# Patient Record
Sex: FEMALE | Race: WHITE | Employment: UNEMPLOYED | URBAN - METROPOLITAN AREA
[De-identification: names, ages, dates, MRNs, and addresses within clinical notes are randomized per-mention and may not be internally consistent; named-entity substitution may affect disease eponyms.]

---

## 2017-12-15 ENCOUNTER — HOSPITAL ENCOUNTER (INPATIENT)
Age: 22
LOS: 4 days | Discharge: HOME | DRG: 885 | End: 2017-12-19
Attending: PSYCHIATRY & NEUROLOGY | Admitting: PSYCHIATRY & NEUROLOGY
Payer: COMMERCIAL

## 2017-12-15 DIAGNOSIS — F31.9 BIPOLAR 1 DISORDER (HCC): Primary | ICD-10-CM

## 2017-12-15 LAB
ALBUMIN SERPL-MCNC: 4.7 G/DL (ref 3.9–4.9)
ALP BLD-CCNC: 45 U/L (ref 40–130)
ALT SERPL-CCNC: 14 U/L (ref 0–33)
AMPHETAMINE SCREEN, URINE: ABNORMAL
ANION GAP SERPL CALCULATED.3IONS-SCNC: 12 MEQ/L (ref 7–13)
AST SERPL-CCNC: 19 U/L (ref 0–35)
BACTERIA: NORMAL /HPF
BARBITURATE SCREEN URINE: ABNORMAL
BASOPHILS ABSOLUTE: 0.1 K/UL (ref 0–0.2)
BASOPHILS RELATIVE PERCENT: 1.2 %
BENZODIAZEPINE SCREEN, URINE: ABNORMAL
BILIRUB SERPL-MCNC: 0.2 MG/DL (ref 0–1.2)
BILIRUBIN URINE: NEGATIVE
BLOOD, URINE: NEGATIVE
BUN BLDV-MCNC: 7 MG/DL (ref 6–20)
CALCIUM SERPL-MCNC: 9.4 MG/DL (ref 8.6–10.2)
CANNABINOID SCREEN URINE: POSITIVE
CHLORIDE BLD-SCNC: 101 MEQ/L (ref 98–107)
CLARITY: CLEAR
CO2: 26 MEQ/L (ref 22–29)
COCAINE METABOLITE SCREEN URINE: ABNORMAL
COLOR: YELLOW
CREAT SERPL-MCNC: 0.75 MG/DL (ref 0.5–0.9)
EOSINOPHILS ABSOLUTE: 0.1 K/UL (ref 0–0.7)
EOSINOPHILS RELATIVE PERCENT: 0.5 %
EPITHELIAL CELLS, UA: NORMAL /HPF
ETHANOL PERCENT: NORMAL G/DL
ETHANOL: <10 MG/DL (ref 0–0.08)
GFR AFRICAN AMERICAN: >60
GFR NON-AFRICAN AMERICAN: >60
GLOBULIN: 2.4 G/DL (ref 2.3–3.5)
GLUCOSE BLD-MCNC: 101 MG/DL (ref 74–109)
GLUCOSE URINE: NEGATIVE MG/DL
HCG(URINE) PREGNANCY TEST: NEGATIVE
HCT VFR BLD CALC: 39.9 % (ref 37–47)
HEMOGLOBIN: 12.9 G/DL (ref 12–16)
KETONES, URINE: NEGATIVE MG/DL
LEUKOCYTE ESTERASE, URINE: ABNORMAL
LYMPHOCYTES ABSOLUTE: 2.2 K/UL (ref 1–4.8)
LYMPHOCYTES RELATIVE PERCENT: 20.2 %
Lab: ABNORMAL
MCH RBC QN AUTO: 28.6 PG (ref 27–31.3)
MCHC RBC AUTO-ENTMCNC: 32.4 % (ref 33–37)
MCV RBC AUTO: 88.1 FL (ref 82–100)
MONOCYTES ABSOLUTE: 0.5 K/UL (ref 0.2–0.8)
MONOCYTES RELATIVE PERCENT: 4.3 %
NEUTROPHILS ABSOLUTE: 8 K/UL (ref 1.4–6.5)
NEUTROPHILS RELATIVE PERCENT: 73.8 %
NITRITE, URINE: NEGATIVE
OPIATE SCREEN URINE: ABNORMAL
PDW BLD-RTO: 14.2 % (ref 11.5–14.5)
PH UA: 7 (ref 5–9)
PHENCYCLIDINE SCREEN URINE: ABNORMAL
PLATELET # BLD: 391 K/UL (ref 130–400)
POTASSIUM SERPL-SCNC: 3.8 MEQ/L (ref 3.5–5.1)
PROTEIN UA: NEGATIVE MG/DL
RBC # BLD: 4.53 M/UL (ref 4.2–5.4)
RBC UA: NORMAL /HPF (ref 0–2)
SODIUM BLD-SCNC: 139 MEQ/L (ref 132–144)
SPECIFIC GRAVITY UA: 1 (ref 1–1.03)
TOTAL CK: 143 U/L (ref 0–170)
TOTAL PROTEIN: 7.1 G/DL (ref 6.4–8.1)
TSH SERPL DL<=0.05 MIU/L-ACNC: 0.72 UIU/ML (ref 0.27–4.2)
URINE REFLEX TO CULTURE: YES
UROBILINOGEN, URINE: 0.2 E.U./DL
WBC # BLD: 10.8 K/UL (ref 4.8–10.8)
WBC UA: NORMAL /HPF (ref 0–5)

## 2017-12-15 PROCEDURE — 99285 EMERGENCY DEPT VISIT HI MDM: CPT

## 2017-12-15 PROCEDURE — 81001 URINALYSIS AUTO W/SCOPE: CPT

## 2017-12-15 PROCEDURE — 84703 CHORIONIC GONADOTROPIN ASSAY: CPT

## 2017-12-15 PROCEDURE — 82550 ASSAY OF CK (CPK): CPT

## 2017-12-15 PROCEDURE — 85025 COMPLETE CBC W/AUTO DIFF WBC: CPT

## 2017-12-15 PROCEDURE — 80307 DRUG TEST PRSMV CHEM ANLYZR: CPT

## 2017-12-15 PROCEDURE — 87077 CULTURE AEROBIC IDENTIFY: CPT

## 2017-12-15 PROCEDURE — 84443 ASSAY THYROID STIM HORMONE: CPT

## 2017-12-15 PROCEDURE — G0480 DRUG TEST DEF 1-7 CLASSES: HCPCS

## 2017-12-15 PROCEDURE — 87186 SC STD MICRODIL/AGAR DIL: CPT

## 2017-12-15 PROCEDURE — 80053 COMPREHEN METABOLIC PANEL: CPT

## 2017-12-15 PROCEDURE — 87086 URINE CULTURE/COLONY COUNT: CPT

## 2017-12-15 PROCEDURE — 1240000000 HC EMOTIONAL WELLNESS R&B

## 2017-12-15 PROCEDURE — 36415 COLL VENOUS BLD VENIPUNCTURE: CPT

## 2017-12-15 RX ORDER — CITALOPRAM 10 MG/1
10 TABLET ORAL DAILY
Status: DISCONTINUED | OUTPATIENT
Start: 2017-12-16 | End: 2017-12-16

## 2017-12-15 RX ORDER — CITALOPRAM 10 MG/1
10 TABLET ORAL DAILY
Status: ON HOLD | COMMUNITY
End: 2017-12-19 | Stop reason: HOSPADM

## 2017-12-15 RX ORDER — HALOPERIDOL 5 MG/ML
5 INJECTION INTRAMUSCULAR EVERY 6 HOURS PRN
Status: DISCONTINUED | OUTPATIENT
Start: 2017-12-15 | End: 2017-12-19 | Stop reason: HOSPADM

## 2017-12-15 RX ORDER — LAMOTRIGINE 150 MG/1
150 TABLET ORAL DAILY
Status: DISCONTINUED | OUTPATIENT
Start: 2017-12-16 | End: 2017-12-19

## 2017-12-15 RX ORDER — HYDROXYZINE PAMOATE 50 MG/1
50 CAPSULE ORAL EVERY 6 HOURS PRN
Status: DISCONTINUED | OUTPATIENT
Start: 2017-12-15 | End: 2017-12-19 | Stop reason: HOSPADM

## 2017-12-15 RX ORDER — ACETAMINOPHEN 325 MG/1
650 TABLET ORAL EVERY 4 HOURS PRN
Status: DISCONTINUED | OUTPATIENT
Start: 2017-12-15 | End: 2017-12-19 | Stop reason: HOSPADM

## 2017-12-15 RX ORDER — LAMOTRIGINE 150 MG/1
150 TABLET ORAL DAILY
Status: ON HOLD | COMMUNITY
End: 2021-01-03 | Stop reason: ALTCHOICE

## 2017-12-15 RX ORDER — HALOPERIDOL 5 MG
5 TABLET ORAL EVERY 6 HOURS PRN
Status: DISCONTINUED | OUTPATIENT
Start: 2017-12-15 | End: 2017-12-19 | Stop reason: HOSPADM

## 2017-12-15 RX ORDER — HYDROXYZINE HYDROCHLORIDE 50 MG/ML
50 INJECTION, SOLUTION INTRAMUSCULAR EVERY 6 HOURS PRN
Status: DISCONTINUED | OUTPATIENT
Start: 2017-12-15 | End: 2017-12-19 | Stop reason: HOSPADM

## 2017-12-15 RX ORDER — TRAZODONE HYDROCHLORIDE 50 MG/1
50 TABLET ORAL NIGHTLY PRN
Status: DISCONTINUED | OUTPATIENT
Start: 2017-12-16 | End: 2017-12-19 | Stop reason: HOSPADM

## 2017-12-15 ASSESSMENT — ENCOUNTER SYMPTOMS
VOMITING: 0
COLOR CHANGE: 0
RHINORRHEA: 0
BLOOD IN STOOL: 0
NAUSEA: 0
DIARRHEA: 0
ABDOMINAL PAIN: 0
SHORTNESS OF BREATH: 0
COUGH: 0

## 2017-12-16 PROBLEM — F31.9 BIPOLAR 1 DISORDER (HCC): Status: RESOLVED | Noted: 2017-12-15 | Resolved: 2017-12-16

## 2017-12-16 PROBLEM — F31.9 BIPOLAR DEPRESSION (HCC): Status: ACTIVE | Noted: 2017-12-16

## 2017-12-16 LAB
EKG ATRIAL RATE: 60 BPM
EKG P AXIS: 66 DEGREES
EKG P-R INTERVAL: 124 MS
EKG Q-T INTERVAL: 396 MS
EKG QRS DURATION: 102 MS
EKG QTC CALCULATION (BAZETT): 396 MS
EKG R AXIS: 72 DEGREES
EKG T AXIS: 54 DEGREES
EKG VENTRICULAR RATE: 60 BPM

## 2017-12-16 PROCEDURE — 1240000000 HC EMOTIONAL WELLNESS R&B

## 2017-12-16 PROCEDURE — 6370000000 HC RX 637 (ALT 250 FOR IP): Performed by: PSYCHIATRY & NEUROLOGY

## 2017-12-16 PROCEDURE — 93005 ELECTROCARDIOGRAM TRACING: CPT

## 2017-12-16 RX ORDER — NICOTINE 21 MG/24HR
1 PATCH, TRANSDERMAL 24 HOURS TRANSDERMAL DAILY
Status: DISCONTINUED | OUTPATIENT
Start: 2017-12-16 | End: 2017-12-16

## 2017-12-16 RX ADMIN — NICOTINE POLACRILEX 2 MG: 2 GUM, CHEWING BUCCAL at 08:48

## 2017-12-16 RX ADMIN — BREXPIPRAZOLE 1 MG: 1 TABLET ORAL at 14:09

## 2017-12-16 RX ADMIN — NICOTINE POLACRILEX 2 MG: 2 GUM, CHEWING BUCCAL at 14:16

## 2017-12-16 RX ADMIN — CITALOPRAM HYDROBROMIDE 10 MG: 10 TABLET, FILM COATED ORAL at 08:48

## 2017-12-16 RX ADMIN — HYDROXYZINE PAMOATE 50 MG: 50 CAPSULE ORAL at 19:11

## 2017-12-16 RX ADMIN — LAMOTRIGINE 150 MG: 150 TABLET ORAL at 08:48

## 2017-12-16 ASSESSMENT — SLEEP AND FATIGUE QUESTIONNAIRES
AVERAGE NUMBER OF SLEEP HOURS: 7
DO YOU USE A SLEEP AID: NO
DO YOU HAVE DIFFICULTY SLEEPING: NO
SLEEP PATTERN: NORMAL

## 2017-12-16 ASSESSMENT — PATIENT HEALTH QUESTIONNAIRE - PHQ9: SUM OF ALL RESPONSES TO PHQ QUESTIONS 1-9: 4

## 2017-12-16 NOTE — ED NOTES
Urine collected and sent to lab.  Lab notified of blood work orders     [facility]  12/15/17 1940

## 2017-12-16 NOTE — CONSULTS
Medications   Medication Dose Route Frequency Provider Last Rate Last Dose    nicotine polacrilex (NICORETTE) gum 2 mg  2 mg Oral PRN Renald Collet, MD   2 mg at 12/16/17 0848    citalopram (CELEXA) tablet 10 mg  10 mg Oral Daily Chase Ruelas MD   10 mg at 12/16/17 0848    lamoTRIgine (LAMICTAL) tablet 150 mg  150 mg Oral Daily Chase Ruelas MD   150 mg at 12/16/17 0848    acetaminophen (TYLENOL) tablet 650 mg  650 mg Oral Q4H PRN Chase Ruelas MD        magnesium hydroxide (MILK OF MAGNESIA) 400 MG/5ML suspension 30 mL  30 mL Oral Daily PRN Chase Ruelas MD        hydrOXYzine (VISTARIL) capsule 50 mg  50 mg Oral Q6H PRN Chase Ruelas MD        Or    hydrOXYzine (VISTARIL) injection 50 mg  50 mg Intramuscular Q6H PRN Chase Ruelas MD        haloperidol lactate (HALDOL) injection 5 mg  5 mg Intramuscular Q6H PRN Chase Ruelas MD        Or    haloperidol (HALDOL) tablet 5 mg  5 mg Oral Q6H PRN Chase Ruelas MD        traZODone (DESYREL) tablet 50 mg  50 mg Oral Nightly PRN Chase Ruelas MD           ALLERGIES: Penicillins    REVIEW OF SYSTEM:   ROS as noted in HPI, 12 point ROS reviewed and otherwise negative. OBJECTIVE  PHYSICAL EXAM: /69   Pulse 101   Temp 99 °F (37.2 °C) (Oral)   Resp 16   Ht 5' 3\" (1.6 m)   Wt 125 lb (56.7 kg)   SpO2 100%   BMI 22.14 kg/m²     General appearance:  No apparent distress, appears stated age and cooperative. HEENT:  Normal cephalic, atraumatic without obvious deformity. Pupils equal, round, and reactive to light. Extra ocular muscles intact. Conjunctivae/corneas clear. Neck: Supple, with full range of motion. No jugular venous distention. Trachea midline. Respiratory:  Normal respiratory effort. Clear to auscultation, bilaterally without Rales/Wheezes/Rhonchi. Cardiovascular:  Regular rate and rhythm with normal S1/S2 without murmurs, rubs or gallops.   Abdomen: Soft, non-tender, non-distended with normal bowel sounds. Musculoskeletal:  No clubbing, cyanosis or edema bilaterally. Full range of motion without deformity. Skin: Skin color, texture, turgor normal.  No rashes or lesions. Neurologic:  Neurovascularly intact without any focal sensory/motor deficits. Cranial nerves: II-XII intact, grossly non-focal.  Psychiatric:  Alert and oriented, thought content appropriate, normal insight  Capillary Refill: Brisk,< 3 seconds   Peripheral Pulses: +2 palpable, equal bilaterally     DATA:     Diagnostic tests reviewed for today's visit:    Most recent labs and imaging results reviewed. ASSESSMENT AND PLAN  Patient Active Hospital Problem List:   Bipolar 1 disorder ([facility] [address] (12/15/2017)    Assessment:     Plan:     General adult medical exam without abnormal findings        This is only a history and physical examination and not medical management. The patient is to contact and follow up with their primary care physician and go over any abnormal labs, imaging, findings, medical concerns, or conditions that we have and have not addressed during this encounter.     Plan of care discussed with: patient    SIGNATURE: Jovanni Markham CNP  DATE: December 16, 2017  TIME: 10:46 AM

## 2017-12-16 NOTE — PROGRESS NOTES
Pt arrived to unit with [facility]FFILIATE OF [facility] staff. Pt taken to shower ada room searched in presence of green team RN and SELIN RN. A cell phone and no other contraband was found. Cell phone secured and placed with belongings.

## 2017-12-16 NOTE — PROGRESS NOTES
Pt arrived to unit. Skin assessment and contraband check complete with this nurse and Jens Hilliard. Pt agreeable. Skin intact. Pt's cell phone found in her sock. Explained to pt that she can not have her cell phone on the unit. Pt agreeable, gave staff cell phone. Cell phone placed with pt's belongings. Pt oriented to unit and assigned room.  Electronically signed by Digna Murray RN on 12/15/17 at 10:53 PM

## 2017-12-16 NOTE — PROGRESS NOTES
Report called by Jacquie Ray RN - SELIN. Pt is 21y/o white female. Involuntary admit of Dr. Deloris Amin with diagnosis of Bipolar. Pt reports having rapid and intense mood swings, sees [facility] weekly. Pt reports life full of stress. Pt reportedly grabbed meds to OD today, BF took meds and pt to see counselor who referred to ER and followed pt and BF here. Pt denies any current suicidal ideation, homicidal ideation or hallucinations. BF reports he does not feel that the pt can be safe  .      Med history = as charted  Surgical History = as charted  Psych History =bipolar open at [facility] =  none

## 2017-12-16 NOTE — PROGRESS NOTES
Pt  requested to sign voluntary and did so. Pt at first was very calm and mood seemed stable. After assessment, pedro was seen on phone crying. Pt reports she was talking with her therapist and just feels overwhelmed right now. Pt disappointed that she wont have visitors. Refused lunch. Isolated to room sitting on floor. Encouraged to come out.  Pt does contract for safety while on unit

## 2017-12-16 NOTE — ED PROVIDER NOTES
probability of clinically significant/life threatening deterioration in the patient's condition which required my urgent intervention. Procedures    FINAL IMPRESSION      1. Bipolar 1 disorder ([facility] [address]          DISPOSITION/PLAN   DISPOSITION Admitted    PATIENT REFERRED TO:  No follow-up provider specified. DISCHARGE MEDICATIONS:  New Prescriptions    No medications on file          (Please note that portions of this note were completed with a voice recognition program.  Efforts were made to edit the dictations but occasionally words are mis-transcribed. )    THOMPSON Marques (electronically signed)  Emergency Physician [address]  12/15/17 4019

## 2017-12-16 NOTE — PROGRESS NOTES
Pt. refused to attend the 1000 skills group, despite staff encouragement. Electronically signed by Braden Watt, [address] on 12/16/2017 at 12:09 PM

## 2017-12-16 NOTE — ED TRIAGE NOTES
Pt dropped off by boyfriend to ED and her therapist followed them to ED. Pt states things were crazy at home so her boyfriend called her therapist. Pt saw her therapist today and was concerned and advised that she go to ED for an eval. Pt sees a therapist, Brina Crawley at [facility]. Pt has been seeing her weekly since May 2017.

## 2017-12-16 NOTE — PROGRESS NOTES
Pt. refused to attend the 0900 community meeting due to resting in room, despite staff encouragement. Electronically signed by Irina Do, [address] on 12/16/2017 at 10:55 AM

## 2017-12-16 NOTE — H&P
Department of Psychiatry  History and Physical - Adult     CHIEF COMPLAINT:  Depression, SI    History obtained from:  patient    Patient was seen after discussing with the treatment team and reviewing the chart    HISTORY OF PRESENT ILLNESS:    The patient is a 25 y.o. female with significant past history of  Bipolar disorder    Pt was transported to ED by her boyfriend and her [address]  therapist, Alessia MORALES followed behind them. Pt states she got into an argument with her boyfriend,she complains he doesn't listen. Pt complains she was feeling very frustrated today from work and there is a lot of family drama. Pt states she was feeling very intense. Pt admits she stated \"I'm just done\" and she went to grab her pills in attempt to OD. The boyfriend snatched up all her pills and called her therapist who was able to see her. Pt states that she is just \"tangled up in life stress\". Pt lives with boyfriend, Jeovany Rivera and his 3 kids  (1, 8, 7 yo). Pt states in general there are no relationship problems. Pt reports she works full-time at [employer]. Pt sees her [address]  therapist on a weekly basis since May 2017. Her psychiatrist is at [facility].   Pt states she has been on Lamictal only 3 months and Celexa for 1 month. Collateral Information obtained from boyfriend and therapist. Boyfriend believes she needs a medication adjustment. BF thinks something is not right for the past 3 months with her mood and it has gotten intense this past week. BF complains pt is having mood swings and easily agitated. BF does not feel comfortable taking her back home because there are children at home. Therapist agrees that her mood swings have become more intense and she is usually able to cope better. Therapist states that they have a safety plan written that she has not adhered to.  Therapist is concerned that her behavior today was an attempt of self-harm which her behavior/thoughts have never gotten this intense in a long time. Therapist agrees that pt needs a medication adjustment. Stressors:relationship issue    The patient is currently receiving care for the above psychiatric illness. Medications Prior to Admission:   Prescriptions Prior to Admission: lamoTRIgine (LAMICTAL) 150 MG tablet, Take 150 mg by mouth daily  citalopram (CELEXA) 10 MG tablet, Take 10 mg by mouth daily     Compliance:yes    Psychiatric Review of Systems       Depression: yes     Saranya or Hypomania:  yes -     Panic Attacks:  no     Phobias:  no     Obsessions and Compulsions:  no     PTSD : no     Hallucinations:  no     Delusions:  no    Substance Abuse History:  ETOH: no  Marijuana: yes  Opiates: no  Other Drugs: no    Past Psychiatric History:  Prior Diagnosis:  Bipolar II disorder; depressed episode  Psychiatrist: kelly  Therapist:yes  Hospitalization: yes 2013  Hx of Suicidal Attempts: yes  Hx of violence:  no  ECT: no  Previous discontinued Psychiatric Med Trials: not recall    Past Medical History:        Diagnosis Date    Migraine     Varicella        Past Surgical History:    History reviewed. No pertinent surgical history.     Allergies:   Penicillins    Family History  Family History   Problem Relation Age of Onset    Cancer Other      brain, breast, lung    Coronary Art Dis Other     High Blood Pressure Brother     Obesity Brother     Cancer Maternal Grandmother     Allergies Maternal Grandmother     Colon Cancer Maternal Grandmother     Breast Cancer Maternal Grandmother     Ovarian Cancer Maternal Grandmother     Other Maternal Grandmother      eye cancer, loss of left eye    Cancer Maternal Grandfather      lung    Heart Disease Maternal Grandfather     Uterine Cancer Mother     Ovarian Cancer Mother     Breast Cancer Mother     Cancer Mother      behind right eye    Heart Attack Mother     Migraines Mother     Cancer Maternal Aunt      lung Recent Labs      12/15/17   1948   BILITOT  0.2   ALKPHOS  45   AST  19   ALT  14     Lab Results   Component Value Date    LABAMPH Neg 12/15/2017    BARBSCNU Neg 12/15/2017    LABBENZ Neg 12/15/2017    LABBENZ NT DTCD 03/01/2013    OPIATESCREENURINE Neg 12/15/2017    PHENCYCLIDINESCREENURINE Neg 12/15/2017    ETOH <10 12/15/2017     Lab Results   Component Value Date    TSH 0.721 12/15/2017     No results found for: LITHIUM  No results found for: VALPROATE, CBMZ  No results found for: LITHIUM, VALPROATE    Radiology  No results found. TREATMENT PLAN:    Risk Management:  close watch and suicide risk    Collateral Information:  Will obtain collateral information from the family or friends. Will obtain medical records as appropriate from out patient providers  Will consult the hospitalist for a physical exam to rule out any co-morbid physical condition. Medications:    See orders    Prn Haldol 5mg and Vistaril 50mg q6hr for extreme agitation. Discussed with the patient risk, benefit, alternative and common side effects for the  proposed medication treatment. Patient is consenting to the treatment. Psychotherapy:   Encourage participation in milieu and group therapy  Individual therapy as needed      GENERAL PATIENT/FAMILY EDUCATION    Goals:    Patient will understand basic signs and symptoms  Patient will understand their role in recovery          Behavioral Services  Medicare Certification      Admission Day 1  I certify that this patient's inpatient psychiatric hospital admission is medically necessary for:     (1) treatment which could reasonably be expected to improve this patient's condition, or     (2) diagnostic study or its equivalent.        Electronically signed by Duke Schofield MD on 12/16/2017 at 10:55 AM

## 2017-12-16 NOTE — ED NOTES
Provisional Diagnosis:   Bipolar Disorder    Psychosocial and Contextual Factors:   Pt was transported to ED by her boyfriend and her [place] therapist, Charanjit MORALES followed behind them. Pt states she got into an argument with her boyfriend today,she complains he doesn't listen. Pt complains she was feeling very frustrated today from work and there is a lot of family drama. Pt states she was feeling very intense. Pt admits she stated \"I'm just done\" and she went to grab her pills in attempt to OD. The boyfriend snatched up all her pills and called her therapist who was able to see her today. Pt states that she is just \"tangled up in life stress\". Pt lives with boyfriend, Trever Stanley and his 3 kids  (1, 8, 7 yo). Pt states in general there are no relationship problems. Pt reports she works full-time at [employer]. Pt sees her [place] therapist on a weekly basis since May 2017. Her psychiatrist is at [facility]. Pt states she has been on Lamictal only 3 months and Celexa for 1 month. Collateral Information obtained from boyfriend and therapist. Boyfriend believes she needs a medication adjustment. BF thinks something is not right for the past 3 months with her mood and it has gotten intense this past week. BF complains pt is having mood swings and easily agitated. BF does not feel comfortable taking her back home because there are children at home. Therapist agrees that her mood swings have become more intense and she is usually able to cope better. Therapist states that they have a safety plan written that she has not adhered to. Therapist is concerned that her behavior today was an attempt of self-harm which her behavior/thoughts have never gotten this intense in a long time. Therapist agrees that pt needs a medication adjustment.     C-SSRS Summary:  yes    Patient: pt currently denies  Family: boyfriend admits yes  Agency: therapist admits yes      Present Suicidal Behavior:  yes    Verbal: none    Attempt:pt attempted to grab her pills but bf snatched them out of her hand    Past Suicidal Behavior: pt admits past history    Verbal:none    Attempt:2012 cutting and took OD of Nyquil      Self-Injurious/Self-Mutilation:pt admits to cutting 6 months ago with razor upper thigh before then was 2 yrs ago    Trauma Identified:  None reported      Protective Factors:    Pt lives with boyfriend been together for 3 yrs. Pt has support and established rapport at [facility] with Prescribing Provider and therapist. Pt is employed full-time and enjoys work. Reports med complaince    Risk Factors:    Pt lacks insight into her illness and not motivated for treatment wanting to follow-up outpatient. Pt uses marijuana as a coping skill to calm her down    Clinical Summary:    See above Pt cooperative and pleasant. No psychosis noted. Thought process and content intact.        Level of Care Disposition:    TO be determined by physician    Insurance Precertification Authorization:  N/A       Grace Shea RN  12/15/17 9721

## 2017-12-17 PROCEDURE — 6370000000 HC RX 637 (ALT 250 FOR IP): Performed by: PSYCHIATRY & NEUROLOGY

## 2017-12-17 PROCEDURE — 1240000000 HC EMOTIONAL WELLNESS R&B

## 2017-12-17 RX ADMIN — TRAZODONE HYDROCHLORIDE 50 MG: 50 TABLET ORAL at 21:49

## 2017-12-17 RX ADMIN — NICOTINE POLACRILEX 2 MG: 2 GUM, CHEWING BUCCAL at 08:32

## 2017-12-17 RX ADMIN — BREXPIPRAZOLE 1 MG: 1 TABLET ORAL at 08:28

## 2017-12-17 RX ADMIN — NICOTINE POLACRILEX 2 MG: 2 GUM, CHEWING BUCCAL at 13:26

## 2017-12-17 RX ADMIN — LAMOTRIGINE 150 MG: 150 TABLET ORAL at 08:28

## 2017-12-17 RX ADMIN — NICOTINE POLACRILEX 2 MG: 2 GUM, CHEWING BUCCAL at 18:56

## 2017-12-17 ASSESSMENT — LIFESTYLE VARIABLES: HISTORY_ALCOHOL_USE: NO

## 2017-12-17 NOTE — CARE COORDINATION
BHI Biopsychosocial Assessment    Current Level of Psychosocial Functioning     Independent x  Dependent    Minimal Assist     Comments:  Patient works and lives with her boyfriend. They take care of his three children together. Patient does not receive any government assistance. She works and is provided employer sponsored benefits. Psychosocial High Risk Factors (check all that apply)    Unable to obtain meds   Chronic illness/pain    Substance abuse   Lack of Family Support   Financial stress   Isolation   Inadequate Community Resources  Suicide attempt(s)  Not taking medications   Victim of crime   Developmental Delay  Unable to manage personal needs    Age 72 or older   Homeless  No transportation   Readmission within 30 days  Unemployment  Traumatic Event    Comments: Patient did not indicate that she is experiencing any high risk psychosocial stressors. Psychiatric Advanced Directives: None Reported. Family to Involve in Treatment: Patient provided her boyfriends information to complete collateral contact. Sexual Orientation:  Patient is in a heterosexual relationship. Patient Strengths: Patient is cooperative and engaging. Patient Barriers: None Identified. Opiate Education Provided:  N/A    CMHC/mental health history: Patient currently receives psychiatric and counseling services at [facility]. Plan of Care   medication management, group/individual therapies, family meetings, psycho -education, treatment team meetings to assist with stabilization    Initial Discharge Plan:  Patient will return to her boyfriend's home, continue with [facility] and follow the recommendations of the treatment team.      Clinical Summary:    Patient is a 25year old female who was admitted to the [facility] due to suicidal ideation. Patient was cooperative and engaging due the assessment. She voiced her concern about her medication being ineffective prior to this hospitalization.  Patient is convinced

## 2017-12-17 NOTE — PROGRESS NOTES
Pt reports feeling clearer today. Had a good night sleep. Pt will try to eat today. Reports it is normal for her to only eat once a day. Out and social. Denies all. Enjoyed her visits yesterday. More resigned to being here.

## 2017-12-17 NOTE — PROGRESS NOTES
Patient denies SI, HI, and AVH. Denies depression and anxiety. Patient brighter today, feels medications are working well for her already. Visible on unit, social with peers. Polite and cooperative with staff.  Electronically signed by Guillermina Juarez LPN on 71/83/6830 at 5:30 PM

## 2017-12-18 PROBLEM — B96.20 E. COLI UTI: Status: ACTIVE | Noted: 2017-12-18

## 2017-12-18 PROBLEM — N39.0 E. COLI UTI: Status: ACTIVE | Noted: 2017-12-18

## 2017-12-18 LAB
ORGANISM: ABNORMAL
URINE CULTURE, ROUTINE: ABNORMAL
URINE CULTURE, ROUTINE: ABNORMAL

## 2017-12-18 PROCEDURE — 6370000000 HC RX 637 (ALT 250 FOR IP): Performed by: INTERNAL MEDICINE

## 2017-12-18 PROCEDURE — 6370000000 HC RX 637 (ALT 250 FOR IP): Performed by: PSYCHIATRY & NEUROLOGY

## 2017-12-18 PROCEDURE — 99232 SBSQ HOSP IP/OBS MODERATE 35: CPT | Performed by: PSYCHIATRY & NEUROLOGY

## 2017-12-18 PROCEDURE — 1240000000 HC EMOTIONAL WELLNESS R&B

## 2017-12-18 RX ORDER — CIPROFLOXACIN 500 MG/1
250 TABLET, FILM COATED ORAL EVERY 12 HOURS SCHEDULED
Status: DISCONTINUED | OUTPATIENT
Start: 2017-12-18 | End: 2017-12-19 | Stop reason: HOSPADM

## 2017-12-18 RX ORDER — ACETAMINOPHEN 80 MG
TABLET,CHEWABLE ORAL ONCE
Status: DISCONTINUED | OUTPATIENT
Start: 2017-12-18 | End: 2017-12-19 | Stop reason: HOSPADM

## 2017-12-18 RX ORDER — CIPROFLOXACIN 500 MG/1
500 TABLET, FILM COATED ORAL EVERY 12 HOURS SCHEDULED
Status: DISCONTINUED | OUTPATIENT
Start: 2017-12-18 | End: 2017-12-18

## 2017-12-18 RX ADMIN — TRAZODONE HYDROCHLORIDE 50 MG: 50 TABLET ORAL at 20:58

## 2017-12-18 RX ADMIN — NICOTINE POLACRILEX 2 MG: 2 GUM, CHEWING BUCCAL at 18:00

## 2017-12-18 RX ADMIN — LAMOTRIGINE 150 MG: 150 TABLET ORAL at 08:22

## 2017-12-18 RX ADMIN — BREXPIPRAZOLE 1 MG: 1 TABLET ORAL at 08:22

## 2017-12-18 RX ADMIN — CIPROFLOXACIN HYDROCHLORIDE 250 MG: 500 TABLET, FILM COATED ORAL at 20:57

## 2017-12-18 RX ADMIN — HYDROXYZINE PAMOATE 50 MG: 50 CAPSULE ORAL at 14:45

## 2017-12-18 RX ADMIN — NICOTINE POLACRILEX 2 MG: 2 GUM, CHEWING BUCCAL at 12:41

## 2017-12-18 RX ADMIN — NICOTINE POLACRILEX 2 MG: 2 GUM, CHEWING BUCCAL at 09:17

## 2017-12-18 NOTE — PLAN OF CARE
Problem: Altered Mood, Depressive Behavior  Goal: LTG-Able to verbalize acceptance of life and situations over which he or she has no control  Outcome: Met This Shift    Goal: LTG-Able to verbalize and/or display a decrease in depressive symptoms  Outcome: Met This Shift    Goal: STG-Able to verbalize suicidal ideations  Outcome: Not Met This Shift  Pt denies SI    Goal: STG-Able to verbalize support system  Outcome: Met This Shift    Goal: LTG-Absence of self-harm  Outcome: Met This Shift    Goal: STG-Knowledge of positive coping patterns  Outcome: Met This Shift    Goal: Patient Specific Goal  Outcome: Met This Shift    Goal: Participation in care planning  Outcome: Met This Shift      Problem: Suicide risk  Goal: Provide patient with safe environment  Provide patient with safe environment   Outcome: Met This Shift

## 2017-12-18 NOTE — PROGRESS NOTES
Group Therapy Note    Date: 12/18/2017  Start Time: 1100  End Time:  0687  Number of Participants: 5    Type of Group: Psychoeducation    Wellness Binder Information  Module Name:    Session Number:      Patient's Goal:  \"to go home\"    Notes:  Pt. attended the skill group. Slightly elevated. [place] engaged in the group discussion. Very helpful to other pts. Status After Intervention:  Unchanged    Participation Level:  Active Listener, Interactive and Monopolizing    Participation Quality: Appropriate, Attentive, Sharing, Supportive and Intrusive      Speech:  normal      Thought Process/Content: Logical      Affective Functioning: Congruent      Mood: elevated      Level of consciousness:  Alert, Oriented x4 and Attentive      Response to Learning: Progressing to goal      Endings: None Reported    Modes of Intervention: Education, Support, Socialization and Activity      Discipline Responsible: Psychoeducational Specialist      Signature:  Hetal Ring

## 2017-12-18 NOTE — CARE COORDINATION
Group Therapy Note    Date: 12/18/2017  Start Time: 1330  End Time:  9476    Number of Participants:6    Type of Group: Psychotherapy    Patient's Goal:  To spend time on interpersonal concerns. Notes:  Patient was verbal about her own concerns and supportive of others. Status After Intervention:  Improved    Participation Level: Active Listener    Participation Quality: Appropriate      Speech:  normal      Thought Process/Content: Logical      Affective Functioning: Congruent      Mood: elevated      Level of consciousness:  Alert      Response to Learning: Able to verbalize current knowledge/experience      Endings: None Reported    Modes of Intervention: Education      Discipline Responsible: /Counselor      Signature:   ASHANTI Faulkner

## 2017-12-18 NOTE — PLAN OF CARE
Problem: Altered Mood, Depressive Behavior  Goal: LTG-Able to verbalize acceptance of life and situations over which he or she has no control  Outcome: Ongoing    Goal: LTG-Able to verbalize and/or display a decrease in depressive symptoms  Outcome: Ongoing    Goal: STG-Able to verbalize suicidal ideations  Outcome: Ongoing    Goal: STG-Able to verbalize support system  Outcome: Ongoing    Goal: LTG-Absence of self-harm  Outcome: Ongoing    Goal: STG-Knowledge of positive coping patterns  Outcome: Ongoing    Goal: Patient Specific Goal  Outcome: Ongoing    Goal: Participation in care planning  Outcome: Ongoing

## 2017-12-18 NOTE — PROGRESS NOTES
[facility] FOLLOW-UP NOTE     12/18/2017     Patient was seen and examined in person, Chart reviewed   Patient's case discussed with staff/team    Chief Complaint: depression    Interim History:   Pt doing better  Mood less depressed or mood swings  Tolerating medication. Less hopeless and worthless    Appetite:   [x] Normal/Unchanged  [] Increased  [] Decreased      Sleep:       [] Normal/Unchanged  [x] Fair       [] Poor              Energy:    [] Normal/Unchanged  [] Increased  [x] Decreased        SI [] Present  [x] Absent    HI  []Present  [x] Absent     Aggression:  [] yes  [x] no    Patient is [x] able  [] unable to CONTRACT FOR SAFETY     PAST MEDICAL/PSYCHIATRIC HISTORY:   Past Medical History:   Diagnosis Date    Migraine     Varicella        FAMILY/SOCIAL HISTORY:  Family History   Problem Relation Age of Onset    Cancer Other      brain, breast, lung    Coronary Art Dis Other     High Blood Pressure Brother     Obesity Brother     Cancer Maternal Grandmother     Allergies Maternal Grandmother     Colon Cancer Maternal Grandmother     Breast Cancer Maternal Grandmother     Ovarian Cancer Maternal Grandmother     Other Maternal Grandmother      eye cancer, loss of left eye    Cancer Maternal Grandfather      lung    Heart Disease Maternal Grandfather     Uterine Cancer Mother     Ovarian Cancer Mother     Breast Cancer Mother     Cancer Mother      behind right eye    Heart Attack Mother     Migraines Mother     Cancer Maternal Aunt      lung     Social History     Social History    Marital status: Single     Spouse name: N/A    Number of children: N/A    Years of education: N/A     Occupational History    Not on file.      Social History Main Topics    Smoking status: Current Every Day Smoker     Packs/day: 0.50     Types: Cigarettes    Smokeless tobacco: Never Used    Alcohol use No    Drug use: Yes     Types: Marijuana    Sexual activity: Not on file     Other Topics Concern    Not on file     Social History Narrative    No narrative on file           ROS:  [x] All negative/unchanged except if checked.  Explain positive(checked items) below:  [] Constitutional  [] Eyes  [] Ear/Nose/Mouth/Throat  [] Respiratory  [] CV  [] GI  []   [] Musculoskeletal  [] Skin/Breast  [] Neurological  [] Endocrine  [] Heme/Lymph  [] Allergic/Immunologic    Explanation:     MEDICATIONS:    Current Facility-Administered Medications:     nicotine polacrilex (NICORETTE) gum 2 mg, 2 mg, Oral, PRN, Shania Oliva MD, 2 mg at 12/18/17 0917    brexpiprazole (REXULTI) tablet 1 mg, 1 mg, Oral, Daily, Shania Oliva MD, 1 mg at 12/18/17 9597    lamoTRIgine (LAMICTAL) tablet 150 mg, 150 mg, Oral, Daily, Shay Murray MD, 150 mg at 12/18/17 4682    acetaminophen (TYLENOL) tablet 650 mg, 650 mg, Oral, Q4H PRN, Shay Murray MD    magnesium hydroxide (MILK OF MAGNESIA) 400 MG/5ML suspension 30 mL, 30 mL, Oral, Daily PRN, Shay Murray MD    hydrOXYzine (VISTARIL) capsule 50 mg, 50 mg, Oral, Q6H PRN, 50 mg at 12/16/17 1911 **OR** hydrOXYzine (VISTARIL) injection 50 mg, 50 mg, Intramuscular, Q6H PRN, Shay Murray MD    haloperidol lactate (HALDOL) injection 5 mg, 5 mg, Intramuscular, Q6H PRN **OR** haloperidol (HALDOL) tablet 5 mg, 5 mg, Oral, Q6H PRN, Shay Murray MD    traZODone (DESYREL) tablet 50 mg, 50 mg, Oral, Nightly PRN, Shay Murray MD, 50 mg at 12/17/17 0307      Examination:  /70   Pulse 80   Temp 98 °F (36.7 °C)   Resp 16   Ht 5' 3\" (1.6 m)   Wt 125 lb (56.7 kg)   SpO2 98%   BMI 22.14 kg/m²   Gait - steady  Medication side effects(SE): no    Mental Status Examination:    Level of consciousness:  within normal limits   Appearance:  fair grooming and fair hygiene  Behavior/Motor:  no abnormalities noted  Attitude toward examiner:  cooperative  Speech:  spontaneous and normal rate   Mood: dysthymic  Affect:  mood congruent  Thought processes:  linear and goal directed   Thought content:  Suicidal Ideation:  denies suicidal ideation  Delusions:  no evidence of delusions  Perceptual Disturbance:  denies any perceptual disturbance  Cognition:  oriented to person, place, and time   Concentration intact  Insight fair   Judgement fair     ASSESSMENT:   Patient symptoms are:  [] Well controlled  [] Improving  [] Worsening  [x] No change      Diagnosis:   Principal Problem:    Bipolar depression ([facility] [address]  Active Problems:    Anxiety disorder  UTI    LABS:    Recent Labs      12/15/17   1948   WBC  10.8   HGB  12.9   PLT  391     Recent Labs      12/15/17   1948   NA  139   K  3.8   CL  101   CO2  26   BUN  7   CREATININE  0.75   GLUCOSE  101     Recent Labs      12/15/17   1948   BILITOT  0.2   ALKPHOS  45   AST  19   ALT  14     Lab Results   Component Value Date    LABAMPH Neg 12/15/2017    BARBSCNU Neg 12/15/2017    LABBENZ Neg 12/15/2017    LABBENZ NT DTCD 03/01/2013    OPIATESCREENURINE Neg 12/15/2017    PHENCYCLIDINESCREENURINE Neg 12/15/2017    ETOH <10 12/15/2017     Lab Results   Component Value Date    TSH 0.721 12/15/2017     No results found for: LITHIUM  No results found for: VALPROATE, CBMZ      Treatment Plan:  Reviewed current Medications with the patient. Increase rexulti 2 mg po q daily  Pt Urine culture - positive for E Coli. Hospitalist to be consulted  Risks, benefits, side effects, drug-to-drug interactions and alternatives to treatment were discussed. Collateral information  CD evaluation  Encourage patient to attend group and other milieu activities.   Discharge planning discussed with the patient and treatment team.    PSYCHOTHERAPY/COUNSELING:  [x] Therapeutic interview  [x] Supportive  [] CBT  [] Ongoing  [] Other  Patient was seen 1:1 for 20 minutes, other than E&M time spent, focusing on      - coping skills techniques     - Anxiety management techniques discussed including deep breathing exercise and PMR     - discussing patients strength and weakness        [x] Patient continues to need, on a daily basis, active treatment furnished directly by or requiring the supervision of inpatient psychiatric personnel      Anticipated Length of stay:            Electronically signed by Rhonda Quarles MD on 12/18/2017 at 11:20 AM

## 2017-12-19 VITALS
WEIGHT: 125 LBS | HEIGHT: 63 IN | BODY MASS INDEX: 22.15 KG/M2 | DIASTOLIC BLOOD PRESSURE: 82 MMHG | HEART RATE: 111 BPM | RESPIRATION RATE: 18 BRPM | OXYGEN SATURATION: 97 % | TEMPERATURE: 99.3 F | SYSTOLIC BLOOD PRESSURE: 118 MMHG

## 2017-12-19 PROCEDURE — 6370000000 HC RX 637 (ALT 250 FOR IP): Performed by: PSYCHIATRY & NEUROLOGY

## 2017-12-19 PROCEDURE — 6370000000 HC RX 637 (ALT 250 FOR IP): Performed by: INTERNAL MEDICINE

## 2017-12-19 PROCEDURE — 99239 HOSP IP/OBS DSCHRG MGMT >30: CPT | Performed by: PSYCHIATRY & NEUROLOGY

## 2017-12-19 RX ORDER — LAMOTRIGINE 25 MG/1
25 TABLET ORAL DAILY
Qty: 30 TABLET | Refills: 0 | Status: SHIPPED | OUTPATIENT
Start: 2017-12-19 | End: 2019-03-13

## 2017-12-19 RX ORDER — CIPROFLOXACIN 250 MG/1
250 TABLET, FILM COATED ORAL EVERY 12 HOURS SCHEDULED
Qty: 18 TABLET | Refills: 0 | Status: SHIPPED | OUTPATIENT
Start: 2017-12-19 | End: 2017-12-29

## 2017-12-19 RX ADMIN — CIPROFLOXACIN HYDROCHLORIDE 250 MG: 500 TABLET, FILM COATED ORAL at 07:53

## 2017-12-19 RX ADMIN — NICOTINE POLACRILEX 2 MG: 2 GUM, CHEWING BUCCAL at 12:21

## 2017-12-19 RX ADMIN — BREXPIPRAZOLE 1 MG: 1 TABLET ORAL at 07:53

## 2017-12-19 RX ADMIN — NICOTINE POLACRILEX 2 MG: 2 GUM, CHEWING BUCCAL at 08:44

## 2017-12-19 RX ADMIN — LAMOTRIGINE 150 MG: 150 TABLET ORAL at 07:54

## 2017-12-19 RX ADMIN — BREXPIPRAZOLE 1 MG: 1 TABLET ORAL at 12:08

## 2017-12-19 NOTE — DISCHARGE SUMMARY
behind right eye    Heart Attack Mother     Migraines Mother     Cancer Maternal Aunt      lung     Social History     Social History    Marital status: Single     Spouse name: N/A    Number of children: N/A    Years of education: N/A     Occupational History    Not on file.      Social History Main Topics    Smoking status: Current Every Day Smoker     Packs/day: 0.50     Types: Cigarettes    Smokeless tobacco: Never Used    Alcohol use No    Drug use: Yes     Types: Marijuana    Sexual activity: Not on file     Other Topics Concern    Not on file     Social History Narrative    No narrative on file       MEDICATIONS:    Current Facility-Administered Medications:     [START ON 12/20/2017] brexpiprazole (REXULTI) tablet 2 mg, 2 mg, Oral, Daily, Antonio Don MD    [START ON 12/20/2017] lamoTRIgine (LAMICTAL) tablet 175 mg, 175 mg, Oral, Daily, Antonio Don MD    ciprofloxacin (CIPRO) tablet 250 mg, 250 mg, Oral, 2 times per day, Tremaine Marte MD, 250 mg at 12/19/17 0753    pill splitter, , Does not apply, Once, Eli Ivey MD    nicotine polacrilex (NICORETTE) gum 2 mg, 2 mg, Oral, PRN, Antonio Don MD, 2 mg at 12/19/17 0844    acetaminophen (TYLENOL) tablet 650 mg, 650 mg, Oral, Q4H PRN, Donavan De Leon MD    magnesium hydroxide (MILK OF MAGNESIA) 400 MG/5ML suspension 30 mL, 30 mL, Oral, Daily PRN, Donavan De Leon MD    hydrOXYzine (VISTARIL) capsule 50 mg, 50 mg, Oral, Q6H PRN, 50 mg at 12/18/17 1445 **OR** hydrOXYzine (VISTARIL) injection 50 mg, 50 mg, Intramuscular, Q6H PRN, Donavan De Leon MD    haloperidol lactate (HALDOL) injection 5 mg, 5 mg, Intramuscular, Q6H PRN **OR** haloperidol (HALDOL) tablet 5 mg, 5 mg, Oral, Q6H PRN, Donavan De Leon MD    traZODone (DESYREL) tablet 50 mg, 50 mg, Oral, Nightly PRN, Donavan De Leon MD, 50 mg at 12/18/17 2058    Examination:  /82   Pulse 111   Temp 99.3 °F (37.4 °C)   Resp 18   Ht 5' 3\" (1.6 m)   Wt 125 lb (56.7 kg) SpO2 97%   BMI 22.14 kg/m²   Gait - steady    HOSPITAL COURSE[de-identified]  Following admission to the hospital, patient had a complete physical exam and blood work up  Patient was monitored closely with suicide precaution  Patient was started on rexulti and lamictal   rexulti dose was titrated to 2 mg  Lamictal dose was increased to 175mg  D/C celexa, since it was making her mood to cycle fast  Was encouraged to participate in group and other milieu activity  Patient started to feel better with this combination of treatment. Significant progress in the symptoms since admission. Mood better, with the score of 2/10 - bad  No AVH or paranoid thoughts  No Hopeless or worthless feeling  No active SI/HI  Appetite:  [x] Normal  [] Increased  [] Decreased    Sleep:       [x] Normal  [] Fair       [] Poor            Energy:    [x] Normal  [] Increased  [] Decreased     SI [] Present  [x] Absent  HI  []Present  [x] Absent   Aggression:  [] yes  [] no  Patient is [x] able  [] unable to CONTRACT FOR SAFETY   Medication side effects(SE):  [x] None(Psych.  Meds.) [] Other      Mental Status Examination on discharge:    Level of consciousness:  within normal limits   Appearance:  well-appearing  Behavior/Motor:  no abnormalities noted  Attitude toward examiner:  attentive and good eye contact  Speech:  spontaneous, normal rate and normal volume   Mood: euthymic  Affect:  mood congruent  Thought processes:  linear and goal directed   Thought content:  Suicidal Ideation:  denies suicidal ideation  Delusions:  no evidence of delusions  Perceptual Disturbance:  denies any perceptual disturbance  Cognition:  oriented to person, place, and time   Concentration intact  Memory intact  Insight good   Judgement fair   Fund of Knowledge adequate      ASSESSMENT:  Patient symptoms are:  [x] Well controlled  [x] Improving  [] Worsening  [] No change      Diagnosis:  Principal Problem:    Bipolar depression ([facility] [address]  Active Problems:    Anxiety disorder    E. coli UTI      LABS:    No results for input(s): WBC, HGB, PLT in the last 72 hours. No results for input(s): NA, K, CL, CO2, BUN, CREATININE, GLUCOSE in the last 72 hours. No results for input(s): BILITOT, ALKPHOS, AST, ALT in the last 72 hours. Lab Results   Component Value Date    LABAMPH Neg 12/15/2017    BARBSCNU Neg 12/15/2017    LABBENZ Neg 12/15/2017    LABBENZ NT DTCD 03/01/2013    OPIATESCREENURINE Neg 12/15/2017    PHENCYCLIDINESCREENURINE Neg 12/15/2017    ETOH <10 12/15/2017     Lab Results   Component Value Date    TSH 0.721 12/15/2017     No results found for: LITHIUM  No results found for: VALPROATE, CBMZ    RISK ASSESSMENT AT DISCHARGE: Low risk for suicide and homicide. Treatment Plan:  Reviewed current Medications with the patient. Education provided on the complaince with treatment. Risks, benefits, side effects, drug-to-drug interactions and alternatives to treatment were discussed. Encourage patient to attend outpatient follow up appointment and therapy. Discharge planning discussed with the patient including follow up plan as documented in the follow up plan section. Patient expressed understanding of the condition and treatment plan. Medication List      START taking these medications    brexpiprazole 2 MG Tabs tablet  Commonly known as:  REXULTI  Take 1 tablet by mouth daily  Start taking on:  12/20/2017     ciprofloxacin 250 MG tablet  Commonly known as:  CIPRO  Take 1 tablet by mouth every 12 hours for 10 days     nicotine polacrilex 2 MG gum  Commonly known as:  NICORETTE  Take 1 each by mouth as needed for Smoking cessation        CHANGE how you take these medications    * lamoTRIgine 150 MG tablet  Commonly known as:  LAMICTAL  What changed:  Another medication with the same name was added. Make sure you understand how and when to take each.      * lamoTRIgine 25 MG tablet  Commonly known as:  LAMICTAL  Take 1 tablet by mouth daily Take along with 150 mg q daily  What changed: You were already taking a medication with the same name, and this prescription was added. Make sure you understand how and when to take each. * This list has 2 medication(s) that are the same as other medications prescribed for you. Read the directions carefully, and ask your doctor or other care provider to review them with you.             STOP taking these medications    citalopram 10 MG tablet  Commonly known as:  CELEXA           Where to Get Your Medications      These medications were sent to [address] [place] [address] [address] [facility] [address] [facility] [address]    Phone:  [phone]   · brexpiprazole 2 MG Tabs tablet  · ciprofloxacin 250 MG tablet  · lamoTRIgine 25 MG tablet  · nicotine polacrilex 2 MG gum         TIME SPEND - 35 MINUTES TO COMPLETE THE EVALUATION, DISCHARGE SUMMARY, MEDICATION RECONCILIATION AND FOLLOW UP CARE     Marina Nava  12/19/2017  9:23 AM

## 2017-12-19 NOTE — PROGRESS NOTES
Feels ready for discharge- is hopeful for future and glad that she came to the hospital for treatment.   Feels she has a better perspective on her life and medications are helpful

## 2017-12-19 NOTE — PROGRESS NOTES
Pt. attended the 0900 community meeting. Electronically signed by Stephania Srivastava [address] on 12/19/2017 at 10:51 AM

## 2017-12-19 NOTE — PROGRESS NOTES
Affect and mood stable  Denies suicidal ideation/homicidal ideation  Reviewed and verbalized understanding of all instructions  Belongings returned to patient  Discharged with friend for transport home

## 2017-12-19 NOTE — PROGRESS NOTES
Patient denies SI, HI, and AVH. Patient denies depression and anxiety. Patient reports that medications are working well for her. Patient is bright in affect. Social with peers. Polite and cooperative with staff.  Electronically signed by Lauren Dash LPN on 04/71/3372 at 7:27 PM

## 2018-09-20 ENCOUNTER — HOSPITAL ENCOUNTER (EMERGENCY)
Age: 23
Discharge: HOME | End: 2018-09-21
Attending: EMERGENCY MEDICINE

## 2018-09-20 VITALS
HEIGHT: 63 IN | BODY MASS INDEX: 22.15 KG/M2 | HEART RATE: 100 BPM | TEMPERATURE: 98.2 F | WEIGHT: 125 LBS | DIASTOLIC BLOOD PRESSURE: 82 MMHG | SYSTOLIC BLOOD PRESSURE: 122 MMHG | RESPIRATION RATE: 18 BRPM | OXYGEN SATURATION: 99 %

## 2018-09-20 DIAGNOSIS — F43.22 ACUTE ADJUSTMENT DISORDER WITH ANXIETY: Primary | ICD-10-CM

## 2018-09-20 LAB
ACETAMINOPHEN LEVEL: <5 UG/ML (ref 10–30)
ALBUMIN SERPL-MCNC: 4.8 G/DL (ref 3.9–4.9)
ALP BLD-CCNC: 53 U/L (ref 40–130)
ALT SERPL-CCNC: 16 U/L (ref 0–33)
AMPHETAMINE SCREEN, URINE: ABNORMAL
ANION GAP SERPL CALCULATED.3IONS-SCNC: 16 MEQ/L (ref 7–13)
AST SERPL-CCNC: 19 U/L (ref 0–35)
BACTERIA: ABNORMAL /HPF
BARBITURATE SCREEN URINE: ABNORMAL
BASOPHILS ABSOLUTE: 0.2 K/UL (ref 0–0.2)
BASOPHILS RELATIVE PERCENT: 1.3 %
BENZODIAZEPINE SCREEN, URINE: ABNORMAL
BILIRUB SERPL-MCNC: <0.2 MG/DL (ref 0–1.2)
BILIRUBIN URINE: NEGATIVE
BLOOD, URINE: NEGATIVE
BUN BLDV-MCNC: 11 MG/DL (ref 6–20)
CALCIUM SERPL-MCNC: 9.3 MG/DL (ref 8.6–10.2)
CANNABINOID SCREEN URINE: POSITIVE
CHLORIDE BLD-SCNC: 104 MEQ/L (ref 98–107)
CHP ED QC CHECK: YES
CLARITY: ABNORMAL
CO2: 20 MEQ/L (ref 22–29)
COCAINE METABOLITE SCREEN URINE: ABNORMAL
COLOR: YELLOW
CREAT SERPL-MCNC: 0.7 MG/DL (ref 0.5–0.9)
EOSINOPHILS ABSOLUTE: 0.1 K/UL (ref 0–0.7)
EOSINOPHILS RELATIVE PERCENT: 1.1 %
EPITHELIAL CELLS, UA: ABNORMAL /HPF
ETHANOL PERCENT: 0.02 G/DL
ETHANOL: 26 MG/DL (ref 0–0.08)
GFR AFRICAN AMERICAN: >60
GFR NON-AFRICAN AMERICAN: >60
GLOBULIN: 2.7 G/DL (ref 2.3–3.5)
GLUCOSE BLD-MCNC: 77 MG/DL (ref 74–109)
GLUCOSE URINE: NEGATIVE MG/DL
GONADOTROPIN, CHORIONIC (HCG) QUANT: 657.8 MIU/ML
HCG(URINE) PREGNANCY TEST: POSITIVE
HCT VFR BLD CALC: 41.8 % (ref 37–47)
HEMOGLOBIN: 13.9 G/DL (ref 12–16)
KETONES, URINE: NEGATIVE MG/DL
LEUKOCYTE ESTERASE, URINE: ABNORMAL
LYMPHOCYTES ABSOLUTE: 1.7 K/UL (ref 1–4.8)
LYMPHOCYTES RELATIVE PERCENT: 13.8 %
Lab: ABNORMAL
MCH RBC QN AUTO: 28.5 PG (ref 27–31.3)
MCHC RBC AUTO-ENTMCNC: 33.4 % (ref 33–37)
MCV RBC AUTO: 85.6 FL (ref 82–100)
MONOCYTES ABSOLUTE: 0.7 K/UL (ref 0.2–0.8)
MONOCYTES RELATIVE PERCENT: 5.7 %
MUCUS: PRESENT
NEUTROPHILS ABSOLUTE: 9.4 K/UL (ref 1.4–6.5)
NEUTROPHILS RELATIVE PERCENT: 78.1 %
NITRITE, URINE: NEGATIVE
OPIATE SCREEN URINE: ABNORMAL
PDW BLD-RTO: 15.8 % (ref 11.5–14.5)
PH UA: 5.5 (ref 5–9)
PHENCYCLIDINE SCREEN URINE: ABNORMAL
PLATELET # BLD: 400 K/UL (ref 130–400)
POTASSIUM SERPL-SCNC: 3.2 MEQ/L (ref 3.5–5.1)
PREGNANCY TEST URINE, POC: POSITIVE
PROTEIN UA: 100 MG/DL
RBC # BLD: 4.88 M/UL (ref 4.2–5.4)
RBC UA: ABNORMAL /HPF (ref 0–2)
SALICYLATE, SERUM: <0.3 MG/DL (ref 15–30)
SODIUM BLD-SCNC: 140 MEQ/L (ref 132–144)
SPECIFIC GRAVITY UA: 1.02 (ref 1–1.03)
TOTAL PROTEIN: 7.5 G/DL (ref 6.4–8.1)
TSH SERPL DL<=0.05 MIU/L-ACNC: 1.66 UIU/ML (ref 0.27–4.2)
UROBILINOGEN, URINE: 0.2 E.U./DL
WBC # BLD: 12.1 K/UL (ref 4.8–10.8)
WBC UA: ABNORMAL /HPF (ref 0–5)

## 2018-09-20 PROCEDURE — G0480 DRUG TEST DEF 1-7 CLASSES: HCPCS

## 2018-09-20 PROCEDURE — 85025 COMPLETE CBC W/AUTO DIFF WBC: CPT

## 2018-09-20 PROCEDURE — 81001 URINALYSIS AUTO W/SCOPE: CPT

## 2018-09-20 PROCEDURE — 84703 CHORIONIC GONADOTROPIN ASSAY: CPT

## 2018-09-20 PROCEDURE — 84702 CHORIONIC GONADOTROPIN TEST: CPT

## 2018-09-20 PROCEDURE — 80053 COMPREHEN METABOLIC PANEL: CPT

## 2018-09-20 PROCEDURE — 80307 DRUG TEST PRSMV CHEM ANLYZR: CPT

## 2018-09-20 PROCEDURE — 99283 EMERGENCY DEPT VISIT LOW MDM: CPT

## 2018-09-20 PROCEDURE — 84443 ASSAY THYROID STIM HORMONE: CPT

## 2018-09-20 RX ORDER — HYDROXYZINE HYDROCHLORIDE 25 MG/1
25 TABLET, FILM COATED ORAL 3 TIMES DAILY PRN
COMMUNITY
End: 2021-06-07

## 2018-09-21 NOTE — ED TRIAGE NOTES
Patient [facility] with c/o possible suicide attempt. Patient stated she was in a verbal altercation with boyfriend and he called police because he \"thought\" she had taken some pills. [place] police pink slipped patient. lpd  found three bottles of pills near patient and a small cut to left forearm. Patient states that she has a h/o cutting herself and says she cut her arm because she was drinking and upset. She states \"I never tried to kill myself\". Patient states her boyfriend is the one who told [employer] that she tried to kill herself. Patient is calm and cooperative in room. Patient changed into [facility] clothes. Informed patient she is pink slipped by [employer]. Security at bedside to gather valuables and clothing from patient.

## 2018-09-21 NOTE — ED NOTES
Bed: 12  Expected date: 9/20/18  Expected time: 9:09 PM  Means of arrival: [facility]  Comments:  21 F suicide attempt, laceration     Adrien Lacy RN  09/20/18 9247

## 2018-09-21 NOTE — ED NOTES
Labwork drawn and sent via tube system with yellow stickers     Elizabeth Rodriguez RN  09/20/18 7475

## 2018-09-21 NOTE — ED PROVIDER NOTES
read by the radiologist. Plain radiographic images are visualized and preliminarily interpreted by the emergency physician with the below findings:        Interpretation per the Radiologist below, if available at the time of this note:    No orders to display     There was a notation by police of a small cut on her left wrist.  She states that she used to cut and use this for dramatic effect. The cut on the left wrist is very small can barely discernible. She states there was no way that this was a suicide attempt.   Again refused further eval    ED BEDSIDE ULTRASOUND:   Performed by ED Physician - none    LABS:  Labs Reviewed   CBC WITH AUTO DIFFERENTIAL - Abnormal; Notable for the following:        Result Value    WBC 12.1 (*)     RDW 15.8 (*)     Neutrophils # 9.4 (*)     All other components within normal limits   URINALYSIS - Abnormal; Notable for the following:     Clarity, UA CLOUDY (*)     Protein,  (*)     Leukocyte Esterase, Urine LARGE (*)     All other components within normal limits   URINE DRUG SCREEN - Abnormal; Notable for the following:     Cannabinoid Scrn, Ur POSITIVE (*)     All other components within normal limits   ACETAMINOPHEN LEVEL - Abnormal; Notable for the following:     Acetaminophen Level <5 (*)     All other components within normal limits   SALICYLATE LEVEL - Abnormal; Notable for the following:     Salicylate, Serum <3.1 (*)     All other components within normal limits   COMPREHENSIVE METABOLIC PANEL - Abnormal; Notable for the following:     Potassium 3.2 (*)     CO2 20 (*)     Anion Gap 16 (*)     All other components within normal limits   MICROSCOPIC URINALYSIS - Abnormal; Notable for the following:     WBC, UA 20-50 (*)     All other components within normal limits   POC PREGNANCY UR-QUAL - Normal   PREGNANCY, URINE   TSH WITHOUT REFLEX   ETHANOL   HCG, QUANTITATIVE, PREGNANCY       All other labs were within normal range or not returned as of this dictation. EMERGENCY DEPARTMENT COURSE and DIFFERENTIAL DIAGNOSIS/MDM:   Vitals:    Vitals:    09/20/18 2124 09/20/18 2311   BP: 124/83 122/82   Pulse: 112 100   Resp:  18   Temp: 98.2 °F (36.8 °C) 98.2 °F (36.8 °C)   TempSrc: Oral Oral   SpO2: 98% 99%   Weight: 125 lb (56.7 kg)    Height: 5' 3\" (1.6 m)            MDM     Patient is alert and oriented ×3 in front of [place] nurse, declined further medical and further psychiatric evaluation while alert and appropriate. She is smiling, intelligent and appropriate young lady and I do not feel that I have legal right to hold her here. Urine pregnancy test was positive. Referral to ObGyn. CRITICAL CARE TIME   Total Critical Care time was  minutes, excluding separately reportable procedures. There was a high probability of clinically significant/life threatening deterioration in the patient's condition which required my urgent intervention. CONSULTS:  None    PROCEDURES:  Unless otherwise noted below, none     Procedures    FINAL IMPRESSION      1.  Acute adjustment disorder with anxiety          DISPOSITION/PLAN   DISPOSITION Decision To Discharge 09/20/2018 11:12:31 PM      PATIENT REFERRED TO:  Suzanna Elaine MD  [address]  [phone]    In 1 day        DISCHARGE MEDICATIONS:  New Prescriptions    No medications on file          (Please note that portions of this note were completed with a voice recognition program.  Efforts were made to edit the dictations but occasionally words are mis-transcribed.)    Adam Garcia MD (electronically signed)  Attending Emergency Physician          Adam Garcia MD  09/20/18 6367       Adam Garcia MD  09/20/18 4735

## 2019-03-13 ENCOUNTER — HOSPITAL ENCOUNTER (EMERGENCY)
Age: 24
Discharge: HOME | End: 2019-03-14
Payer: MEDICAID

## 2019-03-13 VITALS
HEIGHT: 63 IN | TEMPERATURE: 98.3 F | DIASTOLIC BLOOD PRESSURE: 85 MMHG | BODY MASS INDEX: 26.22 KG/M2 | RESPIRATION RATE: 18 BRPM | WEIGHT: 148 LBS | OXYGEN SATURATION: 97 % | HEART RATE: 112 BPM | SYSTOLIC BLOOD PRESSURE: 109 MMHG

## 2019-03-13 DIAGNOSIS — F43.21 ADJUSTMENT DISORDER WITH DEPRESSED MOOD: Primary | ICD-10-CM

## 2019-03-13 LAB
BILIRUBIN URINE: NEGATIVE
BLOOD, URINE: NEGATIVE
CLARITY: ABNORMAL
COLOR: YELLOW
GLUCOSE URINE: NEGATIVE MG/DL
HCG(URINE) PREGNANCY TEST: POSITIVE
KETONES, URINE: NEGATIVE MG/DL
LEUKOCYTE ESTERASE, URINE: ABNORMAL
NITRITE, URINE: NEGATIVE
PH UA: 6.5 (ref 5–9)
PROTEIN UA: 100 MG/DL
SPECIFIC GRAVITY UA: 1.01 (ref 1–1.03)
UROBILINOGEN, URINE: 0.2 E.U./DL

## 2019-03-13 PROCEDURE — 84703 CHORIONIC GONADOTROPIN ASSAY: CPT

## 2019-03-13 PROCEDURE — 99283 EMERGENCY DEPT VISIT LOW MDM: CPT

## 2019-03-13 PROCEDURE — 80307 DRUG TEST PRSMV CHEM ANLYZR: CPT

## 2019-03-13 PROCEDURE — 81001 URINALYSIS AUTO W/SCOPE: CPT

## 2019-03-13 RX ORDER — ARIPIPRAZOLE 5 MG/1
20 TABLET ORAL DAILY
COMMUNITY
End: 2021-04-21 | Stop reason: SDUPTHER

## 2019-03-13 RX ORDER — FOLIC ACID 1 MG/1
4 TABLET ORAL DAILY
Status: ON HOLD | COMMUNITY
End: 2021-01-03 | Stop reason: ALTCHOICE

## 2019-03-13 RX ORDER — ACETAMINOPHEN 325 MG/1
650 TABLET ORAL EVERY 6 HOURS PRN
COMMUNITY
End: 2021-06-07

## 2019-03-13 ASSESSMENT — ENCOUNTER SYMPTOMS
VOMITING: 0
SHORTNESS OF BREATH: 0
COLOR CHANGE: 0
ABDOMINAL PAIN: 0
DIARRHEA: 0
BLOOD IN STOOL: 0
COUGH: 0
SORE THROAT: 0
NAUSEA: 0
RHINORRHEA: 0

## 2019-03-14 LAB
ACETAMINOPHEN LEVEL: <5 UG/ML (ref 10–30)
ALBUMIN SERPL-MCNC: 3.7 G/DL (ref 3.5–4.6)
ALP BLD-CCNC: 73 U/L (ref 40–130)
ALT SERPL-CCNC: 12 U/L (ref 0–33)
AMPHETAMINE SCREEN, URINE: ABNORMAL
ANION GAP SERPL CALCULATED.3IONS-SCNC: 11 MEQ/L (ref 9–15)
AST SERPL-CCNC: 16 U/L (ref 0–35)
BACTERIA: ABNORMAL /HPF
BARBITURATE SCREEN URINE: ABNORMAL
BASOPHILS ABSOLUTE: 0.1 K/UL (ref 0–0.2)
BASOPHILS RELATIVE PERCENT: 0.6 %
BENZODIAZEPINE SCREEN, URINE: ABNORMAL
BILIRUB SERPL-MCNC: <0.2 MG/DL (ref 0.2–0.7)
BUN BLDV-MCNC: 7 MG/DL (ref 6–20)
CALCIUM SERPL-MCNC: 9 MG/DL (ref 8.5–9.9)
CANNABINOID SCREEN URINE: POSITIVE
CHLORIDE BLD-SCNC: 105 MEQ/L (ref 95–107)
CO2: 20 MEQ/L (ref 20–31)
COCAINE METABOLITE SCREEN URINE: POSITIVE
CREAT SERPL-MCNC: 0.42 MG/DL (ref 0.5–0.9)
EOSINOPHILS ABSOLUTE: 0.2 K/UL (ref 0–0.7)
EOSINOPHILS RELATIVE PERCENT: 2 %
EPITHELIAL CELLS, UA: ABNORMAL /HPF (ref 0–5)
ETHANOL PERCENT: NORMAL G/DL
ETHANOL: <10 MG/DL (ref 0–0.08)
GFR AFRICAN AMERICAN: >60
GFR NON-AFRICAN AMERICAN: >60
GLOBULIN: 2.8 G/DL (ref 2.3–3.5)
GLUCOSE BLD-MCNC: 102 MG/DL (ref 70–99)
HCT VFR BLD CALC: 31.3 % (ref 37–47)
HEMOGLOBIN: 10.4 G/DL (ref 12–16)
HYALINE CASTS: ABNORMAL /HPF (ref 0–5)
LYMPHOCYTES ABSOLUTE: 1.8 K/UL (ref 1–4.8)
LYMPHOCYTES RELATIVE PERCENT: 19.5 %
Lab: ABNORMAL
MCH RBC QN AUTO: 30.3 PG (ref 27–31.3)
MCHC RBC AUTO-ENTMCNC: 33.4 % (ref 33–37)
MCV RBC AUTO: 90.9 FL (ref 82–100)
MONOCYTES ABSOLUTE: 0.5 K/UL (ref 0.2–0.8)
MONOCYTES RELATIVE PERCENT: 6 %
NEUTROPHILS ABSOLUTE: 6.5 K/UL (ref 1.4–6.5)
NEUTROPHILS RELATIVE PERCENT: 71.9 %
OPIATE SCREEN URINE: ABNORMAL
PDW BLD-RTO: 13.4 % (ref 11.5–14.5)
PHENCYCLIDINE SCREEN URINE: ABNORMAL
PLATELET # BLD: 308 K/UL (ref 130–400)
POTASSIUM SERPL-SCNC: 3.4 MEQ/L (ref 3.4–4.9)
RBC # BLD: 3.44 M/UL (ref 4.2–5.4)
SALICYLATE, SERUM: <0.3 MG/DL (ref 15–30)
SODIUM BLD-SCNC: 136 MEQ/L (ref 135–144)
TOTAL CK: 140 U/L (ref 0–170)
TOTAL PROTEIN: 6.5 G/DL (ref 6.3–8)
TSH SERPL DL<=0.05 MIU/L-ACNC: 1.48 UIU/ML (ref 0.44–3.86)
WBC # BLD: 9 K/UL (ref 4.8–10.8)
WBC UA: ABNORMAL /HPF (ref 0–5)

## 2019-03-14 PROCEDURE — G0480 DRUG TEST DEF 1-7 CLASSES: HCPCS

## 2019-03-14 PROCEDURE — 80053 COMPREHEN METABOLIC PANEL: CPT

## 2019-03-14 PROCEDURE — 85025 COMPLETE CBC W/AUTO DIFF WBC: CPT

## 2019-03-14 PROCEDURE — 36415 COLL VENOUS BLD VENIPUNCTURE: CPT

## 2019-03-14 PROCEDURE — 82550 ASSAY OF CK (CPK): CPT

## 2019-03-14 PROCEDURE — 84443 ASSAY THYROID STIM HORMONE: CPT

## 2019-03-14 NOTE — ED TRIAGE NOTES
Patient had argument with her boyfriend and reached for a razor to cut self. Patient states this was not suicide attempt, but rather cuts self for stress relief. Boyfriend called ambulance.  Patient denies SI.

## 2019-03-14 NOTE — ED NOTES
Provisional Diagnosis:    Bipolar disorder    Psychosocial and Contextual Factors:    Patient lives at home with boyfriend, his sister, and three children. She is 7 months pregnant and has had routine prenatal care. C-SSRS Summary:     Patient: C-SSRS Suicide Screening  1) Within the past month, have you wished you were dead or wished you could go to sleep and not wake up? : No  2) Have you actually had any thoughts of killing yourself? : No  6) Have you ever done anything, started to do anything, or prepared to do anything to end your life?: Yes(12/2017. was going to overdose on pills but boyfriend stopped her)  Did this occur within the past 3 months? : No    Agency: Patient has monthly counseling with Dr. Campos More of [facility]. She has missed her last two month appointments due to her OBGYN managing her psychiatric medications. She has an appointment with Dr Ade Quinonez next week to go over her recent medication changes and to discuss her increased mood swings. Abuse Assessment  Physical Abuse: Denies  Verbal Abuse: Denies  Emotional Abuse: Denies  Financial Abuse: Denies  Sexual Abuse: Denies  Elder abuse: No    Clinical Summary:    Patient arrived to ER from home after verbal altercation with her boyfriend. She states that he frequently works late and that has been upsetting her. He came home late this evening and confronted him verbally. Patient states \"I lost my mind when he got in my face. It feels like I blacked out for a moment I was so angry. I do not even remember grabbing the utility knife. Since I have a history of cutting he was worried that's what I was going to do. \" She states she never stated she was going to harm herself. She denies any SI/HI. She is future oriented, talking about plans of her baby shower planning this weekend. She states she last cut herself \"over one year ago\" and has no current thoughts of harming herself.  She is alert and oriented x4, calm and cooperative, normal mood and affect. Level of Care Disposition:      Per Dr Ruby Rodriguez.  Sheela Haile, RN  03/14/19 0028

## 2019-03-14 NOTE — ED NOTES
Spoke with mony, with whom patient lives, and he stated that he does not believe she is suicidal, and she would never do anything to harm the baby. Boyfriend states he feels very comfortable taking her home. She did not say she wanted to kill herself.  Boyfriend states she has a history of self cutting for stress relief, but she is not suicidal.     Destiny Bach RN  03/14/19 8969

## 2019-03-14 NOTE — ED PROVIDER NOTES
[facility] ED  eMERGENCY dEPARTMENT eNCOUnter      Pt Name: Hang Peters  MRN: [id number]  [place] 1995  Date of evaluation: 3/13/2019  Provider: Ricardo Mckay St is a 25 y.o. female with PMHx of bipolar presents to the emergency department with self harm threats. Pt is 7 months pregnant. She was arguing with her boyfriend when she grabbed a razor to perform self cutting. Boyfriend then called the police. Patient states she has not performs self cutting for the past year. She has no suicidal ideation, homicidal ideation, hallucinations. She is taking her Abilify as prescribed. There were no abdominal trauma this evening and baby has good fetal movement. HPI    Nursing Notes were reviewed. REVIEW OF SYSTEMS       Review of Systems   Constitutional: Negative for appetite change, chills and fever. HENT: Negative for congestion, rhinorrhea and sore throat. Respiratory: Negative for cough and shortness of breath. Cardiovascular: Negative for chest pain. Gastrointestinal: Negative for abdominal pain, blood in stool, diarrhea, nausea and vomiting. Genitourinary: Negative for difficulty urinating. Musculoskeletal: Negative for neck stiffness. Skin: Negative for color change and rash. Neurological: Negative for dizziness, syncope, weakness, light-headedness, numbness and headaches. Psychiatric/Behavioral: Positive for self-injury. All other systems reviewed and are negative. PAST MEDICAL HISTORY     Past Medical History:   Diagnosis Date    Migraine     Varicella          SURGICAL HISTORY     History reviewed. No pertinent surgical history.       CURRENT MEDICATIONS       Previous Medications    ACETAMINOPHEN (TYLENOL) 325 MG TABLET    Take 650 mg by mouth every 6 hours as needed for Pain    ARIPIPRAZOLE (ABILIFY) 5 MG TABLET    Take 5 mg by mouth daily    FOLIC ACID (FOLVITE) 1 MG TABLET    Take 4 mg by mouth daily    HYDROXYZINE (ATARAX) 25 MG TABLET    Take 25 mg by mouth 3 times daily as needed for Anxiety    LAMOTRIGINE (LAMICTAL) 150 MG TABLET    Take 400 mg by mouth daily     PRENATAL VIT-FE FUMARATE-FA (PRENATAL VITAMIN PO)    Take 1 tablet by mouth daily       ALLERGIES     Penicillins    FAMILY HISTORY       Family History   Problem Relation Age of Onset    Cancer Other         brain, breast, lung    Coronary Art Dis Other     High Blood Pressure Brother     Obesity Brother     Cancer Maternal Grandmother     Allergies Maternal Grandmother     Colon Cancer Maternal Grandmother     Breast Cancer Maternal Grandmother     Ovarian Cancer Maternal Grandmother     Other Maternal Grandmother         eye cancer, loss of left eye    Cancer Maternal Grandfather         lung    Heart Disease Maternal Grandfather     Uterine Cancer Mother     Ovarian Cancer Mother     Breast Cancer Mother     Cancer Mother         behind right eye    Heart Attack Mother     Migraines Mother     Cancer Maternal Aunt         lung          SOCIAL HISTORY       Social History     Socioeconomic History    Marital status: Single     Spouse name: None    Number of children: None    Years of education: None    Highest education level: None   Occupational History    None   Social Needs    Financial resource strain: None    Food insecurity:     Worry: None     Inability: None    Transportation needs:     Medical: None     Non-medical: None   Tobacco Use    Smoking status: Current Every Day Smoker     Packs/day: 0.50     Types: Cigarettes    Smokeless tobacco: Never Used   Substance and Sexual Activity    Alcohol use: No    Drug use: Not Currently    Sexual activity: Yes     Partners: Male   Lifestyle    Physical activity:     Days per week: None     Minutes per session: None    Stress: None   Relationships    Social connections:     Talks on phone: None     Gets together: None     Attends Muslim service: None     Active member of club or organization: None     Attends meetings of clubs or organizations: None     Relationship status: None    Intimate partner violence:     Fear of current or ex partner: None     Emotionally abused: None     Physically abused: None     Forced sexual activity: None   Other Topics Concern    None   Social History Narrative    None         PHYSICAL EXAM         ED Triage Vitals   BP Temp Temp src Pulse Resp SpO2 Height Weight   -- -- -- -- -- -- -- --       Physical Exam   Constitutional: She is oriented to person, place, and time. She appears well-developed and well-nourished. HENT:   Head: Normocephalic and atraumatic. Mouth/Throat: Oropharynx is clear and moist.   Eyes: Pupils are equal, round, and reactive to light. Conjunctivae and EOM are normal.   Neck: Normal range of motion. Neck supple. No tracheal deviation present. Cardiovascular: Normal heart sounds and intact distal pulses. Pulmonary/Chest: Effort normal and breath sounds normal. No stridor. No respiratory distress. Abdominal: Soft. Bowel sounds are normal. She exhibits no distension and no mass. There is no tenderness. There is no rebound and no guarding. Musculoskeletal: Normal range of motion. Neurological: She is alert and oriented to person, place, and time. She has normal reflexes. Skin: Skin is warm and dry. Capillary refill takes less than 2 seconds. No rash noted. Psychiatric: She has a normal mood and affect.  Her behavior is normal. Judgment and thought content normal.       DIAGNOSTIC RESULTS     EKG:All EKG's are interpreted by the Emergency Department Physician who either signs or Co-signs this chart in the absence of a cardiologist.        RADIOLOGY:   Non-plain film images such as CT, Ultrasound and MRI are read by theradiologist. Plain radiographic images are visualized and preliminarily interpreted by the emergency physician with the below findings:    Interpretation per theRadiologist below, if available at the time of this note:    No orders to display           LABS:  Labs Reviewed   CBC WITH AUTO DIFFERENTIAL - Abnormal; Notable for the following components:       Result Value    RBC 3.44 (*)     Hemoglobin 10.4 (*)     Hematocrit 31.3 (*)     All other components within normal limits   COMPREHENSIVE METABOLIC PANEL - Abnormal; Notable for the following components:    Glucose 102 (*)     CREATININE 0.42 (*)     All other components within normal limits   URINALYSIS - Abnormal; Notable for the following components:    Clarity, UA CLOUDY (*)     Protein,  (*)     Leukocyte Esterase, Urine MODERATE (*)     All other components within normal limits   MICROSCOPIC URINALYSIS - Abnormal; Notable for the following components:    Bacteria, UA MODERATE (*)     WBC, UA  (*)     All other components within normal limits   CK   PREGNANCY, URINE   TSH WITHOUT REFLEX   ETHANOL   URINE DRUG SCREEN   ACETAMINOPHEN LEVEL   SALICYLATE LEVEL       All other labs were within normal range or not returned as of this dictation. EMERGENCY DEPARTMENT COURSE and DIFFERENTIAL DIAGNOSIS/MDM:   Vitals:    Vitals:    03/13/19 2323   BP: 109/85   Pulse: 112   Resp: 18   Temp: 98.3 °F (36.8 °C)   TempSrc: Oral   SpO2: 97%   Weight: 148 lb (67.1 kg)   Height: 5' 3\" (1.6 m)         MDM    Blood work is unremarkable. Urine is contaminated. Psychiatry states patient is appropriate for discharge. Patient is very excited about her pregnancy and I do not feel would try to harm herself or commit suicide. Standard anticipatory guidance given to patient upon discharge. Have given them a specific time frame in which to follow-up and who to follow-up with. I have also advised them that they should return to the emergency department if they get worse, or not getting better or develop any new or concerning symptoms. Patient demonstrates understanding.         CRITICAL CARE TIME   Total Critical Caretime was 0 minutes, excluding separately reportable procedures. There was a high probability of clinically significant/life threatening deterioration in the patient's condition which required my urgent intervention. Procedures    FINAL IMPRESSION      1. Adjustment disorder with depressed mood          DISPOSITION/PLAN   DISPOSITION Decision To Discharge 03/14/2019 01:07:57 AM      PATIENT REFERRED TO:  Lowell [address] [id number]  [phone]    Call today        DISCHARGE MEDICATIONS:  New Prescriptions    No medications on file          (Please notethat portions of this note were completed with a voice recognition program.  Efforts were made to edit the dictations but occasionally words are mis-transcribed. )    THOMPSON Garibay (electronically signed)  Emergency Physician Assistant          Suellen [place], [address]  03/14/19 0110

## 2021-01-02 ENCOUNTER — APPOINTMENT (OUTPATIENT)
Dept: CT IMAGING | Age: 26
DRG: 053 | End: 2021-01-02
Payer: MEDICAID

## 2021-01-02 ENCOUNTER — HOSPITAL ENCOUNTER (INPATIENT)
Age: 26
LOS: 3 days | Discharge: HOME | DRG: 053 | End: 2021-01-05
Attending: EMERGENCY MEDICINE
Payer: MEDICAID

## 2021-01-02 ENCOUNTER — APPOINTMENT (OUTPATIENT)
Dept: GENERAL RADIOLOGY | Age: 26
DRG: 053 | End: 2021-01-02
Payer: MEDICAID

## 2021-01-02 DIAGNOSIS — E87.5 HYPERKALEMIA: ICD-10-CM

## 2021-01-02 DIAGNOSIS — R56.9 SEIZURES (HCC): ICD-10-CM

## 2021-01-02 DIAGNOSIS — G40.901 STATUS EPILEPTICUS (HCC): Primary | ICD-10-CM

## 2021-01-02 LAB
ALBUMIN SERPL-MCNC: 4.5 G/DL (ref 3.5–4.6)
ALP BLD-CCNC: 92 U/L (ref 40–130)
ALT SERPL-CCNC: 21 U/L (ref 0–33)
AMPHETAMINE SCREEN, URINE: ABNORMAL
ANION GAP SERPL CALCULATED.3IONS-SCNC: 21 MEQ/L (ref 9–15)
AST SERPL-CCNC: 28 U/L (ref 0–35)
ATYPICAL LYMPHOCYTE RELATIVE PERCENT: 1 %
BANDED NEUTROPHILS RELATIVE PERCENT: 1 % (ref 5–11)
BARBITURATE SCREEN URINE: ABNORMAL
BASOPHILS ABSOLUTE: 0 K/UL (ref 0–0.2)
BASOPHILS RELATIVE PERCENT: 0.4 %
BENZODIAZEPINE SCREEN, URINE: ABNORMAL
BILIRUB SERPL-MCNC: <0.2 MG/DL (ref 0.2–0.7)
BUN BLDV-MCNC: 10 MG/DL (ref 6–20)
CALCIUM SERPL-MCNC: 9.3 MG/DL (ref 8.5–9.9)
CANNABINOID SCREEN URINE: POSITIVE
CHLORIDE BLD-SCNC: 111 MEQ/L (ref 95–107)
CO2: 13 MEQ/L (ref 20–31)
COCAINE METABOLITE SCREEN URINE: ABNORMAL
CREAT SERPL-MCNC: 1.09 MG/DL (ref 0.5–0.9)
EKG ATRIAL RATE: 102 BPM
EKG P AXIS: 54 DEGREES
EKG P-R INTERVAL: 142 MS
EKG Q-T INTERVAL: 354 MS
EKG QRS DURATION: 102 MS
EKG QTC CALCULATION (BAZETT): 461 MS
EKG R AXIS: 44 DEGREES
EKG T AXIS: 41 DEGREES
EKG VENTRICULAR RATE: 102 BPM
EOSINOPHILS ABSOLUTE: 0 K/UL (ref 0–0.7)
EOSINOPHILS RELATIVE PERCENT: 0.1 %
ETHANOL PERCENT: NORMAL G/DL
ETHANOL: <10 MG/DL (ref 0–0.08)
GFR AFRICAN AMERICAN: >60
GFR NON-AFRICAN AMERICAN: >60
GLOBULIN: 3.2 G/DL (ref 2.3–3.5)
GLUCOSE BLD-MCNC: 147 MG/DL (ref 70–99)
GONADOTROPIN, CHORIONIC (HCG) QUANT: <0.1 MIU/ML
HCT VFR BLD CALC: 39.3 % (ref 37–47)
HEMOGLOBIN: 11.6 G/DL (ref 12–16)
LYMPHOCYTES ABSOLUTE: 1.8 K/UL (ref 1–4.8)
LYMPHOCYTES RELATIVE PERCENT: 6 %
Lab: ABNORMAL
MAGNESIUM: 3 MG/DL (ref 1.7–2.4)
MCH RBC QN AUTO: 23.6 PG (ref 27–31.3)
MCHC RBC AUTO-ENTMCNC: 29.6 % (ref 33–37)
MCV RBC AUTO: 79.8 FL (ref 82–100)
METHADONE SCREEN, URINE: ABNORMAL
MONOCYTES ABSOLUTE: 0.5 K/UL (ref 0.2–0.8)
MONOCYTES RELATIVE PERCENT: 1.9 %
NEUTROPHILS ABSOLUTE: 23.5 K/UL (ref 1.4–6.5)
NEUTROPHILS RELATIVE PERCENT: 91 %
OPIATE SCREEN URINE: ABNORMAL
OXYCODONE URINE: ABNORMAL
PDW BLD-RTO: 17.1 % (ref 11.5–14.5)
PHENCYCLIDINE SCREEN URINE: ABNORMAL
PLATELET # BLD: 595 K/UL (ref 130–400)
PLATELET SLIDE REVIEW: ABNORMAL
POTASSIUM SERPL-SCNC: 5.7 MEQ/L (ref 3.4–4.9)
PROPOXYPHENE SCREEN: ABNORMAL
RBC # BLD: 4.93 M/UL (ref 4.2–5.4)
RBC # BLD: NORMAL 10*6/UL
SARS-COV-2, NAAT: NOT DETECTED
SODIUM BLD-SCNC: 145 MEQ/L (ref 135–144)
TOTAL PROTEIN: 7.7 G/DL (ref 6.3–8)
TROPONIN: <0.01 NG/ML (ref 0–0.01)
WBC # BLD: 25.5 K/UL (ref 4.8–10.8)

## 2021-01-02 PROCEDURE — 87040 BLOOD CULTURE FOR BACTERIA: CPT

## 2021-01-02 PROCEDURE — G0480 DRUG TEST DEF 1-7 CLASSES: HCPCS

## 2021-01-02 PROCEDURE — U0002 COVID-19 LAB TEST NON-CDC: HCPCS

## 2021-01-02 PROCEDURE — 6360000002 HC RX W HCPCS: Performed by: INTERNAL MEDICINE

## 2021-01-02 PROCEDURE — 85025 COMPLETE CBC W/AUTO DIFF WBC: CPT

## 2021-01-02 PROCEDURE — 36415 COLL VENOUS BLD VENIPUNCTURE: CPT

## 2021-01-02 PROCEDURE — 71045 X-RAY EXAM CHEST 1 VIEW: CPT

## 2021-01-02 PROCEDURE — 83735 ASSAY OF MAGNESIUM: CPT

## 2021-01-02 PROCEDURE — 96375 TX/PRO/DX INJ NEW DRUG ADDON: CPT

## 2021-01-02 PROCEDURE — 80307 DRUG TEST PRSMV CHEM ANLYZR: CPT

## 2021-01-02 PROCEDURE — 93005 ELECTROCARDIOGRAM TRACING: CPT | Performed by: EMERGENCY MEDICINE

## 2021-01-02 PROCEDURE — 99285 EMERGENCY DEPT VISIT HI MDM: CPT

## 2021-01-02 PROCEDURE — 6370000000 HC RX 637 (ALT 250 FOR IP): Performed by: EMERGENCY MEDICINE

## 2021-01-02 PROCEDURE — 70450 CT HEAD/BRAIN W/O DYE: CPT

## 2021-01-02 PROCEDURE — 2580000003 HC RX 258: Performed by: INTERNAL MEDICINE

## 2021-01-02 PROCEDURE — 84484 ASSAY OF TROPONIN QUANT: CPT

## 2021-01-02 PROCEDURE — 84702 CHORIONIC GONADOTROPIN TEST: CPT

## 2021-01-02 PROCEDURE — 2580000003 HC RX 258: Performed by: EMERGENCY MEDICINE

## 2021-01-02 PROCEDURE — 1210000000 HC MED SURG R&B

## 2021-01-02 PROCEDURE — 6360000002 HC RX W HCPCS: Performed by: EMERGENCY MEDICINE

## 2021-01-02 PROCEDURE — 80053 COMPREHEN METABOLIC PANEL: CPT

## 2021-01-02 PROCEDURE — 96365 THER/PROPH/DIAG IV INF INIT: CPT

## 2021-01-02 RX ORDER — LORAZEPAM 2 MG/ML
1 INJECTION INTRAMUSCULAR ONCE
Status: COMPLETED | OUTPATIENT
Start: 2021-01-02 | End: 2021-01-02

## 2021-01-02 RX ORDER — ACETAMINOPHEN 500 MG
1000 TABLET ORAL ONCE
Status: DISCONTINUED | OUTPATIENT
Start: 2021-01-02 | End: 2021-01-05 | Stop reason: HOSPADM

## 2021-01-02 RX ORDER — ONDANSETRON 2 MG/ML
4 INJECTION INTRAMUSCULAR; INTRAVENOUS EVERY 6 HOURS PRN
Status: DISCONTINUED | OUTPATIENT
Start: 2021-01-02 | End: 2021-01-05 | Stop reason: HOSPADM

## 2021-01-02 RX ORDER — LORAZEPAM 2 MG/ML
2 INJECTION INTRAMUSCULAR EVERY 6 HOURS PRN
Status: DISCONTINUED | OUTPATIENT
Start: 2021-01-02 | End: 2021-01-05 | Stop reason: HOSPADM

## 2021-01-02 RX ORDER — SODIUM CHLORIDE 450 MG/100ML
INJECTION, SOLUTION INTRAVENOUS CONTINUOUS
Status: DISCONTINUED | OUTPATIENT
Start: 2021-01-02 | End: 2021-01-03

## 2021-01-02 RX ORDER — 0.9 % SODIUM CHLORIDE 0.9 %
1000 INTRAVENOUS SOLUTION INTRAVENOUS ONCE
Status: COMPLETED | OUTPATIENT
Start: 2021-01-02 | End: 2021-01-02

## 2021-01-02 RX ORDER — KETOROLAC TROMETHAMINE 30 MG/ML
30 INJECTION, SOLUTION INTRAMUSCULAR; INTRAVENOUS ONCE
Status: DISCONTINUED | OUTPATIENT
Start: 2021-01-02 | End: 2021-01-05 | Stop reason: HOSPADM

## 2021-01-02 RX ORDER — SODIUM POLYSTYRENE SULFONATE 15 G/60ML
30 SUSPENSION ORAL; RECTAL ONCE
Status: COMPLETED | OUTPATIENT
Start: 2021-01-02 | End: 2021-01-02

## 2021-01-02 RX ADMIN — SODIUM POLYSTYRENE SULFONATE 30 G: 15 SUSPENSION ORAL; RECTAL at 16:25

## 2021-01-02 RX ADMIN — ONDANSETRON 4 MG: 2 INJECTION INTRAMUSCULAR; INTRAVENOUS at 17:51

## 2021-01-02 RX ADMIN — LORAZEPAM 2 MG: 2 INJECTION INTRAMUSCULAR; INTRAVENOUS at 13:01

## 2021-01-02 RX ADMIN — SODIUM CHLORIDE: 4.5 INJECTION, SOLUTION INTRAVENOUS at 21:13

## 2021-01-02 RX ADMIN — PHENYTOIN SODIUM 1000 MG: 50 INJECTION INTRAMUSCULAR; INTRAVENOUS at 16:42

## 2021-01-02 RX ADMIN — SODIUM CHLORIDE 1000 ML: 9 INJECTION, SOLUTION INTRAVENOUS at 13:30

## 2021-01-02 RX ADMIN — LEVETIRACETAM 500 MG: 100 INJECTION, SOLUTION INTRAVENOUS at 21:14

## 2021-01-02 RX ADMIN — LEVETIRACETAM 1000 MG: 100 INJECTION, SOLUTION INTRAVENOUS at 13:30

## 2021-01-02 NOTE — ED PROVIDER NOTES
[address] [facility] ED  eMERGENCYdEPARTMENT eNCOUnter      Pt Name: Oleg Valentine  MRN: [id number]  [place] 1995  Date of evaluation: 1/2/2021  Gavin Jimenez MD    CHIEF COMPLAINT           HPI  Oleg Valentine is a 22 y.o. female per chart review has a h/o migraines, seizures on lamictal presents to the ED with seizures. Per family, pt takes lamictal for seizures. Per family, pt has had 5 seizures today. Last seizure just prior to arrival.  Pt arrived in the ED confused. Pt is axox0 however moves all extremities spontaneously. ROS  Review of Systems   Neurological: Positive for seizures. Except as noted above the remainder of the review of systems was reviewed and negative. PAST MEDICAL HISTORY     Past Medical History:   Diagnosis Date    Migraine     Seizures ([facility] [address]     Varicella          SURGICAL HISTORY     History reviewed. No pertinent surgical history.       CURRENTMEDICATIONS       Previous Medications    ACETAMINOPHEN (TYLENOL) 325 MG TABLET    Take 650 mg by mouth every 6 hours as needed for Pain    ARIPIPRAZOLE (ABILIFY) 5 MG TABLET    Take 5 mg by mouth daily    FOLIC ACID (FOLVITE) 1 MG TABLET    Take 4 mg by mouth daily    HYDROXYZINE (ATARAX) 25 MG TABLET    Take 25 mg by mouth 3 times daily as needed for Anxiety    LAMOTRIGINE (LAMICTAL) 150 MG TABLET    Take 400 mg by mouth daily     PRENATAL VIT-FE FUMARATE-FA (PRENATAL VITAMIN PO)    Take 1 tablet by mouth daily       ALLERGIES     Penicillins    FAMILY HISTORY       Family History   Problem Relation Age of Onset    Cancer Other         brain, breast, lung    Coronary Art Dis Other     High Blood Pressure Brother     Obesity Brother     Cancer Maternal Grandmother     Allergies Maternal Grandmother     Colon Cancer Maternal Grandmother     Breast Cancer Maternal Grandmother     Ovarian Cancer Maternal Grandmother     Other Maternal Grandmother         eye cancer, loss of left eye    Cancer Maternal Grandfather         lung    Heart Disease Maternal Grandfather     Uterine Cancer Mother     Ovarian Cancer Mother     Breast Cancer Mother     Cancer Mother         behind right eye    Heart Attack Mother     Migraines Mother     Cancer Maternal Aunt         lung          SOCIAL HISTORY       Social History     Socioeconomic History    Marital status: Single     Spouse name: None    Number of children: None    Years of education: None    Highest education level: None   Occupational History    None   Social Needs    Financial resource strain: None    Food insecurity     Worry: None     Inability: None    Transportation needs     Medical: None     Non-medical: None   Tobacco Use    Smoking status: Current Every Day Smoker     Packs/day: 0.50     Types: Cigarettes    Smokeless tobacco: Never Used   Substance and Sexual Activity    Alcohol use: No    Drug use: Not Currently    Sexual activity: Yes     Partners: Male   Lifestyle    Physical activity     Days per week: None     Minutes per session: None    Stress: None   Relationships    Social connections     Talks on phone: None     Gets together: None     Attends Worship service: None     Active member of club or organization: None     Attends meetings of clubs or organizations: None     Relationship status: None    Intimate partner violence     Fear of current or ex partner: None     Emotionally abused: None     Physically abused: None     Forced sexual activity: None   Other Topics Concern    None   Social History Narrative    None         PHYSICAL EXAM       ED Triage Vitals   BP Temp Temp src Pulse Resp SpO2 Height Weight   -- -- -- -- -- -- -- --       Physical Exam  Vitals signs and nursing note reviewed. Constitutional:       Appearance: She is well-developed. HENT:      Head: Normocephalic.       Right Ear: External ear normal.      Left Ear: External ear normal.   Eyes:      Conjunctiva/sclera: Conjunctivae normal.

## 2021-01-02 NOTE — ED TRIAGE NOTES
Pt arrived via ems from home after having 5 seizures since waking up today. O/a pt wake confused but does follow some commands. per ems family told them that pt has been unresponsive for last hr prior to ems arrival. During triage pt rolled to lt side and eyes began twitching then pt had full tonic clonic seizure for approx 1 min. Dr Neil Dorantes aware.

## 2021-01-02 NOTE — ED NOTES
Pt straight cath for urine with assist by [facility] PCA. Urine sent     Derlesly Casas  01/02/21 3437

## 2021-01-02 NOTE — ED NOTES
Pt still sleeping and skin pink w/d resp non labored. No seizures noted      Lin Sheriff.  Kiko Boswell  01/02/21 8271

## 2021-01-02 NOTE — ED NOTES
Bed: 12  Expected date: 1/2/21  Expected time: 12:49 PM  Means of arrival: [facility]  Comments:  32 F - Seizure x5. Postictal. 130/62,123,91% RA now 98% 3L O2. .

## 2021-01-02 NOTE — PROGRESS NOTES
1710- patient arrived to room; pulled IV out; not following direction and impulsive; seizure protocol in place; railing pads to bed  1720-bed and gown changed; IV replaced to [address]  1725-patient jumps out of bed and impulsive; assist to [facility] x2; unsteady; Supervisor grisel notified that avasys or 1:1 needed for safetyf  1745- patient drinks cup of water w/ large amount of liquid dark brown emesis immediately after  1750-Bed changed; gown changed; vitals stable  1800-patient sleeping in bed at this time  1820-patient drowsy and unable to answer admission questions  Electronically signed by Romario Curiel RN on 1/2/2021 at 6:00 PM

## 2021-01-02 NOTE — ED NOTES
Pt sleepy but awakens to voice pt aware she is at hospital and updated on why she is here. Pt knows month name and bd.skin nimesh w/d     Courtney Hagen.  Brit Jean  01/02/21 1520

## 2021-01-02 NOTE — H&P
Patient with past medical history of partial seizures on Lamictal only comes in with multiple seizures. One episode of tonic-clonic seizure in the ER for 1 minute patient is postictal most of the information was obtained from the ER physician. Patient is only uses marijuana occasionally, patient is compliant with her seizure medications. CT head was negative in the ER. ER physician spoke with Dr. Sloan Delaney and recommended to load her with Keppra and admit her for evaluation. Patient also received Ativan in the ER and currently lethargic and unable to answer questions. Past Medical History:   Diagnosis Date    Migraine     Seizures ([facility] [address]     Varicella      History reviewed. No pertinent surgical history.   Relationships   Social connections    Talks on phone: Not on file    Gets together: Not on file    Attends Zoroastrian service: Not on file    Active member of club or organization: Not on file    Attends meetings of clubs or organizations: Not on file    Relationship status: Not on file     Social History     Tobacco History     Smoking Status  Current Every Day Smoker Smoking Frequency  0.5 packs/day Smoking Tobacco Type  Cigarettes    Smokeless Tobacco Use  Never Used          Alcohol History     Alcohol Use Status  No          Drug Use     Drug Use Status  Not Currently          Sexual Activity     Sexually Active  Yes Partners  Male              Family History   Problem Relation Age of Onset    Cancer Other         brain, breast, lung    Coronary Art Dis Other     High Blood Pressure Brother     Obesity Brother     Cancer Maternal Grandmother     Allergies Maternal Grandmother     Colon Cancer Maternal Grandmother     Breast Cancer Maternal Grandmother     Ovarian Cancer Maternal Grandmother     Other Maternal Grandmother         eye cancer, loss of left eye    Cancer Maternal Grandfather         lung    Heart Disease Maternal Grandfather     Uterine Cancer Mother     Ovarian Cancer Mother     Breast Cancer Mother     Cancer Mother         behind right eye    Heart Attack Mother     Migraines Mother     Cancer Maternal Aunt         lung     No current facility-administered medications on file prior to encounter. Current Outpatient Medications on File Prior to Encounter   Medication Sig Dispense Refill    ARIPiprazole (ABILIFY) 5 MG tablet Take 5 mg by mouth daily      folic acid (FOLVITE) 1 MG tablet Take 4 mg by mouth daily      acetaminophen (TYLENOL) 325 MG tablet Take 650 mg by mouth every 6 hours as needed for Pain      Prenatal Vit-Fe Fumarate-FA (PRENATAL VITAMIN PO) Take 1 tablet by mouth daily      hydrOXYzine (ATARAX) 25 MG tablet Take 25 mg by mouth 3 times daily as needed for Anxiety      lamoTRIgine (LAMICTAL) 150 MG tablet Take 400 mg by mouth daily        Review of Systems   Unable to perform ROS: Acuity of condition   Physical Exam  HENT:      Head: Normocephalic and atraumatic. Nose: Nose normal.      Mouth/Throat:      Mouth: Mucous membranes are moist.   Eyes:      Pupils: Pupils are equal, round, and reactive to light. Neck:      Musculoskeletal: No neck rigidity. Cardiovascular:      Rate and Rhythm: Normal rate. Heart sounds: No murmur. Pulmonary:      Breath sounds: No wheezing. Abdominal:      General: There is no distension. Tenderness: There is no abdominal tenderness. Skin:     Coloration: Skin is not jaundiced. Neurological:      General: No focal deficit present. Mental Status: She is alert. 1) seziures  2) leukocytosis  3) hyperkalemia  4) hypernatremia  Maintaining patient, CTA was negative. Very patient was loaded with Keppra. Neurology evaluation. IV Keppra for now. Ativan as needed for seizures, fall and aspiration precautions. Leukocytosis could be secondary to seizures. We will follow up blood cultures and urine UA. Hypotonic solution for hyponatremia.   Patient received Kayexalate for hyperkalemia will monitor.

## 2021-01-03 LAB
ALBUMIN SERPL-MCNC: 4.1 G/DL (ref 3.5–4.6)
ALP BLD-CCNC: 64 U/L (ref 40–130)
ALT SERPL-CCNC: 19 U/L (ref 0–33)
ANION GAP SERPL CALCULATED.3IONS-SCNC: 11 MEQ/L (ref 9–15)
AST SERPL-CCNC: 34 U/L (ref 0–35)
BASOPHILS ABSOLUTE: 0.1 K/UL (ref 0–0.2)
BASOPHILS RELATIVE PERCENT: 0.6 %
BILIRUB SERPL-MCNC: 0.3 MG/DL (ref 0.2–0.7)
BUN BLDV-MCNC: 7 MG/DL (ref 6–20)
CALCIUM SERPL-MCNC: 7.7 MG/DL (ref 8.5–9.9)
CHLORIDE BLD-SCNC: 108 MEQ/L (ref 95–107)
CO2: 19 MEQ/L (ref 20–31)
CREAT SERPL-MCNC: 0.58 MG/DL (ref 0.5–0.9)
EOSINOPHILS ABSOLUTE: 0.1 K/UL (ref 0–0.7)
EOSINOPHILS RELATIVE PERCENT: 0.8 %
GFR AFRICAN AMERICAN: >60
GFR NON-AFRICAN AMERICAN: >60
GLOBULIN: 2 G/DL (ref 2.3–3.5)
GLUCOSE BLD-MCNC: 88 MG/DL (ref 70–99)
HCT VFR BLD CALC: 33.2 % (ref 37–47)
HEMOGLOBIN: 10.3 G/DL (ref 12–16)
LYMPHOCYTES ABSOLUTE: 2.3 K/UL (ref 1–4.8)
LYMPHOCYTES RELATIVE PERCENT: 17.4 %
MCH RBC QN AUTO: 23.8 PG (ref 27–31.3)
MCHC RBC AUTO-ENTMCNC: 30.9 % (ref 33–37)
MCV RBC AUTO: 76.8 FL (ref 82–100)
MONOCYTES ABSOLUTE: 1 K/UL (ref 0.2–0.8)
MONOCYTES RELATIVE PERCENT: 7.2 %
NEUTROPHILS ABSOLUTE: 9.9 K/UL (ref 1.4–6.5)
NEUTROPHILS RELATIVE PERCENT: 74 %
PDW BLD-RTO: 16.9 % (ref 11.5–14.5)
PLATELET # BLD: 510 K/UL (ref 130–400)
POTASSIUM SERPL-SCNC: 3.1 MEQ/L (ref 3.4–4.9)
RBC # BLD: 4.32 M/UL (ref 4.2–5.4)
SODIUM BLD-SCNC: 138 MEQ/L (ref 135–144)
TOTAL PROTEIN: 6.1 G/DL (ref 6.3–8)
WBC # BLD: 13.4 K/UL (ref 4.8–10.8)

## 2021-01-03 PROCEDURE — 6370000000 HC RX 637 (ALT 250 FOR IP): Performed by: PSYCHIATRY & NEUROLOGY

## 2021-01-03 PROCEDURE — 36415 COLL VENOUS BLD VENIPUNCTURE: CPT

## 2021-01-03 PROCEDURE — 1210000000 HC MED SURG R&B

## 2021-01-03 PROCEDURE — 80053 COMPREHEN METABOLIC PANEL: CPT

## 2021-01-03 PROCEDURE — 6360000002 HC RX W HCPCS: Performed by: INTERNAL MEDICINE

## 2021-01-03 PROCEDURE — 6370000000 HC RX 637 (ALT 250 FOR IP): Performed by: NURSE PRACTITIONER

## 2021-01-03 PROCEDURE — 2580000003 HC RX 258: Performed by: INTERNAL MEDICINE

## 2021-01-03 PROCEDURE — 6370000000 HC RX 637 (ALT 250 FOR IP): Performed by: INTERNAL MEDICINE

## 2021-01-03 PROCEDURE — 85025 COMPLETE CBC W/AUTO DIFF WBC: CPT

## 2021-01-03 PROCEDURE — 87086 URINE CULTURE/COLONY COUNT: CPT

## 2021-01-03 PROCEDURE — 99254 IP/OBS CNSLTJ NEW/EST MOD 60: CPT | Performed by: PSYCHIATRY & NEUROLOGY

## 2021-01-03 PROCEDURE — 80175 DRUG SCREEN QUAN LAMOTRIGINE: CPT

## 2021-01-03 RX ORDER — LAMOTRIGINE 100 MG/1
200 TABLET ORAL 2 TIMES DAILY
Status: DISCONTINUED | OUTPATIENT
Start: 2021-01-03 | End: 2021-01-04

## 2021-01-03 RX ORDER — LEVETIRACETAM 500 MG/1
500 TABLET ORAL 2 TIMES DAILY
Status: ON HOLD | COMMUNITY
End: 2021-01-04 | Stop reason: HOSPADM

## 2021-01-03 RX ORDER — HYDROXYZINE HYDROCHLORIDE 50 MG/ML
25 INJECTION, SOLUTION INTRAMUSCULAR ONCE
Status: COMPLETED | OUTPATIENT
Start: 2021-01-03 | End: 2021-01-03

## 2021-01-03 RX ORDER — ACETAMINOPHEN 325 MG/1
650 TABLET ORAL EVERY 4 HOURS PRN
Status: DISCONTINUED | OUTPATIENT
Start: 2021-01-03 | End: 2021-01-05 | Stop reason: HOSPADM

## 2021-01-03 RX ORDER — LEVETIRACETAM 500 MG/1
1000 TABLET ORAL 2 TIMES DAILY
Status: DISCONTINUED | OUTPATIENT
Start: 2021-01-03 | End: 2021-01-05

## 2021-01-03 RX ORDER — POTASSIUM CHLORIDE 20 MEQ/1
40 TABLET, EXTENDED RELEASE ORAL ONCE
Status: COMPLETED | OUTPATIENT
Start: 2021-01-03 | End: 2021-01-03

## 2021-01-03 RX ORDER — LAMOTRIGINE 200 MG/1
200 TABLET ORAL 2 TIMES DAILY
Status: ON HOLD | COMMUNITY
End: 2021-02-20

## 2021-01-03 RX ORDER — ARIPIPRAZOLE 5 MG/1
5 TABLET ORAL DAILY
Status: DISCONTINUED | OUTPATIENT
Start: 2021-01-03 | End: 2021-01-05 | Stop reason: HOSPADM

## 2021-01-03 RX ORDER — ACETAMINOPHEN 500 MG
1000 TABLET ORAL ONCE
Status: COMPLETED | OUTPATIENT
Start: 2021-01-03 | End: 2021-01-03

## 2021-01-03 RX ADMIN — HYDROXYZINE HYDROCHLORIDE 25 MG: 50 INJECTION, SOLUTION INTRAMUSCULAR at 21:42

## 2021-01-03 RX ADMIN — LAMOTRIGINE 200 MG: 100 TABLET ORAL at 20:57

## 2021-01-03 RX ADMIN — ACETAMINOPHEN 650 MG: 325 TABLET, FILM COATED ORAL at 20:57

## 2021-01-03 RX ADMIN — ARIPIPRAZOLE 5 MG: 5 TABLET ORAL at 09:32

## 2021-01-03 RX ADMIN — LAMOTRIGINE 200 MG: 100 TABLET ORAL at 12:45

## 2021-01-03 RX ADMIN — ACETAMINOPHEN 1000 MG: 500 TABLET ORAL at 04:55

## 2021-01-03 RX ADMIN — ACETAMINOPHEN 650 MG: 325 TABLET, FILM COATED ORAL at 16:56

## 2021-01-03 RX ADMIN — LEVETIRACETAM 500 MG: 100 INJECTION, SOLUTION INTRAVENOUS at 10:12

## 2021-01-03 RX ADMIN — LEVETIRACETAM 1000 MG: 500 TABLET ORAL at 18:07

## 2021-01-03 RX ADMIN — POTASSIUM CHLORIDE 40 MEQ: 20 TABLET, EXTENDED RELEASE ORAL at 09:31

## 2021-01-03 ASSESSMENT — ENCOUNTER SYMPTOMS
COLOR CHANGE: 0
SHORTNESS OF BREATH: 0
VOMITING: 0
DIARRHEA: 0
COUGH: 0
CHOKING: 0
BACK PAIN: 0
NAUSEA: 0
PHOTOPHOBIA: 0
TROUBLE SWALLOWING: 0

## 2021-01-03 ASSESSMENT — PAIN SCALES - GENERAL: PAINLEVEL_OUTOF10: 10

## 2021-01-03 NOTE — ACP (ADVANCE CARE PLANNING)
Advance Care Planning     Advance Care Planning Activator (Inpatient)  Conversation Note      Date of ACP Conversation: 1/2/2021    Conversation Conducted with: Patient with Decision Making Capacity    ACP Activator: [address] makes decisions on behalf of the incapacitated patient: Decision Maker is asked to consider and make decisions based on patient values, known preferences, or best interests. Health Care Decision Maker:     Current Designated Health Care Decision Maker:   Primary Decision Maker: Chirag Gonsalves - Other - [phone]  (If there is a [address] named in the [address]\" box in the ACP activity, but it is not visible above, be sure to open that field and then select the health care decision maker relationship (ie \"primary\") in the blank space to the right of the name.) Validate  this information as still accurate & up-to-date; edit [employer] [address] field as needed.)    Note: Assess and validate information in current ACP documents, as indicated. If no Decision Maker listed above or available through scanned documents, then:    If no Authorized Decision Maker has previously been identified, then patient chooses [address]:  \"Who would you like to name as your primary health care decision-maker? \"               Name: Chirag Gonsalves        Relationship: other          Phone number: [phone]  Aiden Rodriguez this person be reached easily? \" Yes     Note: If the relationship of these Decision-Makers to the patient does NOT follow your state's Next of Kin hierarchy, recommend that patient complete ACP document that meets state-specific requirements to allow them to act on the patient's behalf when appropriate. Care Preferences    Ventilation:   \"If you were in your present state of health and suddenly became very ill and were unable to breathe on your own, what would your preference be about the use of a ventilator (breathing machine) if it were available to you? \"      Would the patient desire the use of ventilator (breathing machine)?: yes    \"If your health worsens and it becomes clear that your chance of recovery is unlikely, what would your preference be about the use of a ventilator (breathing machine) if it were available to you? \"     Would the patient desire the use of ventilator (breathing machine)?: Yes      Resuscitation  \"CPR works best to restart the heart when there is a sudden event, like a heart attack, in someone who is otherwise healthy. Unfortunately, CPR does not typically restart the heart for people who have serious health conditions or who are very sick. \"    \"In the event your heart stopped as a result of an underlying serious health condition, would you want attempts to be made to restart your heart (answer \"yes\" for attempt to resuscitate) or would you prefer a natural death (answer \"no\" for do not attempt to resuscitate)? \" yes      NOTE: If the patient has a valid advance directive AND now provides care preference(s) that are inconsistent with that prior directive, advise the patient to consider either: creating a new advance directive that complies with state-specific requirements; or, if that is not possible, orally revoking that prior directive in accordance with state-specific requirements, which must be documented in the EHR. [x] Yes   [] No   Educated Patient / Nadine Gerber regarding differences between Advance Directives and portable DNR orders.     Length of ACP Conversation in minutes:  10  Conversation Outcomes:  [x] ACP discussion completed  [] Existing advance directive reviewed with patient; no changes to patient's previously recorded wishes  [] New Advance Directive completed  [] Portable Do Not Rescitate prepared for Provider review and signature  [] POLST/POST/MOLST/MOST prepared for Provider review and signature      Follow-up plan:    [] Schedule follow-up conversation to continue planning  [] Referred individual to Provider for additional questions/concerns   [] Advised patient/agent/surrogate to review completed ACP document and update if needed with changes in condition, patient preferences or care setting    [x] This note routed to one or more involved healthcare providers

## 2021-01-03 NOTE — PROGRESS NOTES
Progress Note  Date:1/3/2021       [facility]:[id number]  Patient Bebeto Godwin     YOB: 1995     Age:25 y.o. Subjective    Subjective:  Symptoms:  No shortness of breath, malaise, cough, chest pain, weakness, headache, chest pressure, anorexia, diarrhea or anxiety. Diet:  No nausea or vomiting. Review of Systems   Respiratory: Negative for cough and shortness of breath. Cardiovascular: Negative for chest pain. Gastrointestinal: Negative for anorexia, diarrhea, nausea and vomiting. Neurological: Negative for weakness. Objective         Vitals Last 24 Hours:  TEMPERATURE:  Temp  Av.7 °F (37.1 °C)  Min: 97.9 °F (36.6 °C)  Max: 99.5 °F (37.5 °C)  RESPIRATIONS RANGE: Resp  Av.2  Min: 14  Max: 26  PULSE OXIMETRY RANGE: SpO2  Av.2 %  Min: 98 %  Max: 100 %  PULSE RANGE: Pulse  Av.4  Min: 73  Max: 103  BLOOD PRESSURE RANGE: Systolic (21DOP), SKO:211 , Min:98 , VDX:389   ; Diastolic (41CNR), NZM:24, Min:49, Max:85    I/O (24Hr): Intake/Output Summary (Last 24 hours) at 1/3/2021 1047  Last data filed at 1/3/2021 0940  Gross per 24 hour   Intake 300 ml   Output --   Net 300 ml     Objective:  General Appearance:  Comfortable, well-appearing and in no acute distress. Vital signs: (most recent): Blood pressure (!) 121/49, pulse 73, temperature 97.9 °F (36.6 °C), temperature source Oral, resp. rate 16, weight 160 lb (72.6 kg), SpO2 99 %, unknown if currently breastfeeding. HEENT: Normal HEENT exam.    Lungs:  Normal effort. Heart: Regular rhythm. S1 normal and S2 normal.    Abdomen: Abdomen is soft. There is no epigastric area or suprapubic area tenderness. Extremities: Normal range of motion. Pulses: Distal pulses are intact. Neurological: Patient is alert and oriented to person, place and time. Pupils:  Pupils are equal, round, and reactive to light. Skin:  Warm and dry.       Labs/Imaging/Diagnostics    Labs:  CBC:  Recent Labs 01/02/21  1300 01/03/21  0453   WBC 25.5* 13.4*   RBC 4.93 4.32   HGB 11.6* 10.3*   HCT 39.3 33.2*   MCV 79.8* 76.8*   RDW 17.1* 16.9*   * 510*     CHEMISTRIES:  Recent Labs     01/02/21  1300 01/03/21 0453   * 138   K 5.7* 3.1*   * 108*   CO2 13* 19*   BUN 10 7   CREATININE 1.09* 0.58   GLUCOSE 147* 88   MG 3.0*  --      PT/INR:No results for input(s): PROTIME, INR in the last 72 hours. APTT:No results for input(s): APTT in the last 72 hours. LIVER PROFILE:  Recent Labs     01/02/21  1300 01/03/21 0453   AST 28 34   ALT 21 19   BILITOT <0.2 0.3   ALKPHOS 92 64       Imaging Last 24 Hours:  Ct Head Wo Contrast    Result Date: 1/2/2021  CT HEAD WO CONTRAST : 1/2/2021 CLINICAL HISTORY:  Seizure . COMPARISON: None available. TECHNIQUE: Spiral unenhanced images were obtained of the head, with routine multiplanar reconstructions performed. All CT scans at this facility use dose modulation, iterative reconstruction, and/or weight based dosing when appropriate to reduce radiation dose to as low as reasonably achievable. FINDINGS: There is no intracranial hemorrhage, mass effect, midline shift, extra-axial collection, evidence of hydrocephalus, recent ischemic infarct, or skull fracture identified. There is no significant atrophy or white matter changes, for age. Small broad-based probable mucus retention cysts or mucoceles are noted within the maxillary sinuses. The mastoid air cells and other visualized paranasal sinuses are essentially clear. NO ACUTE INTRACRANIAL PROCESS IDENTIFIED. Xr Chest Portable    Result Date: 1/3/2021  XR CHEST PORTABLE : 1/2/2021 CLINICAL HISTORY:  SOB . COMPARISON: 9/12/2012. TECHNIQUE: An upright portable AP radiograph of the chest was obtained. FINDINGS: Shallow inspiratory volumes are present without significant infiltrate identified. There is no cardiomegaly, significant pleural effusion, vascular congestion, pneumothorax, or displaced fractures identified. NO EVIDENCE OF ACTIVE CARDIOPULMONARY DISEASE.      Assessment//Plan           Hospital Problems           Last Modified POA    Seizures ([facility] [address] 1/2/2021 Yes        Assessment & Plan  1) seizures  Resolved  C/w keppra  Pt states she on keppra at home   Neuro eval  2) leukocytosis resolving  3) hypernatremia better  4) hypokalemia replace  Once cleared by neurology pt can be discharged  Electronically signed by Suzan Cardenas MD on 1/3/21 at 10:47 AM EST

## 2021-01-03 NOTE — CONSULTS
Subjective:      Patient ID: Serenity Reich is a 22 y.o. female who presents today for seizures. HPI 24-year-old right-handed female with a known history of seizures and migraine headaches with bipolar state maintained with seizures patient had 5 seizures prior to her arrival.  She was in status epilepticus. She was confused upon arrival to ER. Patient has no recall of her history of seizure details. Patient was seen at [facility] clinic in 2018 she had a seizure while she was pregnant. EEG and bedside monitoring was reviewed and there appeared to be some suggestion of a left to right frontotemporal sharp waves. Patient had a seizure of few weeks ago and was seen at [facility] where [address] was added. She is followed by Dr. Chuck Ramsey at [facility] patient reports that she is does take her medications. We are not aware as while she was at [facility] she reported she had not been taking her medicine. Patient was given Keppra in the emergency room. recommended a dose of Dilantin as she had 5 seizures already patient already has high dose of lamotrigine at 40 mg daily. Patient only had partial epilepsy but reports that she had no generalized seizure though in documentation during pregnancy at 16 weeks in 2018 she had a seizure this was discussed with Dr. Stewart Mascorro, urine tox screen positive for marijuana only. March 2019 urine drug screen is due for cocaine, patient has a mild headache but otherwise does not report any new symptoms  Review of Systems   Constitutional: Negative for fever. HENT: Negative for ear pain, tinnitus and trouble swallowing. Eyes: Negative for photophobia and visual disturbance. Respiratory: Negative for choking and shortness of breath. Cardiovascular: Negative for chest pain and palpitations. Gastrointestinal: Negative for nausea and vomiting. Musculoskeletal: Negative for back pain, gait problem, joint swelling, myalgias, neck pain and neck stiffness.    Skin: Negative for color change. Allergic/Immunologic: Negative for food allergies. Neurological: Negative for dizziness, tremors, seizures, syncope, facial asymmetry, speech difficulty, weakness, light-headedness, numbness and headaches. Psychiatric/Behavioral: Negative for behavioral problems, confusion, hallucinations and sleep disturbance. Past Medical History:   Diagnosis Date    Migraine     Seizures ([facility] [address]     Varicella      History reviewed. No pertinent surgical history.   Social History     Socioeconomic History    Marital status: Single     Spouse name: Not on file    Number of children: Not on file    Years of education: Not on file    Highest education level: Not on file   Occupational History    Not on file   Social Needs    Financial resource strain: Not on file    Food insecurity     Worry: Not on file     Inability: Not on file    Transportation needs     Medical: Not on file     Non-medical: Not on file   Tobacco Use    Smoking status: Current Every Day Smoker     Packs/day: 0.50     Types: Cigarettes    Smokeless tobacco: Never Used   Substance and Sexual Activity    Alcohol use: No    Drug use: Not Currently    Sexual activity: Yes     Partners: Male   Lifestyle    Physical activity     Days per week: Not on file     Minutes per session: Not on file    Stress: Not on file   Relationships    Social connections     Talks on phone: Not on file     Gets together: Not on file     Attends Anabaptist service: Not on file     Active member of club or organization: Not on file     Attends meetings of clubs or organizations: Not on file     Relationship status: Not on file    Intimate partner violence     Fear of current or ex partner: Not on file     Emotionally abused: Not on file     Physically abused: Not on file     Forced sexual activity: Not on file   Other Topics Concern    Not on file   Social History Narrative    Not on file     Family History   Problem Relation Age of Onset    Cancer Other         brain, breast, lung    Coronary Art Dis Other     High Blood Pressure Brother     Obesity Brother     Cancer Maternal Grandmother     Allergies Maternal Grandmother     Colon Cancer Maternal Grandmother     Breast Cancer Maternal Grandmother     Ovarian Cancer Maternal Grandmother     Other Maternal Grandmother         eye cancer, loss of left eye    Cancer Maternal Grandfather         lung    Heart Disease Maternal Grandfather     Uterine Cancer Mother     Ovarian Cancer Mother     Breast Cancer Mother     Cancer Mother         behind right eye    Heart Attack Mother     Migraines Mother     Cancer Maternal Aunt         lung     Allergies   Allergen Reactions    Penicillins      GI Problems     Current Facility-Administered Medications   Medication Dose Route Frequency Provider Last Rate Last Admin    acetaminophen (TYLENOL) tablet 650 mg  650 mg Oral Q4H PRN Prisca Bobo, RN, NP        ARIPiprazole (ABILIFY) tablet 5 mg  5 mg Oral Daily Ayala Solomon MD   5 mg at 01/03/21 0932    acetaminophen (TYLENOL) tablet 1,000 mg  1,000 mg Oral Once Rex Vasquez MD   Stopped at 01/02/21 1634    ketorolac (TORADOL) injection 30 mg  30 mg Intravenous Once Rex Vasquez MD        levETIRAcetam (KEPPRA) 500 mg in sodium chloride 0.9 % 100 mL IVPB  500 mg Intravenous Q12H Ayala Solomon MD   Stopped at 01/03/21 1030    LORazepam (ATIVAN) injection 2 mg  2 mg Intravenous Q6H PRN Ayala Solomon MD        ondansetron [facility]) injection 4 mg  4 mg Intravenous Q6H PRN Ayala Solomon MD   4 mg at 01/02/21 1751        Objective:   BP (!) 121/49   Pulse 73   Temp 97.9 °F (36.6 °C) (Oral)   Resp 16   Wt 160 lb (72.6 kg)   SpO2 99%   Breastfeeding Unknown   BMI 28.34 kg/m²     Physical Exam  Vitals signs reviewed. Eyes:      Pupils: Pupils are equal, round, and reactive to light. Neck:      Musculoskeletal: Normal range of motion.    Cardiovascular:      Rate and Rhythm: Normal rate and regular rhythm. Heart sounds: No murmur. Pulmonary:      Effort: Pulmonary effort is normal.      Breath sounds: Normal breath sounds. Abdominal:      General: Bowel sounds are normal.   Musculoskeletal: Normal range of motion. Skin:     General: Skin is warm. Neurological:      Mental Status: She is alert and oriented to person, place, and time. Cranial Nerves: No cranial nerve deficit. Sensory: No sensory deficit. Motor: No abnormal muscle tone. Coordination: Coordination normal.      Deep Tendon Reflexes: Reflexes are normal and symmetric. Babinski sign absent on the right side. Babinski sign absent on the left side. Psychiatric:         Mood and Affect: Mood normal.       Patient seen in the presence of my nurse, Gay    Ct Head Wo Contrast    Result Date: 1/2/2021  CT HEAD WO CONTRAST : 1/2/2021 CLINICAL HISTORY:  Seizure . COMPARISON: None available. TECHNIQUE: Spiral unenhanced images were obtained of the head, with routine multiplanar reconstructions performed. All CT scans at this facility use dose modulation, iterative reconstruction, and/or weight based dosing when appropriate to reduce radiation dose to as low as reasonably achievable. FINDINGS: There is no intracranial hemorrhage, mass effect, midline shift, extra-axial collection, evidence of hydrocephalus, recent ischemic infarct, or skull fracture identified. There is no significant atrophy or white matter changes, for age. Small broad-based probable mucus retention cysts or mucoceles are noted within the maxillary sinuses. The mastoid air cells and other visualized paranasal sinuses are essentially clear. NO ACUTE INTRACRANIAL PROCESS IDENTIFIED. Xr Chest Portable    Result Date: 1/3/2021  XR CHEST PORTABLE : 1/2/2021 CLINICAL HISTORY:  SOB . COMPARISON: 9/12/2012. TECHNIQUE: An upright portable AP radiograph of the chest was obtained.  FINDINGS: Shallow inspiratory volumes are present without significant infiltrate identified. There is no cardiomegaly, significant pleural effusion, vascular congestion, pneumothorax, or displaced fractures identified. NO EVIDENCE OF ACTIVE CARDIOPULMONARY DISEASE. Lab Results   Component Value Date    WBC 13.4 01/03/2021    RBC 4.32 01/03/2021    RBC 4.70 06/01/2012    HGB 10.3 01/03/2021    HCT 33.2 01/03/2021    MCV 76.8 01/03/2021    MCH 23.8 01/03/2021    MCHC 30.9 01/03/2021    RDW 16.9 01/03/2021     01/03/2021    MPV 8.0 06/19/2014     Lab Results   Component Value Date     01/03/2021    K 3.1 01/03/2021     01/03/2021    CO2 19 01/03/2021    BUN 7 01/03/2021    CREATININE 0.58 01/03/2021    GFRAA >60.0 01/03/2021    LABGLOM >60.0 01/03/2021    GLUCOSE 88 01/03/2021    GLUCOSE 77 06/01/2012    PROT 6.1 01/03/2021    LABALBU 4.1 01/03/2021    LABALBU 4.7 06/01/2012    CALCIUM 7.7 01/03/2021    BILITOT 0.3 01/03/2021    ALKPHOS 64 01/03/2021    AST 34 01/03/2021    ALT 19 01/03/2021     No results found for: PROTIME, INR  Lab Results   Component Value Date    TSH 1.480 03/14/2019    IRON 58 01/08/2014    TIBC 395 01/08/2014     No results found for: TRIG, HDL, LDLCALC, LDLDIRECT, LABVLDL  Lab Results   Component Value Date    LABAMPH Neg 01/02/2021    BARBSCNU Neg 01/02/2021    LABBENZ Neg 01/02/2021    LABBENZ NT DTCD 03/01/2013    LABMETH Neg 01/02/2021    OPIATESCREENURINE Neg 01/02/2021    PHENCYCLIDINESCREENURINE Neg 01/02/2021    ETOH <10 01/02/2021     No results found for: LITHIUM, DILFRTOT, VALPROATE    Assessment:   Status epilepticus with 5 seizures. Patient has a history of complex partial seizures but reported no generalized tonic-clonic seizures in the past.  Though there is a documentation thousand 18 while she was pregnant she had a seizure at week Zora Sas at that time she was at [facility] and diagnosed with epilepsy. She had 1 abnormal EEG with some left-to-right frontal sharp waves.   Patient had another seizure and was at [facility] in November. At that time she had reported that she was not taking her medication or may have missed the dose. Right now she reports that she has been taking her medication. Keppra was added in November. Patient is on Lamictal 400 mg and I recommended that we add Keppra 1000 mg twice a day. 1 must keep in mind that she has bipolar disorder and Keppra may not be the right choice but for now we will continue this and if this does not help she may need to increase the Lamictal to 600 mg at some point. She already has an established neurologist Dr. Washington Has with whom she can follow-up with. We will follow her if she wants. We will repeat her EEG. Consultation includes my consultation with emergency room for initial treatment of status epilepticus. Patient also has migraine headaches which are not bothersome the headache she has now is a postictal headache. Juan Daniel Hutchins MD, Nayeli Morfin, American Board of Psychiatry & Neurology  Board Certified in Vascular Neurology  Board Certified in Neuromuscular Medicine  Certified in [facility]:

## 2021-01-04 ENCOUNTER — APPOINTMENT (OUTPATIENT)
Dept: MRI IMAGING | Age: 26
DRG: 053 | End: 2021-01-04
Payer: MEDICAID

## 2021-01-04 LAB
ALBUMIN SERPL-MCNC: 3.6 G/DL (ref 3.5–4.6)
ALP BLD-CCNC: 62 U/L (ref 40–130)
ALT SERPL-CCNC: 26 U/L (ref 0–33)
ANION GAP SERPL CALCULATED.3IONS-SCNC: 14 MEQ/L (ref 9–15)
AST SERPL-CCNC: 51 U/L (ref 0–35)
BASOPHILS ABSOLUTE: 0.2 K/UL (ref 0–0.2)
BASOPHILS RELATIVE PERCENT: 2 %
BILIRUB SERPL-MCNC: <0.2 MG/DL (ref 0.2–0.7)
BUN BLDV-MCNC: 7 MG/DL (ref 6–20)
CALCIUM SERPL-MCNC: 8.7 MG/DL (ref 8.5–9.9)
CHLORIDE BLD-SCNC: 110 MEQ/L (ref 95–107)
CO2: 19 MEQ/L (ref 20–31)
CREAT SERPL-MCNC: 0.6 MG/DL (ref 0.5–0.9)
EOSINOPHILS ABSOLUTE: 0.2 K/UL (ref 0–0.7)
EOSINOPHILS RELATIVE PERCENT: 2 %
GFR AFRICAN AMERICAN: >60
GFR NON-AFRICAN AMERICAN: >60
GLOBULIN: 3 G/DL (ref 2.3–3.5)
GLUCOSE BLD-MCNC: 87 MG/DL (ref 70–99)
HCT VFR BLD CALC: 33.2 % (ref 37–47)
HEMOGLOBIN: 10.6 G/DL (ref 12–16)
LYMPHOCYTES ABSOLUTE: 3.1 K/UL (ref 1–4.8)
LYMPHOCYTES RELATIVE PERCENT: 31 %
MCH RBC QN AUTO: 24.3 PG (ref 27–31.3)
MCHC RBC AUTO-ENTMCNC: 32 % (ref 33–37)
MCV RBC AUTO: 76.1 FL (ref 82–100)
MONOCYTES ABSOLUTE: 0.2 K/UL (ref 0.2–0.8)
MONOCYTES RELATIVE PERCENT: 1.9 %
NEUTROPHILS ABSOLUTE: 6.2 K/UL (ref 1.4–6.5)
NEUTROPHILS RELATIVE PERCENT: 63 %
PDW BLD-RTO: 16.4 % (ref 11.5–14.5)
PLATELET # BLD: 517 K/UL (ref 130–400)
POTASSIUM SERPL-SCNC: 4 MEQ/L (ref 3.4–4.9)
RBC # BLD: 4.37 M/UL (ref 4.2–5.4)
SODIUM BLD-SCNC: 143 MEQ/L (ref 135–144)
TOTAL PROTEIN: 6.6 G/DL (ref 6.3–8)
URINE CULTURE, ROUTINE: NORMAL
WBC # BLD: 9.9 K/UL (ref 4.8–10.8)

## 2021-01-04 PROCEDURE — 6370000000 HC RX 637 (ALT 250 FOR IP): Performed by: INTERNAL MEDICINE

## 2021-01-04 PROCEDURE — APPSS30 APP SPLIT SHARED TIME 16-30 MINUTES: Performed by: NURSE PRACTITIONER

## 2021-01-04 PROCEDURE — 70553 MRI BRAIN STEM W/O & W/DYE: CPT

## 2021-01-04 PROCEDURE — 6360000004 HC RX CONTRAST MEDICATION: Performed by: PSYCHIATRY & NEUROLOGY

## 2021-01-04 PROCEDURE — 6370000000 HC RX 637 (ALT 250 FOR IP): Performed by: PSYCHIATRY & NEUROLOGY

## 2021-01-04 PROCEDURE — 95816 EEG AWAKE AND DROWSY: CPT

## 2021-01-04 PROCEDURE — 1210000000 HC MED SURG R&B

## 2021-01-04 PROCEDURE — 80053 COMPREHEN METABOLIC PANEL: CPT

## 2021-01-04 PROCEDURE — 85025 COMPLETE CBC W/AUTO DIFF WBC: CPT

## 2021-01-04 PROCEDURE — 99233 SBSQ HOSP IP/OBS HIGH 50: CPT | Performed by: PSYCHIATRY & NEUROLOGY

## 2021-01-04 PROCEDURE — 6370000000 HC RX 637 (ALT 250 FOR IP): Performed by: NURSE PRACTITIONER

## 2021-01-04 PROCEDURE — 36415 COLL VENOUS BLD VENIPUNCTURE: CPT

## 2021-01-04 PROCEDURE — A9579 GAD-BASE MR CONTRAST NOS,1ML: HCPCS | Performed by: PSYCHIATRY & NEUROLOGY

## 2021-01-04 PROCEDURE — 93010 ELECTROCARDIOGRAM REPORT: CPT | Performed by: INTERNAL MEDICINE

## 2021-01-04 RX ORDER — LAMOTRIGINE 100 MG/1
300 TABLET ORAL DAILY
Status: DISCONTINUED | OUTPATIENT
Start: 2021-01-04 | End: 2021-01-05 | Stop reason: HOSPADM

## 2021-01-04 RX ORDER — LEVETIRACETAM 1000 MG/1
1000 TABLET ORAL 2 TIMES DAILY
Qty: 60 TABLET | Refills: 3 | Status: SHIPPED | OUTPATIENT
Start: 2021-01-04 | End: 2021-01-05 | Stop reason: HOSPADM

## 2021-01-04 RX ORDER — NICOTINE 21 MG/24HR
1 PATCH, TRANSDERMAL 24 HOURS TRANSDERMAL DAILY
Status: DISCONTINUED | OUTPATIENT
Start: 2021-01-04 | End: 2021-01-05 | Stop reason: HOSPADM

## 2021-01-04 RX ORDER — LAMOTRIGINE 100 MG/1
100 TABLET ORAL ONCE
Status: DISCONTINUED | OUTPATIENT
Start: 2021-01-04 | End: 2021-01-05 | Stop reason: HOSPADM

## 2021-01-04 RX ORDER — SODIUM CHLORIDE 0.9 % (FLUSH) 0.9 %
10 SYRINGE (ML) INJECTION 2 TIMES DAILY
Status: DISCONTINUED | OUTPATIENT
Start: 2021-01-04 | End: 2021-01-05 | Stop reason: HOSPADM

## 2021-01-04 RX ORDER — HYDROXYZINE PAMOATE 25 MG/1
25 CAPSULE ORAL 3 TIMES DAILY PRN
Status: DISCONTINUED | OUTPATIENT
Start: 2021-01-04 | End: 2021-01-05 | Stop reason: HOSPADM

## 2021-01-04 RX ORDER — LORAZEPAM 2 MG/ML
1 INJECTION INTRAMUSCULAR
Status: ACTIVE | OUTPATIENT
Start: 2021-01-04 | End: 2021-01-04

## 2021-01-04 RX ADMIN — LEVETIRACETAM 1000 MG: 500 TABLET ORAL at 10:20

## 2021-01-04 RX ADMIN — LEVETIRACETAM 1000 MG: 500 TABLET ORAL at 21:47

## 2021-01-04 RX ADMIN — LAMOTRIGINE 300 MG: 100 TABLET ORAL at 22:10

## 2021-01-04 RX ADMIN — HYDROXYZINE PAMOATE 25 MG: 25 CAPSULE ORAL at 21:47

## 2021-01-04 RX ADMIN — ACETAMINOPHEN 650 MG: 325 TABLET, FILM COATED ORAL at 14:13

## 2021-01-04 RX ADMIN — LAMOTRIGINE 200 MG: 100 TABLET ORAL at 11:16

## 2021-01-04 RX ADMIN — HYDROXYZINE PAMOATE 25 MG: 25 CAPSULE ORAL at 10:20

## 2021-01-04 RX ADMIN — GADOTERIDOL 15 ML: 279.3 INJECTION, SOLUTION INTRAVENOUS at 20:59

## 2021-01-04 RX ADMIN — HYDROXYZINE PAMOATE 25 MG: 25 CAPSULE ORAL at 15:55

## 2021-01-04 RX ADMIN — ARIPIPRAZOLE 5 MG: 5 TABLET ORAL at 10:20

## 2021-01-04 ASSESSMENT — ENCOUNTER SYMPTOMS
CHEST TIGHTNESS: 0
NAUSEA: 0
COUGH: 0
SHORTNESS OF BREATH: 0
VOMITING: 0
TROUBLE SWALLOWING: 0
COLOR CHANGE: 0
WHEEZING: 0

## 2021-01-04 NOTE — PROGRESS NOTES
[facility] Neurology Daily Progress Note  Name: Ruby Ramsey  Age: 22 y.o. Gender: female  CodeStatus: Full Code  Allergies: Penicillins    Chief Complaint:Seizures (x 5 today)    Primary Care Provider: No primary care provider on file. InpatientTreatment Team: Treatment Team: Attending Provider: Radha Castro DO; Consulting Physician: Vicky Fang MD; : Junior Doran, RN; Utilization Reviewer: Diana Payton RN; Registered Nurse: Jessika Contreras RN; Patient Care Tech: Olivia Carpio  Admission Date: 1/2/2021      HPI   Pt seen and examined for neuro follow up for breakthrough seizure. Patient currently alert and oriented x3, no acute distress, cooperative. No recurrent seizure activity noted. She continues on Lamictal 200 mg twice daily. Keppra was increased to 1000 mg twice daily and patient is tolerating this without adverse effect or mood or behavioral changes. Headache resolved today. Pt more awake and has poor recall of events from yesterday  Vitals:    01/03/21 1908   BP: 139/81   Pulse: 78   Resp:    Temp: 98.6 °F (37 °C)   SpO2: 100%      Review of Systems   Constitutional: Negative for appetite change, chills, fatigue and fever. HENT: Negative for hearing loss and trouble swallowing. Eyes: Negative for visual disturbance. Respiratory: Negative for cough, chest tightness, shortness of breath and wheezing. Cardiovascular: Negative for chest pain, palpitations and leg swelling. Gastrointestinal: Negative for nausea and vomiting. Musculoskeletal: Negative for gait problem. Skin: Negative for color change and rash. Neurological: Negative for dizziness, tremors, seizures, syncope, facial asymmetry, speech difficulty, weakness, light-headedness, numbness and headaches. Psychiatric/Behavioral: Negative for agitation, confusion and hallucinations. The patient is not nervous/anxious. more awake  Physical Exam  Vitals signs and nursing note reviewed. Constitutional:       General: She is not in acute distress. Appearance: She is not ill-appearing or diaphoretic. HENT:      Head: Normocephalic and atraumatic. Eyes:      Extraocular Movements: Extraocular movements intact. Pupils: Pupils are equal, round, and reactive to light. Cardiovascular:      Rate and Rhythm: Normal rate and regular rhythm. Pulmonary:      Effort: Pulmonary effort is normal. No respiratory distress. Breath sounds: Normal breath sounds. Abdominal:      General: Bowel sounds are normal.      Palpations: Abdomen is soft. Skin:     General: Skin is warm and dry. Neurological:      General: No focal deficit present. Mental Status: She is alert and oriented to person, place, and time. Mental status is at baseline. Cranial Nerves: No cranial nerve deficit. non focal exam        Medications:  Reviewed    Infusion Medications:   Scheduled Medications:    ARIPiprazole  5 mg Oral Daily    levETIRAcetam  1,000 mg Oral BID    lamoTRIgine  200 mg Oral BID    acetaminophen  1,000 mg Oral Once    ketorolac  30 mg Intravenous Once     PRN Meds: hydrOXYzine, acetaminophen, LORazepam, ondansetron    Labs:   Recent Labs     01/02/21  1300 01/03/21  0453 01/04/21  0509   WBC 25.5* 13.4* 9.9   HGB 11.6* 10.3* 10.6*   HCT 39.3 33.2* 33.2*   * 510* 517*     Recent Labs     01/02/21  1300 01/03/21  0453 01/04/21  0509   * 138 143   K 5.7* 3.1* 4.0   * 108* 110*   CO2 13* 19* 19*   BUN 10 7 7   CREATININE 1.09* 0.58 0.60   CALCIUM 9.3 7.7* 8.7     Recent Labs     01/02/21  1300 01/03/21  0453 01/04/21  0509   AST 28 34 51*   ALT 21 19 26   BILITOT <0.2 0.3 <0.2   ALKPHOS 92 64 62     No results for input(s): INR in the last 72 hours.   Recent Labs     01/02/21  1300   TROPONINI <0.010       Urinalysis:   Lab Results   Component Value Date    NITRU Negative 03/13/2019    WBCUA  03/13/2019    BACTERIA MODERATE 03/13/2019    RBCUA 0-2 09/20/2018 BLOODU Negative 03/13/2019    SPECGRAV 1.012 03/13/2019    GLUCOSEU Negative 03/13/2019    GLUCOSEU NEG 11/25/2011       Radiology:   Most recent    EEG No procedure found. MRI of Brain No results found for this or any previous visit. No results found for this or any previous visit. MRA of the Head and Neck: No results found for this or any previous visit. No results found for this or any previous visit. No results found for this or any previous visit. CT of the Head:   Results for orders placed during the hospital encounter of 01/02/21   CT Head WO Contrast    Narrative CT HEAD WO CONTRAST : 1/2/2021    CLINICAL HISTORY:  Seizure . COMPARISON: None available. TECHNIQUE: Spiral unenhanced images were obtained of the head, with routine multiplanar reconstructions performed. All CT scans at this facility use dose modulation, iterative reconstruction, and/or weight based dosing when appropriate to reduce radiation dose to as low as reasonably achievable. FINDINGS:    There is no intracranial hemorrhage, mass effect, midline shift, extra-axial collection, evidence of hydrocephalus, recent ischemic infarct, or skull fracture identified. There is no significant atrophy or white matter changes, for age. Small broad-based probable mucus retention cysts or mucoceles are noted within the maxillary sinuses. The mastoid air cells and other visualized paranasal sinuses are essentially clear. Impression NO ACUTE INTRACRANIAL PROCESS IDENTIFIED. No results found for this or any previous visit. No results found for this or any previous visit. Carotid duplex: No results found for this or any previous visit. No results found for this or any previous visit. No results found for this or any previous visit. Echo No results found for this or any previous visit. Assessment/Plan:    Status epilepticus with 5 seizures.   Patient has a history of complex partial seizures but reported no generalized tonic-clonic seizures in the past.  Though there is a documentation thousand 18 while she was pregnant she had a seizure at week Yany Pee at that time she was at [facility] clinic and diagnosed with epilepsy. She had 1 abnormal EEG with some left-to-right frontal sharp waves. Patient had another seizure and was at [facility] in November. At that time she had reported that she was not taking her medication or may have missed the dose. Right now she reports that she has been taking her medication. Keppra was added in November. Patient is on Lamictal 400 mg and I recommended that we add Keppra 1000 mg twice a day. 1 must keep in mind that she has bipolar disorder and Keppra may not be the right choice but for now we will continue this and if this does not help she may need to increase the Lamictal to 600 mg at some point. She already has an established neurologist Dr. Bhupendra Green with whom she can follow-up with. We will follow her if she wants. We will repeat her EEG. Consultation includes my consultation with emergency room for initial treatment of status epilepticus.     Patient also has migraine headaches which are not bothersome the headache she has now is a postictal headache. Breakthrough seizure  Continue Lamictal 200 mg twice daily  Keppra has been increased to 1000 mg twice daily patient is tolerating without adverse effect or mood or behavioral changes thus far  CT head negative   Tox screen positive for cannabinoid  EEG pending  Patient can be discharged after EEG completed and can follow-up with existing neurologist  I have personally performed a face to face diagnostic evaluation on this patient, reviewed all data and investigations, and am the sole provider of all clinical decisions on the neurological status of this patient. pt more wake and comprehends better,  eeg abnormal as periods of rhythmic changes c/w prtialseizures,  Will increase lamictal as I do not think Keppra helping, keep one more night  Needs MRI brain for temporal sclerosis  Discussed with Dr Francheska iBllings and pt, agreeable to stay        [facility] GREG Callaway MD, [id number], American Board of Psychiatry & Neurology  Board Certified in Vascular Neurology  Board Certified in Neuromuscular Medicine  Certified in Neurorehabilitation     Collaborating physicians: Dr Adrien Callaway    Electronically signed by BRUCE Vera CNP on 1/4/2021 at 10:48 AM

## 2021-01-04 NOTE — DISCHARGE SUMMARY
Hospital Medicine Discharge Summary    Shakira Coats  :  1995  MRN:  [id number]    Admit date:  2021  Discharge date:  2021    Admitting Physician:  Claire Ordoñez MD  Primary Care Physician:  No primary care provider on file. Discharge Diagnoses:    Status epilepticus   Bipolar d/o       Patient Active Problem List   Diagnosis    Migraine    Anxiety disorder    Depression    Bipolar depression ([facility]    E. coli UTI    Seizures ([facility]       Chief Complaint   Patient presents with    Seizures     x 5 today     Hospital Course:         Admitted with status epilepticus. Neuro was consulted. AED's were adjusted by neurology. Educated on seizure precautions. Added to dc instructions by neurology. She verbalized understanding. Rx for AED send to pharmacy by neuro. She will follow up with [facility] neurologist, Dr Charleen Shah as outpatient in 1-2 weeks. Exam on discharge:   /81   Pulse 78   Temp 98.6 °F (37 °C)   Resp 16   Wt 160 lb (72.6 kg)   SpO2 100%   Breastfeeding Unknown   BMI 28.34 kg/m²   General appearance: No apparent distress, appears stated age and cooperative. HEENT: Pupils equal, round, and reactive to light. Conjunctivae/corneas clear. Neck: Supple, with full range of motion. No jugular venous distention. Trachea midline. Respiratory:  Normal respiratory effort. Clear to auscultation, bilaterally without Rales/Wheezes/Rhonchi. Cardiovascular: Regular rate and rhythm with normal S1/S2 without murmurs, rubs or gallops. Abdomen: Soft, non-tender, non-distended with normal bowel sounds. Musculoskeletal: No clubbing, cyanosis or edema bilaterally. Full range of motion without deformity. Skin: Skin color, texture, turgor normal.  No rashes or lesions. Neuro: Non Focal. Symetrical motor and tone. Nl Comprehension, Alert,awake and oriented. NL CN. Symetrical tone and reflexes.   Psychiatric: Alert and oriented, thought content appropriate, normal insight  Capillary Refill: Brisk,< 3 seconds   Peripheral Pulses: +2 palpable, equal bilaterally     Patient was seen by the following consultants while admitted to [facility]:   Consults:  [address]    Significant Diagnostic Studies:    Refer to chart     Please refer to chart if no studies are shown here    Ct Head Wo Contrast    Result Date: 1/2/2021  CT HEAD WO CONTRAST : 1/2/2021 CLINICAL HISTORY:  Seizure . COMPARISON: None available. TECHNIQUE: Spiral unenhanced images were obtained of the head, with routine multiplanar reconstructions performed. All CT scans at this facility use dose modulation, iterative reconstruction, and/or weight based dosing when appropriate to reduce radiation dose to as low as reasonably achievable. FINDINGS: There is no intracranial hemorrhage, mass effect, midline shift, extra-axial collection, evidence of hydrocephalus, recent ischemic infarct, or skull fracture identified. There is no significant atrophy or white matter changes, for age. Small broad-based probable mucus retention cysts or mucoceles are noted within the maxillary sinuses. The mastoid air cells and other visualized paranasal sinuses are essentially clear. NO ACUTE INTRACRANIAL PROCESS IDENTIFIED. Xr Chest Portable    Result Date: 1/3/2021  XR CHEST PORTABLE : 1/2/2021 CLINICAL HISTORY:  SOB . COMPARISON: 9/12/2012. TECHNIQUE: An upright portable AP radiograph of the chest was obtained. FINDINGS: Shallow inspiratory volumes are present without significant infiltrate identified. There is no cardiomegaly, significant pleural effusion, vascular congestion, pneumothorax, or displaced fractures identified. NO EVIDENCE OF ACTIVE CARDIOPULMONARY DISEASE.        Discharge Medications:       So [place]   Home Medication Instructions YMD:[id number]    Printed on:01/04/21 6409   Medication Information                      acetaminophen (TYLENOL) 325 MG tablet  Take 650 mg by mouth every 6 hours as needed for Pain             ARIPiprazole (ABILIFY) 5 MG tablet  Take 5 mg by mouth daily             hydrOXYzine (ATARAX) 25 MG tablet  Take 25 mg by mouth 3 times daily as needed for Anxiety             lamoTRIgine (LAMICTAL) 200 MG tablet  Take 200 mg by mouth 2 times daily             levETIRAcetam (KEPPRA) 1000 MG tablet  Take 1 tablet by mouth 2 times daily                 Disposition:   If discharged to Home, Any [place] [address] needs that were indicated and/or required as been addressed and set up by Social Work. Condition at discharge: good     Activity: activity as tolerated    Total time taken for discharging this patient: 40 minutes. Greater than 70% of time was spent focused exclusively on this patient. Time was taken to review chart, discuss plans with consultants, reconciling medications, discussing plan answering questions with patient.      Gee Zuniga  1/4/2021, 5:55 PM  ----------------------------------------------------------------------------------------------------------------------    Sukhwinder Prasad

## 2021-01-05 VITALS
RESPIRATION RATE: 18 BRPM | OXYGEN SATURATION: 94 % | WEIGHT: 160 LBS | BODY MASS INDEX: 28.34 KG/M2 | SYSTOLIC BLOOD PRESSURE: 122 MMHG | TEMPERATURE: 97.7 F | DIASTOLIC BLOOD PRESSURE: 65 MMHG | HEART RATE: 63 BPM

## 2021-01-05 LAB
ALBUMIN SERPL-MCNC: 3.7 G/DL (ref 3.5–4.6)
ALP BLD-CCNC: 60 U/L (ref 40–130)
ALT SERPL-CCNC: 35 U/L (ref 0–33)
ANION GAP SERPL CALCULATED.3IONS-SCNC: 13 MEQ/L (ref 9–15)
AST SERPL-CCNC: 70 U/L (ref 0–35)
BASOPHILS ABSOLUTE: 0.1 K/UL (ref 0–0.2)
BASOPHILS RELATIVE PERCENT: 1.2 %
BILIRUB SERPL-MCNC: <0.2 MG/DL (ref 0.2–0.7)
BUN BLDV-MCNC: 9 MG/DL (ref 6–20)
CALCIUM SERPL-MCNC: 8.8 MG/DL (ref 8.5–9.9)
CHLORIDE BLD-SCNC: 105 MEQ/L (ref 95–107)
CO2: 22 MEQ/L (ref 20–31)
CREAT SERPL-MCNC: 0.67 MG/DL (ref 0.5–0.9)
EOSINOPHILS ABSOLUTE: 0.4 K/UL (ref 0–0.7)
EOSINOPHILS RELATIVE PERCENT: 4.5 %
GFR AFRICAN AMERICAN: >60
GFR NON-AFRICAN AMERICAN: >60
GLOBULIN: 2.8 G/DL (ref 2.3–3.5)
GLUCOSE BLD-MCNC: 85 MG/DL (ref 70–99)
HCT VFR BLD CALC: 34 % (ref 37–47)
HEMOGLOBIN: 10.7 G/DL (ref 12–16)
LAMOTRIGINE LEVEL: 3.3 UG/ML (ref 2.5–15)
LYMPHOCYTES ABSOLUTE: 2.5 K/UL (ref 1–4.8)
LYMPHOCYTES RELATIVE PERCENT: 30.7 %
MCH RBC QN AUTO: 24.2 PG (ref 27–31.3)
MCHC RBC AUTO-ENTMCNC: 31.6 % (ref 33–37)
MCV RBC AUTO: 76.5 FL (ref 82–100)
MONOCYTES ABSOLUTE: 0.6 K/UL (ref 0.2–0.8)
MONOCYTES RELATIVE PERCENT: 7.1 %
NEUTROPHILS ABSOLUTE: 4.7 K/UL (ref 1.4–6.5)
NEUTROPHILS RELATIVE PERCENT: 56.5 %
PDW BLD-RTO: 16.8 % (ref 11.5–14.5)
PLATELET # BLD: 512 K/UL (ref 130–400)
POTASSIUM SERPL-SCNC: 3.9 MEQ/L (ref 3.4–4.9)
RBC # BLD: 4.45 M/UL (ref 4.2–5.4)
SODIUM BLD-SCNC: 140 MEQ/L (ref 135–144)
TOTAL PROTEIN: 6.5 G/DL (ref 6.3–8)
WBC # BLD: 8.3 K/UL (ref 4.8–10.8)

## 2021-01-05 PROCEDURE — 6370000000 HC RX 637 (ALT 250 FOR IP): Performed by: PSYCHIATRY & NEUROLOGY

## 2021-01-05 PROCEDURE — 6370000000 HC RX 637 (ALT 250 FOR IP): Performed by: INTERNAL MEDICINE

## 2021-01-05 PROCEDURE — 99233 SBSQ HOSP IP/OBS HIGH 50: CPT | Performed by: PSYCHIATRY & NEUROLOGY

## 2021-01-05 PROCEDURE — 80053 COMPREHEN METABOLIC PANEL: CPT

## 2021-01-05 PROCEDURE — 85025 COMPLETE CBC W/AUTO DIFF WBC: CPT

## 2021-01-05 PROCEDURE — 36415 COLL VENOUS BLD VENIPUNCTURE: CPT

## 2021-01-05 RX ORDER — LAMOTRIGINE 100 MG/1
100 TABLET ORAL ONCE
Qty: 30 TABLET | Refills: 3 | Status: ON HOLD | OUTPATIENT
Start: 2021-01-05 | End: 2021-02-20

## 2021-01-05 RX ORDER — LAMOTRIGINE 150 MG/1
300 TABLET ORAL 2 TIMES DAILY
Qty: 120 TABLET | Refills: 3 | Status: SHIPPED | OUTPATIENT
Start: 2021-01-05 | End: 2021-06-03 | Stop reason: SDUPTHER

## 2021-01-05 RX ADMIN — LAMOTRIGINE 300 MG: 100 TABLET ORAL at 10:24

## 2021-01-05 RX ADMIN — ARIPIPRAZOLE 5 MG: 5 TABLET ORAL at 10:24

## 2021-01-05 ASSESSMENT — ENCOUNTER SYMPTOMS
COLOR CHANGE: 0
PHOTOPHOBIA: 0
DIARRHEA: 0
COUGH: 0
BACK PAIN: 0
CHOKING: 0
SHORTNESS OF BREATH: 0
VOMITING: 0
NAUSEA: 0
TROUBLE SWALLOWING: 0

## 2021-01-05 NOTE — PROGRESS NOTES
Neurology Follow up    SUBJECTIVE: Patient slept well but is somewhat tired but more awake and less foggy    Current Facility-Administered Medications   Medication Dose Route Frequency Provider Last Rate Last Admin    hydrOXYzine (VISTARIL) capsule 25 mg  25 mg Oral TID PRN Galileo Najera DO   25 mg at 01/04/21 2147    lamoTRIgine (LAMICTAL) tablet 300 mg  300 mg Oral Daily Governor Jeannine MD   300 mg at 01/04/21 2210    lamoTRIgine (LAMICTAL) tablet 100 mg  100 mg Oral Once Governor Jeannine MD   Stopped at 01/04/21 1943    nicotine (NICODERM CQ) 21 MG/24HR 1 patch  1 patch Transdermal Daily Galileo Najera DO   1 patch at 01/04/21 2147    sodium chloride flush 0.9 % injection 10 mL  10 mL Intravenous BID Governor Jeannine MD        acetaminophen (TYLENOL) tablet 650 mg  650 mg Oral Q4H PRN Prisca Bobo RN, NP   650 mg at 01/04/21 1413    ARIPiprazole (ABILIFY) tablet 5 mg  5 mg Oral Daily Tad Hickman MD   5 mg at 01/04/21 1020    acetaminophen (TYLENOL) tablet 1,000 mg  1,000 mg Oral Once Santy Andre MD   Stopped at 01/02/21 1634    ketorolac (TORADOL) injection 30 mg  30 mg Intravenous Once Santy Andre MD        LORazepam (ATIVAN) injection 2 mg  2 mg Intravenous Q6H PRN Tad Hickman MD        ondansetron [facility]) injection 4 mg  4 mg Intravenous Q6H PRN Tad Hickman MD   4 mg at 01/02/21 1751       PHYSICAL EXAM:    /65   Pulse 63   Temp 97.7 °F (36.5 °C) (Oral)   Resp 18   Wt 160 lb (72.6 kg)   SpO2 94%   Breastfeeding Unknown   BMI 28.34 kg/m²    General Appearance:      Skin:  normal  CVS - Normal sounds, No murmurs , No carotid Bruits  RS -CTA  Abdomen Soft, BS present  Review of Systems   Constitutional: Negative for fever. HENT: Negative for ear pain, tinnitus and trouble swallowing. Eyes: Negative for photophobia and visual disturbance. Respiratory: Negative for choking and shortness of breath. Cardiovascular: Negative for chest pain and palpitations. Gastrointestinal: Negative for nausea and vomiting. Musculoskeletal: Negative for back pain, gait problem, joint swelling, myalgias, neck pain and neck stiffness. Skin: Negative for color change. Allergic/Immunologic: Negative for food allergies. Neurological: Negative for dizziness, tremors, seizures, syncope, facial asymmetry, speech difficulty, weakness, light-headedness, numbness and headaches. Psychiatric/Behavioral: Negative for behavioral problems, confusion, hallucinations and sleep disturbance.       Mental Status Exam:             Level of Alertness:   awake            Orientation:   person, place, time                      Attention/Concentration:  normal            Language:  normal      Funduscopic Exam:     Cranial Nerves            Cranial nerve III           Pupils:  equal, round, reactive to light      Cranial nerves III, IV, VI           Extraocular Movements: intact      Cranial nerve V           Facial sensation:  intact      Cranial nerve VII           Facial strength: intact      Cranial nerve VIII           Hearing:  intact      Cranial nerve IX           Palate:  intact      Cranial nerve XI         Shoulder shrug:  intact      Cranial nerve XII          Tongue movement:  normal    Motor:    Drift:  absent  Motor exam is symmetrical 5 out of 5 all extremities bilaterally  Tone:  normal  Abnormal Movements:  absent            Sensory:        Pinprick             Right Upper Extremity:  normal             Left Upper Extremity:  normal             Right Lower Extremity:  normal             Left Lower Extremity:  normal           Vibration                         Touch            Proprioception                 Coordination:           Finger/Nose   Right:  normal              Left:  normal          Heel-Knee-Shin                Right:  normal              Left:  normal          Rapid Alternating Movements              Right:  normal              Left:  normal          Gait: Casual:  normal                         Romberg:  normal            Reflexes:             Deep Tendon Reflexes:             Reflexes are 2 +             Plantar response:                Right:  downgoing               Left:  downgoing    Vascular:  Cardiac Exam:  normal         Ct Head Wo Contrast    Result Date: 1/2/2021  CT HEAD WO CONTRAST : 1/2/2021 CLINICAL HISTORY:  Seizure . COMPARISON: None available. TECHNIQUE: Spiral unenhanced images were obtained of the head, with routine multiplanar reconstructions performed. All CT scans at this facility use dose modulation, iterative reconstruction, and/or weight based dosing when appropriate to reduce radiation dose to as low as reasonably achievable. FINDINGS: There is no intracranial hemorrhage, mass effect, midline shift, extra-axial collection, evidence of hydrocephalus, recent ischemic infarct, or skull fracture identified. There is no significant atrophy or white matter changes, for age. Small broad-based probable mucus retention cysts or mucoceles are noted within the maxillary sinuses. The mastoid air cells and other visualized paranasal sinuses are essentially clear. NO ACUTE INTRACRANIAL PROCESS IDENTIFIED. Xr Chest Portable    Result Date: 1/3/2021  XR CHEST PORTABLE : 1/2/2021 CLINICAL HISTORY:  SOB . COMPARISON: 9/12/2012. TECHNIQUE: An upright portable AP radiograph of the chest was obtained. FINDINGS: Shallow inspiratory volumes are present without significant infiltrate identified. There is no cardiomegaly, significant pleural effusion, vascular congestion, pneumothorax, or displaced fractures identified. NO EVIDENCE OF ACTIVE CARDIOPULMONARY DISEASE.        Recent Labs     01/03/21 0453 01/04/21  0509 01/05/21  0512   WBC 13.4* 9.9 8.3   HGB 10.3* 10.6* 10.7*   * 517* 512*     Recent Labs     01/03/21 0453 01/04/21  0509 01/05/21  0512    143 140   K 3.1* 4.0 3.9   * 110* 105   CO2 19* 19* 22   BUN 7 7 9 CREATININE 0.58 0.60 0.67   GLUCOSE 88 87 85     Recent Labs     01/03/21  0453 01/04/21  0509 01/05/21  0512   BILITOT 0.3 <0.2 <0.2   ALKPHOS 64 62 60   AST 34 51* 70*   ALT 19 26 35*     No results found for: PROTIME, INR  No results found for: LITHIUM, DILFRTOT, VALPROATE    ASSESSMENT AND PLAN  Complex partial seizures with secondary generalization. Patient had a significantly abnormal EEG with rhythmic changes occurring during awake phase. These findings are consistent with underlying subclinical partial seizures. Patient was on Lamictal 400 mg and Keppra 5 mg twice a day prior to admission. Patient has been seen by other neurologists as well. Further recommended that we maximize the Lamictal to 300 mg twice a day and discontinue the Keppra and try and maintain her on the monotherapy with maximizing the dose with levels. Ilir Martines may not be the right choice for her as she is bipolar as well. He is due to see her psychiatrist in the next week and she is on Abilify. MRI of the brain was done last night which are reviewed and does not show temporal sclerosis or any other scarring. Patient would like to follow-up with me and we will see her in a few weeks with a follow-up level of Lamictal in a week. Endings were discussed with Dr. Fanny Dumont last night and the patient. Juan Daniel Hou MD, [place], American Board of Psychiatry & Neurology  Board Certified in Vascular Neurology  Board Certified in Neuromuscular Medicine  Certified in . [address]

## 2021-01-05 NOTE — PROGRESS NOTES
Progress Note  Date:2021       ZYZZ:[id number]  Patient Ana Aldridge     YOB: 1995     Age:25 y.o. Pt was seen and evaluated, no overnight events    Subjective    Subjective:  Symptoms:  No shortness of breath, malaise, cough, chest pain, weakness, headache, chest pressure, anorexia, diarrhea or anxiety. Diet:  No nausea or vomiting. Review of Systems   Respiratory: Negative for cough and shortness of breath. Cardiovascular: Negative for chest pain. Gastrointestinal: Negative for anorexia, diarrhea, nausea and vomiting. Neurological: Negative for weakness. Objective         Vitals Last 24 Hours:  TEMPERATURE:  Temp  Av.2 °F (36.8 °C)  Min: 97.7 °F (36.5 °C)  Max: 98.6 °F (37 °C)  RESPIRATIONS RANGE: Resp  Av  Min: 18  Max: 18  PULSE OXIMETRY RANGE: SpO2  Av %  Min: 94 %  Max: 100 %  PULSE RANGE: Pulse  Av  Min: 63  Max: 73  BLOOD PRESSURE RANGE: Systolic (95CEU), GOO:995 , Min:122 , THY:904   ; Diastolic (81WON), VMC:19, Min:65, Max:65    I/O (24Hr): No intake or output data in the 24 hours ending 21 1104  Objective:  General Appearance:  Comfortable and well-appearing. Vital signs: (most recent): Blood pressure 122/65, pulse 63, temperature 97.7 °F (36.5 °C), temperature source Oral, resp. rate 18, weight 160 lb (72.6 kg), SpO2 94 %, unknown if currently breastfeeding. HEENT: Normal HEENT exam.    Lungs:  Normal effort. Heart: Normal rate. Regular rhythm. S1 normal and S2 normal.    Abdomen: Abdomen is soft. Bowel sounds are normal.   There is no epigastric area or suprapubic area tenderness. Pulses: Distal pulses are intact. Neurological: Patient is alert. Pupils:  Pupils are equal, round, and reactive to light. Skin:  Warm and dry.       Labs/Imaging/Diagnostics    Labs:  CBC:  Recent Labs     21  0453 21  0509 21  0512   WBC 13.4* 9.9 8.3   RBC 4.32 4.37 4.45   HGB 10.3* 10.6* 10.7*   HCT 33.2* 33.2* 34.0*   MCV 76.8* 76.1* 76.5*   RDW 16.9* 16.4* 16.8*   * 517* 512*     CHEMISTRIES:  Recent Labs     01/02/21  1300 01/03/21  0453 01/04/21  0509 01/05/21  0512   * 138 143 140   K 5.7* 3.1* 4.0 3.9   * 108* 110* 105   CO2 13* 19* 19* 22   BUN 10 7 7 9   CREATININE 1.09* 0.58 0.60 0.67   GLUCOSE 147* 88 87 85   MG 3.0*  --   --   --      PT/INR:No results for input(s): PROTIME, INR in the last 72 hours. APTT:No results for input(s): APTT in the last 72 hours. LIVER PROFILE:  Recent Labs     01/03/21  0453 01/04/21  0509 01/05/21  0512   AST 34 51* 70*   ALT 19 26 35*   BILITOT 0.3 <0.2 <0.2   ALKPHOS 64 62 60       Imaging Last 24 Hours:  Mri Brain W Wo Contrast    Result Date: 1/5/2021  MRI BRAIN W WO CONTRAST : 1/4/2021 CLINICAL HISTORY: Seizure. COMPARISON: Head CT 1/2/2021. TECHNIQUE: Multiplanar MR imaging of the head was performed without contrast. FINDINGS: There is no evidence of mesial temporal sclerosis, abnormal enhancement, infarct, intracranial hemorrhage, mass effect, midline shift, extra-axial collection, or hydrocephalus. There is no significant atrophy or white matter changes, for age. NO ACUTE INTRACRANIAL PROCESS OR EVIDENCE OF MESIAL TEMPORAL SCLEROSIS IDENTIFIED.      Assessment//Plan           Hospital Problems           Last Modified POA    * (Principal) Seizures ([address] 1/4/2021 Yes    Anxiety disorder 1/4/2021 Yes    Depression 1/4/2021 Yes    Bipolar depression ([address] 1/4/2021 Yes    Status epilepticus ([address] 1/4/2021 Yes    Partial symptomatic epilepsy with complex partial seizures, intractable, with status epilepticus ([address] 1/4/2021 Yes        Assessment & Plan  1) Seizures  C/w AED  2) hypokalemia resolved  3) leukocytosis  C/w WBC  4) hypokalemia  Resolved    Electronically signed by Radha Cisneros MD on 1/5/21 at 11:04 AM EST

## 2021-01-07 LAB
BLOOD CULTURE, ROUTINE: NORMAL
CULTURE, BLOOD 2: NORMAL

## 2021-01-10 PROCEDURE — 95816 EEG AWAKE AND DROWSY: CPT | Performed by: PSYCHIATRY & NEUROLOGY

## 2021-01-10 NOTE — PROCEDURES
[address]                      [address], [address] [facility]                          ELECTROENCEPHALOGRAM REPORT    PATIENT NAME: Bry Kumar                  :        1995  MED REC NO:   [id number]                            ROOM:       K190  ACCOUNT NO:   [de-identified]                           ADMIT DATE: 2021  PROVIDER:     Samia Holder MD    DATE OF EE2021    MEDICATIONS:  Keppra, Abilify, and Lamictal.    EEG FINDINGS:  The background rhythm of this EEG shows 9 Hz posterior  dominant rhythm of alpha. In between this, there are multiple episodes  of sudden rhythmic changes occurring suddenly and stopping suddenly with  alpha rhythms continuing. These findings are consistent with underlying  seizures. The patient remains awake during this time. Hyperventilation  was performed with good effort without any change in frequencies. Photic stimulation does not reveal any photic response to seizures. IMPRESSION:  This is an abnormal EEG recording by the virtue of  underlying subclinical seizures as discussed above. The patient has  multiple episodes of sudden rhythmic changes occurring for several  seconds with a normal alpha rhythm after this. The patient should be  maximized on anticonvulsant agents. Clinical correlation is  recommended.         Ange Leahy MD    D: 01/10/2021 11:10:13       T: 01/10/2021 11:11:54     [email or web address]  Job#: [id number]     Doc#: [id number]    CC:

## 2021-02-10 PROBLEM — Z87.19 H/O: GASTROINTESTINAL DISEASE: Status: ACTIVE | Noted: 2021-02-10

## 2021-02-10 PROBLEM — D64.9 ANEMIA: Status: ACTIVE | Noted: 2021-02-10

## 2021-02-10 PROBLEM — O99.323 DRUG USE AFFECTING PREGNANCY IN THIRD TRIMESTER: Status: ACTIVE | Noted: 2018-10-13

## 2021-02-10 PROBLEM — S42.209A FRACTURE OF UPPER END OF HUMERUS: Status: ACTIVE | Noted: 2021-02-10

## 2021-02-10 PROBLEM — B00.9: Status: ACTIVE | Noted: 2019-05-02

## 2021-02-10 PROBLEM — F31.10 BIPOLAR AFFECTIVE DISORDER, CURRENT EPISODE MANIC (HCC): Status: ACTIVE | Noted: 2021-02-10

## 2021-02-10 PROBLEM — O42.00 PROM WITH ONSET OF LABOR WITHIN 24 HOURS OF RUPTURE: Status: ACTIVE | Noted: 2019-05-25

## 2021-02-10 PROBLEM — O98.519: Status: ACTIVE | Noted: 2019-05-02

## 2021-02-10 PROBLEM — G40.109 TEMPORAL LOBE EPILEPSY (HCC): Status: ACTIVE | Noted: 2018-10-14

## 2021-02-10 PROBLEM — G40.909 SEIZURE DISORDER (HCC): Status: ACTIVE | Noted: 2020-11-09

## 2021-02-10 PROBLEM — A08.4 VIRAL GASTROENTERITIS: Status: ACTIVE | Noted: 2021-02-10

## 2021-02-10 PROBLEM — O99.820 GROUP B STREPTOCOCCUS CARRIER, ANTEPARTUM: Status: ACTIVE | Noted: 2019-05-06

## 2021-02-10 PROBLEM — E55.9 VITAMIN D DEFICIENCY: Status: ACTIVE | Noted: 2021-02-10

## 2021-02-10 PROBLEM — R82.90 ABNORMAL URINALYSIS: Status: ACTIVE | Noted: 2020-11-09

## 2021-02-10 PROBLEM — Z65.3 PROBLEMS RELATED TO OTHER LEGAL CIRCUMSTANCES: Status: ACTIVE | Noted: 2019-06-11

## 2021-02-10 PROBLEM — F17.200 NICOTINE USE DISORDER: Status: ACTIVE | Noted: 2018-10-14

## 2021-02-10 PROBLEM — O99.333 TOBACCO SMOKING AFFECTING PREGNANCY IN THIRD TRIMESTER: Status: ACTIVE | Noted: 2018-10-13

## 2021-02-10 PROBLEM — G60.0 HEREDITARY MOTOR AND SENSORY NEUROPATHY: Status: ACTIVE | Noted: 2021-02-10

## 2021-02-20 ENCOUNTER — APPOINTMENT (OUTPATIENT)
Dept: CT IMAGING | Age: 26
DRG: 053 | End: 2021-02-20
Payer: MEDICAID

## 2021-02-20 ENCOUNTER — APPOINTMENT (OUTPATIENT)
Dept: GENERAL RADIOLOGY | Age: 26
DRG: 053 | End: 2021-02-20
Payer: MEDICAID

## 2021-02-20 ENCOUNTER — HOSPITAL ENCOUNTER (INPATIENT)
Age: 26
LOS: 2 days | Discharge: HOME | DRG: 053 | End: 2021-02-22
Attending: INTERNAL MEDICINE | Admitting: INTERNAL MEDICINE
Payer: MEDICAID

## 2021-02-20 DIAGNOSIS — G40.909 SEIZURE DISORDER (HCC): Primary | ICD-10-CM

## 2021-02-20 DIAGNOSIS — F14.10 COCAINE ABUSE (HCC): ICD-10-CM

## 2021-02-20 PROBLEM — F19.90 DRUG USE: Status: ACTIVE | Noted: 2021-02-20

## 2021-02-20 PROBLEM — M25.512 LEFT SHOULDER PAIN: Status: ACTIVE | Noted: 2021-02-20

## 2021-02-20 PROBLEM — R79.89 ELEVATED LACTIC ACID LEVEL: Status: ACTIVE | Noted: 2021-02-20

## 2021-02-20 PROBLEM — R56.9 SEIZURES (HCC): Status: ACTIVE | Noted: 2021-02-20

## 2021-02-20 PROBLEM — D72.829 LEUKOCYTOSIS: Status: ACTIVE | Noted: 2021-02-20

## 2021-02-20 LAB
ALBUMIN SERPL-MCNC: 4.5 G/DL (ref 3.5–4.6)
ALP BLD-CCNC: 104 U/L (ref 40–130)
ALT SERPL-CCNC: 48 U/L (ref 0–33)
AMPHETAMINE SCREEN, URINE: ABNORMAL
ANION GAP SERPL CALCULATED.3IONS-SCNC: 23 MEQ/L (ref 9–15)
ANISOCYTOSIS: ABNORMAL
AST SERPL-CCNC: 38 U/L (ref 0–35)
BARBITURATE SCREEN URINE: ABNORMAL
BASOPHILS ABSOLUTE: 0 K/UL (ref 0–0.2)
BASOPHILS ABSOLUTE: 0.2 K/UL (ref 0–0.2)
BASOPHILS RELATIVE PERCENT: 0.4 %
BASOPHILS RELATIVE PERCENT: 1 %
BENZODIAZEPINE SCREEN, URINE: ABNORMAL
BILIRUB SERPL-MCNC: <0.2 MG/DL (ref 0.2–0.7)
BUN BLDV-MCNC: 16 MG/DL (ref 6–20)
CALCIUM SERPL-MCNC: 8.3 MG/DL (ref 8.5–9.9)
CANNABINOID SCREEN URINE: POSITIVE
CHLORIDE BLD-SCNC: 106 MEQ/L (ref 95–107)
CK MB: 4.1 NG/ML (ref 0–3.8)
CO2: 11 MEQ/L (ref 20–31)
COCAINE METABOLITE SCREEN URINE: POSITIVE
CREAT SERPL-MCNC: 0.99 MG/DL (ref 0.5–0.9)
CREATINE KINASE-MB INDEX: 1.1 % (ref 0–3.5)
EOSINOPHILS ABSOLUTE: 0 K/UL (ref 0–0.7)
EOSINOPHILS ABSOLUTE: 0.2 K/UL (ref 0–0.7)
EOSINOPHILS RELATIVE PERCENT: 0.1 %
EOSINOPHILS RELATIVE PERCENT: 1 %
ETHANOL PERCENT: NORMAL G/DL
ETHANOL: <10 MG/DL (ref 0–0.08)
GFR AFRICAN AMERICAN: >60
GFR NON-AFRICAN AMERICAN: >60
GLOBULIN: 3.5 G/DL (ref 2.3–3.5)
GLUCOSE BLD-MCNC: 124 MG/DL (ref 70–99)
HCG, URINE, POC: NEGATIVE
HCT VFR BLD CALC: 34.4 % (ref 37–47)
HCT VFR BLD CALC: 37.1 % (ref 37–47)
HEMOGLOBIN: 10.5 G/DL (ref 12–16)
HEMOGLOBIN: 11.1 G/DL (ref 12–16)
HYPOCHROMIA: ABNORMAL
HYPOCHROMIA: ABNORMAL
LACTIC ACID: 1.7 MMOL/L (ref 0.5–2.2)
LACTIC ACID: 11 MMOL/L (ref 0.5–2.2)
LYMPHOCYTES ABSOLUTE: 1.4 K/UL (ref 1–4.8)
LYMPHOCYTES ABSOLUTE: 1.9 K/UL (ref 1–4.8)
LYMPHOCYTES RELATIVE PERCENT: 6 %
LYMPHOCYTES RELATIVE PERCENT: 6 %
Lab: ABNORMAL
Lab: NORMAL
MAGNESIUM: 2.8 MG/DL (ref 1.7–2.4)
MCH RBC QN AUTO: 22.5 PG (ref 27–31.3)
MCH RBC QN AUTO: 22.5 PG (ref 27–31.3)
MCHC RBC AUTO-ENTMCNC: 30 % (ref 33–37)
MCHC RBC AUTO-ENTMCNC: 30.6 % (ref 33–37)
MCV RBC AUTO: 73.4 FL (ref 82–100)
MCV RBC AUTO: 75.2 FL (ref 82–100)
METHADONE SCREEN, URINE: ABNORMAL
MICROCYTES: ABNORMAL
MICROCYTES: ABNORMAL
MONOCYTES ABSOLUTE: 0.6 K/UL (ref 0.2–0.8)
MONOCYTES ABSOLUTE: 0.7 K/UL (ref 0.2–0.8)
MONOCYTES RELATIVE PERCENT: 2 %
MONOCYTES RELATIVE PERCENT: 3 %
NEGATIVE QC PASS/FAIL: NORMAL
NEUTROPHILS ABSOLUTE: 21.1 K/UL (ref 1.4–6.5)
NEUTROPHILS ABSOLUTE: 28.5 K/UL (ref 1.4–6.5)
NEUTROPHILS RELATIVE PERCENT: 91 %
NEUTROPHILS RELATIVE PERCENT: 92 %
OPIATE SCREEN URINE: ABNORMAL
OXYCODONE URINE: ABNORMAL
PDW BLD-RTO: 17.7 % (ref 11.5–14.5)
PDW BLD-RTO: 18.3 % (ref 11.5–14.5)
PHENCYCLIDINE SCREEN URINE: ABNORMAL
PLATELET # BLD: 505 K/UL (ref 130–400)
PLATELET # BLD: 657 K/UL (ref 130–400)
PLATELET SLIDE REVIEW: ABNORMAL
PLATELET SLIDE REVIEW: ABNORMAL
POIKILOCYTES: ABNORMAL
POIKILOCYTES: ABNORMAL
POSITIVE QC PASS/FAIL: NORMAL
POTASSIUM SERPL-SCNC: 4.1 MEQ/L (ref 3.4–4.9)
PROLACTIN: 26.6 NG/ML
PROPOXYPHENE SCREEN: ABNORMAL
RBC # BLD: 4.7 M/UL (ref 4.2–5.4)
RBC # BLD: 4.94 M/UL (ref 4.2–5.4)
SARS-COV-2, NAAT: NOT DETECTED
SLIDE REVIEW: ABNORMAL
SODIUM BLD-SCNC: 140 MEQ/L (ref 135–144)
TOTAL CK: 366 U/L (ref 0–170)
TOTAL PROTEIN: 8 G/DL (ref 6.3–8)
WBC # BLD: 23.2 K/UL (ref 4.8–10.8)
WBC # BLD: 31 K/UL (ref 4.8–10.8)

## 2021-02-20 PROCEDURE — 82553 CREATINE MB FRACTION: CPT

## 2021-02-20 PROCEDURE — 80175 DRUG SCREEN QUAN LAMOTRIGINE: CPT

## 2021-02-20 PROCEDURE — 96375 TX/PRO/DX INJ NEW DRUG ADDON: CPT

## 2021-02-20 PROCEDURE — 87635 SARS-COV-2 COVID-19 AMP PRB: CPT

## 2021-02-20 PROCEDURE — 85025 COMPLETE CBC W/AUTO DIFF WBC: CPT

## 2021-02-20 PROCEDURE — 80053 COMPREHEN METABOLIC PANEL: CPT

## 2021-02-20 PROCEDURE — 81003 URINALYSIS AUTO W/O SCOPE: CPT

## 2021-02-20 PROCEDURE — 6360000002 HC RX W HCPCS: Performed by: INTERNAL MEDICINE

## 2021-02-20 PROCEDURE — 96366 THER/PROPH/DIAG IV INF ADDON: CPT

## 2021-02-20 PROCEDURE — 83605 ASSAY OF LACTIC ACID: CPT

## 2021-02-20 PROCEDURE — 83735 ASSAY OF MAGNESIUM: CPT

## 2021-02-20 PROCEDURE — 99284 EMERGENCY DEPT VISIT MOD MDM: CPT

## 2021-02-20 PROCEDURE — 70450 CT HEAD/BRAIN W/O DYE: CPT

## 2021-02-20 PROCEDURE — 6360000002 HC RX W HCPCS: Performed by: PHYSICIAN ASSISTANT

## 2021-02-20 PROCEDURE — 36415 COLL VENOUS BLD VENIPUNCTURE: CPT

## 2021-02-20 PROCEDURE — 87040 BLOOD CULTURE FOR BACTERIA: CPT

## 2021-02-20 PROCEDURE — 84146 ASSAY OF PROLACTIN: CPT

## 2021-02-20 PROCEDURE — 2580000003 HC RX 258: Performed by: PHYSICIAN ASSISTANT

## 2021-02-20 PROCEDURE — 82550 ASSAY OF CK (CPK): CPT

## 2021-02-20 PROCEDURE — 96365 THER/PROPH/DIAG IV INF INIT: CPT

## 2021-02-20 PROCEDURE — 6360000002 HC RX W HCPCS: Performed by: FAMILY MEDICINE

## 2021-02-20 PROCEDURE — 82077 ASSAY SPEC XCP UR&BREATH IA: CPT

## 2021-02-20 PROCEDURE — 73030 X-RAY EXAM OF SHOULDER: CPT

## 2021-02-20 PROCEDURE — 80307 DRUG TEST PRSMV CHEM ANLYZR: CPT

## 2021-02-20 PROCEDURE — 1210000000 HC MED SURG R&B

## 2021-02-20 RX ORDER — LORAZEPAM 2 MG/ML
2 INJECTION INTRAMUSCULAR ONCE
Status: COMPLETED | OUTPATIENT
Start: 2021-02-20 | End: 2021-02-20

## 2021-02-20 RX ORDER — MORPHINE SULFATE 2 MG/ML
0.5 INJECTION, SOLUTION INTRAMUSCULAR; INTRAVENOUS ONCE
Status: DISCONTINUED | OUTPATIENT
Start: 2021-02-20 | End: 2021-02-20

## 2021-02-20 RX ORDER — LAMOTRIGINE 200 MG/1
300 TABLET ORAL 2 TIMES DAILY
Status: DISCONTINUED | OUTPATIENT
Start: 2021-02-21 | End: 2021-02-22 | Stop reason: HOSPADM

## 2021-02-20 RX ORDER — ONDANSETRON 2 MG/ML
4 INJECTION INTRAMUSCULAR; INTRAVENOUS EVERY 6 HOURS PRN
Status: DISCONTINUED | OUTPATIENT
Start: 2021-02-20 | End: 2021-02-22 | Stop reason: HOSPADM

## 2021-02-20 RX ORDER — HYDROXYZINE HYDROCHLORIDE 25 MG/1
25 TABLET, FILM COATED ORAL 3 TIMES DAILY PRN
Status: DISCONTINUED | OUTPATIENT
Start: 2021-02-20 | End: 2021-02-22 | Stop reason: HOSPADM

## 2021-02-20 RX ORDER — 0.9 % SODIUM CHLORIDE 0.9 %
1000 INTRAVENOUS SOLUTION INTRAVENOUS ONCE
Status: COMPLETED | OUTPATIENT
Start: 2021-02-20 | End: 2021-02-20

## 2021-02-20 RX ORDER — SODIUM CHLORIDE 0.9 % (FLUSH) 0.9 %
10 SYRINGE (ML) INJECTION EVERY 12 HOURS SCHEDULED
Status: DISCONTINUED | OUTPATIENT
Start: 2021-02-20 | End: 2021-02-22 | Stop reason: HOSPADM

## 2021-02-20 RX ORDER — ACETAMINOPHEN 325 MG/1
650 TABLET ORAL EVERY 6 HOURS PRN
Status: DISCONTINUED | OUTPATIENT
Start: 2021-02-20 | End: 2021-02-22 | Stop reason: HOSPADM

## 2021-02-20 RX ORDER — ACETAMINOPHEN 325 MG/1
650 TABLET ORAL EVERY 6 HOURS PRN
Status: DISCONTINUED | OUTPATIENT
Start: 2021-02-20 | End: 2021-02-20 | Stop reason: SDUPTHER

## 2021-02-20 RX ORDER — POLYETHYLENE GLYCOL 3350 17 G/17G
17 POWDER, FOR SOLUTION ORAL DAILY PRN
Status: DISCONTINUED | OUTPATIENT
Start: 2021-02-20 | End: 2021-02-22 | Stop reason: HOSPADM

## 2021-02-20 RX ORDER — MORPHINE SULFATE 2 MG/ML
0.5 INJECTION, SOLUTION INTRAMUSCULAR; INTRAVENOUS ONCE
Status: COMPLETED | OUTPATIENT
Start: 2021-02-20 | End: 2021-02-20

## 2021-02-20 RX ORDER — HYDROCODONE BITARTRATE AND ACETAMINOPHEN 5; 325 MG/1; MG/1
1 TABLET ORAL EVERY 6 HOURS PRN
Status: DISCONTINUED | OUTPATIENT
Start: 2021-02-20 | End: 2021-02-22 | Stop reason: HOSPADM

## 2021-02-20 RX ORDER — PROMETHAZINE HYDROCHLORIDE 12.5 MG/1
12.5 TABLET ORAL EVERY 6 HOURS PRN
Status: DISCONTINUED | OUTPATIENT
Start: 2021-02-20 | End: 2021-02-22 | Stop reason: HOSPADM

## 2021-02-20 RX ORDER — ACETAMINOPHEN 650 MG/1
650 SUPPOSITORY RECTAL EVERY 6 HOURS PRN
Status: DISCONTINUED | OUTPATIENT
Start: 2021-02-20 | End: 2021-02-22 | Stop reason: HOSPADM

## 2021-02-20 RX ORDER — SODIUM CHLORIDE 0.9 % (FLUSH) 0.9 %
10 SYRINGE (ML) INJECTION PRN
Status: DISCONTINUED | OUTPATIENT
Start: 2021-02-20 | End: 2021-02-22 | Stop reason: HOSPADM

## 2021-02-20 RX ORDER — ARIPIPRAZOLE 20 MG/1
20 TABLET ORAL DAILY
Status: DISCONTINUED | OUTPATIENT
Start: 2021-02-21 | End: 2021-02-22 | Stop reason: HOSPADM

## 2021-02-20 RX ORDER — ACETAMINOPHEN 80 MG
TABLET,CHEWABLE ORAL ONCE
Status: COMPLETED | OUTPATIENT
Start: 2021-02-21 | End: 2021-02-21

## 2021-02-20 RX ADMIN — SODIUM CHLORIDE 1000 ML: 9 INJECTION, SOLUTION INTRAVENOUS at 17:05

## 2021-02-20 RX ADMIN — MORPHINE SULFATE 0.5 MG: 2 INJECTION, SOLUTION INTRAMUSCULAR; INTRAVENOUS at 21:17

## 2021-02-20 RX ADMIN — PHENYTOIN SODIUM 1000 MG: 50 INJECTION INTRAMUSCULAR; INTRAVENOUS at 17:55

## 2021-02-20 RX ADMIN — LORAZEPAM 2 MG: 2 INJECTION, SOLUTION INTRAMUSCULAR; INTRAVENOUS at 17:06

## 2021-02-20 RX ADMIN — SODIUM CHLORIDE 1000 ML: 9 INJECTION, SOLUTION INTRAVENOUS at 18:30

## 2021-02-20 ASSESSMENT — PAIN SCALES - GENERAL
PAINLEVEL_OUTOF10: 8
PAINLEVEL_OUTOF10: 9

## 2021-02-20 ASSESSMENT — ENCOUNTER SYMPTOMS
WHEEZING: 0
EYE PAIN: 0
SHORTNESS OF BREATH: 0
VOMITING: 0
NAUSEA: 0
SORE THROAT: 0
VISUAL CHANGE: 0
ALLERGIC/IMMUNOLOGIC NEGATIVE: 1
APNEA: 0
COLOR CHANGE: 0
ABDOMINAL PAIN: 0
TROUBLE SWALLOWING: 0

## 2021-02-20 NOTE — ED PROVIDER NOTES
[address] ENCOUNTER      Pt Name: Elvis Robles  MRN: [id number]  [place] 1995  Date of evaluation: 2/20/2021  Provider: Ramy Weber PA-C    CHIEF COMPLAINT       Chief Complaint   Patient presents with    Seizures     has a h/o  had recent change to medications   pt had 2 today          HISTORY OF PRESENT ILLNESS   (Location/Symptom, Timing/Onset, Context/Setting, Quality, Duration, Modifying Factors, Severity)  Note limiting factors. Elvis Robles is a 32 y.o. female who presents to the emergency department with 2 seizures today. Patient had a seizure this morning and EMS had gone and patient signed out as a no treat, however patient had a second seizure today just prior to arrival.  Family described the seizure as \"all over shaking\". Patient has a known seizure disorder and was admitted 1-1/2 months ago with status epilepticus. Patient is alert and oriented on emergency department arrival though drowsy. Patient does not remember the events of the seizure but states that she was laying down when the seizures occurred. Patient denies any recent fevers, cough or congestion. Patient is complaining of left shoulder pain which she states was not there prior to the seizures. Patient is on Lamictal and states that she has not missed any doses but recently had a dosing change approximately 3 weeks ago    HPI    Nursing Notes were reviewed. REVIEW OF SYSTEMS    (2-9 systems for level 4, 10 or more for level 5)     Review of Systems   Constitutional: Negative for diaphoresis and fever. HENT: Negative for hearing loss and trouble swallowing. Eyes: Negative for pain. Respiratory: Negative for apnea and shortness of breath. Cardiovascular: Negative for chest pain. Gastrointestinal: Negative for abdominal pain. Endocrine: Negative. Genitourinary: Negative for hematuria. Musculoskeletal: Positive for arthralgias.  Negative for neck pain and neck stiffness. Skin: Negative for color change. Allergic/Immunologic: Negative. Neurological: Positive for seizures. Negative for dizziness and numbness. Hematological: Negative. Psychiatric/Behavioral: Negative. All other systems reviewed and are negative. Except as noted above the remainder of the review of systems was reviewed and negative. PAST MEDICAL HISTORY     Past Medical History:   Diagnosis Date    Migraine     Seizures ([facility] [address]     Varicella          SURGICAL HISTORY     History reviewed. No pertinent surgical history.       CURRENT MEDICATIONS       Current Discharge Medication List      CONTINUE these medications which have NOT CHANGED    Details   lamoTRIgine (LAMICTAL) 150 MG tablet Take 2 tablets by mouth 2 times daily  Qty: 120 tablet, Refills: 3      ARIPiprazole (ABILIFY) 5 MG tablet Take 20 mg by mouth daily       acetaminophen (TYLENOL) 325 MG tablet Take 650 mg by mouth every 6 hours as needed for Pain      hydrOXYzine (ATARAX) 25 MG tablet Take 25 mg by mouth 3 times daily as needed for Anxiety             ALLERGIES     Penicillins    FAMILY HISTORY       Family History   Problem Relation Age of Onset    Cancer Other         brain, breast, lung    Coronary Art Dis Other     High Blood Pressure Brother     Obesity Brother     Cancer Maternal Grandmother     Allergies Maternal Grandmother     Colon Cancer Maternal Grandmother     Breast Cancer Maternal Grandmother     Ovarian Cancer Maternal Grandmother     Other Maternal Grandmother         eye cancer, loss of left eye    Cancer Maternal Grandfather         lung    Heart Disease Maternal Grandfather     Uterine Cancer Mother     Ovarian Cancer Mother     Breast Cancer Mother     Cancer Mother         behind right eye    Heart Attack Mother     Migraines Mother     Cancer Maternal Aunt         lung          SOCIAL HISTORY       Social History     Socioeconomic History    Marital status: Single     Spouse name: None    Number of children: None    Years of education: None    Highest education level: None   Occupational History    None   Social Needs    Financial resource strain: None    Food insecurity     Worry: None     Inability: None    Transportation needs     Medical: None     Non-medical: None   Tobacco Use    Smoking status: Current Every Day Smoker     Packs/day: 0.50     Years: 9.00     Pack years: 4.50     Types: Cigarettes    Smokeless tobacco: Never Used   Substance and Sexual Activity    Alcohol use: No    Drug use: Not Currently    Sexual activity: Yes     Partners: Male   Lifestyle    Physical activity     Days per week: None     Minutes per session: None    Stress: None   Relationships    Social connections     Talks on phone: None     Gets together: None     Attends [employer] service: None     Active member of club or organization: None     Attends meetings of clubs or organizations: None     Relationship status: None    Intimate partner violence     Fear of current or ex partner: None     Emotionally abused: None     Physically abused: None     Forced sexual activity: None   Other Topics Concern    None   Social History Narrative    None       SCREENINGS        Hi Coma Scale  Eye Opening: Spontaneous  Best Verbal Response: Oriented  Best Motor Response: Obeys commands  [place] Coma Scale Score: 15               PHYSICAL EXAM    (up to 7 for level 4, 8 or more for level 5)     ED Triage Vitals [02/20/21 1647]   BP Temp Temp src Pulse Resp SpO2 Height Weight   128/80 98.6 °F (37 °C) -- 85 14 97 % 5' 7\" (1.702 m) 175 lb (79.4 kg)       Physical Exam  Vitals signs and nursing note reviewed. Constitutional:       General: She is not in acute distress. Appearance: She is well-developed. She is not diaphoretic. HENT:      Head: Normocephalic and atraumatic. Mouth/Throat:      Pharynx: No oropharyngeal exudate. Eyes:      General: No scleral icterus. Calcium 8.3 (*)     ALT 48 (*)     AST 38 (*)     All other components within normal limits   URINE DRUG SCREEN - Abnormal; Notable for the following components:    Cannabinoid Scrn, Ur POSITIVE (*)     Cocaine Metabolite Screen, Urine POSITIVE (*)     All other components within normal limits   CBC WITH AUTO DIFFERENTIAL - Abnormal; Notable for the following components:    WBC 31.0 (*)     Hemoglobin 11.1 (*)     MCV 75.2 (*)     MCH 22.5 (*)     MCHC 30.0 (*)     RDW 18.3 (*)     Platelets 299 (*)     Neutrophils Absolute 28.5 (*)     All other components within normal limits   URINE RT REFLEX TO CULTURE - Abnormal; Notable for the following components:    Blood, Urine SMALL (*)     Protein, UA 30 (*)     Leukocyte Esterase, Urine TRACE (*)     All other components within normal limits   MAGNESIUM - Abnormal; Notable for the following components:    Magnesium 2.8 (*)     All other components within normal limits   LACTIC ACID, PLASMA - Abnormal; Notable for the following components:    Lactic Acid 11.0 (*)     All other components within normal limits    Narrative:     Gurwinder Wood tel. [phone],  LACID results called to and read back by Marquita North, 02/20/2021 18:27, by  HANY   CK - Abnormal; Notable for the following components:     Total  (*)     All other components within normal limits   CKMB & RELATIVE PERCENT - Abnormal; Notable for the following components:    CK-MB 4.1 (*)     All other components within normal limits   CBC WITH AUTO DIFFERENTIAL - Abnormal; Notable for the following components:    WBC 23.2 (*)     Hemoglobin 10.5 (*)     Hematocrit 34.4 (*)     MCV 73.4 (*)     MCH 22.5 (*)     MCHC 30.6 (*)     RDW 17.7 (*)     Platelets 365 (*)     Neutrophils Absolute 21.0 (*)     All other components within normal limits   COVID-19, RAPID   CULTURE, BLOOD 1   CULTURE, BLOOD 2   ETHANOL    Narrative:     Gurwinder Wood tel. [phone],  LACID results called to and read back by Tisha Mancini Crow, 02/20/2021 18:27, by  Edin Tinajero   PROLACTIN   LACTIC ACID, PLASMA   LAMOTRIGINE LEVEL   MICROSCOPIC URINALYSIS   BASIC METABOLIC PANEL W/ REFLEX TO MG FOR LOW K   CBC WITH AUTO DIFFERENTIAL   LACTIC ACID, PLASMA   POC PREGNANCY UR-QUAL       All other labs were within normal range or not returned as of this dictation. EMERGENCY DEPARTMENT COURSE and DIFFERENTIAL DIAGNOSIS/MDM:   Vitals:    Vitals:    02/20/21 1800 02/20/21 2000 02/20/21 2045 02/20/21 2113   BP: 127/84 138/64  (!) 126/58   Pulse:  90  94   Resp:  16  20   Temp:    98.2 °F (36.8 °C)   TempSrc:    Oral   SpO2:  95%  100%   Weight:   171 lb 14.4 oz (78 kg)    Height:   5' 3\" (1.6 m)            MDM      REASSESSMENT      Patient presented to the emergency department following 2 seizures at home. Patient did state that she has been compliant with her Lamictal.  While in the emergency department and while having laboratory testing done the patient had another seizure which was witnessed by nurse. This was described as a full body tonic-clonic seizure that lasted approximately 1 minute. Patient was given 1 mg of Ativan at that time. Call was placed to patient's neurologist who recommends 1 g of Dilantin and to continue Lamictal and admit to the hospital for further evaluation and care. Patient had no further seizures while in the emergency department      CONSULTS:  Neville Das 19:  Unless otherwise noted below, none     Procedures        FINAL IMPRESSION      1. Seizure disorder ([facility] [address]    2. Cocaine abuse ([facility] [address]          DISPOSITION/PLAN   DISPOSITION Admitted 02/20/2021 07:38:31 PM      PATIENT REFERRED TO:  No follow-up provider specified. DISCHARGE MEDICATIONS:  Current Discharge Medication List        Controlled Substances Monitoring:     No flowsheet data found.     (Please note that portions of this note were completed with a voice recognition program.  Efforts were made to edit the dictations but occasionally words

## 2021-02-20 NOTE — ED NOTES
Bed: 26  Expected date: 2/20/21  Expected time:   Means of arrival:   Comments:  32year old  female, seizure h/o seizures recent change in meds 152/69, 100 nsr, 99% ra.  One touch 128     April L Gill JohnsonL[facility]aster [facility] Hospital  02/20/21 5936

## 2021-02-20 NOTE — ED NOTES
Went into room to give pt  1 liter pof normal saline per order  Witnessed pt having fulll body sezuire     Dr Carson Whatley  notifed aND [address], Susan. \Hospital Has a New Name and Outlook.\"" Dafne Collins RN  02/20/21 7701

## 2021-02-21 ENCOUNTER — APPOINTMENT (OUTPATIENT)
Dept: MRI IMAGING | Age: 26
DRG: 053 | End: 2021-02-21
Payer: MEDICAID

## 2021-02-21 LAB
ANION GAP SERPL CALCULATED.3IONS-SCNC: 8 MEQ/L (ref 9–15)
BACTERIA: ABNORMAL /HPF
BASOPHILS ABSOLUTE: 0.1 K/UL (ref 0–0.2)
BASOPHILS RELATIVE PERCENT: 0.6 %
BILIRUBIN URINE: NEGATIVE
BLOOD, URINE: ABNORMAL
BUN BLDV-MCNC: 10 MG/DL (ref 6–20)
CALCIUM SERPL-MCNC: 8.1 MG/DL (ref 8.5–9.9)
CHLORIDE BLD-SCNC: 108 MEQ/L (ref 95–107)
CLARITY: CLEAR
CO2: 19 MEQ/L (ref 20–31)
COLOR: YELLOW
CREAT SERPL-MCNC: 0.76 MG/DL (ref 0.5–0.9)
CRYSTALS, UA: ABNORMAL /HPF
EOSINOPHILS ABSOLUTE: 0.1 K/UL (ref 0–0.7)
EOSINOPHILS RELATIVE PERCENT: 0.7 %
EPITHELIAL CELLS, UA: ABNORMAL /HPF
GFR AFRICAN AMERICAN: >60
GFR NON-AFRICAN AMERICAN: >60
GLUCOSE BLD-MCNC: 91 MG/DL (ref 70–99)
GLUCOSE URINE: NEGATIVE MG/DL
HCT VFR BLD CALC: 32 % (ref 37–47)
HEMOGLOBIN: 10 G/DL (ref 12–16)
KETONES, URINE: NEGATIVE MG/DL
LACTIC ACID: 0.7 MMOL/L (ref 0.5–2.2)
LEUKOCYTE ESTERASE, URINE: ABNORMAL
LYMPHOCYTES ABSOLUTE: 2.2 K/UL (ref 1–4.8)
LYMPHOCYTES RELATIVE PERCENT: 16.6 %
MAGNESIUM: 2.5 MG/DL (ref 1.7–2.4)
MCH RBC QN AUTO: 22.4 PG (ref 27–31.3)
MCHC RBC AUTO-ENTMCNC: 31.3 % (ref 33–37)
MCV RBC AUTO: 71.5 FL (ref 82–100)
MONOCYTES ABSOLUTE: 1 K/UL (ref 0.2–0.8)
MONOCYTES RELATIVE PERCENT: 7.1 %
NEUTROPHILS ABSOLUTE: 10 K/UL (ref 1.4–6.5)
NEUTROPHILS RELATIVE PERCENT: 75 %
NITRITE, URINE: NEGATIVE
PDW BLD-RTO: 17.4 % (ref 11.5–14.5)
PH UA: 5 (ref 5–9)
PLATELET # BLD: 467 K/UL (ref 130–400)
POTASSIUM REFLEX MAGNESIUM: 3.5 MEQ/L (ref 3.4–4.9)
PROTEIN UA: 30 MG/DL
RBC # BLD: 4.47 M/UL (ref 4.2–5.4)
RBC UA: ABNORMAL /HPF (ref 0–2)
SODIUM BLD-SCNC: 135 MEQ/L (ref 135–144)
SPECIFIC GRAVITY UA: 1.01 (ref 1–1.03)
URINE REFLEX TO CULTURE: ABNORMAL
UROBILINOGEN, URINE: 0.2 E.U./DL
WBC # BLD: 13.4 K/UL (ref 4.8–10.8)
WBC UA: ABNORMAL /HPF (ref 0–5)

## 2021-02-21 PROCEDURE — 6360000002 HC RX W HCPCS: Performed by: INTERNAL MEDICINE

## 2021-02-21 PROCEDURE — 1210000000 HC MED SURG R&B

## 2021-02-21 PROCEDURE — 80048 BASIC METABOLIC PNL TOTAL CA: CPT

## 2021-02-21 PROCEDURE — 85025 COMPLETE CBC W/AUTO DIFF WBC: CPT

## 2021-02-21 PROCEDURE — 6370000000 HC RX 637 (ALT 250 FOR IP): Performed by: INTERNAL MEDICINE

## 2021-02-21 PROCEDURE — 73221 MRI JOINT UPR EXTREM W/O DYE: CPT

## 2021-02-21 PROCEDURE — 2580000003 HC RX 258: Performed by: INTERNAL MEDICINE

## 2021-02-21 PROCEDURE — 83605 ASSAY OF LACTIC ACID: CPT

## 2021-02-21 PROCEDURE — 83735 ASSAY OF MAGNESIUM: CPT

## 2021-02-21 PROCEDURE — 36415 COLL VENOUS BLD VENIPUNCTURE: CPT

## 2021-02-21 PROCEDURE — 99254 IP/OBS CNSLTJ NEW/EST MOD 60: CPT | Performed by: ORTHOPAEDIC SURGERY

## 2021-02-21 PROCEDURE — 99254 IP/OBS CNSLTJ NEW/EST MOD 60: CPT | Performed by: PSYCHIATRY & NEUROLOGY

## 2021-02-21 PROCEDURE — 6370000000 HC RX 637 (ALT 250 FOR IP): Performed by: PSYCHIATRY & NEUROLOGY

## 2021-02-21 RX ORDER — NICOTINE 21 MG/24HR
1 PATCH, TRANSDERMAL 24 HOURS TRANSDERMAL DAILY
Status: DISCONTINUED | OUTPATIENT
Start: 2021-02-21 | End: 2021-02-22 | Stop reason: HOSPADM

## 2021-02-21 RX ORDER — PHENYTOIN SODIUM 100 MG/1
100 CAPSULE, EXTENDED RELEASE ORAL 3 TIMES DAILY
Status: DISCONTINUED | OUTPATIENT
Start: 2021-02-21 | End: 2021-02-22 | Stop reason: HOSPADM

## 2021-02-21 RX ORDER — LORAZEPAM 2 MG/ML
1 INJECTION INTRAMUSCULAR
Status: ACTIVE | OUTPATIENT
Start: 2021-02-21 | End: 2021-02-21

## 2021-02-21 RX ADMIN — HYDROCODONE BITARTRATE AND ACETAMINOPHEN 1 TABLET: 5; 325 TABLET ORAL at 18:12

## 2021-02-21 RX ADMIN — PHENYTOIN SODIUM 100 MG: 100 CAPSULE ORAL at 21:57

## 2021-02-21 RX ADMIN — HYDROCODONE BITARTRATE AND ACETAMINOPHEN 1 TABLET: 5; 325 TABLET ORAL at 00:49

## 2021-02-21 RX ADMIN — LAMOTRIGINE 300 MG: 200 TABLET ORAL at 21:57

## 2021-02-21 RX ADMIN — HYDROCODONE BITARTRATE AND ACETAMINOPHEN 1 TABLET: 5; 325 TABLET ORAL at 06:20

## 2021-02-21 RX ADMIN — ACETAMINOPHEN 650 MG: 325 TABLET ORAL at 09:59

## 2021-02-21 RX ADMIN — Medication: at 00:42

## 2021-02-21 RX ADMIN — HYDROCODONE BITARTRATE AND ACETAMINOPHEN 1 TABLET: 5; 325 TABLET ORAL at 12:31

## 2021-02-21 RX ADMIN — HYDROXYZINE HYDROCHLORIDE 25 MG: 25 TABLET, FILM COATED ORAL at 18:12

## 2021-02-21 RX ADMIN — LAMOTRIGINE 300 MG: 200 TABLET ORAL at 09:58

## 2021-02-21 RX ADMIN — ARIPIPRAZOLE 20 MG: 20 TABLET ORAL at 09:59

## 2021-02-21 RX ADMIN — SODIUM CHLORIDE, PRESERVATIVE FREE 10 ML: 5 INJECTION INTRAVENOUS at 10:00

## 2021-02-21 RX ADMIN — ENOXAPARIN SODIUM 40 MG: 40 INJECTION SUBCUTANEOUS at 09:59

## 2021-02-21 RX ADMIN — PHENYTOIN SODIUM 100 MG: 100 CAPSULE ORAL at 15:43

## 2021-02-21 RX ADMIN — SODIUM CHLORIDE, PRESERVATIVE FREE 10 ML: 5 INJECTION INTRAVENOUS at 00:42

## 2021-02-21 RX ADMIN — LAMOTRIGINE 300 MG: 200 TABLET ORAL at 00:42

## 2021-02-21 ASSESSMENT — ENCOUNTER SYMPTOMS
CHOKING: 0
BACK PAIN: 0
COLOR CHANGE: 0
PHOTOPHOBIA: 0
NAUSEA: 0
TROUBLE SWALLOWING: 0
VOMITING: 0
SHORTNESS OF BREATH: 0

## 2021-02-21 ASSESSMENT — PAIN SCALES - GENERAL
PAINLEVEL_OUTOF10: 7
PAINLEVEL_OUTOF10: 9
PAINLEVEL_OUTOF10: 8

## 2021-02-21 NOTE — PROGRESS NOTES
Patient is alert and complains of pain in her left shoulder and arm. She states she had generalized pain. She is unable to move her left arm or turn well to her left side. Patient used the bedpan until seen by physician. Patient states she has been having seizures since she was 16. Skin intact and seizure pads are up for protection. She is a smoker and admits to using cocaine and smoking weed.

## 2021-02-21 NOTE — H&P
Mother    Galvin Oppenheim Mother         behind right eye    Heart Attack Mother     Migraines Mother     Cancer Maternal Aunt         lung     SOCIAL HISTORY:    Social History     Socioeconomic History    Marital status: Single     Spouse name: Not on file    Number of children: Not on file    Years of education: Not on file    Highest education level: Not on file   Occupational History    Not on file   Social Needs    Financial resource strain: Not on file    Food insecurity     Worry: Not on file     Inability: Not on file    Transportation needs     Medical: Not on file     Non-medical: Not on file   Tobacco Use    Smoking status: Current Every Day Smoker     Packs/day: 0.50     Types: Cigarettes    Smokeless tobacco: Never Used   Substance and Sexual Activity    Alcohol use: No    Drug use: Not Currently    Sexual activity: Yes     Partners: Male   Lifestyle    Physical activity     Days per week: Not on file     Minutes per session: Not on file    Stress: Not on file   Relationships    Social connections     Talks on phone: Not on file     Gets together: Not on file     Attends Orthodoxy service: Not on file     Active member of club or organization: Not on file     Attends meetings of clubs or organizations: Not on file     Relationship status: Not on file    Intimate partner violence     Fear of current or ex partner: Not on file     Emotionally abused: Not on file     Physically abused: Not on file     Forced sexual activity: Not on file   Other Topics Concern    Not on file   Social History Narrative    Not on file     MEDICATIONS:   Prior to Admission medications    Medication Sig Start Date End Date Taking?  Authorizing Provider   lamoTRIgine (LAMICTAL) 150 MG tablet Take 2 tablets by mouth 2 times daily 1/5/21  Yes Imani Alvarado MD   lamoTRIgine (LAMICTAL) 100 MG tablet Take 1 tablet by mouth once for 1 dose 1/5/21 2/20/21 Yes Juan Daniel Thomas MD   lamoTRIgine (LAMICTAL) 200 MG tablet Take 200 mg by mouth 2 times daily   Yes Historical Provider, MD   ARIPiprazole (ABILIFY) 5 MG tablet Take 5 mg by mouth daily   Yes Historical Provider, MD   acetaminophen (TYLENOL) 325 MG tablet Take 650 mg by mouth every 6 hours as needed for Pain   Yes Historical Provider, MD   hydrOXYzine (ATARAX) 25 MG tablet Take 25 mg by mouth 3 times daily as needed for Anxiety   Yes Historical Provider, MD       ALLERGIES: Penicillins    REVIEW OF SYSTEM:   Review of Systems   Constitutional: Positive for fatigue. Negative for chills, diaphoresis and fever. HENT: Negative for congestion, sore throat and trouble swallowing. Eyes: Negative for visual disturbance. Respiratory: Negative for shortness of breath and wheezing. Cardiovascular: Negative for chest pain, palpitations and leg swelling. Gastrointestinal: Negative for abdominal pain, nausea and vomiting. Genitourinary: Negative for flank pain and hematuria. Musculoskeletal: Positive for arthralgias. Negative for gait problem. Skin: Negative for rash. Neurological: Positive for seizures. Negative for dizziness and tremors. Psychiatric/Behavioral: Negative for agitation. The patient is nervous/anxious. OBJECTIVE  PHYSICAL EXAM:   Physical Exam  Constitutional:       General: She is not in acute distress. Appearance: She is not ill-appearing. HENT:      Head: Normocephalic. Nose: Nose normal.      Mouth/Throat:      Mouth: Mucous membranes are moist.   Eyes:      Conjunctiva/sclera: Conjunctivae normal.      Pupils: Pupils are equal, round, and reactive to light. Neck:      Musculoskeletal: No muscular tenderness. Cardiovascular:      Rate and Rhythm: Normal rate and regular rhythm. Pulses: Normal pulses. Pulmonary:      Effort: Pulmonary effort is normal.      Breath sounds: No wheezing or rhonchi. Abdominal:      Palpations: Abdomen is soft. Tenderness: There is no abdominal tenderness.    Musculoskeletal:      Right lower leg: No edema. Left lower leg: No edema. Lymphadenopathy:      Cervical: No cervical adenopathy. Skin:     General: Skin is warm and dry. Capillary Refill: Capillary refill takes less than 2 seconds. Findings: No rash. Neurological:      Mental Status: She is alert and oriented to person, place, and time. Psychiatric:      Comments: Anxious. /64   Pulse 90   Temp 98.6 °F (37 °C)   Resp 16   Ht 5' 7\" (1.702 m)   Wt 175 lb (79.4 kg)   SpO2 95%   BMI 27.41 kg/m²     DATA:     Diagnostic tests reviewed for today's visit:    Most recent labs and imaging results reviewed.      LABS:    Recent Results (from the past 24 hour(s))   POC Pregnancy Urine Qual    Collection Time: 02/20/21 12:00 AM   Result Value Ref Range    HCG, Urine, POC Negative Negative    Lot Number [id number]     Positive QC Pass/Fail Acceptable     Negative QC Pass/Fail Acceptable    Comprehensive Metabolic Panel    Collection Time: 02/20/21  5:00 PM   Result Value Ref Range    Sodium 140 135 - 144 mEq/L    Potassium 4.1 3.4 - 4.9 mEq/L    Chloride 106 95 - 107 mEq/L    CO2 11 (L) 20 - 31 mEq/L    Anion Gap 23 (H) 9 - 15 mEq/L    Glucose 124 (H) 70 - 99 mg/dL    BUN 16 6 - 20 mg/dL    CREATININE 0.99 (H) 0.50 - 0.90 mg/dL    GFR Non-African American >60.0 >60    GFR  >60.0 >60    Calcium 8.3 (L) 8.5 - 9.9 mg/dL    Total Protein 8.0 6.3 - 8.0 g/dL    Albumin 4.5 3.5 - 4.6 g/dL    Total Bilirubin <0.2 0.2 - 0.7 mg/dL    Alkaline Phosphatase 104 40 - 130 U/L    ALT 48 (H) 0 - 33 U/L    AST 38 (H) 0 - 35 U/L    Globulin 3.5 2.3 - 3.5 g/dL   Urine Drug Screen    Collection Time: 02/20/21  5:00 PM   Result Value Ref Range    Amphetamine Screen, Urine Neg Negative <1000 ng/mL    Barbiturate Screen, Ur Neg Negative < 200 ng/mL    Benzodiazepine Screen, Urine Neg Negative < 200 ng/mL    Cannabinoid Scrn, Ur POSITIVE (A) Negative < 50 ng/mL    Cocaine Metabolite Screen, Urine POSITIVE (A) Negative < 300 Basophils % 0.4 %    Neutrophils Absolute 28.5 (H) 1.4 - 6.5 K/uL    Lymphocytes Absolute 1.9 1.0 - 4.8 K/uL    Monocytes Absolute 0.6 0.2 - 0.8 K/uL    Eosinophils Absolute 0.0 0.0 - 0.7 K/uL    Basophils Absolute 0.0 0.0 - 0.2 K/uL    Anisocytosis 2+     Microcytes 2+     Hypochromia 1+     Poikilocytes 1+    Lactic Acid, Plasma    Collection Time: 02/20/21  5:11 PM   Result Value Ref Range    Lactic Acid 11.0 (HH) 0.5 - 2.2 mmol/L   COVID-19, Rapid    Collection Time: 02/20/21  6:31 PM    Specimen: Nasopharyngeal Swab   Result Value Ref Range    SARS-CoV-2, NAAT Not Detected Not Detected       IMAGING:  Xr Shoulder Left (min 2 Views)    Result Date: 2/20/2021  EXAM: XR SHOULDER LEFT (MIN 2 VIEWS) HISTORY: Pain COMPARISON: None available TECHNIQUE: AP, Grashey, and a scapular Y view of the shoulder obtained. FINDINGS: No acute fracture or dislocation. Acromioclavicular joint is intact. Soft tissues are within normal limits. No acute osseous abnormality. VTE Prophylaxis: low molecular weight heparin -  start    ASSESSMENT AND PLAN  Principal Problem:    Seizures x 3 today - witnessed in ER  Received ativan and 1000 mg dilantin in ER. Neuro contacted. Plan: admit  Neuro consult. Leukocytosis  31K   Seizure x 3. Urine w trace leukocytes, small blood. Awaiting micro. No cough or fever. Plan: likely post-seizure. No obvious infection. Recheck in AM.       Elevated lactic acid level  Plan: Recheck in AM.  Likely post-seizure elevation. Drug use  Cocaine, cannabis. Hx seizure disorder. Plan:  re: cessation once receptive. Left shoulder pain since seizure. States she fell on it. Melodye Perfect w no acute abnormality. No dislocation. Plan: pain control   Consider ortho if no improvement by AM.       Bipolar disorder   Depression/anxiety. Treated w. Abilify, atarax. Plan: continue meds. Awaiting verification.          Plan of care discussed with: patient    SIGNATURE: Dorothy Lainez BRUCE Whitney - CNP  DATE: February 20, 2021  TIME: 8:32 PM   Angelica Jorgensen DO  - supervising physician

## 2021-02-21 NOTE — FLOWSHEET NOTE
Notified Dr. Cisco Schultz of Orthopedic Surgery consult via [address] [place] [address]

## 2021-02-21 NOTE — CONSULTS
Patient: Matthew Payer  Unit/Bed:W291/W291-01  YOB: 1995  MRN: [id number]  Acct: [de-identified]   Admitting Diagnosis: Seizures ([facility] [address] [R56.9]  Seizures ([facility] [address] [R56.9]  Admit Date:  2/20/2021  Hospital Day: 1      Chief Complaint:   Left shoulder pain    History of Present Illness:  Patient is a 51-year-old female who has epilepsy. The patient's had multiple seizures. The patient had a seizure yesterday and was brought in by EMS to the emergency room here at [facility].  The patient fell off her air mattress when she had that seizure. The patient also had a seizure actually while she was in the emergency room. She patient's been here before of with status epilepticus. The patient is evaluated by Dr. Vee Santos here on admission has had previous work-up with other neurologist on her last admission. Procedure apparently occurred when she was 16 weeks pregnant. Been asked to evaluate her in regards to her left shoulder. The initial time he came to evaluated the patient was not at bedside and therefore I have returned to see her as she is returned from MRI. She states she has had popping of her shoulder and probably had previous dislocations. Patient says she has had previous dislocations or popping in her shoulder prior to this event.     Allergies   Allergen Reactions    Penicillins      GI Problems       Current Facility-Administered Medications   Medication Dose Route Frequency Provider Last Rate Last Admin    phenytoin (DILANTIN) ER capsule 100 mg  100 mg Oral TID Eloisa Nowak MD        LORazepam (ATIVAN) injection 1 mg  1 mg Intravenous Once PRN Eloisa Nowak MD        sodium chloride flush 0.9 % injection 10 mL  10 mL Intravenous 2 times per day Nya Landonar, DO   10 mL at 02/21/21 1000    sodium chloride flush 0.9 % injection 10 mL  10 mL Intravenous PRN Marilia Lange, DO        enoxaparin (LOVENOX) injection 40 mg  40 mg Subcutaneous Daily Basim Lange, DO   40 mg at 02/21/21 0574  promethazine (PHENERGAN) tablet 12.5 mg  12.5 mg Oral Q6H PRN Ruby Browner Sedar, DO        Or    ondansetron (ZOFRAN) injection 4 mg  4 mg Intravenous Q6H PRN Ruby Browner Sedar, DO        polyethylene glycol (GLYCOLAX) packet 17 g  17 g Oral Daily PRN Ruby Browner Sedar, DO        acetaminophen (TYLENOL) tablet 650 mg  650 mg Oral Q6H PRN Ruby Browner Sedar, DO   650 mg at 02/21/21 0579    Or    acetaminophen (TYLENOL) suppository 650 mg  650 mg Rectal Q6H PRN Ruby Browner Sedar, DO        lamoTRIgine (LAMICTAL) tablet 300 mg  300 mg Oral BID Ruby Browner Sedar, DO   300 mg at 02/21/21 6369    hydrOXYzine (ATARAX) tablet 25 mg  25 mg Oral TID PRN Ruby Browner Sedar, DO        ARIPiprazole (ABILIFY) tablet 20 mg  20 mg Oral Daily Ruby Browner Sedar, DO   20 mg at 02/21/21 0959    HYDROcodone-acetaminophen (NORCO) 5-325 MG per tablet 1 tablet  1 tablet Oral Q6H PRN Chano Dally, DO   1 tablet at 02/21/21 1231       PMHx:  Past Medical History:   Diagnosis Date    Migraine     Seizures (HCC)     Varicella        PSHx:  History reviewed. No pertinent surgical history.     Social Hx:  Social History     Socioeconomic History    Marital status: Single     Spouse name: None    Number of children: None    Years of education: None    Highest education level: None   Occupational History    None   Social Needs    Financial resource strain: None    Food insecurity     Worry: None     Inability: None    Transportation needs     Medical: None     Non-medical: None   Tobacco Use    Smoking status: Current Every Day Smoker     Packs/day: 0.50     Years: 9.00     Pack years: 4.50     Types: Cigarettes    Smokeless tobacco: Never Used   Substance and Sexual Activity    Alcohol use: No    Drug use: Not Currently    Sexual activity: Yes     Partners: Male   Lifestyle    Physical activity     Days per week: None     Minutes per session: None    Stress: None   Relationships    Social connections     Talks on phone: None     Gets together: None Attends [employer] service: None     Active member of club or organization: None     Attends meetings of clubs or organizations: None     Relationship status: None    Intimate partner violence     Fear of current or ex partner: None     Emotionally abused: None     Physically abused: None     Forced sexual activity: None   Other Topics Concern    None   Social History Narrative    None       Family Hx:  Family History   Problem Relation Age of Onset    Cancer Other         brain, breast, lung    Coronary Art Dis Other     High Blood Pressure Brother     Obesity Brother     Cancer Maternal Grandmother     Allergies Maternal Grandmother     Colon Cancer Maternal Grandmother     Breast Cancer Maternal Grandmother     Ovarian Cancer Maternal Grandmother     Other Maternal Grandmother         eye cancer, loss of left eye    Cancer Maternal Grandfather         lung    Heart Disease Maternal Grandfather     Uterine Cancer Mother     Ovarian Cancer Mother     Breast Cancer Mother     Cancer Mother         behind right eye    Heart Attack Mother     Migraines Mother     Cancer Maternal Aunt         lung       Review ofSystems:   Review of Systems   Constitutional: Negative for fever. HENT: Negative for ear pain, tinnitus and trouble swallowing. Eyes: Negative for photophobia and visual disturbance. Respiratory: Negative for choking and shortness of breath. Cardiovascular: Negative for chest pain and palpitations. Gastrointestinal: Negative for nausea and vomiting. Musculoskeletal: Negative for back pain, gait problem, joint swelling, myalgias, neck pain and neck stiffness. Skin: Negative for color change. Allergic/Immunologic: Negative for food allergies. Neurological: Negative for dizziness, tremors, seizures, syncope, facial asymmetry, speech difficulty, weakness, light-headedness, numbness and headaches.    Psychiatric/Behavioral: Negative for behavioral problems, confusion, hallucinations and sleep disturbance.        Physical Examination:    /65   Pulse 89   Temp 98.1 °F (36.7 °C) (Oral)   Resp 20   Ht 5' 3\" (1.6 m)   Wt 171 lb 14.4 oz (78 kg)   SpO2 97%   BMI 30.45 kg/m²    Physical Exam     Patient has pain with any type of motion of her left shoulder. The shoulder appears to be reduced however she has pain and discomfort with any type of passive motion. Active motion is of been more painful. The patient can flex and extend her elbow wrist and fingers. Therefore I believe she is neurologically intact. Her is grossly intact as well. She has good cap refill in the fingers on the left.     LABS:  CBC:   Lab Results   Component Value Date    WBC 13.4 02/21/2021    RBC 4.47 02/21/2021    RBC 4.70 06/01/2012    HGB 10.0 02/21/2021    HCT 32.0 02/21/2021    MCV 71.5 02/21/2021    MCH 22.4 02/21/2021    MCHC 31.3 02/21/2021    RDW 17.4 02/21/2021     02/21/2021    MPV 8.0 06/19/2014     CBC with Differential:   Lab Results   Component Value Date    WBC 13.4 02/21/2021    RBC 4.47 02/21/2021    RBC 4.70 06/01/2012    HGB 10.0 02/21/2021    HCT 32.0 02/21/2021     02/21/2021    MCV 71.5 02/21/2021    MCH 22.4 02/21/2021    MCHC 31.3 02/21/2021    RDW 17.4 02/21/2021    BANDSPCT 1 01/02/2021    LYMPHOPCT 16.6 02/21/2021    MONOPCT 7.1 02/21/2021    EOSPCT 1.6 04/13/2012    BASOPCT 0.6 02/21/2021    MONOSABS 1.0 02/21/2021    LYMPHSABS 2.2 02/21/2021    EOSABS 0.1 02/21/2021    BASOSABS 0.1 02/21/2021     CMP:    Lab Results   Component Value Date     02/21/2021    K 3.5 02/21/2021     02/21/2021    CO2 19 02/21/2021    BUN 10 02/21/2021    CREATININE 0.76 02/21/2021    GFRAA >60.0 02/21/2021    LABGLOM >60.0 02/21/2021    GLUCOSE 91 02/21/2021    GLUCOSE 77 06/01/2012    PROT 8.0 02/20/2021    LABALBU 4.5 02/20/2021    LABALBU 4.7 06/01/2012    CALCIUM 8.1 02/21/2021    BILITOT <0.2 02/20/2021    ALKPHOS 104 02/20/2021    AST 38 02/20/2021    ALT 48 02/20/2021     BMP:    Lab Results   Component Value Date     02/21/2021    K 3.5 02/21/2021     02/21/2021    CO2 19 02/21/2021    BUN 10 02/21/2021    LABALBU 4.5 02/20/2021    LABALBU 4.7 06/01/2012    CREATININE 0.76 02/21/2021    CALCIUM 8.1 02/21/2021    GFRAA >60.0 02/21/2021    LABGLOM >60.0 02/21/2021    GLUCOSE 91 02/21/2021    GLUCOSE 77 06/01/2012     Magnesium:    Lab Results   Component Value Date    MG 2.5 02/21/2021     Troponin:    Lab Results   Component Value Date    TROPONINI <0.010 01/02/2021     No results for input(s): PROBNP in the last 72 hours. No results for input(s): INR in the last 72 hours. RADIOLOGY:  Ct Head Wo Contrast    Result Date: 2/21/2021  EXAMINATION: CT of the brain without contrast HISTORY: Seizure COMPARISON: CT brain from January 2, 2021 TECHNIQUE: Multiple contiguous axial images were obtained of the brain from the skull base through the vertex. Multiplanar reformats were obtained. FINDINGS: Brain volume is age appropriate. Ventricular morphology is within normal limits. Gray-white matter differentiation is preserved. No acute hemorrhage or abnormal extra-axial fluid collection. No mass effect or midline shift. Mild mucosal thickening of the right maxillary sinus. The mastoid air cells are clear. Calvarium is intact. No acute intracranial process. All CT scans at this facility use dose modulation, iterative reconstruction, and/or weight based dosing when appropriate to reduce radiation dose to as low as reasonably achievable. Xr Shoulder Left (min 2 Views)    Result Date: 2/20/2021  EXAM: XR SHOULDER LEFT (MIN 2 VIEWS) HISTORY: Pain COMPARISON: None available TECHNIQUE: AP, Grashey, and a scapular Y view of the shoulder obtained. FINDINGS: No acute fracture or dislocation. Acromioclavicular joint is intact. Soft tissues are within normal limits. No acute osseous abnormality.      Mri Shoulder Left Wo Contrast    Result Date: 2/21/2021  EXAM: MRI of the left shoulder without contrast HISTORY:  Shoulder pain after seizure TECHNIQUE: Multiplanar multisequence MRI of the left shoulder was performed without contrast. COMPARISON:  Shoulder radiographs from February 20, 2021. FINDINGS: Examination is degraded by motion artifact. The acromioclavicular joint is intact. Acromion is flat. Coracoclavicular ligament intact. No subacromial/subdeltoid bursal fluid or edema. Low-grade intrasubstance tear the myotendinous junction of mid and inferior subscapularis tendon centered approximately 2.5 cm from its attachment to the humerus with moderate associated edema within subscapularis muscle. Low-grade intrasubstance tearing  along the myotendinous junction of teres minor. Supraspinatus and infraspinatus tendons are intact. No atrophy or fatty infiltration of the rotator cuff musculature. Low-grade partial-thickness tearing of proximal pectoralis minor muscle is question. Mild edema is present within the latissimus dorsi muscle. The intra-articular and extra-articular long head biceps tendon is intact. The biceps tendon resides within the bicipital groove. Mildly displaced tear of the anterior inferior labrum. There is periosteal stripping in periosteal tearing. The glenoid is intact. A Hill-Sachs lesion measures approximately 1.5 cm in transverse dimension with a maximum depth of approximately 8 mm. There  is mild bone marrow edema of the humeral head No well-defined or measurable cartilage defect identified. Moderate glenohumeral joint effusion containing several tiny loose bodies. Findings compatible with prior anterior inferior shoulder dislocation with labral tear and Hill-Sachs lesion as detailed. Moderate glenohumeral joint effusion with areas of synovitis and numerous tiny loose bodies. Low-grade partial-thickness intrasubstance tear along the mid and inferior fibers of subscapularis tendon.  Low-grade intrasubstance tearing along the myotendinous junction of teres minor. Possible low-grade intrasubstance tearing of proximal pectoralis minor muscle. Mildly displaced dorsally strain. 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 My findings consistent with labral pathology and a Hill-Sachs lesion which occurs with that anterior dislocation. Shoulder as of now is reduced. Plan:    Unfortunately with seizures a dislocation shoulder is not uncommon. As a result of either this dislocation or previous 1 the patient has labral pathology and she also has a Hill-Sachs lesion. On the recent MRI she also some rotator cuff injury as well but no grisel large rotator cuff tears. I would not be commented age 32. With shoulder dislocations at her age she is a high level of potential recurrence. If she becomes recurrent dislocator should be a candidate for reconstruction. However any of her seizure activity would have to be stable for several years in order to pursue such that she would rip any type of repair immediately with any type of seizure. Therefore conservative care is appropriate. At this time I would recommend ice anti-inflammatories and pain medicine as needed. She can theoretically if she does not get significant improvement over several weeks go to a subscap strengthening program via therapy. She continues no signs of instability without seizures follow-up in the office would be appropriate. Shared this plan with the patient.   Will be signing off orthopedically during this admission.         Electronically signed by Elsie Avila MD on 2/21/2021 at 3:36 PM

## 2021-02-21 NOTE — CARE COORDINATION
[facility] Case Management Initial Discharge Assessment    Met with Patient to discuss discharge plan. PCP: No primary care provider on file. Date of Last Visit:     If no PCP, list provided? Yes    Discharge Planning    Living Arrangements: independently at home    Who do you live with? FAMILY    Who helps you with your care:  self    If lives at home:     Do you have any barriers navigating in your home? no    Patient can perform ADL? Yes    Current Services (outpatient and in home) :  None    Dialysis: No    Is transportation available to get to your appointments? Yes    DME Equipment:  no    Respiratory equipment: None    Respiratory provider:  no     Pharmacy:  yes - [facility]    Consult with Medication Assistance Program?  No      Patient agreeable to [address]? N/A    Patient agreeable to SNF/Rehab? N/A    Other discharge needs identified? N/A    Freedom of choice list provided with basic dialogue that supports the patient's individualized plan of care/goals and shares the quality data associated with the providers. Yes    Does Patient Have a High-Risk for Readmission Diagnosis (CHF, PN, MI, COPD)? No        The plan for Transition of Care is related to the following treatment goals:HD STABLE    Initial Discharge Plan? (Note: please see concurrent daily documentation for any updates after initial note).     HOME WITH FAMILY, DENIES NEES    The Patient  was provided with choice of any post-acute providers for care and equipment and agrees with discharge plan  Yes  20%    Electronically signed by Bennett Almonte RN on 2/21/2021 at 11:48 AM

## 2021-02-21 NOTE — CONSULTS
headaches. Psychiatric/Behavioral: Negative for behavioral problems, confusion, hallucinations and sleep disturbance. Past Medical History:   Diagnosis Date    Migraine     Seizures ([facility] [address]     Varicella      History reviewed. No pertinent surgical history.   Social History     Socioeconomic History    Marital status: Single     Spouse name: Not on file    Number of children: Not on file    Years of education: Not on file    Highest education level: Not on file   Occupational History    Not on file   Social Needs    Financial resource strain: Not on file    Food insecurity     Worry: Not on file     Inability: Not on file    Transportation needs     Medical: Not on file     Non-medical: Not on file   Tobacco Use    Smoking status: Current Every Day Smoker     Packs/day: 0.50     Years: 9.00     Pack years: 4.50     Types: Cigarettes    Smokeless tobacco: Never Used   Substance and Sexual Activity    Alcohol use: No    Drug use: Not Currently    Sexual activity: Yes     Partners: Male   Lifestyle    Physical activity     Days per week: Not on file     Minutes per session: Not on file    Stress: Not on file   Relationships    Social connections     Talks on phone: Not on file     Gets together: Not on file     Attends Faith service: Not on file     Active member of club or organization: Not on file     Attends meetings of clubs or organizations: Not on file     Relationship status: Not on file    Intimate partner violence     Fear of current or ex partner: Not on file     Emotionally abused: Not on file     Physically abused: Not on file     Forced sexual activity: Not on file   Other Topics Concern    Not on file   Social History Narrative    Not on file     Family History   Problem Relation Age of Onset    Cancer Other         brain, breast, lung    Coronary Art Dis Other     High Blood Pressure Brother     Obesity Brother     Cancer Maternal Grandmother     Allergies Maternal Grandmother     Colon Cancer Maternal Grandmother     Breast Cancer Maternal Grandmother     Ovarian Cancer Maternal Grandmother     Other Maternal Grandmother         eye cancer, loss of left eye    Cancer Maternal Grandfather         lung    Heart Disease Maternal Grandfather     Uterine Cancer Mother     Ovarian Cancer Mother     Breast Cancer Mother     Cancer Mother         behind right eye    Heart Attack Mother     Migraines Mother     Cancer Maternal Aunt         lung     Allergies   Allergen Reactions    Penicillins      GI Problems     Current Facility-Administered Medications   Medication Dose Route Frequency Provider Last Rate Last Admin    phenytoin (DILANTIN) ER capsule 100 mg  100 mg Oral TID Samia Holder MD        LORazepam (ATIVAN) injection 1 mg  1 mg Intravenous Once PRN Samia Holder MD        sodium chloride flush 0.9 % injection 10 mL  10 mL Intravenous 2 times per day Pan CONROY Sedar, DO   10 mL at 02/21/21 1000    sodium chloride flush 0.9 % injection 10 mL  10 mL Intravenous PRN Pan CONROY Sedar, DO        enoxaparin (LOVENOX) injection 40 mg  40 mg Subcutaneous Daily Basim CONROY Sedar, DO   40 mg at 02/21/21 0959    promethazine (PHENERGAN) tablet 12.5 mg  12.5 mg Oral Q6H PRN Pan CONROY Sedar, DO        Or    ondansetron (ZOFRAN) injection 4 mg  4 mg Intravenous Q6H PRN Pan CONROY Sedar, DO        polyethylene glycol (GLYCOLAX) packet 17 g  17 g Oral Daily PRN Lauren Stern Sedar, DO        acetaminophen (TYLENOL) tablet 650 mg  650 mg Oral Q6H PRN Lauren Setrn Sedar, DO   650 mg at 02/21/21 4180    Or    acetaminophen (TYLENOL) suppository 650 mg  650 mg Rectal Q6H PRN Lauren Stern Sedar, DO        lamoTRIgine (LAMICTAL) tablet 300 mg  300 mg Oral BID Lauren Stern Sedar, DO   300 mg at 02/21/21 9362    hydrOXYzine (ATARAX) tablet 25 mg  25 mg Oral TID PRN Lauren Stern Sedar, DO        ARIPiprazole (ABILIFY) tablet 20 mg  20 mg Oral Daily Basim CONROY Sedar, DO   20 mg at 02/21/21 0959    HYDROcodone-acetaminophen (NORCO) 5-325 MG per tablet 1 tablet  1 tablet Oral Q6H PRN Axel Lange, DO   1 tablet at 02/21/21 0620        Objective:   /65   Pulse 89   Temp 98.1 °F (36.7 °C) (Oral)   Resp 20   Ht 5' 3\" (1.6 m)   Wt 171 lb 14.4 oz (78 kg)   SpO2 97%   BMI 30.45 kg/m²     Physical Exam  Vitals signs reviewed. Eyes:      Pupils: Pupils are equal, round, and reactive to light. Neck:      Musculoskeletal: Normal range of motion. Cardiovascular:      Rate and Rhythm: Normal rate and regular rhythm. Heart sounds: No murmur. Pulmonary:      Effort: Pulmonary effort is normal.      Breath sounds: Normal breath sounds. Abdominal:      General: Bowel sounds are normal.   Musculoskeletal: Normal range of motion. Skin:     General: Skin is warm. Neurological:      Mental Status: She is alert and oriented to person, place, and time. Cranial Nerves: No cranial nerve deficit. Sensory: No sensory deficit. Motor: No abnormal muscle tone. Coordination: Coordination normal.      Deep Tendon Reflexes: Reflexes are normal and symmetric. Babinski sign absent on the right side. Babinski sign absent on the left side. Psychiatric:         Mood and Affect: Mood normal.       Patient is unable to raise her arm up with the shoulder in considerable pain on the left     Ct Head Wo Contrast    Result Date: 2/21/2021  EXAMINATION: CT of the brain without contrast HISTORY: Seizure COMPARISON: CT brain from January 2, 2021 TECHNIQUE: Multiple contiguous axial images were obtained of the brain from the skull base through the vertex. Multiplanar reformats were obtained. FINDINGS: Brain volume is age appropriate. Ventricular morphology is within normal limits. Gray-white matter differentiation is preserved. No acute hemorrhage or abnormal extra-axial fluid collection. No mass effect or midline shift. Mild mucosal thickening of the right maxillary sinus. The mastoid air cells are clear. Calvarium is intact. No acute intracranial process. All CT scans at this facility use dose modulation, iterative reconstruction, and/or weight based dosing when appropriate to reduce radiation dose to as low as reasonably achievable. Xr Shoulder Left (min 2 Views)    Result Date: 2/20/2021  EXAM: XR SHOULDER LEFT (MIN 2 VIEWS) HISTORY: Pain COMPARISON: None available TECHNIQUE: AP, Grashey, and a scapular Y view of the shoulder obtained. FINDINGS: No acute fracture or dislocation. Acromioclavicular joint is intact. Soft tissues are within normal limits. No acute osseous abnormality. Lab Results   Component Value Date    WBC 13.4 02/21/2021    RBC 4.47 02/21/2021    RBC 4.70 06/01/2012    HGB 10.0 02/21/2021    HCT 32.0 02/21/2021    MCV 71.5 02/21/2021    MCH 22.4 02/21/2021    MCHC 31.3 02/21/2021    RDW 17.4 02/21/2021     02/21/2021    MPV 8.0 06/19/2014     Lab Results   Component Value Date     02/21/2021    K 3.5 02/21/2021     02/21/2021    CO2 19 02/21/2021    BUN 10 02/21/2021    CREATININE 0.76 02/21/2021    GFRAA >60.0 02/21/2021    LABGLOM >60.0 02/21/2021    GLUCOSE 91 02/21/2021    GLUCOSE 77 06/01/2012    PROT 8.0 02/20/2021    LABALBU 4.5 02/20/2021    LABALBU 4.7 06/01/2012    CALCIUM 8.1 02/21/2021    BILITOT <0.2 02/20/2021    ALKPHOS 104 02/20/2021    AST 38 02/20/2021    ALT 48 02/20/2021     No results found for: PROTIME, INR  Lab Results   Component Value Date    TSH 1.480 03/14/2019    IRON 58 01/08/2014    TIBC 395 01/08/2014     No results found for: TRIG, HDL, LDLCALC, LDLDIRECT, LABVLDL  Lab Results   Component Value Date    LABAMPH Neg 02/20/2021    BARBSCNU Neg 02/20/2021    LABBENZ Neg 02/20/2021    LABBENZ NT DTCD 03/01/2013    LABMETH Neg 02/20/2021    OPIATESCREENURINE Neg 02/20/2021    PHENCYCLIDINESCREENURINE Neg 02/20/2021    ETOH <10 02/20/2021     No results found for: LITHIUM, DILFRTOT, VALPROATE    Assessment:   Epilepsy with breakthrough seizures.   Patient had 3 seizures yesterday she is on a high dose of Lamictal 300 mg twice a day. She was admitted here with status epilepticus with partial seizures and we had added Keppra for short time. We did not think to continue the medication as we had increased her Lamictal though she has not missed a dose. She has bipolar disorder and we work concern about use of Keppra. We now would need a second anticonvulsant for now while she is in the hospital we will treat her with Dilantin we will keep her on this for a while and as an outpatient may change her over to something different such as Xcopri. She had an abnormal EEG in the past she had 1 EEG at the clinic which showed left focus and this was her first seizure when she was pregnant. Her other evaluations are normal.  Patient has sustained a shoulder injury on the left from the seizure and an MRI is therefore recommended likely has a tear. This consultation includes my consultation the emergency room. Urine tox screen shows marijuana and cocaine metabolites likely to be laced. Discussed this with her      Juan Daniel DEAN.  Brittney Melvin MD, Filiberto Massey American Board of Psychiatry & Neurology  Board Certified in Vascular Neurology  Board Certified in Neuromuscular Medicine  Certified in [facility]:

## 2021-02-22 VITALS
DIASTOLIC BLOOD PRESSURE: 66 MMHG | HEIGHT: 63 IN | SYSTOLIC BLOOD PRESSURE: 140 MMHG | OXYGEN SATURATION: 99 % | RESPIRATION RATE: 17 BRPM | BODY MASS INDEX: 30.46 KG/M2 | HEART RATE: 86 BPM | WEIGHT: 171.9 LBS | TEMPERATURE: 99 F

## 2021-02-22 LAB
AMPHETAMINE SCREEN, URINE: ABNORMAL
BARBITURATE SCREEN URINE: ABNORMAL
BENZODIAZEPINE SCREEN, URINE: ABNORMAL
CANNABINOID SCREEN URINE: POSITIVE
CK MB: 1.7 NG/ML (ref 0–3.8)
COCAINE METABOLITE SCREEN URINE: ABNORMAL
CREATINE KINASE-MB INDEX: 0.3 % (ref 0–3.5)
Lab: ABNORMAL
METHADONE SCREEN, URINE: ABNORMAL
OPIATE SCREEN URINE: ABNORMAL
OXYCODONE URINE: ABNORMAL
PHENCYCLIDINE SCREEN URINE: ABNORMAL
PROPOXYPHENE SCREEN: ABNORMAL
TOTAL CK: 654 U/L (ref 0–170)

## 2021-02-22 PROCEDURE — 82550 ASSAY OF CK (CPK): CPT

## 2021-02-22 PROCEDURE — 6370000000 HC RX 637 (ALT 250 FOR IP): Performed by: INTERNAL MEDICINE

## 2021-02-22 PROCEDURE — APPSS30 APP SPLIT SHARED TIME 16-30 MINUTES: Performed by: NURSE PRACTITIONER

## 2021-02-22 PROCEDURE — 99233 SBSQ HOSP IP/OBS HIGH 50: CPT | Performed by: PSYCHIATRY & NEUROLOGY

## 2021-02-22 PROCEDURE — 95816 EEG AWAKE AND DROWSY: CPT

## 2021-02-22 PROCEDURE — 80307 DRUG TEST PRSMV CHEM ANLYZR: CPT

## 2021-02-22 PROCEDURE — 82553 CREATINE MB FRACTION: CPT

## 2021-02-22 PROCEDURE — 6360000002 HC RX W HCPCS: Performed by: INTERNAL MEDICINE

## 2021-02-22 PROCEDURE — 6370000000 HC RX 637 (ALT 250 FOR IP): Performed by: PSYCHIATRY & NEUROLOGY

## 2021-02-22 PROCEDURE — 36415 COLL VENOUS BLD VENIPUNCTURE: CPT

## 2021-02-22 RX ORDER — NICOTINE 21 MG/24HR
1 PATCH, TRANSDERMAL 24 HOURS TRANSDERMAL DAILY
Qty: 30 PATCH | Refills: 3 | Status: SHIPPED | OUTPATIENT
Start: 2021-02-23 | End: 2021-06-07

## 2021-02-22 RX ORDER — PHENYTOIN SODIUM 100 MG/1
100 CAPSULE, EXTENDED RELEASE ORAL 3 TIMES DAILY
Qty: 90 CAPSULE | Refills: 2 | Status: SHIPPED | OUTPATIENT
Start: 2021-02-22 | End: 2021-03-03 | Stop reason: DRUGHIGH

## 2021-02-22 RX ADMIN — HYDROCODONE BITARTRATE AND ACETAMINOPHEN 1 TABLET: 5; 325 TABLET ORAL at 00:02

## 2021-02-22 RX ADMIN — HYDROCODONE BITARTRATE AND ACETAMINOPHEN 1 TABLET: 5; 325 TABLET ORAL at 07:36

## 2021-02-22 RX ADMIN — ARIPIPRAZOLE 20 MG: 20 TABLET ORAL at 09:39

## 2021-02-22 RX ADMIN — HYDROCODONE BITARTRATE AND ACETAMINOPHEN 1 TABLET: 5; 325 TABLET ORAL at 14:00

## 2021-02-22 RX ADMIN — ENOXAPARIN SODIUM 40 MG: 40 INJECTION SUBCUTANEOUS at 09:39

## 2021-02-22 RX ADMIN — PHENYTOIN SODIUM 100 MG: 100 CAPSULE ORAL at 09:39

## 2021-02-22 RX ADMIN — PHENYTOIN SODIUM 100 MG: 100 CAPSULE ORAL at 14:00

## 2021-02-22 RX ADMIN — LAMOTRIGINE 300 MG: 200 TABLET ORAL at 10:33

## 2021-02-22 ASSESSMENT — ENCOUNTER SYMPTOMS
SHORTNESS OF BREATH: 0
WHEEZING: 0
COUGH: 0
COLOR CHANGE: 0
CHEST TIGHTNESS: 0
VOMITING: 0
TROUBLE SWALLOWING: 0
NAUSEA: 0

## 2021-02-22 ASSESSMENT — PAIN SCALES - GENERAL
PAINLEVEL_OUTOF10: 8
PAINLEVEL_OUTOF10: 8

## 2021-02-22 NOTE — PROGRESS NOTES
Hospitalist Progress Note      Date of Admission: 2/20/2021  Chief Complaint:    Chief Complaint   Patient presents with    Seizures     has a h/o  had recent change to medications   pt had 2 today      Subjective:  No new complaints, no nausea vomiting chest pain or headache. Complains of some shoulder pain. Medications:    Infusion Medications   Scheduled Medications    phenytoin  100 mg Oral TID    nicotine  1 patch Transdermal Daily    sodium chloride flush  10 mL Intravenous 2 times per day    enoxaparin  40 mg Subcutaneous Daily    lamoTRIgine  300 mg Oral BID    ARIPiprazole  20 mg Oral Daily     PRN Meds: LORazepam, sodium chloride flush, promethazine **OR** ondansetron, polyethylene glycol, acetaminophen **OR** acetaminophen, hydrOXYzine, HYDROcodone 5 mg - acetaminophen  No intake or output data in the 24 hours ending 02/21/21 2248  Exam:  /65   Pulse 89   Temp 98.1 °F (36.7 °C) (Oral)   Resp 20   Ht 5' 3\" (1.6 m)   Wt 171 lb 14.4 oz (78 kg)   SpO2 97%   BMI 30.45 kg/m²   Head: Normocephalic, atraumatic  Sclera clear  Neck JVD flat  Lungs: Normal effort of breathing    Labs:   Recent Labs     02/20/21  1711 02/20/21 2015 02/21/21  0605   WBC 31.0* 23.2* 13.4*   HGB 11.1* 10.5* 10.0*   HCT 37.1 34.4* 32.0*   * 505* 467*     Recent Labs     02/20/21  1700 02/21/21  0605    135   K 4.1 3.5    108*   CO2 11* 19*   BUN 16 10   CREATININE 0.99* 0.76   CALCIUM 8.3* 8.1*   AST 38*  --    ALT 48*  --    BILITOT <0.2  --    ALKPHOS 104  --      No results for input(s): INR in the last 72 hours. Recent Labs     02/20/21  1700   CKTOTAL 56*     Radiology:  MRI SHOULDER LEFT WO CONTRAST   Final Result      Findings compatible with prior anterior inferior shoulder dislocation with labral tear and Hill-Sachs lesion as detailed. Moderate glenohumeral joint effusion with areas of synovitis and numerous tiny loose bodies.        Low-grade partial-thickness intrasubstance

## 2021-02-22 NOTE — FLOWSHEET NOTE
1115--Pt assessment complete. Alert and oriented x4. PERRLA. Neuro assessment WNL. No SOB/dyspnea. Lungs clear. Cardiac WNL. Neurovascular WNL. Abdomen soft, active bowels x4. No complaints of painful urination. Pt complains of pain in her left shoulder but is being controlled well with pain medication. Seizure precautions in place, call light within reach. 1400-Pt discharged, discharge instructions and pt education complete, pt verbalized an understanding. 1530--pt prescription left behind, called pt and was asked to fax to [facility] on [place]. Fax complete.

## 2021-02-22 NOTE — FLOWSHEET NOTE
Resume care of pt at [address] [place] [address]. Pt awake in the bed talking to staff. C/O pain in the left shoulder. No seizure activity at this time. Pt requested a sandwich states she is starving. and given. Medicated at 1200 for pain of 8 to 9 in the left shoulder. Electronically signed by Justo Hines LPN on 6/44/9410 at 35:79 AM    Pt took pain pill at 1200 and has been asleep all night. No distress notes. Electronically signed by Justo Hines LPN on 2/23/3660 at 9:00 AM

## 2021-02-22 NOTE — FLOWSHEET NOTE
Spiritual Care Services     Summary of Visit:  Patient was alert and stated that today is a good day, no seizures, patient shared that she has great support form her mother in law, pt has a one year old son, pt has no community of kg, but appreciated the visit,     Spiritual Assessment/Intervention/Outcomes:    Encounter Summary  Services provided to[de-identified] (P) Patient  Referral/Consult From[de-identified] (P) Rounding  Support System: (P) Family members  Continue Visiting: (P) No  Complexity of Encounter: (P) Moderate  Length of Encounter: (P) 15 minutes  Spiritual Assessment Completed: (P) Yes  Routine  Type: (P) Follow up  Assessment: Approachable, Anxious  Intervention: Nurtured hope, Active listening, Sustaining presence/ Ministry of presence  Outcome: Comfort, Receptive     Spiritual/Quaker  Type: (P) Spiritual struggle  Assessment: (P) Concerns with suffering  Intervention: (P) Nurtured hope, Active listening, Sustaining presence/ Ministry of presence, Discussed meaning/purpose  Outcome: (P) Comfort, Expressed gratitude, Engaged in conversation, [facility]:        [id number] [facility]   Electronically signed by Vania Alston on 2/22/21 at 1:58 PM EST     To reach a  for emotional and spiritual support, place an [facility] consult request.   If a  is needed immediately, dial 0 and ask to page the on-call .

## 2021-02-22 NOTE — PROGRESS NOTES
[id number] [facility] Neurology Daily Progress Note  Name: Beverly Cabezas  Age: 32 y.o. Gender: female  CodeStatus: Full Code  Allergies: Penicillins    Chief Complaint:Seizures (has a h/o  had recent change to medications   pt had 2 today )    Primary Care Provider: No primary care provider on file. InpatientTreatment Team: Treatment Team: Attending Provider: Margarita Berrios DO; Consulting Physician: Imani Alvarado MD; Utilization Reviewer: Keith Vasquez RN; : Weston Maldonado RN; Registered Nurse: Hudson Benson RN; Registered Nurse: Gilberto Geller RN; Patient Care Tech: [facility]  Admission Date: 2/20/2021      HPI   Pt seen and examined for neuro follow up for history of epilepsy with breakthrough seizure. Patient currently alert and oriented x3, no acute distress, cooperative. Encephalopathy has resolved. No recurrent seizures. No focal neuro deficits. NO Headache, double vision, blurry vision, difficulty with speech, difficulty with swallowing, weakness, numbness, pain, nausea, vomiting, choking, neck pain, dizziness    No side effects  Vitals:    02/22/21 0722   BP: (!) 140/66   Pulse: 86   Resp: 17   Temp: 99 °F (37.2 °C)   SpO2: 99%      Review of Systems   Constitutional: Negative for appetite change, chills, fatigue and fever. HENT: Negative for hearing loss and trouble swallowing. Eyes: Negative for visual disturbance. Respiratory: Negative for cough, chest tightness, shortness of breath and wheezing. Cardiovascular: Negative for chest pain, palpitations and leg swelling. Gastrointestinal: Negative for nausea and vomiting. Musculoskeletal: Negative for gait problem. Skin: Negative for color change and rash. Neurological: Negative for dizziness, tremors, seizures, syncope, facial asymmetry, speech difficulty, weakness, light-headedness, numbness and headaches. Psychiatric/Behavioral: Negative for agitation, confusion and hallucinations. The patient is not nervous/anxious. 02/22/21  0504   CKTOTAL 366* 654*       Urinalysis:   Lab Results   Component Value Date    NITRU Negative 02/20/2021    WBCUA 6-9 02/20/2021    BACTERIA FEW 02/20/2021    RBCUA 3-5 02/20/2021    BLOODU SMALL 02/20/2021    SPECGRAV 1.012 02/20/2021    GLUCOSEU Negative 02/20/2021    GLUCOSEU NEG 11/25/2011       Radiology:   Most recent    EEG No procedure found. MRI of Brain   Results for orders placed during the hospital encounter of 01/02/21   MRI BRAIN W WO CONTRAST    Narrative MRI BRAIN W WO CONTRAST : 1/4/2021    CLINICAL HISTORY: Seizure. COMPARISON: Head CT 1/2/2021. TECHNIQUE: Multiplanar MR imaging of the head was performed without contrast.      FINDINGS:    There is no evidence of mesial temporal sclerosis, abnormal enhancement, infarct, intracranial hemorrhage, mass effect, midline shift, extra-axial collection, or hydrocephalus. There is no significant atrophy or white matter changes, for age. Impression NO ACUTE INTRACRANIAL PROCESS OR EVIDENCE OF MESIAL TEMPORAL SCLEROSIS IDENTIFIED. No results found for this or any previous visit. MRA of the Head and Neck: No results found for this or any previous visit. No results found for this or any previous visit. No results found for this or any previous visit. CT of the Head:   Results for orders placed during the hospital encounter of 02/20/21   CT HEAD WO CONTRAST    Narrative EXAMINATION: CT of the brain without contrast    HISTORY: Seizure    COMPARISON: CT brain from January 2, 2021    TECHNIQUE: Multiple contiguous axial images were obtained of the brain from the skull base through the vertex. Multiplanar reformats were obtained. FINDINGS:    Brain volume is age appropriate. Ventricular morphology is within normal limits. Gray-white matter differentiation is preserved. No acute hemorrhage or abnormal extra-axial fluid collection. No mass effect or midline shift. Mild mucosal thickening of the right maxillary sinus. The mastoid air cells are clear. Calvarium is intact. Impression No acute intracranial process. All CT scans at this facility use dose modulation, iterative reconstruction, and/or weight based dosing when appropriate to reduce radiation dose to as low as reasonably achievable. No results found for this or any previous visit. No results found for this or any previous visit. Carotid duplex: No results found for this or any previous visit. No results found for this or any previous visit. No results found for this or any previous visit. Echo No results found for this or any previous visit. Assessment/Plan:    Epilepsy with breakthrough seizures. Patient had 3 seizures yesterday she is on a high dose of Lamictal 300 mg twice a day. She was admitted here with status epilepticus with partial seizures and we had added Keppra for short time. We did not think to continue the medication as we had increased her Lamictal though she has not missed a dose. She has bipolar disorder and we work concern about use of Keppra. We now would need a second anticonvulsant for now while she is in the hospital we will treat her with Dilantin we will keep her on this for a while and as an outpatient may change her over to something different such as Xcopri. She had an abnormal EEG in the past she had 1 EEG at the clinic which showed left focus and this was her first seizure when she was pregnant. Her other evaluations are normal.  Patient has sustained a shoulder injury on the left from the seizure and an MRI is therefore recommended likely has a tear.     This consultation includes my consultation the emergency room. Urine tox screen shows marijuana and cocaine metabolites likely to be laced.   Discussed this with her    Epilepsy with breakthrough seizures  MRI of the brain negative for any acute process or mesial temporal sclerosis  Continue on Dilantin and Lamictal  Tox screen positive for cannabinoid and cocaine, educated patient to avoid alcohol and illicit drug use as this can lower the threshold for seizures. EEG pending  Orthopedics has been consulted due to shoulder dislocation and is delaying surgical intervention until patient is seizure-free for 1 year  Okay to discharge from neurology standpoint and patient will follow up with Dr. Melvin Rodas so he can evaluate her appropriateness for Xcopri. Follow-up appointment has been made for 3/17/2021 at 1 PM.   I have personally performed a face to face diagnostic evaluation on this patient, reviewed all data and investigations, and am the sole provider of all clinical decisions on the neurological status of this patient. Pt  Has not had any seizures,   Will continue on dilantin for now      Juan Daniel Rodas MD, [address], American Board of Psychiatry & Neurology  Board Certified in Vascular Neurology  Board Certified in Neuromuscular Medicine  Certified in Neurorehabilitation     Collaborating physicians: Dr Melvin Rodas    Electronically signed by BRUCE Lamb CNP on 2/22/2021 at 12:00 PM

## 2021-02-22 NOTE — DISCHARGE SUMMARY
Physician Discharge Summary     Patient ID:  Beverly Cabezas  [id number]  8 y.o.  1995    Admit date: 2/20/2021    Discharge date : 02/22/21     Admitting Physician: Angelica Jorgensen DO     Discharge Physician: SONIA [facility] B.HDO VAN     Admission Diagnoses: Seizures ([facility] [address] [R56.9]  Seizures ([facility] [address] [R56.9]    Discharge Diagnoses: Seizure, L shoulder dislocation    Admission Condition: fair    Discharged Condition: good    Hospital Course: 33 y/o Pt, history of epilepsy since her last pregnancy several years previously brought in through ed for seizures. Pt has had two seizures today at home, during which she bit her tongue and injured her shoulder. She was brought to ED, and on arrival was noted to have another one. Neurology was contacted and recommended phenytoin x1 gram followed by lamictal. Blood lamictal levels are pending. Pt denies any fevers or chills, CP, SOB, N/V/D/C. Lactic acid elevated during seizure, resolved with IVF. Pt admitted and neuro consulted and added lamictal and dilantin. Pt improved and no further seizures. Ortho consulted and will follow patient as an outpatient for her L shoulder dislocation. Pt discharged home in stable and improved condition and will follow with PCP, neuro and ortho after discharge. Consults: neurology and orthopedic surgery      Discharge Exam:  Constitutional: Awake and alert in no acute distress.  Sitting in bed comfortably  Head: Normocephalic, atraumatic  Eyes: EOMI, PERRLA  ENT: moist mucous membranes  Neck: neck supple, trachea midline  Lungs: Good inspiratory effort, CTABL, no wheeze, no rhonchi, no rales  Heart: RRR, normal S1 and S2, no murmurs  GI: Soft, non-distended, non tender, no guarding, no rebound, +BS  MSK: no edema noted, L shoulder in sling  Skin: warm, dry  Psych: appropriate affect       Labs:   Recent Labs     02/20/21  1711 02/20/21 2015 02/21/21  0605   WBC 31.0* 23.2* 13.4*   HGB 11.1* 10.5* 10.0*   HCT 37.1 34.4* 32.0*   * 505* 467* (DILANTIN) 100 MG ER capsule Take 1 capsule by mouth 3 times daily, Disp-90 capsule, R-2Print      nicotine (NICODERM CQ) 21 MG/24HR Place 1 patch onto the skin daily, Disp-30 patch, R-3Normal         CONTINUE these medications which have NOT CHANGED    Details   lamoTRIgine (LAMICTAL) 150 MG tablet Take 2 tablets by mouth 2 times daily, Disp-120 tablet, R-3Print      ARIPiprazole (ABILIFY) 5 MG tablet Take 20 mg by mouth daily Historical Med      acetaminophen (TYLENOL) 325 MG tablet Take 650 mg by mouth every 6 hours as needed for PainHistorical Med      hydrOXYzine (ATARAX) 25 MG tablet Take 25 mg by mouth 3 times daily as needed for AnxietyHistorical Med           Activity: activity as tolerated  Diet: regular diet  Wound Care: none needed    Follow-up with Jacquelyn Mesa MD  in 2 weeks.     DC time 35 minutes    Signed:  Electronically signed by [facility]SD[facility] Lynn on 2/22/2021 at 5:27 PM

## 2021-02-24 LAB — LAMOTRIGINE LEVEL: 1.9 UG/ML (ref 2.5–15)

## 2021-02-25 ENCOUNTER — TELEPHONE (OUTPATIENT)
Dept: NEUROLOGY | Age: 26
End: 2021-02-25

## 2021-02-25 DIAGNOSIS — R56.9 SEIZURE (HCC): Primary | ICD-10-CM

## 2021-02-25 DIAGNOSIS — F41.9 ANXIETY: ICD-10-CM

## 2021-02-25 NOTE — TELEPHONE ENCOUNTER
Patient called and states that she is on dilantin 100mg tid and lamictal 300mg bid. She states that dilantin was just added . She states that she feels anxious, like she is coming out of her skin. She feels wired but is tired.  She states that its a weird feeling    Please advise    Thanks Umm Huston      [phone]    Phone numbers that she can be reached at

## 2021-02-25 NOTE — TELEPHONE ENCOUNTER
Attempted to call patient back on her home phone number both on our work line and on my cell phone and could not get through due to restrictions placed on her number. Also attempted to call her mobile phone number but this line was busy.

## 2021-02-25 NOTE — TELEPHONE ENCOUNTER
Patient called back and I was able to speak with her as I was in the office. She states that she just has a generalized feeling of anxiety since being discharged from the hospital.  She feels like she is \"crawling out of her skin\". No recurrent seizures. No headaches or focal neuro deficits. Patient does have underlying anxiety disorder for which she takes as needed Atarax. Advised her we will assess levels of Dilantin and Lamictal and assess TSH level and further advise once we get the results.

## 2021-02-26 DIAGNOSIS — F41.9 ANXIETY: ICD-10-CM

## 2021-02-26 DIAGNOSIS — R56.9 SEIZURE (HCC): ICD-10-CM

## 2021-02-26 LAB
BLOOD CULTURE, ROUTINE: NORMAL
CULTURE, BLOOD 2: NORMAL
PHENYTOIN LEVEL: 6.4 UG/ML (ref 10–20)
TSH REFLEX: 0.48 UIU/ML (ref 0.44–3.86)

## 2021-02-28 LAB — LAMOTRIGINE LEVEL: 6 UG/ML (ref 2.5–15)

## 2021-03-01 ENCOUNTER — TELEPHONE (OUTPATIENT)
Dept: NEUROLOGY | Age: 26
End: 2021-03-01

## 2021-03-01 PROCEDURE — 95816 EEG AWAKE AND DROWSY: CPT | Performed by: PSYCHIATRY & NEUROLOGY

## 2021-03-01 NOTE — TELEPHONE ENCOUNTER
Number to call patient to go over laboratory testing results. Once again her home phone number has restrictions on it which will not take my incoming call. Her other phone number is busy. I did advise patient of this problem the last time I attempted to call her and that I was unable to get through to her on either line. PED DISCHARGE SUMMARY      Patient: Gabriel Guzman MRN: [id number]  SSN: [id number]    YOB: 2013  Age: 3 y.o. Sex: female      Admitting Diagnosis: Abdominal pain    Discharge Diagnosis:   Problem List as of 12/10/2017  Date Reviewed: 2017          Codes Class Noted - Resolved    Fever ICD-10-CM: R50.9  ICD-9-CM: 780.60  2017 - Present        Leg pain ICD-10-CM: M79.606  ICD-9-CM: 729.5  2017 - Present        Constipation ICD-10-CM: K59.00  ICD-9-CM: 564.00  2017 - Present        Fecal impaction of colon ([address] ICD-10-CM: K56.41  ICD-9-CM: 560.32  2017 - Present        * (Principal)Abdominal pain ICD-10-CM: R10.9  ICD-9-CM: 789.00  2017 - Present        Chronic idiopathic constipation ICD-10-CM: K59.04  ICD-9-CM: 564.00  2017 - Present        Single liveborn, born in hospital, delivered without mention of  delivery ICD-10-CM: Z38.00  ICD-9-CM: V30.00  2013 - Present               Primary Care Physician: Samantha Casanova MD    HPI: As per admitting MD, \"Pt is 3 y. o. with history of intermittent stool withholding and encopresis for the past 5-6 months is brought due to persistent abdominal pain. Per mom abdominal pain started on monday 5 days ago. It is intermittent but pt becky and moans when it occurs. Also had vomiting (non bilious, non bloody) 2-3 x/day for 3 days but vomiting has stopped for the last 2 days. Pt noted to have Tactile fever since tuesday 4 days ago and was intermittently getting motrin, last dose this morning. Pt was seen at [facility] ED on Monday (where labs, urine, CT, abd x ray, US were done and showed constipation and started on miralax/glycerine supp). Seen here the next day  due to persistent abdominal pain where x ray was done and showed constipation. Referred to GI and seen on  by Dr Perla Nielson who recommended stopping miralax and prescribed bentyl, cyproheptadine and zofran.  Bentyl is not covered by pt's insurance. Per mom the cyproheptadine does not help with the abdominal pain. Pt had about 2 liquid stools/day on Tuesday and wednesday,1x yesterday but none today. Passing a lot of foul smelling gas today. Today started complaining of left leg pain, wincing when her thigh is touched, refusing to bear weight. Denies trauma, rash, cough, URI symptoms or sick contact. Appetite and activity reduced. Normally very active child but she has been mostly lying down and has missed school the whole week. Drinking some fluids but urinating less about 2x today.       Course in the ED: 101 temp, labs, motrin, X ray, GI consult- recommended Golytely by NG tube for clean out\"       Hospital Course: Pt admitted for abdominal pain, vomiting, and constipation. NG placed and Golytely given and pt cleaned out with resolution seen on KUB. Pt did also have fever on admit and c/o dysuria and left leg pain. UCx was negative and CK neg for myositis. Xray of femur was negative. ASO, Lyme and Bcx sent and pending. 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 Pt currently with FROM and walking normally in room. ESR and CRP were elevated on admit but both improved on discharge. ESR from 41 to 2 and CRP 9.35 to 7.36. Pt will be discharge home on Miralax with 1 cap BID (but if loose stools then decrease to 1 cap daily) with f/u with GI at end of this week. F/u with PCP to follow up pending labs in 2-3 days. At time of Discharge patient is Afebrile, feeling well and stooling well. No leg pain or fever.     Disposition: Improved Home    Labs:     Recent Results (from the past 72 hour(s))   CBC WITH AUTOMATED DIFF    Collection Time: 12/08/17 10:16 PM   Result Value Ref Range    WBC 15.1 (H) 4.9 - 13.2 K/uL    RBC 4.24 3.84 - 4.92 M/uL    HGB 12.1 10.2 - 12.7 g/dL    HCT 35.1 31.2 - 37.8 %    MCV 82.8 72.3 - 85.0 FL    MCH 28.5 23.7 - 28.6 PG    MCHC 34.5 31.8 - 34.6 g/dL    RDW 12.2 (L) 12.4 - 14.9 %    PLATELET 660 591 - 508 K/uL    NEUTROPHILS 69 22 - 69 %    LYMPHOCYTES 17 (L) 18 - 69 %    MONOCYTES 13 (H) 4 - 11 %    EOSINOPHILS 1 0 - 3 %    BASOPHILS 0 0 - 1 %    ABS. NEUTROPHILS 10.4 (H) 1.6 - 8.3 K/UL    ABS. LYMPHOCYTES 2.5 1.3 - 5.8 K/UL    ABS. MONOCYTES 2.0 (H) 0.2 - 0.9 K/UL    ABS. EOSINOPHILS 0.1 0.0 - 0.5 K/UL    ABS. BASOPHILS 0.0 0.0 - 0.1 K/UL   METABOLIC PANEL, BASIC    Collection Time: 12/08/17 10:16 PM   Result Value Ref Range    Sodium 136 132 - 141 mmol/L    Potassium 4.0 3.5 - 5.1 mmol/L    Chloride 101 97 - 108 mmol/L    CO2 25 18 - 29 mmol/L    Anion gap 10 5 - 15 mmol/L    Glucose 104 54 - 117 mg/dL    BUN 8 6 - 20 MG/DL    Creatinine 0.22 0.20 - 0.70 MG/DL    BUN/Creatinine ratio 36 (H) 12 - 20      GFR est AA Cannot be calculated >60 ml/min/1.73m2    GFR est non-AA Cannot be calculated >60 ml/min/1.73m2    Calcium 9.4 8.8 - 10.8 MG/DL   LIPASE    Collection Time: 12/08/17 10:16 PM   Result Value Ref Range    Lipase 87 73 - 393 U/L   CK    Collection Time: 12/08/17 10:16 PM   Result Value Ref Range    CK 62 26 - 192 U/L   OCCULT BLOOD, STOOL    Collection Time: 12/08/17 10:16 PM   Result Value Ref Range    Occult blood, stool NEGATIVE  NEG     HEPATIC FUNCTION PANEL    Collection Time: 12/08/17 10:16 PM   Result Value Ref Range    Protein, total 7.2 6.0 - 8.0 g/dL    Albumin 3.6 3.2 - 5.5 g/dL    Globulin 3.6 2.0 - 4.0 g/dL    A-G Ratio 1.0 (L) 1.1 - 2.2      Bilirubin, total 0.6 0.2 - 1.0 MG/DL    Bilirubin, direct <0.1 0.0 - 0.2 MG/DL    Alk.  phosphatase 164 110 - 460 U/L    AST (SGOT) 27 15 - 50 U/L    ALT (SGPT) 13 12 - 78 U/L   C REACTIVE PROTEIN, QT    Collection Time: 12/08/17 10:16 PM   Result Value Ref Range    C-Reactive protein 9.35 (H) 0.00 - 0.60 mg/dL   SED RATE (ESR)    Collection Time: 12/08/17 10:16 PM   Result Value Ref Range    Sed rate, automated 41 (H) 0 - 15 mm/hr   URINALYSIS W/MICROSCOPIC    Collection Time: 12/09/17 12:05 AM   Result Value Ref Range    Color YELLOW/STRAW      Appearance TURBID (A) CLEAR      Specific gravity 1.027 1.003 - 1.030      pH (UA) 7.5 5.0 - 8.0      Protein TRACE (A) NEG mg/dL    Glucose NEGATIVE  NEG mg/dL    Ketone TRACE (A) NEG mg/dL    Bilirubin NEGATIVE  NEG      Blood NEGATIVE  NEG      Urobilinogen 1.0 0.2 - 1.0 EU/dL    Nitrites NEGATIVE  NEG      Leukocyte Esterase TRACE (A) NEG      WBC 0-4 0 - 4 /hpf    RBC 0-5 0 - 5 /hpf    Epithelial cells FEW FEW /lpf    Bacteria NEGATIVE  NEG /hpf    Mucus 1+ (A) NEG /lpf    Amorphous Crystals 3+ (A) NEG   CULTURE, URINE    Collection Time: 12/09/17 12:15 AM   Result Value Ref Range    Special Requests: NO SPECIAL REQUESTS      Lomax Count <1,000 CFU/ML      Culture result: NO GROWTH 1 DAY     CBC WITH AUTOMATED DIFF    Collection Time: 12/10/17 10:16 AM   Result Value Ref Range    WBC 9.3 4.9 - 13.2 K/uL    RBC 4.27 3.84 - 4.92 M/uL    HGB 12.1 10.2 - 12.7 g/dL    HCT 35.6 31.2 - 37.8 %    MCV 83.4 72.3 - 85.0 FL    MCH 28.3 23.7 - 28.6 PG    MCHC 34.0 31.8 - 34.6 g/dL    RDW 12.0 (L) 12.4 - 14.9 %    PLATELET 291 939 - 486 K/uL    NEUTROPHILS 67 22 - 69 %    LYMPHOCYTES 23 18 - 69 %    MONOCYTES 7 4 - 11 %    EOSINOPHILS 3 0 - 3 %    BASOPHILS 0 0 - 1 %    ABS. NEUTROPHILS 6.2 1.6 - 8.3 K/UL    ABS. LYMPHOCYTES 2.1 1.3 - 5.8 K/UL    ABS. MONOCYTES 0.7 0.2 - 0.9 K/UL    ABS. EOSINOPHILS 0.3 0.0 - 0.5 K/UL    ABS. BASOPHILS 0.0 0.0 - 0.1 K/UL   C REACTIVE PROTEIN, QT    Collection Time: 12/10/17 10:16 AM   Result Value Ref Range    C-Reactive protein 7.36 (H) 0.00 - 0.60 mg/dL   SED RATE (ESR)    Collection Time: 12/10/17 10:16 AM   Result Value Ref Range    Sed rate, automated 2 0 - 15 mm/hr       There has been no growth for urine culture in the last 24 hours    Radiology:    KUB 12/8 - There is significant retained stool but no dilated loops of bowel or air-fluid levels. Gas is seen through to the rectum. No free air. No pathologic  calcifications. KUB  - No significant residual colonic stool. No evidence for bowel obstruction. Femur xray - No acute abnormality. Pending Labs:  Bcx, ASO and Lyme    Discharge Exam:   Visit Vitals    BP 96/52 (BP 1 Location: Right arm, BP Patient Position: At rest)    Pulse 110    Temp 98.3 °F (36.8 °C)    Resp 20    Ht (!) 0.965 m    Wt 14.8 kg    SpO2 99%    BMI 15.89 kg/m2     Oxygen Therapy  O2 Sat (%): 99 % (17)  O2 Device: Room air (17)  Temp (24hrs), Av.3 °F (36.8 °C), Min:97 °F (36.1 °C), Max:99.6 °F (37.6 °C)    General  no distress, well developed, well nourished  HEENT  normocephalic/ atraumatic and moist mucous membranes  Respiratory  Clear Breath Sounds Bilaterally, No Increased Effort and Good Air Movement Bilaterally  Cardiovascular   RRR, S1S2 and No murmur  Abdomen  soft, non tender, non distended, bowel sounds present in all 4 quadrants, active bowel sounds and no masses  Lymph   none  Skin  No Rash, No Erythema and Cap Refill less than 3 sec  Musculoskeletal full range of motion in all Joints, no swelling or tenderness and strength normal and equal bilaterally  Neurology  CN II - XII grossly intact and normal gait    Discharge Condition: good    Discharge Medications:  Current Discharge Medication List      CONTINUE these medications which have NOT CHANGED    Details   CHILDREN'S PAIN RELIEF 160 mg/5 mL suspension       polyethylene glycol (MIRALAX) 17 gram/dose powder 17 g two (2) times a day. STOP taking these medications       ondansetron (ZOFRAN ODT) 4 mg disintegrating tablet Comments:   Reason for Stopping:         cyproheptadine (PERIACTIN) 2 mg/5 mL syrup Comments:   Reason for Stopping:         dicyclomine (BENTYL) 10 mg/5 mL soln oral solution Comments:   Reason for Stopping:         glycerin, child, supp Comments:   Reason for Stopping:             Mom holding on Periactin for now and will call GI tomorrow to discuss if she should continue it.  It was not continued while hospitalized and mom states it didn't help at home. Discharge Instructions: Call your doctor with concerns of persistent fever, decreased urine output, persistent vomiting and abdominal pain or constipation    Asthma action plan was given to family: not applicable    Appointment with: Honey Packer MD in  2-3 days. Mom has appointment with GI tomorrow but will call tomorrow and change to Friday 12/15.      Signed By: Gwen Reyna MD  Total Patient Care Time: > 30 minutes

## 2021-03-01 NOTE — PROCEDURES
[address]                      [address], [address] [facility]                          ELECTROENCEPHALOGRAM REPORT    PATIENT NAME: Judith Peres                  :        1995  MED REC NO:   [id number]                            ROOM:       W291  ACCOUNT NO:   [de-identified]                           ADMIT DATE: 2021  PROVIDER:     Alix Olivier MD    DATE OF EE2021    MEDICATIONS:  Dilantin, Lovenox, Lamictal, Abilify and Norco.    EEG FINDINGS:  This is a spontaneous 21-channel EEG recording for this  patient with memory loss. The background rhythm of this EEG shows a  well regulated 9 Hz posterior dominant rhythm of alpha. This is  symmetrical and attenuates with eye opening. EKG is monitored, this is  regular. Photic stimulation is performed without any photo responsive  seizures. There is no suggestion of asymmetry. The record remains  symmetrical throughout. Hyperventilation was performed with good effort  without any change in frequencies. Photic stimulation does not reveal  photic responses. IMPRESSION:  This is a normal and very well regulated EEG recording much  improved from her previous EEG. Clinical correlation is recommended.         Marsha Kendall MD    D: 2021 10:58:24       T: 2021 11:24:47     [email or web address]  Job#: [id number]     Doc#: [id number]    CC:

## 2021-03-03 ENCOUNTER — TELEPHONE (OUTPATIENT)
Dept: NEUROLOGY | Age: 26
End: 2021-03-03

## 2021-03-03 DIAGNOSIS — R56.9 SEIZURE (HCC): Primary | ICD-10-CM

## 2021-03-03 RX ORDER — PHENYTOIN SODIUM 100 MG/1
200 CAPSULE, EXTENDED RELEASE ORAL 2 TIMES DAILY
Qty: 120 CAPSULE | Refills: 3 | Status: SHIPPED | OUTPATIENT
Start: 2021-03-03 | End: 2021-05-04 | Stop reason: SDUPTHER

## 2021-04-19 ENCOUNTER — TELEPHONE (OUTPATIENT)
Dept: NEUROLOGY | Age: 26
End: 2021-04-19

## 2021-04-19 NOTE — TELEPHONE ENCOUNTER
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    Please advise    Thanks Kade

## 2021-04-21 RX ORDER — ARIPIPRAZOLE 5 MG/1
20 TABLET ORAL DAILY
Qty: 120 TABLET | Refills: 0 | Status: SHIPPED | OUTPATIENT
Start: 2021-04-21 | End: 2021-05-28 | Stop reason: SDUPTHER

## 2021-04-21 NOTE — TELEPHONE ENCOUNTER
Pt is currently on ablify 20mg daily I did verify this with her pharmacy which [facility]. I spoke with them and they said that the last fill with them was march 12th. I am going to send the refill request to you in a different encounter due to you saying ok to do.

## 2021-05-03 ENCOUNTER — APPOINTMENT (OUTPATIENT)
Dept: CT IMAGING | Age: 26
End: 2021-05-03
Payer: MEDICAID

## 2021-05-03 ENCOUNTER — HOSPITAL ENCOUNTER (EMERGENCY)
Age: 26
Discharge: HOME | End: 2021-05-04
Attending: EMERGENCY MEDICINE
Payer: MEDICAID

## 2021-05-03 ENCOUNTER — APPOINTMENT (OUTPATIENT)
Dept: GENERAL RADIOLOGY | Age: 26
End: 2021-05-03
Payer: MEDICAID

## 2021-05-03 DIAGNOSIS — G40.919 BREAKTHROUGH SEIZURE (HCC): Primary | ICD-10-CM

## 2021-05-03 DIAGNOSIS — R56.9 SEIZURE (HCC): ICD-10-CM

## 2021-05-03 LAB
ANION GAP SERPL CALCULATED.3IONS-SCNC: 15 MEQ/L (ref 9–15)
ANISOCYTOSIS: ABNORMAL
BASOPHILS ABSOLUTE: 0.1 K/UL (ref 0–0.2)
BASOPHILS RELATIVE PERCENT: 0.6 %
BUN BLDV-MCNC: 7 MG/DL (ref 6–20)
CALCIUM SERPL-MCNC: 8.7 MG/DL (ref 8.5–9.9)
CHLORIDE BLD-SCNC: 102 MEQ/L (ref 95–107)
CO2: 18 MEQ/L (ref 20–31)
CREAT SERPL-MCNC: 0.64 MG/DL (ref 0.5–0.9)
EOSINOPHILS ABSOLUTE: 0 K/UL (ref 0–0.7)
EOSINOPHILS RELATIVE PERCENT: 0.3 %
ETHANOL PERCENT: NORMAL G/DL
ETHANOL: <10 MG/DL (ref 0–0.08)
GFR AFRICAN AMERICAN: >60
GFR NON-AFRICAN AMERICAN: >60
GLUCOSE BLD-MCNC: 105 MG/DL (ref 70–99)
HCT VFR BLD CALC: 36.7 % (ref 37–47)
HEMOGLOBIN: 11.2 G/DL (ref 12–16)
LYMPHOCYTES ABSOLUTE: 2.2 K/UL (ref 1–4.8)
LYMPHOCYTES RELATIVE PERCENT: 14.6 %
MCH RBC QN AUTO: 20.9 PG (ref 27–31.3)
MCHC RBC AUTO-ENTMCNC: 30.5 % (ref 33–37)
MCV RBC AUTO: 68.6 FL (ref 82–100)
MICROCYTES: ABNORMAL
MONOCYTES ABSOLUTE: 0.6 K/UL (ref 0.2–0.8)
MONOCYTES RELATIVE PERCENT: 4.2 %
NEUTROPHILS ABSOLUTE: 11.9 K/UL (ref 1.4–6.5)
NEUTROPHILS RELATIVE PERCENT: 80.3 %
OVALOCYTES: ABNORMAL
PDW BLD-RTO: 19 % (ref 11.5–14.5)
PLATELET # BLD: 578 K/UL (ref 130–400)
POIKILOCYTES: ABNORMAL
POTASSIUM SERPL-SCNC: 4.1 MEQ/L (ref 3.4–4.9)
RBC # BLD: 5.35 M/UL (ref 4.2–5.4)
SODIUM BLD-SCNC: 135 MEQ/L (ref 135–144)
WBC # BLD: 14.8 K/UL (ref 4.8–10.8)

## 2021-05-03 PROCEDURE — 82077 ASSAY SPEC XCP UR&BREATH IA: CPT

## 2021-05-03 PROCEDURE — 80048 BASIC METABOLIC PNL TOTAL CA: CPT

## 2021-05-03 PROCEDURE — 99284 EMERGENCY DEPT VISIT MOD MDM: CPT

## 2021-05-03 PROCEDURE — 36415 COLL VENOUS BLD VENIPUNCTURE: CPT

## 2021-05-03 PROCEDURE — 87086 URINE CULTURE/COLONY COUNT: CPT

## 2021-05-03 PROCEDURE — 80175 DRUG SCREEN QUAN LAMOTRIGINE: CPT

## 2021-05-03 PROCEDURE — 71045 X-RAY EXAM CHEST 1 VIEW: CPT

## 2021-05-03 PROCEDURE — 6360000002 HC RX W HCPCS: Performed by: EMERGENCY MEDICINE

## 2021-05-03 PROCEDURE — 81001 URINALYSIS AUTO W/SCOPE: CPT

## 2021-05-03 PROCEDURE — 96374 THER/PROPH/DIAG INJ IV PUSH: CPT

## 2021-05-03 PROCEDURE — 70450 CT HEAD/BRAIN W/O DYE: CPT

## 2021-05-03 PROCEDURE — 80185 ASSAY OF PHENYTOIN TOTAL: CPT

## 2021-05-03 PROCEDURE — 80177 DRUG SCRN QUAN LEVETIRACETAM: CPT

## 2021-05-03 PROCEDURE — 80307 DRUG TEST PRSMV CHEM ANLYZR: CPT

## 2021-05-03 PROCEDURE — 2580000003 HC RX 258: Performed by: EMERGENCY MEDICINE

## 2021-05-03 PROCEDURE — 85025 COMPLETE CBC W/AUTO DIFF WBC: CPT

## 2021-05-03 RX ADMIN — LEVETIRACETAM 1000 MG: 100 INJECTION, SOLUTION INTRAVENOUS at 22:45

## 2021-05-04 VITALS
OXYGEN SATURATION: 100 % | TEMPERATURE: 98 F | HEART RATE: 88 BPM | SYSTOLIC BLOOD PRESSURE: 120 MMHG | WEIGHT: 170 LBS | HEIGHT: 63 IN | BODY MASS INDEX: 30.12 KG/M2 | DIASTOLIC BLOOD PRESSURE: 76 MMHG | RESPIRATION RATE: 12 BRPM

## 2021-05-04 LAB
AMPHETAMINE SCREEN, URINE: ABNORMAL
BARBITURATE SCREEN URINE: ABNORMAL
BENZODIAZEPINE SCREEN, URINE: ABNORMAL
BILIRUBIN URINE: NEGATIVE
BLOOD, URINE: NEGATIVE
CANNABINOID SCREEN URINE: POSITIVE
CLARITY: ABNORMAL
COCAINE METABOLITE SCREEN URINE: ABNORMAL
COLOR: YELLOW
EPITHELIAL CELLS, UA: ABNORMAL /HPF
GLUCOSE URINE: NEGATIVE MG/DL
HYALINE CASTS: ABNORMAL /LPF (ref 0–5)
KETONES, URINE: 15 MG/DL
LEUKOCYTE ESTERASE, URINE: ABNORMAL
Lab: ABNORMAL
METHADONE SCREEN, URINE: ABNORMAL
NITRITE, URINE: NEGATIVE
OPIATE SCREEN URINE: ABNORMAL
OXYCODONE URINE: ABNORMAL
PH UA: 5 (ref 5–9)
PHENCYCLIDINE SCREEN URINE: ABNORMAL
PHENYTOIN LEVEL: 5 UG/ML (ref 10–20)
PROPOXYPHENE SCREEN: ABNORMAL
PROTEIN UA: ABNORMAL MG/DL
RBC UA: ABNORMAL /HPF (ref 0–2)
SPECIFIC GRAVITY UA: 1.01 (ref 1–1.03)
TRICHOMONAS: PRESENT /HPF
URINE REFLEX TO CULTURE: YES
UROBILINOGEN, URINE: 0.2 E.U./DL
WBC UA: ABNORMAL /HPF (ref 0–5)

## 2021-05-04 PROCEDURE — 6370000000 HC RX 637 (ALT 250 FOR IP): Performed by: EMERGENCY MEDICINE

## 2021-05-04 RX ORDER — DEXTROSE AND SODIUM CHLORIDE 5; .9 G/100ML; G/100ML
INJECTION, SOLUTION INTRAVENOUS
Status: DISCONTINUED
Start: 2021-05-04 | End: 2021-05-04 | Stop reason: HOSPADM

## 2021-05-04 RX ORDER — ACETAMINOPHEN 325 MG/1
650 TABLET ORAL ONCE
Status: COMPLETED | OUTPATIENT
Start: 2021-05-04 | End: 2021-05-04

## 2021-05-04 RX ORDER — PHENYTOIN SODIUM 100 MG/1
CAPSULE, EXTENDED RELEASE ORAL
Qty: 120 CAPSULE | Refills: 3 | Status: SHIPPED | OUTPATIENT
Start: 2021-05-04 | End: 2021-06-07 | Stop reason: SDUPTHER

## 2021-05-04 RX ADMIN — ACETAMINOPHEN 650 MG: 325 TABLET ORAL at 01:30

## 2021-05-04 ASSESSMENT — ENCOUNTER SYMPTOMS
ABDOMINAL DISTENTION: 0
COUGH: 0
SINUS PAIN: 0
DIARRHEA: 0
RHINORRHEA: 0
SHORTNESS OF BREATH: 0
EYE PAIN: 0
CONSTIPATION: 0
ABDOMINAL PAIN: 0
NAUSEA: 0
VOMITING: 0
WHEEZING: 0
EYE DISCHARGE: 0

## 2021-05-04 NOTE — ED NOTES
Bed: 09  Expected date:   Expected time:   Means of arrival:   Comments:  32 F seizure  Aox4  122/73  98  97% ra  20 g Right ac with labs  Bg [address] [facility]  05/03/21 2051

## 2021-05-04 NOTE — ED NOTES
Patient has been triaged. The patient is sitting in bed at this time with seizure pads on bed. The patient is awake and alert and there are no s/s of distress at this time.  Will continue to monitor       Radha Blankenship RN  05/03/21 7509

## 2021-05-04 NOTE — ED TRIAGE NOTES
Pt states that she has a seizure disorder and that she had several seizures today. She said that he normally has a seizure 1 time monthly.

## 2021-05-04 NOTE — ED PROVIDER NOTES
[address] [facility] ED  EMERGENCY MEDICINE     Pt Name: Candi Joseph  MRN: [id number]  Marlingfantionette 1995  Date of evaluation: 5/3/2021  PCP:    Self Referral  Provider: [address]       Chief Complaint   Patient presents with    Seizures       HISTORY OF PRESENT ILLNESS    HPI     Patient is a 26-year-old female with history of seizure disorder, bipolar disorder, past drug use. Presenting to the emergency department with complaint of breakthrough seizure. Patient states she had a couple seizures today. Her last seizure was 2 weeks ago. She sees Dr. Rivka Varma and we recently changed his medications for her seizures. Patient states that she is compliant with her medications. Denies any nausea, vomiting, fevers, or chills. Denies any urinary symptoms or diarrhea. Patient denies possibility of pregnancy as she does finished her period a week ago. Patient complains of headache at this time. Triage notes and Nursing notes were reviewed by myself. Any discrepancies are addressed above. PAST MEDICAL HISTORY     Past Medical History:   Diagnosis Date    Migraine     Seiz[address] ([facility] [address]     Varicella        SURGICAL HISTORY     History reviewed. No pertinent surgical history.     CURRENT MEDICATIONS       Current Discharge Medication List      CONTINUE these medications which have NOT CHANGED    Details   ARIPiprazole (ABILIFY) 5 MG tablet Take 4 tablets by mouth daily  Qty: 120 tablet, Refills: 0      ondansetron (ZOFRAN-ODT) 4 MG disintegrating tablet Take by mouth      lamoTRIgine (LAMICTAL) 150 MG tablet Take 2 tablets by mouth 2 times daily  Qty: 120 tablet, Refills: 3      acetaminophen (TYLENOL) 325 MG tablet Take 650 mg by mouth every 6 hours as needed for Pain      diclofenac sodium (VOLTAREN) 1 % GEL Place 4 g onto the skin      levETIRAcetam (KEPPRA) 500 MG tablet Take 500 mg by mouth 2 times daily      nicotine (NICODERM CQ) 21 MG/24HR Place 1 patch onto the skin daily  Qty: 30 patch, Refills: 3      hydrOXYzine (ATARAX) 25 MG tablet Take 25 mg by mouth 3 times daily as needed for Anxiety             ALLERGIES       Allergies   Allergen Reactions    Penicillins      GI Problems       FAMILY HISTORY       Family History   Problem Relation Age of Onset    Cancer Other         brain, breast, lung    Coronary Art Dis Other     High Blood Pressure Brother     Obesity Brother     Cancer Maternal Grandmother     Allergies Maternal Grandmother     Colon Cancer Maternal Grandmother     Breast Cancer Maternal Grandmother     Ovarian Cancer Maternal Grandmother     Other Maternal Grandmother         eye cancer, loss of left eye    Cancer Maternal Grandfather         lung    Heart Disease Maternal Grandfather     Uterine Cancer Mother     Ovarian Cancer Mother     Breast Cancer Mother     Cancer Mother         behind right eye    Heart Attack Mother     Migraines Mother     Cancer Maternal Aunt         lung        SOCIAL HISTORY       Social History     Socioeconomic History    Marital status: Single     Spouse name: None    Number of children: None    Years of education: None    Highest education level: None   Occupational History    None   Social Needs    Financial resource strain: None    Food insecurity     Worry: None     Inability: None    Transportation needs     Medical: None     Non-medical: None   Tobacco Use    Smoking status: Current Every Day Smoker     Packs/day: 0.50     Years: 9.00     Pack years: 4.50     Types: Cigarettes    Smokeless tobacco: Never Used   Substance and Sexual Activity    Alcohol use: No    Drug use: Not Currently    Sexual activity: Yes     Partners: Male   Lifestyle    Physical activity     Days per week: None     Minutes per session: None    Stress: None   Relationships    Social connections     Talks on phone: None     Gets together: None     Attends Yarsani service: None     Active member of club or organization: None     Attends meetings of clubs or organizations: None     Relationship status: None    Intimate partner violence     Fear of current or ex partner: None     Emotionally abused: None     Physically abused: None     Forced sexual activity: None   Other Topics Concern    None   Social History Narrative    None       REVIEW OF SYSTEMS     Review of Systems   Constitutional: Negative for activity change, chills, fatigue and fever. HENT: Negative for congestion, rhinorrhea and sinus pain. Eyes: Negative for pain and discharge. Respiratory: Negative for cough, shortness of breath and wheezing. Cardiovascular: Negative for chest pain and palpitations. Gastrointestinal: Negative for abdominal distention, abdominal pain, constipation, diarrhea, nausea and vomiting. Genitourinary: Negative for difficulty urinating, dysuria and hematuria. Musculoskeletal: Negative for joint swelling and myalgias. Skin: Negative for rash. Neurological: Positive for seizures and headaches. Negative for dizziness, weakness, light-headedness and numbness. Except as noted above the remainder of the review of systems was reviewed and is negative. SCREENINGS                        PHYSICAL EXAM    (up to 7 for level 4, 8 or more for level 5)     ED Triage Vitals [05/03/21 2046]   BP Temp Temp Source Pulse Resp SpO2 Height Weight   (!) 158/85 98 °F (36.7 °C) Oral 92 20 96 % 5' 3\" (1.6 m) 170 lb (77.1 kg)       Physical Exam  Constitutional:       General: She is not in acute distress. Appearance: Normal appearance. She is normal weight. HENT:      Right Ear: External ear normal.      Left Ear: External ear normal.      Nose: Nose normal. No congestion or rhinorrhea. Mouth/Throat:      Mouth: Mucous membranes are moist.      Pharynx: Oropharynx is clear. Comments: Did bite the right side of her cheek  Eyes:      General:         Right eye: No discharge. Left eye: No discharge. Conjunctiva/sclera: Conjunctivae normal.      Pupils: Pupils are equal, round, and reactive to light. Neck:      Musculoskeletal: Normal range of motion. Cardiovascular:      Rate and Rhythm: Normal rate and regular rhythm. Pulmonary:      Effort: Pulmonary effort is normal. No respiratory distress. Abdominal:      General: Abdomen is flat. There is no distension. Tenderness: There is no abdominal tenderness. Musculoskeletal: Normal range of motion. Skin:     General: Skin is warm and dry. Capillary Refill: Capillary refill takes less than 2 seconds. Neurological:      General: No focal deficit present. Mental Status: She is alert. DIAGNOSTIC RESULTS     EKG:(none if blank)  All EKGs are interpreted by the Emergency Department Physician who either signs or Co-signs this chart in the absence of a cardiologist.        RADIOLOGY: (none if blank)   I directly visualized the following images and reviewed the radiologist interpretations.   Interpretation per the Radiologist below, if available at the time of this note:  XR CHEST PORTABLE    (Results Pending)   CT HEAD WO CONTRAST    (Results Pending)       LABS:  Labs Reviewed   BASIC METABOLIC PANEL - Abnormal; Notable for the following components:       Result Value    CO2 18 (*)     Glucose 105 (*)     All other components within normal limits   CBC WITH AUTO DIFFERENTIAL - Abnormal; Notable for the following components:    WBC 14.8 (*)     Hemoglobin 11.2 (*)     Hematocrit 36.7 (*)     MCV 68.6 (*)     MCH 20.9 (*)     MCHC 30.5 (*)     RDW 19.0 (*)     Platelets 983 (*)     Neutrophils Absolute 11.9 (*)     All other components within normal limits   PHENYTOIN LEVEL, TOTAL - Abnormal; Notable for the following components:    Phenytoin Lvl 5.0 (*)     All other components within normal limits   ETHANOL   URINE RT REFLEX TO CULTURE   URINE DRUG SCREEN   LEVETIRACETAM LEVEL   LAMOTRIGINE LEVEL   POC PREGNANCY UR-QUAL       All other labs were within normal range or not returned as of this dictation. Please note, any cultures that may have been sent were not resulted at the time of this patient visit. EMERGENCY DEPARTMENT COURSE and Medical Decision Making:     Vitals:    Vitals:    05/03/21 2200 05/03/21 2230 05/04/21 0030 05/04/21 0100   BP: (!) 109/59 119/70 118/70 113/77   Pulse: 81 83 76 80   Resp: 18 19 24 19   Temp:       TempSrc:       SpO2: 99%  95% 99%   Weight:       Height:           PROCEDURES: (None if blank)  Procedures       MDM     Patient's lab work is unremarkable. Patient is stable at this time with no seizure activity. She did receive Keppra 1 g load. Patient did get fluids. Did speak to Dr. Alyssa Escobar, her neurologist who requested increasing the Dilantin from 200 mg twice a day to 200 mg in the morning and 300 mg at night. Patient is agreeable to plan of care and did write a new prescription for this. Strict return precautions and follow up instructions were discussed with the patient with which the patient agrees    ED Medications administered this visit:    Medications   levETIRAcetam (KEPPRA) 1,000 mg in sodium chloride 0.9 % 100 mL IVPB (has no administration in time range)   acetaminophen (TYLENOL) tablet 650 mg (has no administration in time range)   dextrose 5 % and 0.9 % NaCl 5-0.9 % infusion (has no administration in time range)         FINAL IMPRESSION      1.  Breakthrough seizure ([address]    2. Seizure [facility])          DISPOSITION/PLAN   DISPOSITION Decision To Discharge 05/04/2021 01:19:09 AM      PATIENT REFERRED TO:  Macho Lan MD  [facility] [address] [place] [address] [address] [facility]  [phone]            DISCHARGE MEDICATIONS:  Current Discharge Medication List                 Dimas Parra DO (electronically signed)  Attending Physician, Emergency Department          Dimas Parra [place] [address]

## 2021-05-05 LAB
KEPPRA: 34 UG/ML (ref 12–46)
LAMOTRIGINE LEVEL: 4.2 UG/ML (ref 2.5–15)
URINE CULTURE, ROUTINE: NORMAL

## 2021-05-28 RX ORDER — ARIPIPRAZOLE 5 MG/1
20 TABLET ORAL DAILY
Qty: 120 TABLET | Refills: 0 | Status: SHIPPED | OUTPATIENT
Start: 2021-05-28 | End: 2021-06-07

## 2021-05-28 NOTE — TELEPHONE ENCOUNTER
Patient is  requesting medication refill. Please approve or deny this request.    Rx requested:  Requested Prescriptions     Pending Prescriptions Disp Refills    ARIPiprazole (ABILIFY) 5 MG tablet 120 tablet 0     Sig: Take 4 tablets by mouth daily         Last Office Visit:   Visit date not found      Next Visit Date:  No future appointments.

## 2021-06-03 DIAGNOSIS — R56.9 SEIZURE (HCC): ICD-10-CM

## 2021-06-03 RX ORDER — LAMOTRIGINE 150 MG/1
300 TABLET ORAL 2 TIMES DAILY
Qty: 20 TABLET | Refills: 0 | Status: SHIPPED | OUTPATIENT
Start: 2021-06-03 | End: 2021-06-07 | Stop reason: SDUPTHER

## 2021-06-03 NOTE — TELEPHONE ENCOUNTER
Requested Prescriptions     Pending Prescriptions Disp Refills    lamoTRIgine (LAMICTAL) 150 MG tablet 20 tablet 0     Sig: Take 2 tablets by mouth 2 times daily for 5 days     5 day supply until seen 6/7/2021

## 2021-06-07 ENCOUNTER — OFFICE VISIT (OUTPATIENT)
Dept: NEUROLOGY | Age: 26
End: 2021-06-07
Payer: MEDICAID

## 2021-06-07 VITALS
HEART RATE: 105 BPM | SYSTOLIC BLOOD PRESSURE: 128 MMHG | BODY MASS INDEX: 29.41 KG/M2 | WEIGHT: 166 LBS | DIASTOLIC BLOOD PRESSURE: 76 MMHG

## 2021-06-07 DIAGNOSIS — F31.10 BIPOLAR AFFECTIVE DISORDER, CURRENT EPISODE MANIC, CURRENT EPISODE SEVERITY UNSPECIFIED (HCC): ICD-10-CM

## 2021-06-07 DIAGNOSIS — F41.9 ANXIETY: ICD-10-CM

## 2021-06-07 DIAGNOSIS — R56.9 SEIZURE (HCC): ICD-10-CM

## 2021-06-07 DIAGNOSIS — G40.109 TEMPORAL LOBE EPILEPSY (HCC): Primary | ICD-10-CM

## 2021-06-07 DIAGNOSIS — Z09 HOSPITAL DISCHARGE FOLLOW-UP: ICD-10-CM

## 2021-06-07 LAB
AMPHETAMINE SCREEN, URINE: ABNORMAL
ANION GAP SERPL CALCULATED.3IONS-SCNC: 9 MEQ/L (ref 9–15)
BARBITURATE SCREEN URINE: ABNORMAL
BENZODIAZEPINE SCREEN, URINE: ABNORMAL
BUN BLDV-MCNC: 8 MG/DL (ref 6–20)
CALCIUM SERPL-MCNC: 9.5 MG/DL (ref 8.5–9.9)
CANNABINOID SCREEN URINE: POSITIVE
CHLORIDE BLD-SCNC: 104 MEQ/L (ref 95–107)
CO2: 25 MEQ/L (ref 20–31)
COCAINE METABOLITE SCREEN URINE: ABNORMAL
CREAT SERPL-MCNC: 0.66 MG/DL (ref 0.5–0.9)
GFR AFRICAN AMERICAN: >60
GFR NON-AFRICAN AMERICAN: >60
GLUCOSE BLD-MCNC: 85 MG/DL (ref 70–99)
HCT VFR BLD CALC: 34.8 % (ref 37–47)
HEMOGLOBIN: 10.8 G/DL (ref 12–16)
Lab: ABNORMAL
MAGNESIUM: 2 MG/DL (ref 1.7–2.4)
MCH RBC QN AUTO: 21.4 PG (ref 27–31.3)
MCHC RBC AUTO-ENTMCNC: 31 % (ref 33–37)
MCV RBC AUTO: 69 FL (ref 82–100)
METHADONE SCREEN, URINE: ABNORMAL
OPIATE SCREEN URINE: ABNORMAL
OXYCODONE URINE: ABNORMAL
PDW BLD-RTO: 20.1 % (ref 11.5–14.5)
PHENCYCLIDINE SCREEN URINE: POSITIVE
PHENYTOIN LEVEL: 7.8 UG/ML (ref 10–20)
PLATELET # BLD: 456 K/UL (ref 130–400)
POTASSIUM SERPL-SCNC: 3.7 MEQ/L (ref 3.4–4.9)
PROPOXYPHENE SCREEN: ABNORMAL
RBC # BLD: 5.05 M/UL (ref 4.2–5.4)
SODIUM BLD-SCNC: 138 MEQ/L (ref 135–144)
TOTAL CK: 100 U/L (ref 0–170)
WBC # BLD: 9.2 K/UL (ref 4.8–10.8)

## 2021-06-07 PROCEDURE — 4004F PT TOBACCO SCREEN RCVD TLK: CPT | Performed by: NURSE PRACTITIONER

## 2021-06-07 PROCEDURE — G8427 DOCREV CUR MEDS BY ELIG CLIN: HCPCS | Performed by: NURSE PRACTITIONER

## 2021-06-07 PROCEDURE — 99214 OFFICE O/P EST MOD 30 MIN: CPT | Performed by: NURSE PRACTITIONER

## 2021-06-07 PROCEDURE — G8419 CALC BMI OUT NRM PARAM NOF/U: HCPCS | Performed by: NURSE PRACTITIONER

## 2021-06-07 RX ORDER — LAMOTRIGINE 150 MG/1
300 TABLET ORAL 2 TIMES DAILY
Qty: 20 TABLET | Refills: 0 | Status: CANCELLED | OUTPATIENT
Start: 2021-06-07 | End: 2021-06-12

## 2021-06-07 RX ORDER — ARIPIPRAZOLE 20 MG/1
TABLET ORAL
COMMUNITY
Start: 2021-03-12 | End: 2021-06-16 | Stop reason: SDUPTHER

## 2021-06-07 RX ORDER — LAMOTRIGINE 150 MG/1
300 TABLET ORAL 2 TIMES DAILY
Qty: 120 TABLET | Refills: 1 | Status: SHIPPED | OUTPATIENT
Start: 2021-06-07 | End: 2021-08-30 | Stop reason: SDUPTHER

## 2021-06-07 RX ORDER — ARIPIPRAZOLE 20 MG/1
TABLET ORAL
Qty: 30 TABLET | Refills: 2 | Status: CANCELLED | OUTPATIENT
Start: 2021-06-07

## 2021-06-07 RX ORDER — PHENYTOIN SODIUM 100 MG/1
CAPSULE, EXTENDED RELEASE ORAL
Qty: 120 CAPSULE | Refills: 3 | Status: SHIPPED | OUTPATIENT
Start: 2021-06-07 | End: 2021-06-16 | Stop reason: SDUPTHER

## 2021-06-07 ASSESSMENT — ENCOUNTER SYMPTOMS
SHORTNESS OF BREATH: 0
WHEEZING: 0
COUGH: 0
COLOR CHANGE: 0
CHEST TIGHTNESS: 0
NAUSEA: 0
VOMITING: 0
TROUBLE SWALLOWING: 0

## 2021-06-07 NOTE — PROGRESS NOTES
Subjective:      Patient ID: Bambi Latham is a 32 y.o. female who presents today for:  Chief Complaint   Patient presents with    Follow-up     PT states that she woke up with a seizure yesterday, she said she had tore her arm out of socket from the seizure. She states that afterwards she slept half the day and woke up feeling ok. She says that her anxiety has been very bad lately, but she has been unable to get into pych still she is trying to get scheduled. She says even with her medication she is still having at least one seizure a month. HPI  Pt seen and examined in the office for hospital follow up. Patient is a 59-year-old  female with past medical history of migraine, varicella, seizures, bipolar disorder. Pt was admitted to [facility] from 2/20/2021 to 2/22/2021 for history of epilepsy with breakthrough seizure. Hospital records reviewed. Patient was initially seen and evaluated by Dr. Asad Gonzales on 2/21/2021. She was admitted with status epilepticus. There is mention in Dr. Radha Spangler dictation that patient had EEG in the past that showed a left temporal focus. She was given Keppra upon admission. She was on Lamictal and Dilantin at home. She also has a history of bipolar disorder. Tox screen was positive for cocaine metabolites and marijuana. MRI of the brain was negative for any acute process or mesial temporal sclerosis. EEG was completed and was normal.  Patient was supposed to follow-up with Dr. Asad Gonzales after hospital discharge for consideration of Xcopri. She was a no-show for this appointment on 3/17/2021. Patient also with history of bipolar disorder and has not followed up with psychiatry in the last 6 months. She is currently on Abilify. Patient currently alert and oriented x3, no acute distress, cooperative. She reports that she continues to have seizures approximately 1 time per month. She states that her last seizure was yesterday.   She woke up around 6 AM and was covered in urine and had significant pain in her left shoulder she thinks from a seizure. She was very tired and slept until approximately 3 PM that day. She bit her tongue slightly. She reports daily compliance with her Lamictal and Dilantin although had not taken her Dilantin dose yet today. Keppra was not given after hospitalization. We will like to avoid this if possible given her underlying anxiety and psychiatric diagnosis. Patient does report ongoing significant anxiety. She reports that her seizures are exacerbated by anxiety as well as her menses. She denies any new focal neuro deficits. No fevers. No unusual or unplanned weight loss. No suicidal ideation. Past Medical History:   Diagnosis Date    Migraine     Seizures ([facility] [address]     Varicella      No past surgical history on file. Social History     Socioeconomic History    Marital status: Single     Spouse name: Not on file    Number of children: Not on file    Years of education: Not on file    Highest education level: Not on file   Occupational History    Not on file   Tobacco Use    Smoking status: Current Every Day Smoker     Packs/day: 0.50     Years: 9.00     Pack years: 4.50     Types: Cigarettes    Smokeless tobacco: Never Used   Vaping Use    Vaping Use: Never used   Substance and Sexual Activity    Alcohol use: No    Drug use: Not Currently    Sexual activity: Yes     Partners: Male   Other Topics Concern    Not on file   Social History Narrative    Not on file     Social Determinants of Health     Financial Resource Strain:     Difficulty of Paying Living Expenses:    Food Insecurity:     Worried About Running Out of Food in the Last Year:     Ran Out of Food in the Last Year:    Transportation Needs:     Lack of Transportation (Medical):      Lack of Transportation (Non-Medical):    Physical Activity:     Days of Exercise per Week:     Minutes of Exercise per Session:    Stress:     Feeling of Stress :    Social Connections:     Frequency of Communication with Friends and Family:     Frequency of Social Gatherings with Friends and Family:     Attends Bahai Services:     Active Member of Clubs or Organizations:     Attends Club or Organization Meetings:     Marital Status:    Intimate Partner Violence:     Fear of Current or Ex-Partner:     Emotionally Abused:     Physically Abused:     Sexually Abused:      Family History   Problem Relation Age of Onset    Cancer Other         brain, breast, lung    Coronary Art Dis Other     High Blood Pressure Brother     Obesity Brother     Cancer Maternal Grandmother     Allergies Maternal Grandmother     Colon Cancer Maternal Grandmother     Breast Cancer Maternal Grandmother     Ovarian Cancer Maternal Grandmother     Other Maternal Grandmother         eye cancer, loss of left eye    Cancer Maternal Grandfather         lung    Heart Disease Maternal Grandfather     Uterine Cancer Mother     Ovarian Cancer Mother     Breast Cancer Mother     Cancer Mother         behind right eye    Heart Attack Mother     Migraines Mother     Cancer Maternal Aunt         lung     Allergies   Allergen Reactions    Penicillins      GI Problems     Current Outpatient Medications on File Prior to Visit   Medication Sig Dispense Refill    ARIPiprazole (ABILIFY) 20 MG tablet TAKE 1 TABLET DAILY       No current facility-administered medications on file prior to visit. Review of Systems   Constitutional: Negative for appetite change, chills, fatigue and fever. HENT: Negative for hearing loss and trouble swallowing. Eyes: Negative for visual disturbance. Respiratory: Negative for cough, chest tightness, shortness of breath and wheezing. Cardiovascular: Negative for chest pain, palpitations and leg swelling. Gastrointestinal: Negative for nausea and vomiting. Genitourinary: Negative for difficulty urinating.    Musculoskeletal: Negative for gait problem. Skin: Negative for color change and rash. Neurological: Positive for seizures. Negative for dizziness, tremors, syncope, facial asymmetry, speech difficulty, weakness, light-headedness, numbness and headaches. Psychiatric/Behavioral: Negative for agitation, confusion and hallucinations. The patient is nervous/anxious. Objective:   /76 (Site: Left Upper Arm, Position: Sitting, Cuff Size: Medium Adult)   Pulse 105   Wt 166 lb (75.3 kg)   BMI 29.41 kg/m²     Physical Exam  Vitals reviewed. Constitutional:       General: She is not in acute distress. Appearance: She is not ill-appearing or diaphoretic. HENT:      Head: Normocephalic and atraumatic. Eyes:      Extraocular Movements: Extraocular movements intact. Pupils: Pupils are equal, round, and reactive to light. Cardiovascular:      Rate and Rhythm: Normal rate and regular rhythm. Pulmonary:      Effort: Pulmonary effort is normal. No respiratory distress. Breath sounds: Normal breath sounds. Abdominal:      General: Bowel sounds are normal.      Palpations: Abdomen is soft. Skin:     General: Skin is warm and dry. Neurological:      General: No focal deficit present. Mental Status: She is alert and oriented to person, place, and time. Mental status is at baseline. Cranial Nerves: No cranial nerve deficit. Motor: Motor function is intact. No tremor. Coordination: Coordination is intact. CT HEAD WO CONTRAST    Result Date: 5/4/2021  CT Brain. Contrast medium:  without contrast.. History:  Seizure. Hit head. . Technical factors: CT imaging of the brain was obtained and formatted as 5 mm contiguous axial images. 2.5 mm contiguous axial images were obtained through the osseous structures. Sagittal and coronal reconstruction obtained during postprocessing. Comparison:  February 20, 2021, January 2, 2021. Findings: Extra-axial spaces:  Normal. Intracranial hemorrhage:  None. Ventricular system:  Normal for age. Basal Cisterns:  Normal. Cerebral Parenchyma:  Normal. Midline Shift:  None. Cerebellum:  Normal. Paranasal sinuses and mastoid air cells:  Rounded soft tissue attenuation anterior right maxillary sinus. Dave Janna Visualized Orbits:  Normal.     Impression: Right maxillary retention cyst. All CT scans at this facility use dose modulation, iterative reconstruction, and/or weight based dosing when appropriate to reduce radiation dose to as low as reasonably achievable. XR CHEST PORTABLE    Result Date: 5/4/2021  EXAMINATION: XR CHEST PORTABLE CLINICAL HISTORY: SEIZURE COMPARISONS: JANUARY 2, 2021 FINDINGS: Patient rotated to left. Osseous structures intact. Cardiopericardial silhouette normal. Pulmonary vasculature normal. Lungs clear. NO ACUTE CARDIOPULMONARY DISEASE.       Lab Results   Component Value Date    WBC 14.8 05/03/2021    RBC 5.35 05/03/2021    RBC 4.70 06/01/2012    HGB 11.2 05/03/2021    HCT 36.7 05/03/2021    MCV 68.6 05/03/2021    MCH 20.9 05/03/2021    MCHC 30.5 05/03/2021    RDW 19.0 05/03/2021     05/03/2021    MPV 8.0 06/19/2014     Lab Results   Component Value Date     05/03/2021    K 4.1 05/03/2021    K 3.5 02/21/2021     05/03/2021    CO2 18 05/03/2021    BUN 7 05/03/2021    CREATININE 0.64 05/03/2021    GFRAA >60.0 05/03/2021    LABGLOM >60.0 05/03/2021    GLUCOSE 105 05/03/2021    GLUCOSE 77 06/01/2012    PROT 8.0 02/20/2021    LABALBU 4.5 02/20/2021    LABALBU 4.7 06/01/2012    CALCIUM 8.7 05/03/2021    BILITOT <0.2 02/20/2021    ALKPHOS 104 02/20/2021    AST 38 02/20/2021    ALT 48 02/20/2021     No results found for: PROTIME, INR  Lab Results   Component Value Date    TSH 1.480 03/14/2019    IRON 58 01/08/2014    TIBC 395 01/08/2014     No results found for: TRIG, HDL, LDLCALC, LDLDIRECT, LABVLDL  Lab Results   Component Value Date    LABAMPH Neg 05/03/2021    BARBSCNU Neg 05/03/2021    LABBENZ Neg 05/03/2021    LABBENZ NT DTCD 03/01/2013    LABMETH Neg 05/03/2021    OPIATESCREENURINE Neg 05/03/2021    PHENCYCLIDINESCREENURINE Neg 05/03/2021    ETOH <10 05/03/2021     No results found for: LITHIUM, DILFRTOT, VALPROATE    Assessment and Plan:      1. Seizure [facility])  -  Pt was admitted to [facility] from 2/20/2021 to 2/22/2021 for history of epilepsy with breakthrough seizure. Hospital records reviewed. Patient was initially seen and evaluated by Dr. Kim Gage on 2/21/2021. There is mention in Dr. Anitha Rodriguez consultation that patient had EEG in the past with a left temporal focus. She was admitted with status epilepticus. She was given Keppra upon admission. She was on Lamictal and Dilantin at home. She also has a history of bipolar disorder. Tox screen was positive for cocaine metabolites and marijuana. MRI of the brain was negative for any acute process or mesial temporal sclerosis. EEG was completed and was normal.  Patient was supposed to follow-up with Dr. Kim Gage after hospital discharge for consideration of Xcopri. She was a no-show for this appointment on 3/17/2021. Patient also with history of bipolar disorder and has not followed up with psychiatry in the last 6 months. She is currently on Abilify.   -Patient's phenytoin levels have been subtherapeutic with the last 2 lab draws. Also Lamictal level, even though is therapeutic, should be somewhat higher given the dose she is on. There is high suspicion that she is noncompliant at least partially with her antiseizure medications. I did discuss this with Dr. Kim Gage and at this time we will hold off on starting alternative treatment until patient fully compliant with current medications. I discussed with the patient importance of medication compliance. I reinforced with her the need to follow-up with psychiatry for her underlying psychiatric issues and prescription of Abilify.   Patient cannot see psychiatry here at [facility] until she establishes with a [facility] primary care provider given our policy. She will be referred to establish with a primary care provider here and then can be referred further to psychiatry. She does have an established psychiatrist but does not wish to follow-up with them as she was not happy with her care. Also reinforced need to avoid alcohol and illicit drug use as this will lower her threshold for seizures. Patient could also be having some intermittent pseudoseizures given her significant anxiety. - phenytoin (DILANTIN) 100 MG ER capsule; Take 200 mg in the morning and 300 mg at night. Dispense: 120 capsule; Refill: 3  [facility] [place]  - Phenytoin Level, Total; Future  - Lamotrigine Level; Future  - CK; Future  - Urine Drug Screen; Future  - CBC; Future  - Basic Metabolic Panel; Future  - Magnesium; Future  - lamoTRIgine (LAMICTAL) 150 MG tablet; Take 2 tablets by mouth 2 times daily  Dispense: 120 tablet; Refill: 1  - Notify neurologist when starting new medications as anti-seizure meds can interact with prescription and nonprescription medicines which may cause increased side effects or decreased efficacy  - Avoid alcohol or illicit drug use as these can lower seizure threshold  - Take seizure medicine exactly as prescribed  - Inform us of any side effects of medications  - Do not stop seizure medication or change dose unless directed to by health care professional  - Call with any questions or concerns    2. Temporal lobe epilepsy ([facility] [address]      3. Bipolar affective disorder, current episode manic, current episode severity unspecified ([facility] [address]  -Patient will need to follow-up with psychiatry per above    4. Anxiety    5. Hospital discharge follow-up        Return in about 3 months (around 9/7/2021), or if symptoms worsen or fail to improve.     BRUCE Mann - CNP     Collaborating Physician Dr Devonte Potts

## 2021-06-09 LAB — LAMOTRIGINE LEVEL: 4.2 UG/ML (ref 2.5–15)

## 2021-06-16 ENCOUNTER — TELEPHONE (OUTPATIENT)
Dept: NEUROLOGY | Age: 26
End: 2021-06-16

## 2021-06-16 DIAGNOSIS — R56.9 SEIZURE (HCC): ICD-10-CM

## 2021-06-16 RX ORDER — PHENYTOIN SODIUM 300 MG/1
300 CAPSULE, EXTENDED RELEASE ORAL 2 TIMES DAILY
Qty: 120 CAPSULE | Refills: 3 | Status: SHIPPED | OUTPATIENT
Start: 2021-06-16 | End: 2021-06-29 | Stop reason: SDUPTHER

## 2021-06-16 RX ORDER — HYDROXYZINE HYDROCHLORIDE 25 MG/1
25 TABLET, FILM COATED ORAL DAILY PRN
Qty: 14 TABLET | Refills: 0 | Status: SHIPPED | OUTPATIENT
Start: 2021-06-16 | End: 2021-06-29 | Stop reason: ALTCHOICE

## 2021-06-16 RX ORDER — ARIPIPRAZOLE 20 MG/1
20 TABLET ORAL DAILY
Qty: 30 TABLET | Refills: 0 | Status: SHIPPED | OUTPATIENT
Start: 2021-06-16 | End: 2021-07-12 | Stop reason: SDUPTHER

## 2021-06-29 ENCOUNTER — OFFICE VISIT (OUTPATIENT)
Dept: FAMILY MEDICINE CLINIC | Age: 26
End: 2021-06-29
Payer: MEDICAID

## 2021-06-29 VITALS
HEART RATE: 100 BPM | DIASTOLIC BLOOD PRESSURE: 77 MMHG | SYSTOLIC BLOOD PRESSURE: 110 MMHG | BODY MASS INDEX: 30.55 KG/M2 | RESPIRATION RATE: 16 BRPM | HEIGHT: 62 IN | TEMPERATURE: 97.5 F | OXYGEN SATURATION: 99 % | WEIGHT: 166 LBS

## 2021-06-29 DIAGNOSIS — G40.109 TEMPORAL LOBE EPILEPSY (HCC): Primary | ICD-10-CM

## 2021-06-29 DIAGNOSIS — F31.75 BIPOLAR DISORDER, IN PARTIAL REMISSION, MOST RECENT EPISODE DEPRESSED (HCC): ICD-10-CM

## 2021-06-29 DIAGNOSIS — F43.21 GRIEF REACTION: ICD-10-CM

## 2021-06-29 DIAGNOSIS — F41.9 ANXIETY: ICD-10-CM

## 2021-06-29 DIAGNOSIS — F43.0 PANIC STATE AS ACUTE REACTION TO STRESS: ICD-10-CM

## 2021-06-29 PROBLEM — N39.0 E. COLI UTI: Status: RESOLVED | Noted: 2017-12-18 | Resolved: 2021-06-29

## 2021-06-29 PROBLEM — B96.20 E. COLI UTI: Status: RESOLVED | Noted: 2017-12-18 | Resolved: 2021-06-29

## 2021-06-29 PROBLEM — O99.333 TOBACCO SMOKING AFFECTING PREGNANCY IN THIRD TRIMESTER: Status: RESOLVED | Noted: 2018-10-13 | Resolved: 2021-06-29

## 2021-06-29 PROBLEM — O98.519: Status: RESOLVED | Noted: 2019-05-02 | Resolved: 2021-06-29

## 2021-06-29 PROBLEM — B00.9: Status: RESOLVED | Noted: 2019-05-02 | Resolved: 2021-06-29

## 2021-06-29 PROBLEM — A08.4 VIRAL GASTROENTERITIS: Status: RESOLVED | Noted: 2021-02-10 | Resolved: 2021-06-29

## 2021-06-29 PROBLEM — Z87.19 HISTORY OF GASTROINTESTINAL DISEASE: Status: RESOLVED | Noted: 2021-02-10 | Resolved: 2021-06-29

## 2021-06-29 PROBLEM — R79.89 ELEVATED LACTIC ACID LEVEL: Status: RESOLVED | Noted: 2021-02-20 | Resolved: 2021-06-29

## 2021-06-29 PROBLEM — F31.10 BIPOLAR AFFECTIVE DISORDER, CURRENT EPISODE MANIC (HCC): Status: RESOLVED | Noted: 2021-02-10 | Resolved: 2021-06-29

## 2021-06-29 PROBLEM — Z65.3 PROBLEMS RELATED TO OTHER LEGAL CIRCUMSTANCES: Status: RESOLVED | Noted: 2019-06-11 | Resolved: 2021-06-29

## 2021-06-29 PROBLEM — M25.512 LEFT SHOULDER PAIN: Status: RESOLVED | Noted: 2021-02-20 | Resolved: 2021-06-29

## 2021-06-29 PROBLEM — O42.00 PROM WITH ONSET OF LABOR WITHIN 24 HOURS OF RUPTURE: Status: RESOLVED | Noted: 2019-05-25 | Resolved: 2021-06-29

## 2021-06-29 PROBLEM — F43.20 GRIEF REACTION: Status: ACTIVE | Noted: 2021-06-29

## 2021-06-29 PROBLEM — R56.9 SEIZURES (HCC): Status: RESOLVED | Noted: 2021-02-20 | Resolved: 2021-06-29

## 2021-06-29 PROBLEM — R82.90 ABNORMAL URINALYSIS: Status: RESOLVED | Noted: 2020-11-09 | Resolved: 2021-06-29

## 2021-06-29 PROBLEM — D64.9 ANEMIA: Status: RESOLVED | Noted: 2021-02-10 | Resolved: 2021-06-29

## 2021-06-29 PROBLEM — G40.909 EPILEPSY (HCC): Status: RESOLVED | Noted: 2020-11-09 | Resolved: 2021-06-29

## 2021-06-29 PROBLEM — S42.209A FRACTURE OF PROXIMAL END OF HUMERUS: Status: RESOLVED | Noted: 2021-02-10 | Resolved: 2021-06-29

## 2021-06-29 PROBLEM — D72.829 LEUKOCYTOSIS: Status: RESOLVED | Noted: 2021-02-20 | Resolved: 2021-06-29

## 2021-06-29 PROBLEM — O99.820 GROUP B STREPTOCOCCUS CARRIER, ANTEPARTUM: Status: RESOLVED | Noted: 2019-05-06 | Resolved: 2021-06-29

## 2021-06-29 PROBLEM — F19.90 DRUG USE: Status: RESOLVED | Noted: 2021-02-20 | Resolved: 2021-06-29

## 2021-06-29 PROBLEM — O99.323 DRUG USE AFFECTING PREGNANCY IN THIRD TRIMESTER: Status: RESOLVED | Noted: 2018-10-13 | Resolved: 2021-06-29

## 2021-06-29 PROCEDURE — 99214 OFFICE O/P EST MOD 30 MIN: CPT | Performed by: PHYSICIAN ASSISTANT

## 2021-06-29 PROCEDURE — G8427 DOCREV CUR MEDS BY ELIG CLIN: HCPCS | Performed by: PHYSICIAN ASSISTANT

## 2021-06-29 PROCEDURE — 4004F PT TOBACCO SCREEN RCVD TLK: CPT | Performed by: PHYSICIAN ASSISTANT

## 2021-06-29 PROCEDURE — G8417 CALC BMI ABV UP PARAM F/U: HCPCS | Performed by: PHYSICIAN ASSISTANT

## 2021-06-29 RX ORDER — ALPRAZOLAM 0.25 MG/1
TABLET ORAL
Qty: 30 TABLET | Refills: 0 | Status: SHIPPED | OUTPATIENT
Start: 2021-06-29 | End: 2021-07-29 | Stop reason: SDUPTHER

## 2021-06-29 RX ORDER — PHENYTOIN SODIUM 300 MG/1
300 CAPSULE, EXTENDED RELEASE ORAL 2 TIMES DAILY
Qty: 120 CAPSULE | Refills: 3 | Status: SHIPPED | OUTPATIENT
Start: 2021-06-29 | End: 2021-08-24 | Stop reason: SDUPTHER

## 2021-06-29 SDOH — ECONOMIC STABILITY: FOOD INSECURITY: WITHIN THE PAST 12 MONTHS, THE FOOD YOU BOUGHT JUST DIDN'T LAST AND YOU DIDN'T HAVE MONEY TO GET MORE.: NEVER TRUE

## 2021-06-29 SDOH — ECONOMIC STABILITY: FOOD INSECURITY: WITHIN THE PAST 12 MONTHS, YOU WORRIED THAT YOUR FOOD WOULD RUN OUT BEFORE YOU GOT MONEY TO BUY MORE.: NEVER TRUE

## 2021-06-29 ASSESSMENT — ENCOUNTER SYMPTOMS
ABDOMINAL PAIN: 0
TROUBLE SWALLOWING: 0
CHEST TIGHTNESS: 1
SHORTNESS OF BREATH: 0
WHEEZING: 0
ABDOMINAL DISTENTION: 0
COLOR CHANGE: 0

## 2021-06-29 ASSESSMENT — SOCIAL DETERMINANTS OF HEALTH (SDOH): HOW HARD IS IT FOR YOU TO PAY FOR THE VERY BASICS LIKE FOOD, HOUSING, MEDICAL CARE, AND HEATING?: NOT HARD AT ALL

## 2021-06-29 NOTE — PROGRESS NOTES
Subjective  Darrentan Zamora, 32 y.o. female presents today with:  Chief Complaint   Patient presents with   Jayme Rice Establish Care     Establish care, pt does not have a previous PCP. pt would like to discuss anxiety. would like you to take over meds. HPI  Parth Dhillon is in the office today to establish care. Seizure disorder. Followed by neurology. History of grand mal.    Last seizure was about 1 month ago. Prone to have partial-seizure if she skips medication. Currently taking phenytoin and lamictal.  Needs to have labs drawn for monitoring. Anxiety/panic attacks/Grief reaction. Was under the care of Drew Chaparro CNP at [facility]. 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 Taking 20 mg abilify daily for MDD, grief reaction--  by suicide which caused a lot of grief and reactive depression. Was started on 25 mg atarax for prn panic/use. Has not felt like medication works fast enough when she is having an acute panic attack. Has had several episodes of panic at work--in which she has had to leave. Asking for an alternate medication today to try. Benzo naive. Denies worsening depressed mood. No SI/HI. Close with her MIL and REYES. Has a toddler son. Good friend network. Review of Systems   Constitutional: Negative for activity change, appetite change, chills, diaphoresis, fatigue, fever and unexpected weight change. HENT: Negative for congestion and trouble swallowing. Eyes: Negative for visual disturbance. Respiratory: Positive for chest tightness (panic). Negative for shortness of breath and wheezing. Cardiovascular: Positive for palpitations (panic). Negative for chest pain and leg swelling. Gastrointestinal: Negative for abdominal distention and abdominal pain. Genitourinary: Negative for difficulty urinating. Musculoskeletal: Negative for arthralgias. Skin: Negative for color change. Neurological: Positive for seizures.  Negative for dizziness, weakness, light-headedness and numbness. Psychiatric/Behavioral: Positive for dysphoric mood. Negative for agitation, behavioral problems, confusion, decreased concentration, self-injury, sleep disturbance and suicidal ideas. The patient is nervous/anxious. Past Medical History:   Diagnosis Date    Anxiety     Migraine     Seizures ([address]     Varicella      History reviewed. No pertinent surgical history. Social History     Socioeconomic History    Marital status: Single     Spouse name: Not on file    Number of children: Not on file    Years of education: Not on file    Highest education level: Not on file   Occupational History    Not on file   Tobacco Use    Smoking status: Current Every Day Smoker     Packs/day: 0.50     Years: 9.00     Pack years: 4.50     Types: Cigarettes    Smokeless tobacco: Never Used   Vaping Use    Vaping Use: Never used   Substance and Sexual Activity    Alcohol use: No    Drug use: Not Currently    Sexual activity: Yes     Partners: Male   Other Topics Concern    Not on file   Social History Narrative    Not on file     Social Determinants of Health     Financial Resource Strain: Low Risk     Difficulty of Paying Living Expenses: Not hard at all   Food Insecurity: No Food Insecurity    Worried About Running Out of Food in the Last Year: Never true    Antione of Food in the Last Year: Never true   Transportation Needs:     Lack of Transportation (Medical):      Lack of Transportation (Non-Medical):    Physical Activity:     Days of Exercise per Week:     Minutes of Exercise per Session:    Stress:     Feeling of Stress :    Social Connections:     Frequency of Communication with Friends and Family:     Frequency of Social Gatherings with Friends and Family:     Attends Holiness Services:     Active Member of Clubs or Organizations:     Attends Club or Organization Meetings:     Marital Status:    Intimate Partner Violence:     Fear of Current or Ex-Partner:     Emotionally Abused:     Physically Abused:     Sexually Abused:      Family History   Problem Relation Age of Onset    Cancer Other         brain, breast, lung    Coronary Art Dis Other     High Blood Pressure Brother     Obesity Brother     Cancer Maternal Grandmother     Allergies Maternal Grandmother     Colon Cancer Maternal Grandmother     Breast Cancer Maternal Grandmother     Ovarian Cancer Maternal Grandmother     Other Maternal Grandmother         eye cancer, loss of left eye    Cancer Maternal Grandfather         lung    Heart Disease Maternal Grandfather     Uterine Cancer Mother     Ovarian Cancer Mother     Breast Cancer Mother     Cancer Mother         behind right eye    Heart Attack Mother     Migraines Mother     Cancer Maternal Aunt         lung     Allergies   Allergen Reactions    Penicillins      GI Problems     Current Outpatient Medications   Medication Sig Dispense Refill    ALPRAZolam (XANAX) 0.25 MG tablet Take 1 tablet by mouth as needed for acute panic/anxiety. No more than 3 doses in 24 hours. 30 tablet 0    phenytoin (PHENYTEK) 300 MG ER capsule Take 1 capsule by mouth 2 times daily 120 capsule 3    ARIPiprazole (ABILIFY) 20 MG tablet Take 1 tablet by mouth daily 30 tablet 0    lamoTRIgine (LAMICTAL) 150 MG tablet Take 2 tablets by mouth 2 times daily 120 tablet 1     No current facility-administered medications for this visit. PMH, Surgical Hx, Family Hx, and Social Hx reviewed and updated. Health Maintenance reviewed.     Objective  Vitals:    06/29/21 0912 06/29/21 1006   BP: (!) 141/75 110/77   Site: Right Upper Arm Left Upper Arm   Position: Sitting Sitting   Cuff Size: Medium Adult Large Adult   Pulse: 100    Resp: 16    Temp: 97.5 °F (36.4 °C)    TempSrc: Temporal    SpO2: 99%    Weight: 166 lb (75.3 kg)    Height: 5' 2\" (1.575 m)      BP Readings from Last 3 Encounters:   06/29/21 110/77   06/07/21 128/76   05/04/21 Take 1 tablet by mouth as needed for acute panic/anxiety. No more than 3 doses in 24 hours. Grief reaction  -     ALPRAZolam (XANAX) 0.25 MG tablet; Take 1 tablet by mouth as needed for acute panic/anxiety. No more than 3 doses in 24 hours. Anxiety    Trial 0.25 mg xanax PRN. 4 week follow up with me. Noted urine drug screen--positive for marijuana. Advised limited use of that at this time. Orders Placed This Encounter   Medications    ALPRAZolam (XANAX) 0.25 MG tablet     Sig: Take 1 tablet by mouth as needed for acute panic/anxiety. No more than 3 doses in 24 hours. Dispense:  30 tablet     Refill:  0    phenytoin (PHENYTEK) 300 MG ER capsule     Sig: Take 1 capsule by mouth 2 times daily     Dispense:  120 capsule     Refill:  3     Medications Discontinued During This Encounter   Medication Reason    hydrOXYzine (ATARAX) 25 MG tablet Alternate therapy    phenytoin (PHENYTEK) 300 MG ER capsule REORDER     Return in about 4 weeks (around 7/27/2021) for follow up in office. Reviewed with the patient: current clinical status, medications, activities and diet. Side effects, adverse effects of the medication prescribed today, as well as treatment plan/ rationale and result expectations have been discussed with the patient who expresses understanding and desires to proceed. Close follow up to evaluate treatment results and for coordination of care. I have reviewed the patient's medical history in detail and updated the computerized patient record.     Tamar Meza PA-C

## 2021-06-30 ENCOUNTER — TELEPHONE (OUTPATIENT)
Dept: FAMILY MEDICINE CLINIC | Age: 26
End: 2021-06-30

## 2021-06-30 NOTE — TELEPHONE ENCOUNTER
Patient calling to request xanax and phenytoin prescriptions be sent to [facility] on [address]. Called [facility] and transferred scripts for patient. Thao Singh is aware scripts were transferred. No other questions at this time.

## 2021-07-12 DIAGNOSIS — F43.0 PANIC STATE AS ACUTE REACTION TO STRESS: ICD-10-CM

## 2021-07-12 DIAGNOSIS — F43.21 GRIEF REACTION: ICD-10-CM

## 2021-07-12 RX ORDER — ARIPIPRAZOLE 20 MG/1
20 TABLET ORAL DAILY
Qty: 30 TABLET | Refills: 5 | Status: SHIPPED | OUTPATIENT
Start: 2021-07-12 | End: 2021-08-31 | Stop reason: ALTCHOICE

## 2021-07-12 RX ORDER — ALPRAZOLAM 0.25 MG/1
TABLET ORAL
Qty: 30 TABLET | Refills: 0 | OUTPATIENT
Start: 2021-07-12 | End: 2021-08-11

## 2021-07-12 NOTE — TELEPHONE ENCOUNTER
Patient is requesting medication refill. Please approve or deny this request.    Rx requested:  Requested Prescriptions     Pending Prescriptions Disp Refills    ARIPiprazole (ABILIFY) 20 MG tablet 30 tablet 0     Sig: Take 1 tablet by mouth daily    ALPRAZolam (XANAX) 0.25 MG tablet 30 tablet 0     Sig: Take 1 tablet by mouth as needed for acute panic/anxiety. No more than 3 doses in 24 hours. Last Office Visit:   6/29/2021      Next Visit Date:  Future Appointments   Date Time Provider Ermias Johnson   7/29/2021  9:30 AM RULA Rojo [facility] [place]   9/8/2021  2:00 PM Jyothi Gregg MD [facility]    Pt is wondering if they can have a short term of the xanax until her appt 7/29. She had a rough few days so she took it the three times a day. Please advise.

## 2021-07-16 ENCOUNTER — TELEPHONE (OUTPATIENT)
Dept: FAMILY MEDICINE CLINIC | Age: 26
End: 2021-07-16

## 2021-07-16 NOTE — TELEPHONE ENCOUNTER
Patient called the office to ask why the Xanax was not refilled. Informed patient medication was denied because refill was requested too soon. She states she only received a 10 day supply. I asked patient how many she is taking a day and she stated 3. Please advise and thank you.

## 2021-07-16 NOTE — TELEPHONE ENCOUNTER
----- Message from Mary Sawant sent at 7/15/2021  4:47 PM EDT -----  Subject: Medication Problem    QUESTIONS  Name of Medication? ALPRAZolam (XANAX) 0.25 MG tablet  Patient-reported dosage and instructions? .25 3x's daily   What question or problem do you have with the medication? Patient   requested refill for medication on 07/12/2021. The refill was not sent to   the pharmacy and patient did not received a call from the office. Patient   called in to follow up with refill request to see why it had not been   filled. Preferred Pharmacy? [facility] - [facility] phone number (if available)? [phone]  Additional Information for Provider?   ---------------------------------------------------------------------------  --------------  CALL BACK INFO  What is the best way for the office to contact you? OK to leave message on   voicemail  Preferred Call Back Phone Number? [phone]  ---------------------------------------------------------------------------  --------------  SCRIPT ANSWERS  Relationship to Patient?  Self

## 2021-07-16 NOTE — TELEPHONE ENCOUNTER
Outgoing Call    Call To: Bruce Palma    Call Reason:   Follow Up on medication request    Answered:  [] Yes  [x] No, left voicemail? [x] yes [] no    Outcome:  Lmom for patient to call back for more information on refill request. Medication appears to be previously prescribed by [facility]. Routing message to proper department.

## 2021-07-16 NOTE — TELEPHONE ENCOUNTER
Incoming Call    Caller:  Emma 12 (HIPPA):  HIPPA not applicable    Notes:  Patient contacted [facility] requesting a refill on Xanax. After looking into the patient's chart it is noted that she is not due for a refill until the end of the month. Will attempt to contact patient to check for a reason for being out early.     Please Advise

## 2021-07-17 NOTE — TELEPHONE ENCOUNTER
She should not be taking three daily. They are for as needed situations not to exceed three in 24 hour period. We can discuss at follow up.

## 2021-07-29 ENCOUNTER — VIRTUAL VISIT (OUTPATIENT)
Dept: FAMILY MEDICINE CLINIC | Age: 26
End: 2021-07-29
Payer: MEDICAID

## 2021-07-29 DIAGNOSIS — F43.21 GRIEF REACTION: ICD-10-CM

## 2021-07-29 DIAGNOSIS — G40.109 TEMPORAL LOBE EPILEPSY (HCC): ICD-10-CM

## 2021-07-29 DIAGNOSIS — F43.0 PANIC STATE AS ACUTE REACTION TO STRESS: ICD-10-CM

## 2021-07-29 DIAGNOSIS — F31.75 BIPOLAR DISORDER, IN PARTIAL REMISSION, MOST RECENT EPISODE DEPRESSED (HCC): Primary | ICD-10-CM

## 2021-07-29 PROCEDURE — 99214 OFFICE O/P EST MOD 30 MIN: CPT | Performed by: PHYSICIAN ASSISTANT

## 2021-07-29 PROCEDURE — G8427 DOCREV CUR MEDS BY ELIG CLIN: HCPCS | Performed by: PHYSICIAN ASSISTANT

## 2021-07-29 RX ORDER — ALPRAZOLAM 0.25 MG/1
TABLET ORAL
Qty: 30 TABLET | Refills: 0 | Status: SHIPPED | OUTPATIENT
Start: 2021-07-29 | End: 2021-08-27 | Stop reason: SDUPTHER

## 2021-07-29 RX ORDER — BUSPIRONE HYDROCHLORIDE 7.5 MG/1
7.5 TABLET ORAL 2 TIMES DAILY
Qty: 60 TABLET | Refills: 3 | Status: SHIPPED | OUTPATIENT
Start: 2021-07-29 | End: 2021-08-28

## 2021-07-29 ASSESSMENT — ENCOUNTER SYMPTOMS
ABDOMINAL PAIN: 0
ABDOMINAL DISTENTION: 0
SHORTNESS OF BREATH: 0
TROUBLE SWALLOWING: 0
COLOR CHANGE: 0
CHEST TIGHTNESS: 1
WHEEZING: 0

## 2021-07-29 NOTE — PROGRESS NOTES
2021    TELEHEALTH EVALUATION -- Audio/Visual (During GVGMK-93 public health emergency)    Due to Yony 19 outbreak, patient's office visit was converted to a virtual visit. Patient was contacted and agreed to proceed with a virtual visit via Telephone Visit--total length of call 15-20 minutes. The risks and benefits of converting to a virtual visit were discussed in light of the current infectious disease epidemic. Patient also understood that insurance coverage and co-pays are up to their individual insurance plans. HPI:    Artur Devi (:  1995) has requested an audio/video evaluation for the following concern(s):    VV today for anxiety follow up. Last OV with me: 21 as new patient. Seizure disorder. Followed by neurology. History of grand mal.    Last seizure, per patient, was this morning. Prone to have partial-seizure if she skips medication. Currently taking phenytoin and lamictal.  Needs to have labs drawn for monitoring. Last labs: lamictal level: NORMAL. Phenytoin level: below therapeutic threshold at 7.8. Currently taking phenytoin 300 mg er BID. Has f/u 21 with Dr. Chapin Forward.     Anxiety/panic attacks/Grief reaction. Was under the care of Yonatan Diaz CNP at [facility]. 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 Taking 20 mg abilify daily for MDD, grief reaction--  by suicide which caused a lot of grief and reactive depression. At last OV, was having increased panic attacks. Previously was on atarax with no relief. Denies worsening depressed mood. No SI/HI. Close with her MIL and REYES. Has a toddler son. Good friend network. Started on PRN xanax at last OV. Medication worked very well for patient. Admits to taking medication 2-3 times/day. Has been out for 2 weeks. Anxiety has been worse. Open to starting DAILY medication for anxiety in addition to abilify.      Review of Systems   Constitutional: Negative for activity change, appetite change, chills, diaphoresis, fatigue, fever and unexpected weight change. HENT: Negative for congestion and trouble swallowing. Eyes: Negative for visual disturbance. Respiratory: Positive for chest tightness (panic). Negative for shortness of breath and wheezing. Cardiovascular: Positive for palpitations (panic ). Negative for chest pain and leg swelling. Gastrointestinal: Negative for abdominal distention and abdominal pain. Genitourinary: Negative for difficulty urinating. Musculoskeletal: Negative for arthralgias. Skin: Negative for color change. Neurological: Positive for seizures. Negative for dizziness, weakness, light-headedness and numbness. Psychiatric/Behavioral: Negative for agitation, behavioral problems, confusion, decreased concentration, dysphoric mood, self-injury, sleep disturbance and suicidal ideas. The patient is nervous/anxious. Prior to Visit Medications    Medication Sig Taking? Authorizing Provider   Erick Greene) 0.25 MG tablet Take 1 tablet by mouth as needed for acute panic/anxiety. No more than 3 doses in 24 hours.  Yes Tamar Meza PA-C   busPIRone (BUSPAR) 7.5 MG tablet Take 1 tablet by mouth 2 times daily Yes Tamar Meza PA-C   ARIPiprazole (ABILIFY) 20 MG tablet Take 1 tablet by mouth daily Yes Tamar Meza PA-C   phenytoin (PHENYTEK) 300 MG ER capsule Take 1 capsule by mouth 2 times daily Yes Tamar Meza PA-C   lamoTRIgine (LAMICTAL) 150 MG tablet Take 2 tablets by mouth 2 times daily Yes BRUCE Orlando CNP       Social History     Tobacco Use    Smoking status: Current Every Day Smoker     Packs/day: 0.50     Years: 9.00     Pack years: 4.50     Types: Cigarettes    Smokeless tobacco: Never Used   Vaping Use    Vaping Use: Never used   Substance Use Topics    Alcohol use: No    Drug use: Not Currently        Allergies   Allergen Reactions    Penicillins      GI Problems   ,   Past Medical History: Diagnosis Date    Anxiety     Migraine     Seizures ([facility] [address]     Varicella    , History reviewed. No pertinent surgical history. ,   Social History     Tobacco Use    Smoking status: Current Every Day Smoker     Packs/day: 0.50     Years: 9.00     Pack years: 4.50     Types: Cigarettes    Smokeless tobacco: Never Used   Vaping Use    Vaping Use: Never used   Substance Use Topics    Alcohol use: No    Drug use: Not Currently   ,   Family History   Problem Relation Age of Onset    Cancer Other         brain, breast, lung    Coronary Art Dis Other     High Blood Pressure Brother     Obesity Brother     Cancer Maternal Grandmother     Allergies Maternal Grandmother     Colon Cancer Maternal Grandmother     Breast Cancer Maternal Grandmother     Ovarian Cancer Maternal Grandmother     Other Maternal Grandmother         eye cancer, loss of left eye    Cancer Maternal Grandfather         lung    Heart Disease Maternal Grandfather     Uterine Cancer Mother     Ovarian Cancer Mother     Breast Cancer Mother     Cancer Mother         behind right eye    Heart Attack Mother     Migraines Mother     Cancer Maternal Aunt         lung   ,   Immunization History   Administered Date(s) Administered    DTP 1995, 1995, 01/22/1996    DTaP 1995, 1995, 01/22/1996, 03/20/1997, 11/11/1999, 06/01/2000    DTaP vaccine 03/20/1997, 11/11/1999, 06/01/2000    HIB PRP-T (ActHIB, Hiberix) 1995, 1995, 01/22/1996, 03/20/1997    HPV Quadrivalent (Gardasil) 06/12/2008, 08/19/2008, 12/19/2008    Hepatitis A 03/28/2013, 09/27/2013    Hepatitis B 1995, 1995, 02/24/1998    Hepatitis B Ped/Adol (Engerix-B, Recombivax HB) 1995, 1995, 02/24/1998    Hepatitis B vaccine 1995    Hib, unspecified 1995, 1995, 01/22/1996, 03/20/1997    Influenza 11/02/2012, 09/27/2013    Influenza Virus Vaccine 10/16/2009, 01/09/2012, 11/16/2018    MMR 03/20/1997, 11/11/1999, 06/01/2000    Meningococcal MCV4P (Menactra) 05/23/2011    Polio IPV (IPOL) 1995, 1995, 01/22/1996, 11/11/1999, 06/01/2000    Polio OPV 1995, 1995, 01/22/1996, 11/11/1999    Tdap (Boostrix, Adacel) 05/23/2011, 03/11/2019   ,   Health Maintenance   Topic Date Due    Varicella vaccine (1 of 2 - 2-dose childhood series) Never done    COVID-19 Vaccine (1) Never done    Cervical cancer screen  Never done    Pneumococcal 0-64 years Vaccine (1 of 2 - PPSV23) 06/29/2022 (Originally 2/12/2001)    Flu vaccine (1) 09/01/2021    DTaP/Tdap/Td vaccine (8 - Td or Tdap) 03/11/2029    Hepatitis A vaccine  Completed    Hepatitis B vaccine  Completed    Hib vaccine  Completed    HPV vaccine  Completed    Meningococcal (ACWY) vaccine  Completed    Hepatitis C screen  Completed    HIV screen  Completed       PHYSICAL EXAMINATION:  [ INSTRUCTIONS:  \"[x]\" Indicates a positive item  \"[]\" Indicates a negative item  -- DELETE ALL ITEMS NOT EXAMINED]  [x] Alert  [x] Oriented to person/place/time    [] No apparent distress  [] Toxic appearing    [] Face flushed appearing [] Sclera clear  [] Lips are cyanotic      [x] Breathing appears normal  [] Appears tachypneic      [] Rash on visible skin    [] Cranial Nerves II-XII grossly intact    [] Motor grossly intact in visible upper extremities    [] Motor grossly intact in visible lower extremities    [x] Normal Mood  [] Anxious appearing    [] Depressed appearing  [] Confused appearing      [] Poor short term memory  [] Poor long term memory    [] OTHER:      Due to this being a TeleHealth encounter, evaluation of the following organ systems is limited: Vitals/Constitutional/EENT/Resp/CV/GI//MS/Neuro/Skin/Heme-Lymph-Imm. ASSESSMENT/PLAN:  1. Bipolar disorder, in partial remission, most recent episode depressed (HCC)  Continue abilify, stable.      2. Temporal lobe epilepsy ([facility] [address]  Will forward chart to neurology as phenytoin dosage may need to be adjusted. Has upcoming visit. 3. Panic state as acute reaction to stress  Discussed PRN use of benzo. Not to be taken 3 times daily. Start buspar.     - ALPRAZolam (XANAX) 0.25 MG tablet; Take 1 tablet by mouth as needed for acute panic/anxiety. No more than 3 doses in 24 hours. Dispense: 30 tablet; Refill: 0  - busPIRone (BUSPAR) 7.5 MG tablet; Take 1 tablet by mouth 2 times daily  Dispense: 60 tablet; Refill: 3    4. Grief reaction  Start buspar. Continue prn:   - ALPRAZolam (XANAX) 0.25 MG tablet; Take 1 tablet by mouth as needed for acute panic/anxiety. No more than 3 doses in 24 hours. Dispense: 30 tablet; Refill: 0    OARRS report ran and is consistent with current and past history concerning scheduled medications. Return in about 4 weeks (around 8/26/2021) for follow up in office. An  electronic signature was used to authenticate this note. --Laurel De Luna PA-C on 7/29/2021 at 12:08 PM        Pursuant to the emergency declaration under the [facility] waiver authority and the [employer] Act, this Virtual  Visit was conducted, with patient's consent, to reduce the patient's risk of exposure to COVID-19 and provide continuity of care for an established patient. Services were provided through a video synchronous discussion virtually to substitute for in-person clinic visit.

## 2021-07-29 NOTE — Clinical Note
Lilli Mccoy,    I had follow up with Livier Thomson today. She said she had a seizure this morning. Looks like her phenytoin level is a little below therapeutic range. Not sure if this needs to be adjusted? She does have upcoming visit 9/8 with Ankita Graves. THANK YOU!

## 2021-07-30 ENCOUNTER — TELEPHONE (OUTPATIENT)
Dept: NEUROLOGY | Age: 26
End: 2021-07-30

## 2021-07-30 NOTE — TELEPHONE ENCOUNTER
Call placed to patient to discuss breakthrough seizure that she reported at PCP appointment yesterday. No answer. Message left on voicemail advising her to call back so we can discuss further.

## 2021-08-08 ENCOUNTER — HOSPITAL ENCOUNTER (EMERGENCY)
Age: 26
Discharge: LEFT BEFORE DISCHARGE | End: 2021-08-08
Payer: MEDICAID

## 2021-08-08 VITALS
WEIGHT: 166 LBS | DIASTOLIC BLOOD PRESSURE: 71 MMHG | HEIGHT: 63 IN | HEART RATE: 86 BPM | TEMPERATURE: 97.4 F | BODY MASS INDEX: 29.41 KG/M2 | SYSTOLIC BLOOD PRESSURE: 125 MMHG | OXYGEN SATURATION: 99 % | RESPIRATION RATE: 17 BRPM

## 2021-08-08 DIAGNOSIS — G40.919 BREAKTHROUGH SEIZURE (HCC): Primary | ICD-10-CM

## 2021-08-08 DIAGNOSIS — Z53.29 LEFT AGAINST MEDICAL ADVICE: ICD-10-CM

## 2021-08-08 LAB
ALBUMIN SERPL-MCNC: 4.1 G/DL (ref 3.5–4.6)
ALP BLD-CCNC: 119 U/L (ref 40–130)
ALT SERPL-CCNC: 37 U/L (ref 0–33)
AMPHETAMINE SCREEN, URINE: ABNORMAL
ANION GAP SERPL CALCULATED.3IONS-SCNC: 12 MEQ/L (ref 9–15)
AST SERPL-CCNC: 32 U/L (ref 0–35)
BACTERIA: ABNORMAL /HPF
BARBITURATE SCREEN URINE: ABNORMAL
BASOPHILS ABSOLUTE: 0.1 K/UL (ref 0–0.2)
BASOPHILS RELATIVE PERCENT: 0.9 %
BENZODIAZEPINE SCREEN, URINE: ABNORMAL
BILIRUB SERPL-MCNC: <0.2 MG/DL (ref 0.2–0.7)
BILIRUBIN URINE: NEGATIVE
BLOOD, URINE: NEGATIVE
BUN BLDV-MCNC: 7 MG/DL (ref 6–20)
CALCIUM SERPL-MCNC: 8.7 MG/DL (ref 8.5–9.9)
CANNABINOID SCREEN URINE: POSITIVE
CARBAMAZEPINE LEVEL: 2.1 UG/ML (ref 4–10)
CHLORIDE BLD-SCNC: 102 MEQ/L (ref 95–107)
CLARITY: ABNORMAL
CO2: 22 MEQ/L (ref 20–31)
COCAINE METABOLITE SCREEN URINE: ABNORMAL
COLOR: YELLOW
CREAT SERPL-MCNC: 0.53 MG/DL (ref 0.5–0.9)
EOSINOPHILS ABSOLUTE: 0.1 K/UL (ref 0–0.7)
EOSINOPHILS RELATIVE PERCENT: 0.9 %
EPITHELIAL CELLS, UA: ABNORMAL /HPF (ref 0–5)
ETHANOL PERCENT: NORMAL G/DL
ETHANOL: <10 MG/DL (ref 0–0.08)
GFR AFRICAN AMERICAN: >60
GFR NON-AFRICAN AMERICAN: >60
GLOBULIN: 3.2 G/DL (ref 2.3–3.5)
GLUCOSE BLD-MCNC: 82 MG/DL (ref 70–99)
GLUCOSE URINE: NEGATIVE MG/DL
HCT VFR BLD CALC: 34.5 % (ref 37–47)
HEMOGLOBIN: 10.8 G/DL (ref 12–16)
HYALINE CASTS: ABNORMAL /HPF (ref 0–5)
KETONES, URINE: NEGATIVE MG/DL
LEUKOCYTE ESTERASE, URINE: ABNORMAL
LYMPHOCYTES ABSOLUTE: 2.3 K/UL (ref 1–4.8)
LYMPHOCYTES RELATIVE PERCENT: 22.9 %
Lab: ABNORMAL
MCH RBC QN AUTO: 21.5 PG (ref 27–31.3)
MCHC RBC AUTO-ENTMCNC: 31.4 % (ref 33–37)
MCV RBC AUTO: 68.4 FL (ref 82–100)
METHADONE SCREEN, URINE: ABNORMAL
MONOCYTES ABSOLUTE: 0.5 K/UL (ref 0.2–0.8)
MONOCYTES RELATIVE PERCENT: 5.4 %
NEUTROPHILS ABSOLUTE: 7 K/UL (ref 1.4–6.5)
NEUTROPHILS RELATIVE PERCENT: 69.9 %
NITRITE, URINE: NEGATIVE
OPIATE SCREEN URINE: ABNORMAL
OXYCODONE URINE: ABNORMAL
PDW BLD-RTO: 19.7 % (ref 11.5–14.5)
PH UA: 6.5 (ref 5–9)
PHENCYCLIDINE SCREEN URINE: POSITIVE
PHENOBARBITAL LEVEL: <2.4 UG/ML (ref 10–30)
PHENYTOIN LEVEL: 20.7 UG/ML (ref 10–20)
PLATELET # BLD: 438 K/UL (ref 130–400)
POTASSIUM SERPL-SCNC: 4 MEQ/L (ref 3.4–4.9)
PROPOXYPHENE SCREEN: ABNORMAL
PROTEIN UA: NEGATIVE MG/DL
RBC # BLD: 5.04 M/UL (ref 4.2–5.4)
RBC UA: ABNORMAL /HPF (ref 0–2)
SODIUM BLD-SCNC: 136 MEQ/L (ref 135–144)
SPECIFIC GRAVITY UA: 1.01 (ref 1–1.03)
TOTAL PROTEIN: 7.3 G/DL (ref 6.3–8)
TRICHOMONAS: PRESENT /HPF
URINE REFLEX TO CULTURE: YES
UROBILINOGEN, URINE: 0.2 E.U./DL
WBC # BLD: 10 K/UL (ref 4.8–10.8)
WBC UA: >100 /HPF (ref 0–5)

## 2021-08-08 PROCEDURE — 87086 URINE CULTURE/COLONY COUNT: CPT

## 2021-08-08 PROCEDURE — 80156 ASSAY CARBAMAZEPINE TOTAL: CPT

## 2021-08-08 PROCEDURE — 80053 COMPREHEN METABOLIC PANEL: CPT

## 2021-08-08 PROCEDURE — 80307 DRUG TEST PRSMV CHEM ANLYZR: CPT

## 2021-08-08 PROCEDURE — 36415 COLL VENOUS BLD VENIPUNCTURE: CPT

## 2021-08-08 PROCEDURE — 81003 URINALYSIS AUTO W/O SCOPE: CPT

## 2021-08-08 PROCEDURE — 6360000002 HC RX W HCPCS: Performed by: PHYSICIAN ASSISTANT

## 2021-08-08 PROCEDURE — 80184 ASSAY OF PHENOBARBITAL: CPT

## 2021-08-08 PROCEDURE — 82077 ASSAY SPEC XCP UR&BREATH IA: CPT

## 2021-08-08 PROCEDURE — 96374 THER/PROPH/DIAG INJ IV PUSH: CPT

## 2021-08-08 PROCEDURE — 99283 EMERGENCY DEPT VISIT LOW MDM: CPT

## 2021-08-08 PROCEDURE — 85025 COMPLETE CBC W/AUTO DIFF WBC: CPT

## 2021-08-08 PROCEDURE — 80185 ASSAY OF PHENYTOIN TOTAL: CPT

## 2021-08-08 PROCEDURE — 2580000003 HC RX 258: Performed by: PHYSICIAN ASSISTANT

## 2021-08-08 RX ADMIN — PHENYTOIN SODIUM 500 MG: 50 INJECTION INTRAMUSCULAR; INTRAVENOUS at 14:38

## 2021-08-08 ASSESSMENT — PAIN SCALES - GENERAL: PAINLEVEL_OUTOF10: 7

## 2021-08-08 ASSESSMENT — ENCOUNTER SYMPTOMS
SORE THROAT: 0
NAUSEA: 0
BACK PAIN: 0
COUGH: 0
VOMITING: 0
RHINORRHEA: 0
EYE PAIN: 0
ABDOMINAL PAIN: 0
SHORTNESS OF BREATH: 0
DIARRHEA: 0
PHOTOPHOBIA: 0

## 2021-08-08 ASSESSMENT — PAIN DESCRIPTION - LOCATION: LOCATION: GENERALIZED

## 2021-08-08 ASSESSMENT — PAIN DESCRIPTION - DESCRIPTORS: DESCRIPTORS: ACHING

## 2021-08-08 NOTE — ED NOTES
Pt stated  That the dilantin infusion burned and hurt really bad   I checked iv sight no sign of infiltraion  howvever pt insisted I removed iv   Which I did  And notified  Werner Patel RN  08/08/21 5780

## 2021-08-08 NOTE — ED PROVIDER NOTES
[facility] ED  eMERGENCY dEPARTMENTeNCOUnter      Pt Name: Abimael Nassar  MRN: [id number]  Armsradhagfantionette 1995  Date ofevaluation: 8/8/2021  Provider: Laverne Deal PA-C    CHIEF COMPLAINT       Chief Complaint   Patient presents with    Seizures     3 partial seizures today. on medications          HISTORY OF PRESENT ILLNESS   (Location/Symptom, Timing/Onset,Context/Setting, Quality, Duration, Modifying Factors, Severity)  Note limiting factors. Abimael Nassar is a 32 y.o. female who presents to the emergency department breakthrough seizure. Patient states that she had 3 partial seizures where she stares off today. Patient states she has been compliant on her medication she is on Lamictal and Dilantin. She does have a neurologist.  She also reports that she has been having breakthrough seizures about every other day at this point and last week it was a grand mal seizure. She has not been incontinent after any of the seizures. She denies hitting her head. Denies any drug or alcohol use. HPI    NursingNotes were reviewed. REVIEW OF SYSTEMS    (2-9 systems for level 4, 10 or more for level 5)     Review of Systems   Constitutional: Negative for chills, diaphoresis, fatigue and fever. HENT: Negative for congestion, rhinorrhea and sore throat. Eyes: Negative for photophobia and pain. Respiratory: Negative for cough and shortness of breath. Cardiovascular: Negative for chest pain and palpitations. Gastrointestinal: Negative for abdominal pain, diarrhea, nausea and vomiting. Genitourinary: Negative for dysuria and flank pain. Musculoskeletal: Negative for back pain. Skin: Negative for rash. Neurological: Positive for seizures. Negative for dizziness, light-headedness and headaches. Psychiatric/Behavioral: Negative. All other systems reviewed and are negative. Except as noted above the remainder of the review of systems was reviewed and negative.        PAST MEDICAL HISTORY     Past Medical History:   Diagnosis Date    Anxiety     Migraine     Seizures ([facility] [address]     Varicella          SURGICALHISTORY     History reviewed. No pertinent surgical history. CURRENT MEDICATIONS       Previous Medications    ALPRAZOLAM (XANAX) 0.25 MG TABLET    Take 1 tablet by mouth as needed for acute panic/anxiety. No more than 3 doses in 24 hours.     ARIPIPRAZOLE (ABILIFY) 20 MG TABLET    Take 1 tablet by mouth daily    BUSPIRONE (BUSPAR) 7.5 MG TABLET    Take 1 tablet by mouth 2 times daily    LAMOTRIGINE (LAMICTAL) 150 MG TABLET    Take 2 tablets by mouth 2 times daily    PHENYTOIN (PHENYTEK) 300 MG ER CAPSULE    Take 1 capsule by mouth 2 times daily       ALLERGIES     Penicillins    FAMILY HISTORY       Family History   Problem Relation Age of Onset    Cancer Other         brain, breast, lung    Coronary Art Dis Other     High Blood Pressure Brother     Obesity Brother     Cancer Maternal Grandmother     Allergies Maternal Grandmother     Colon Cancer Maternal Grandmother     Breast Cancer Maternal Grandmother     Ovarian Cancer Maternal Grandmother     Other Maternal Grandmother         eye cancer, loss of left eye    Cancer Maternal Grandfather         lung    Heart Disease Maternal Grandfather     Uterine Cancer Mother     Ovarian Cancer Mother     Breast Cancer Mother     Cancer Mother         behind right eye    Heart Attack Mother     Migraines Mother     Cancer Maternal Aunt         lung          SOCIAL HISTORY       Social History     Socioeconomic History    Marital status: Single     Spouse name: None    Number of children: None    Years of education: None    Highest education level: None   Occupational History    None   Tobacco Use    Smoking status: Current Every Day Smoker     Packs/day: 0.50     Years: 9.00     Pack years: 4.50     Types: Cigarettes    Smokeless tobacco: Never Used   Vaping Use    Vaping Use: Never used   Substance and Sexual Activity    Alcohol use: No    Drug use: Not Currently    Sexual activity: Yes     Partners: Male   Other Topics Concern    None   Social History Narrative    None     Social Determinants of Health     Financial Resource Strain: Low Risk     Difficulty of Paying Living Expenses: Not hard at all   Food Insecurity: No Food Insecurity    Worried About Running Out of Food in the Last Year: Never true    Antione of Food in the Last Year: Never true   Transportation Needs:     Lack of Transportation (Medical):  Lack of Transportation (Non-Medical):    Physical Activity:     Days of Exercise per Week:     Minutes of Exercise per Session:    Stress:     Feeling of Stress :    Social Connections:     Frequency of Communication with Friends and Family:     Frequency of Social Gatherings with Friends and Family:     Attends Synagogue Services:     Active Member of Clubs or Organizations:     Attends Club or Organization Meetings:     Marital Status:    Intimate Partner Violence:     Fear of Current or Ex-Partner:     Emotionally Abused:     Physically Abused:     Sexually Abused:        SCREENINGS      @FLOW([id number])@      PHYSICAL EXAM    (up to 7 for level 4, 8 or more for level 5)     ED Triage Vitals [08/08/21 1251]   BP Temp Temp Source Pulse Resp SpO2 Height Weight   127/78 97.4 °F (36.3 °C) Temporal 86 17 100 % 5' 3\" (1.6 m) 166 lb (75.3 kg)       Physical Exam  Vitals and nursing note reviewed. Constitutional:       General: She is not in acute distress. Appearance: Normal appearance. She is well-developed. She is not diaphoretic. HENT:      Head: Normocephalic and atraumatic. Eyes:      General: Lids are normal.      Conjunctiva/sclera: Conjunctivae normal.   Cardiovascular:      Rate and Rhythm: Normal rate and regular rhythm. Pulses: Normal pulses. Heart sounds: Normal heart sounds.    Pulmonary:      Effort: Pulmonary effort is normal.      Breath sounds: Normal breath sounds. Abdominal:      General: Bowel sounds are normal.      Palpations: Abdomen is soft. Tenderness: There is no abdominal tenderness. Musculoskeletal:      Cervical back: Normal range of motion and neck supple. Lymphadenopathy:      Cervical: No cervical adenopathy. Skin:     General: Skin is warm and dry. Capillary Refill: Capillary refill takes less than 2 seconds. Findings: No rash. Neurological:      Mental Status: She is alert and oriented to person, place, and time. Psychiatric:         Thought Content:  Thought content normal.         Judgment: Judgment normal.         DIAGNOSTIC RESULTS     EKG: All EKG's are interpreted by the Emergency Department Physician who either signs or Co-signsthis chart in the absence of a cardiologist.        RADIOLOGY:   Liliam Kelsie such as CT, Ultrasound and MRI are read by the radiologist. Plain radiographic images are visualized and preliminarily interpreted by the emergency physician with the below findings:        Interpretation per the Radiologist below, if available at the time ofthis note:    No orders to display         ED BEDSIDE ULTRASOUND:   Performed by ED Physician - none    LABS:  Labs Reviewed   PHENYTOIN LEVEL, TOTAL - Abnormal; Notable for the following components:       Result Value    Phenytoin Lvl 20.7 (*)     All other components within normal limits   CARBAMAZEPINE LEVEL, TOTAL - Abnormal; Notable for the following components:    Carbamazepine Lvl 2.1 (*)     All other components within normal limits   PHENOBARBITAL LEVEL - Abnormal; Notable for the following components:    Phenobarbital Lvl <2.4 (*)     All other components within normal limits   COMPREHENSIVE METABOLIC PANEL - Abnormal; Notable for the following components:    ALT 37 (*)     All other components within normal limits   CBC WITH AUTO DIFFERENTIAL - Abnormal; Notable for the following components:    Hemoglobin 10.8 (*)     Hematocrit 34.5 (*) procedures. There was a high probability ofclinically significant/life threatening deterioration in the patient's condition which required my urgent intervention. CONSULTS:  None    PROCEDURES:  Unless otherwise noted below, none     Procedures    FINAL IMPRESSION      1. Breakthrough seizure ([address]    2.  Left against medical advice          DISPOSITION/PLAN   DISPOSITION [place] 08/08/2021 03:24:21 PM      PATIENT REFERREDTO:  Nilda Rausch PA-C  [address] [facility] [address]  [phone]    Schedule an appointment as soon as possible for a visit in 2 days      Gladys Harley MD  [address] [place] [address]  [address]  [address] [facility]  [phone]    Call in 1 day      [facility]) ED  [address] [address]  [phone]  Go to   if you have another seizure      DISCHARGEMEDICATIONS:  New Prescriptions    No medications on file          (Please note that portions of this note were completed with a voice recognition program.  Efforts were made to edit the dictations but occasionally words are mis-transcribed.)    Ivet Joseph (electronically signed)  Attending Emergency Physician         Miranda Delacruz PA-C  08/08/21 9977

## 2021-08-08 NOTE — ED NOTES
Pt states she deos not want to styay in hospital  And is refusing another iv      Tuan Carranza, RN  08/08/21 9618

## 2021-08-09 ENCOUNTER — TELEPHONE (OUTPATIENT)
Dept: FAMILY MEDICINE CLINIC | Age: 26
End: 2021-08-09

## 2021-08-09 NOTE — TELEPHONE ENCOUNTER
Patient called in and said unfortunately she has herpes. She has not reached out to her gyno regarding this due to no outbreak happening. But now she is requesting antibiotics. She said she has used acyclovir in the past.     Patient also says she has bad acne on her shoulder blades. She has tried numerous over the counter meds but nothing is working.     Please advise and thank you

## 2021-08-10 LAB — URINE CULTURE, ROUTINE: NORMAL

## 2021-08-10 RX ORDER — ACYCLOVIR 400 MG/1
400 TABLET ORAL 3 TIMES DAILY
Qty: 15 TABLET | Refills: 1 | Status: SHIPPED | OUTPATIENT
Start: 2021-08-10 | End: 2021-08-15

## 2021-08-24 ENCOUNTER — TELEPHONE (OUTPATIENT)
Dept: NEUROLOGY | Age: 26
End: 2021-08-24

## 2021-08-24 DIAGNOSIS — G40.109 TEMPORAL LOBE EPILEPSY (HCC): ICD-10-CM

## 2021-08-24 RX ORDER — PHENYTOIN SODIUM 300 MG/1
300 CAPSULE, EXTENDED RELEASE ORAL 2 TIMES DAILY
Qty: 120 CAPSULE | Refills: 3 | Status: SHIPPED | OUTPATIENT
Start: 2021-08-24 | End: 2021-11-01 | Stop reason: SDUPTHER

## 2021-08-24 NOTE — TELEPHONE ENCOUNTER
Patient is on abilify 20mg qd and is wanting to know if you can give it for 1 month       Please advise     thanks Dano Harris

## 2021-08-26 ENCOUNTER — TELEPHONE (OUTPATIENT)
Dept: FAMILY MEDICINE CLINIC | Age: 26
End: 2021-08-26

## 2021-08-26 NOTE — TELEPHONE ENCOUNTER
Patient is calling because she has missed her apt today at 8am. She states she does not have any refills on her Abilify or Xanax. Patients Xanax filled on 7/29, 0 refills. Abilify filled on 7/12 with refills remaining. Please advise and thank you.

## 2021-08-27 DIAGNOSIS — F43.0 PANIC STATE AS ACUTE REACTION TO STRESS: ICD-10-CM

## 2021-08-27 DIAGNOSIS — F43.21 GRIEF REACTION: ICD-10-CM

## 2021-08-27 RX ORDER — ALPRAZOLAM 0.25 MG/1
TABLET ORAL
Qty: 30 TABLET | Refills: 0 | Status: SHIPPED | OUTPATIENT
Start: 2021-08-27 | End: 2021-08-31 | Stop reason: ALTCHOICE

## 2021-08-27 NOTE — TELEPHONE ENCOUNTER
Patient requesting medication refill. She is aware script can be filled on Saturday. Please approve or deny this request.    Rx requested:  Requested Prescriptions     Pending Prescriptions Disp Refills    ALPRAZolam (XANAX) 0.25 MG tablet 30 tablet 0     Sig: Take 1 tablet by mouth as needed for acute panic/anxiety. No more than 3 doses in 24 hours.          Last Office Visit:   7/29/2021      Next Visit Date:  Future Appointments   Date Time Provider Ermias Johnson   9/8/2021  2:00 PM Cristo Pack MD [facility]

## 2021-08-30 ENCOUNTER — TELEPHONE (OUTPATIENT)
Dept: FAMILY MEDICINE CLINIC | Age: 26
End: 2021-08-30

## 2021-08-30 DIAGNOSIS — R56.9 SEIZURE (HCC): ICD-10-CM

## 2021-08-30 RX ORDER — LAMOTRIGINE 150 MG/1
300 TABLET ORAL 2 TIMES DAILY
Qty: 120 TABLET | Refills: 3 | Status: SHIPPED | OUTPATIENT
Start: 2021-08-30 | End: 2021-11-01 | Stop reason: SDUPTHER

## 2021-08-30 NOTE — TELEPHONE ENCOUNTER
FYI Message   Patient came into office VERY upset and irate she states she has been having extreme mood swings highs and lows and has caused physical alterations she states she believes her Abilify is no longer working she asked for a review on medications and to talk to [facility]     Added a VV appointment for 8/31 at 2pm and told her we will give her resources for Dr. Celso Lopez

## 2021-08-30 NOTE — TELEPHONE ENCOUNTER
Patient is requesting medication refill.  Please approve or deny this request.    Rx requested:  Requested Prescriptions     Pending Prescriptions Disp Refills    lamoTRIgine (LAMICTAL) 150 MG tablet 120 tablet 3     Sig: Take 2 tablets by mouth 2 times daily         Last Office Visit:   6/7/2021      Next Visit Date:  Future Appointments   Date Time Provider Ermias Johnson   9/8/2021  2:00 PM Savannah Rubin MD [facility]

## 2021-08-31 ENCOUNTER — VIRTUAL VISIT (OUTPATIENT)
Dept: FAMILY MEDICINE CLINIC | Age: 26
End: 2021-08-31
Payer: MEDICAID

## 2021-08-31 DIAGNOSIS — G40.109 TEMPORAL LOBE EPILEPSY (HCC): ICD-10-CM

## 2021-08-31 DIAGNOSIS — F31.75 BIPOLAR DISORDER, IN PARTIAL REMISSION, MOST RECENT EPISODE DEPRESSED (HCC): ICD-10-CM

## 2021-08-31 DIAGNOSIS — F43.0 PANIC STATE AS ACUTE REACTION TO STRESS: Primary | ICD-10-CM

## 2021-08-31 PROCEDURE — G8427 DOCREV CUR MEDS BY ELIG CLIN: HCPCS | Performed by: PHYSICIAN ASSISTANT

## 2021-08-31 PROCEDURE — 99214 OFFICE O/P EST MOD 30 MIN: CPT | Performed by: PHYSICIAN ASSISTANT

## 2021-08-31 RX ORDER — QUETIAPINE FUMARATE 50 MG/1
50 TABLET, EXTENDED RELEASE ORAL NIGHTLY
Qty: 90 TABLET | Refills: 1 | Status: SHIPPED | OUTPATIENT
Start: 2021-08-31 | End: 2021-09-14 | Stop reason: SINTOL

## 2021-08-31 RX ORDER — LORAZEPAM 0.5 MG/1
0.5 TABLET ORAL 2 TIMES DAILY PRN
Qty: 60 TABLET | Refills: 1 | Status: SHIPPED | OUTPATIENT
Start: 2021-08-31 | End: 2022-08-30 | Stop reason: SDUPTHER

## 2021-08-31 RX ORDER — ACYCLOVIR 400 MG/1
TABLET ORAL
Status: ON HOLD | COMMUNITY
Start: 2021-08-26 | End: 2021-11-05 | Stop reason: HOSPADM

## 2021-08-31 ASSESSMENT — ENCOUNTER SYMPTOMS
COLOR CHANGE: 0
ABDOMINAL DISTENTION: 0
ABDOMINAL PAIN: 0
SHORTNESS OF BREATH: 0
WHEEZING: 0
TROUBLE SWALLOWING: 0
CHEST TIGHTNESS: 1

## 2021-08-31 NOTE — PROGRESS NOTES
2021    TELEHEALTH EVALUATION -- Audio/Visual (During IBEEO-53 public health emergency)    Due to [place] 19 outbreak, patient's office visit was converted to a virtual visit. Patient was contacted and agreed to proceed with a virtual visit via Telephone Visit--total length 15-20 minutes. The risks and benefits of converting to a virtual visit were discussed in light of the current infectious disease epidemic. Patient also understood that insurance coverage and co-pays are up to their individual insurance plans. HPI:    Sanna Guallpa (:  1995) has requested an audio/video evaluation for the following concern(s):    VV today for follow up. Last OV with me: 21 via VV. Bipolar disorder. Does not feel like medications are working. Had an episode two days ago where she felt very aggressive/agitated with a friend. States that she almost hit them out of aggression. Not sleeping well. Denies SI/HI. Taking abilify 20 mg daily. Xanax PRN. Admits to needing to take 2 tablets of 0.25 mg to feel calm/relieved. Continues with therapy at [place]. Seizure disorder. Stable. Taking medications. Review of Systems   Constitutional: Negative for activity change, appetite change, chills, diaphoresis, fatigue, fever and unexpected weight change. HENT: Negative for congestion and trouble swallowing. Eyes: Negative for visual disturbance. Respiratory: Positive for chest tightness (panic). Negative for shortness of breath and wheezing. Cardiovascular: Positive for palpitations (panic ). Negative for chest pain and leg swelling. Gastrointestinal: Negative for abdominal distention and abdominal pain. Genitourinary: Negative for difficulty urinating. Musculoskeletal: Negative for arthralgias. Skin: Negative for color change. Neurological: Positive for seizures. Negative for dizziness, speech difficulty, weakness, light-headedness and numbness.    Psychiatric/Behavioral: Positive for agitation and sleep disturbance. Negative for behavioral problems, confusion, decreased concentration, dysphoric mood, self-injury and suicidal ideas. The patient is nervous/anxious. Prior to Visit Medications    Medication Sig Taking? Authorizing Provider   acyclovir (ZOVIRAX) 400 MG tablet Take 1 tablet by mouth 3 times daily for 5 days Start at symptoms. Yes Historical Provider, MD   LORazepam (ATIVAN) 0.5 MG tablet Take 1 tablet by mouth 2 times daily as needed for Anxiety for up to 60 days. Yes Tamar Meza PA-C   QUEtiapine (SEROQUEL XR) 50 MG extended release tablet Take 1 tablet by mouth nightly Yes Tamar Meza PA-C   lamoTRIgine (LAMICTAL) 150 MG tablet Take 2 tablets by mouth 2 times daily Yes BRUCE Meza CNP   phenytoin (PHENYTEK) 300 MG ER capsule Take 1 capsule by mouth 2 times daily Yes Savannah Rubin MD       Social History     Tobacco Use    Smoking status: Current Every Day Smoker     Packs/day: 0.50     Years: 9.00     Pack years: 4.50     Types: Cigarettes    Smokeless tobacco: Never Used   Vaping Use    Vaping Use: Never used   Substance Use Topics    Alcohol use: No    Drug use: Not Currently        Allergies   Allergen Reactions    Penicillins      GI Problems   ,   Past Medical History:   Diagnosis Date    Anxiety     Migraine     Seizures (HCC)     Varicella    , History reviewed. No pertinent surgical history. ,   Social History     Tobacco Use    Smoking status: Current Every Day Smoker     Packs/day: 0.50     Years: 9.00     Pack years: 4.50     Types: Cigarettes    Smokeless tobacco: Never Used   Vaping Use    Vaping Use: Never used   Substance Use Topics    Alcohol use: No    Drug use: Not Currently   ,   Family History   Problem Relation Age of Onset    Cancer Other         brain, breast, lung    Coronary Art Dis Other     High Blood Pressure Brother     Obesity Brother     Cancer Maternal Grandmother     Allergies Maternal Grandmother     Colon Cancer Maternal Grandmother     Breast Cancer Maternal Grandmother     Ovarian Cancer Maternal Grandmother     Other Maternal Grandmother         eye cancer, loss of left eye    Cancer Maternal Grandfather         lung    Heart Disease Maternal Grandfather     Uterine Cancer Mother     Ovarian Cancer Mother     Breast Cancer Mother     Cancer Mother         behind right eye    Heart Attack Mother     Migraines Mother     Cancer Maternal Aunt         lung   ,   Immunization History   Administered Date(s) Administered    DTP 1995, 1995, 01/22/1996    DTaP 1995, 1995, 01/22/1996, 03/20/1997, 11/11/1999, 06/01/2000    DTaP vaccine 03/20/1997, 11/11/1999, 06/01/2000    HIB PRP-T (ActHIB, Hiberix) 1995, 1995, 01/22/1996, 03/20/1997    HPV Quadrivalent (Gardasil) 06/12/2008, 08/19/2008, 12/19/2008    Hepatitis A 03/28/2013, 09/27/2013    Hepatitis B 1995, 1995, 02/24/1998    Hepatitis B Ped/Adol (Engerix-B, Recombivax HB) 1995, 1995, 02/24/1998    Hepatitis B vaccine 1995    Hib, unspecified 1995, 1995, 01/22/1996, 03/20/1997    Influenza 11/02/2012, 09/27/2013    Influenza Virus Vaccine 10/16/2009, 01/09/2012, 11/16/2018    MMR 03/20/1997, 11/11/1999, 06/01/2000    Meningococcal MCV4P (Menactra) 05/23/2011    Polio IPV (IPOL) 1995, 1995, 01/22/1996, 11/11/1999, 06/01/2000    Polio OPV 1995, 1995, 01/22/1996, 11/11/1999    Tdap (Boostrix, Adacel) 05/23/2011, 03/11/2019   ,   Health Maintenance   Topic Date Due    Varicella vaccine (1 of 2 - 2-dose childhood series) Never done    COVID-19 Vaccine (1) Never done    Cervical cancer screen  Never done    Pneumococcal 0-64 years Vaccine (1 of 2 - PPSV23) 06/29/2022 (Originally 2/12/2001)    Flu vaccine (1) 09/01/2021    DTaP/Tdap/Td vaccine (8 - Td or Tdap) 03/11/2029    Hepatitis A vaccine  Completed    Hepatitis B Response Supplemental Appropriations Act, this Virtual  Visit was conducted, with patient's consent, to reduce the patient's risk of exposure to COVID-19 and provide continuity of care for an established patient. Services were provided through a video synchronous discussion virtually to substitute for in-person clinic visit.

## 2021-09-07 ENCOUNTER — TELEPHONE (OUTPATIENT)
Dept: FAMILY MEDICINE CLINIC | Age: 26
End: 2021-09-07

## 2021-09-07 NOTE — TELEPHONE ENCOUNTER
Patient calls in today and states she would like a phone call immediately in regards to some lows she is having with recent medication change she did not want to go into details and wanted to talk to [facility] directly [phone]

## 2021-09-14 ENCOUNTER — VIRTUAL VISIT (OUTPATIENT)
Dept: FAMILY MEDICINE CLINIC | Age: 26
End: 2021-09-14
Payer: MEDICAID

## 2021-09-14 DIAGNOSIS — F43.0 PANIC STATE AS ACUTE REACTION TO STRESS: ICD-10-CM

## 2021-09-14 DIAGNOSIS — F31.75 BIPOLAR DISORDER, IN PARTIAL REMISSION, MOST RECENT EPISODE DEPRESSED (HCC): Primary | ICD-10-CM

## 2021-09-14 DIAGNOSIS — G40.109 TEMPORAL LOBE EPILEPSY (HCC): ICD-10-CM

## 2021-09-14 PROCEDURE — 99214 OFFICE O/P EST MOD 30 MIN: CPT | Performed by: PHYSICIAN ASSISTANT

## 2021-09-14 PROCEDURE — G8427 DOCREV CUR MEDS BY ELIG CLIN: HCPCS | Performed by: PHYSICIAN ASSISTANT

## 2021-09-14 RX ORDER — OLANZAPINE 5 MG/1
5 TABLET ORAL NIGHTLY
Qty: 90 TABLET | Refills: 1 | Status: SHIPPED | OUTPATIENT
Start: 2021-09-14 | End: 2021-11-01

## 2021-09-14 ASSESSMENT — ENCOUNTER SYMPTOMS
COLOR CHANGE: 0
ABDOMINAL PAIN: 0
SHORTNESS OF BREATH: 0
ABDOMINAL DISTENTION: 0
WHEEZING: 0
TROUBLE SWALLOWING: 0
CHEST TIGHTNESS: 1

## 2021-09-14 NOTE — PROGRESS NOTES
2021    TELEHEALTH EVALUATION -- Audio/Visual (During GKUNY-61 public health emergency)    Due to [place] 19 outbreak, patient's office visit was converted to a virtual visit. Patient was contacted and agreed to proceed with a virtual visit via Telephone Visit--total length of call 15-20 minutes. The risks and benefits of converting to a virtual visit were discussed in light of the current infectious disease epidemic. Patient also understood that insurance coverage and co-pays are up to their individual insurance plans. HPI:    Elisha Givens (:  1995) has requested an audio/video evaluation for the following concern(s):    VV today for follow up. Mood disorder. Started on 50 mg seroquel at last OV. Feels like medication is making her very lethargic/sleepy during the day. Has not noticed any significant change with her mood/irritability. Continues to have some days of irritability/'Highs\" and then will \"crash\". Was started on ativan 0.5 mg BID, PRN. Ativan is working very well, thinks she can take 1/2 tablet vs. Full. Denies SI/HI. Denies acute sarkis with inability to sleep, risky behaviors. Open to psychiatry referral.   Previously was on several different medications including zoloft, abilify, xanax and vistaril. Seizure disorder. Controlled. Doing well on current medication regimen. Denies any breakthrough seizures or seizure-type activity. Review of Systems   Constitutional: Negative for activity change, appetite change, chills, diaphoresis, fatigue, fever and unexpected weight change. HENT: Negative for congestion and trouble swallowing. Eyes: Negative for visual disturbance. Respiratory: Positive for chest tightness (improved with ativan/new medication). Negative for shortness of breath and wheezing. Cardiovascular: Negative for chest pain, palpitations and leg swelling. Gastrointestinal: Negative for abdominal distention and abdominal pain. Genitourinary: Negative for difficulty urinating. Musculoskeletal: Negative for arthralgias. Skin: Negative for color change. Neurological: Positive for seizures. Negative for dizziness, speech difficulty, weakness, light-headedness and numbness. Psychiatric/Behavioral: Positive for agitation and sleep disturbance. Negative for behavioral problems, confusion, decreased concentration, dysphoric mood, self-injury and suicidal ideas. The patient is nervous/anxious. Prior to Visit Medications    Medication Sig Taking? Authorizing Provider   OLANZapine (ZYPREXA) 5 MG tablet Take 1 tablet by mouth nightly Yes Tamar Meza PA-C   acyclovir (ZOVIRAX) 400 MG tablet Take 1 tablet by mouth 3 times daily for 5 days Start at symptoms. Yes Historical Provider, MD   LORazepam (ATIVAN) 0.5 MG tablet Take 1 tablet by mouth 2 times daily as needed for Anxiety for up to 60 days. Yes Tamar Meza PA-C   lamoTRIgine (LAMICTAL) 150 MG tablet Take 2 tablets by mouth 2 times daily Yes BRUCE Menjivar - CNP   phenytoin (PHENYTEK) 300 MG ER capsule Take 1 capsule by mouth 2 times daily Yes Curtis Armstrong MD       Social History     Tobacco Use    Smoking status: Current Every Day Smoker     Packs/day: 0.50     Years: 9.00     Pack years: 4.50     Types: Cigarettes    Smokeless tobacco: Never Used   Vaping Use    Vaping Use: Never used   Substance Use Topics    Alcohol use: No    Drug use: Not Currently        Allergies   Allergen Reactions    Penicillins      GI Problems   ,   Past Medical History:   Diagnosis Date    Anxiety     Migraine     Seizures (HCC)     Varicella    , History reviewed. No pertinent surgical history. ,   Social History     Tobacco Use    Smoking status: Current Every Day Smoker     Packs/day: 0.50     Years: 9.00     Pack years: 4.50     Types: Cigarettes    Smokeless tobacco: Never Used   Vaping Use    Vaping Use: Never used   Substance Use Topics    Alcohol use: No    Drug use: Not Currently   ,   Family History   Problem Relation Age of Onset    Cancer Other         brain, breast, lung    Coronary Art Dis Other     High Blood Pressure Brother     Obesity Brother     Cancer Maternal Grandmother     Allergies Maternal Grandmother     Colon Cancer Maternal Grandmother     Breast Cancer Maternal Grandmother     Ovarian Cancer Maternal Grandmother     Other Maternal Grandmother         eye cancer, loss of left eye    Cancer Maternal Grandfather         lung    Heart Disease Maternal Grandfather     Uterine Cancer Mother     Ovarian Cancer Mother     Breast Cancer Mother     Cancer Mother         behind right eye    Heart Attack Mother     Migraines Mother     Cancer Maternal Aunt         lung   ,   Immunization History   Administered Date(s) Administered    DTP 1995, 1995, 01/22/1996    DTaP 1995, 1995, 01/22/1996, 03/20/1997, 11/11/1999, 06/01/2000    DTaP vaccine 03/20/1997, 11/11/1999, 06/01/2000    HIB PRP-T (ActHIB, Hiberix) 1995, 1995, 01/22/1996, 03/20/1997    HPV Quadrivalent (Gardasil) 06/12/2008, 08/19/2008, 12/19/2008    Hepatitis A 03/28/2013, 09/27/2013    Hepatitis B 1995, 1995, 02/24/1998    Hepatitis B Ped/Adol (Engerix-B, Recombivax HB) 1995, 1995, 02/24/1998    Hepatitis B vaccine 1995    Hib, unspecified 1995, 1995, 01/22/1996, 03/20/1997    Influenza 11/02/2012, 09/27/2013    Influenza Virus Vaccine 10/16/2009, 01/09/2012, 11/16/2018    MMR 03/20/1997, 11/11/1999, 06/01/2000    Meningococcal MCV4P (Menactra) 05/23/2011    Polio IPV (IPOL) 1995, 1995, 01/22/1996, 11/11/1999, 06/01/2000    Polio OPV 1995, 1995, 01/22/1996, 11/11/1999    Tdap (Boostrix, Adacel) 05/23/2011, 03/11/2019   ,   Health Maintenance   Topic Date Due    Varicella vaccine (1 of 2 - 2-dose childhood series) Never done    COVID-19 Vaccine (1) Never done    Pap smear Never done    Flu vaccine (1) 09/01/2021    Pneumococcal 0-64 years Vaccine (1 of 2 - PPSV23) 06/29/2022 (Originally 2/12/2001)    DTaP/Tdap/Td vaccine (8 - Td or Tdap) 03/11/2029    Hepatitis A vaccine  Completed    Hepatitis B vaccine  Completed    Hib vaccine  Completed    HPV vaccine  Completed    Meningococcal (ACWY) vaccine  Completed    Hepatitis C screen  Completed    HIV screen  Completed       PHYSICAL EXAMINATION:  [ INSTRUCTIONS:  \"[x]\" Indicates a positive item  \"[]\" Indicates a negative item  -- DELETE ALL ITEMS NOT EXAMINED]  [x] Alert  [x] Oriented to person/place/time    [] No apparent distress  [] Toxic appearing    [] Face flushed appearing [] Sclera clear  [] Lips are cyanotic      [x] Breathing appears normal  [] Appears tachypneic      [] Rash on visible skin    [] Cranial Nerves II-XII grossly intact    [] Motor grossly intact in visible upper extremities    [] Motor grossly intact in visible lower extremities    [] Normal Mood  [] Anxious appearing    [] Depressed appearing  [] Confused appearing      [] Poor short term memory  [] Poor long term memory    [] OTHER:      Due to this being a TeleHealth encounter, evaluation of the following organ systems is limited: Vitals/Constitutional/EENT/Resp/CV/GI//MS/Neuro/Skin/Heme-Lymph-Imm. ASSESSMENT/PLAN:  1. Bipolar disorder, in partial remission, most recent episode depressed ([facility] [address]  - External Referral to Psychiatry  - OLANZapine (ZYPREXA) 5 MG tablet; Take 1 tablet by mouth nightly  Dispense: 90 tablet; Refill: 1    2. Temporal lobe epilepsy ([facility] [address]  CONTROLLED. 3. Panic state as acute reaction to stress    STOP seroquel due to side effects. Trial 5 mg zyprexa with slow titration weekly, if needed. Discussed referral to psychiatry for proper medication monitoring/dose adjustment. She is OPEN and OK with referral.  1 week follow up with an update. No follow-ups on file.     An  electronic signature was used to authenticate this note. --Nelia Amin PA-C on 9/14/2021 at 2:11 PM        Pursuant to the emergency declaration under the [address] [place] [address], [facility], [facility] waiver authority and the [employer] Act, this Virtual  Visit was conducted, with patient's consent, to reduce the patient's risk of exposure to COVID-19 and provide continuity of care for an established patient. Services were provided through a video synchronous discussion virtually to substitute for in-person clinic visit.

## 2021-09-15 NOTE — TELEPHONE ENCOUNTER
LVM for patient to verify if she has talked to [facility] before she left on maternity leave.

## 2021-11-01 ENCOUNTER — VIRTUAL VISIT (OUTPATIENT)
Dept: NEUROLOGY | Age: 26
End: 2021-11-01
Payer: MEDICAID

## 2021-11-01 ENCOUNTER — HOSPITAL ENCOUNTER (INPATIENT)
Age: 26
LOS: 4 days | Discharge: HOME | DRG: 753 | End: 2021-11-05
Attending: EMERGENCY MEDICINE | Admitting: PSYCHIATRY & NEUROLOGY
Payer: MEDICAID

## 2021-11-01 DIAGNOSIS — R56.9 SEIZURE (HCC): ICD-10-CM

## 2021-11-01 DIAGNOSIS — R55 SYNCOPE, UNSPECIFIED SYNCOPE TYPE: Primary | ICD-10-CM

## 2021-11-01 DIAGNOSIS — F31.32 BIPOLAR AFFECTIVE DISORDER, CURRENTLY DEPRESSED, MODERATE (HCC): ICD-10-CM

## 2021-11-01 DIAGNOSIS — F31.75 BIPOLAR DISORDER, IN PARTIAL REMISSION, MOST RECENT EPISODE DEPRESSED (HCC): ICD-10-CM

## 2021-11-01 DIAGNOSIS — G40.109 TEMPORAL LOBE EPILEPSY (HCC): ICD-10-CM

## 2021-11-01 DIAGNOSIS — R45.851 SUICIDAL IDEATION: ICD-10-CM

## 2021-11-01 DIAGNOSIS — F31.9 BIPOLAR 1 DISORDER (HCC): Primary | ICD-10-CM

## 2021-11-01 DIAGNOSIS — Z91.199 NONCOMPLIANCE: ICD-10-CM

## 2021-11-01 LAB
ACETAMINOPHEN LEVEL: <5 UG/ML (ref 10–30)
ALBUMIN SERPL-MCNC: 4.5 G/DL (ref 3.5–4.6)
ALP BLD-CCNC: 109 U/L (ref 40–130)
ALT SERPL-CCNC: 66 U/L (ref 0–33)
AMPHETAMINE SCREEN, URINE: ABNORMAL
ANION GAP SERPL CALCULATED.3IONS-SCNC: 12 MEQ/L (ref 9–15)
ANISOCYTOSIS: ABNORMAL
AST SERPL-CCNC: 33 U/L (ref 0–35)
BACTERIA: NEGATIVE /HPF
BARBITURATE SCREEN URINE: ABNORMAL
BASOPHILS ABSOLUTE: 0.1 K/UL (ref 0–0.2)
BASOPHILS RELATIVE PERCENT: 0.8 %
BENZODIAZEPINE SCREEN, URINE: ABNORMAL
BILIRUB SERPL-MCNC: <0.2 MG/DL (ref 0.2–0.7)
BILIRUBIN URINE: NEGATIVE
BLOOD, URINE: NEGATIVE
BUN BLDV-MCNC: 8 MG/DL (ref 6–20)
CALCIUM SERPL-MCNC: 9 MG/DL (ref 8.5–9.9)
CANNABINOID SCREEN URINE: POSITIVE
CHLORIDE BLD-SCNC: 101 MEQ/L (ref 95–107)
CHOLESTEROL, TOTAL: 256 MG/DL (ref 0–199)
CLARITY: ABNORMAL
CO2: 23 MEQ/L (ref 20–31)
COCAINE METABOLITE SCREEN URINE: ABNORMAL
COLOR: YELLOW
CREAT SERPL-MCNC: 0.6 MG/DL (ref 0.5–0.9)
EOSINOPHILS ABSOLUTE: 0.1 K/UL (ref 0–0.7)
EOSINOPHILS RELATIVE PERCENT: 1.1 %
EPITHELIAL CELLS, UA: ABNORMAL /HPF (ref 0–5)
ETHANOL PERCENT: NORMAL G/DL
ETHANOL: <10 MG/DL (ref 0–0.08)
GFR AFRICAN AMERICAN: >60
GFR NON-AFRICAN AMERICAN: >60
GLOBULIN: 2.9 G/DL (ref 2.3–3.5)
GLUCOSE BLD-MCNC: 86 MG/DL (ref 70–99)
GLUCOSE URINE: NEGATIVE MG/DL
HCG(URINE) PREGNANCY TEST: NEGATIVE
HCT VFR BLD CALC: 35.9 % (ref 37–47)
HDLC SERPL-MCNC: 74 MG/DL (ref 40–59)
HEMOGLOBIN: 11.3 G/DL (ref 12–16)
HYPOCHROMIA: ABNORMAL
KETONES, URINE: ABNORMAL MG/DL
LDL CHOLESTEROL CALCULATED: 166 MG/DL (ref 0–129)
LEUKOCYTE ESTERASE, URINE: ABNORMAL
LYMPHOCYTES ABSOLUTE: 1.7 K/UL (ref 1–4.8)
LYMPHOCYTES RELATIVE PERCENT: 15.6 %
Lab: ABNORMAL
MCH RBC QN AUTO: 22.4 PG (ref 27–31.3)
MCHC RBC AUTO-ENTMCNC: 31.5 % (ref 33–37)
MCV RBC AUTO: 71.1 FL (ref 82–100)
METHADONE SCREEN, URINE: ABNORMAL
MICROCYTES: ABNORMAL
MONOCYTES ABSOLUTE: 0.6 K/UL (ref 0.2–0.8)
MONOCYTES RELATIVE PERCENT: 5.5 %
NEUTROPHILS ABSOLUTE: 8.6 K/UL (ref 1.4–6.5)
NEUTROPHILS RELATIVE PERCENT: 77 %
NITRITE, URINE: NEGATIVE
OPIATE SCREEN URINE: ABNORMAL
OXYCODONE URINE: ABNORMAL
PDW BLD-RTO: 19.9 % (ref 11.5–14.5)
PH UA: 5.5 (ref 5–9)
PHENCYCLIDINE SCREEN URINE: POSITIVE
PHENYTOIN LEVEL: 27 UG/ML (ref 10–20)
PLATELET # BLD: 524 K/UL (ref 130–400)
POTASSIUM SERPL-SCNC: 3.8 MEQ/L (ref 3.4–4.9)
PROPOXYPHENE SCREEN: ABNORMAL
PROTEIN UA: NEGATIVE MG/DL
RBC # BLD: 5.05 M/UL (ref 4.2–5.4)
RBC UA: ABNORMAL /HPF (ref 0–5)
SALICYLATE, SERUM: 0.7 MG/DL (ref 15–30)
SARS-COV-2, NAAT: NOT DETECTED
SLIDE REVIEW: ABNORMAL
SODIUM BLD-SCNC: 136 MEQ/L (ref 135–144)
SPECIFIC GRAVITY UA: 1.02 (ref 1–1.03)
TOTAL CK: 70 U/L (ref 0–170)
TOTAL PROTEIN: 7.4 G/DL (ref 6.3–8)
TRIGL SERPL-MCNC: 79 MG/DL (ref 0–150)
TSH SERPL DL<=0.05 MIU/L-ACNC: 0.93 UIU/ML (ref 0.44–3.86)
URINE REFLEX TO CULTURE: YES
UROBILINOGEN, URINE: 1 E.U./DL
WBC # BLD: 11.1 K/UL (ref 4.8–10.8)
WBC UA: >100 /HPF (ref 0–5)

## 2021-11-01 PROCEDURE — 1240000000 HC EMOTIONAL WELLNESS R&B

## 2021-11-01 PROCEDURE — 80307 DRUG TEST PRSMV CHEM ANLYZR: CPT

## 2021-11-01 PROCEDURE — 84703 CHORIONIC GONADOTROPIN ASSAY: CPT

## 2021-11-01 PROCEDURE — 80061 LIPID PANEL: CPT

## 2021-11-01 PROCEDURE — 36415 COLL VENOUS BLD VENIPUNCTURE: CPT

## 2021-11-01 PROCEDURE — G8417 CALC BMI ABV UP PARAM F/U: HCPCS | Performed by: NURSE PRACTITIONER

## 2021-11-01 PROCEDURE — 6370000000 HC RX 637 (ALT 250 FOR IP): Performed by: EMERGENCY MEDICINE

## 2021-11-01 PROCEDURE — 82550 ASSAY OF CK (CPK): CPT

## 2021-11-01 PROCEDURE — 84443 ASSAY THYROID STIM HORMONE: CPT

## 2021-11-01 PROCEDURE — 87635 SARS-COV-2 COVID-19 AMP PRB: CPT

## 2021-11-01 PROCEDURE — 85025 COMPLETE CBC W/AUTO DIFF WBC: CPT

## 2021-11-01 PROCEDURE — 4004F PT TOBACCO SCREEN RCVD TLK: CPT | Performed by: NURSE PRACTITIONER

## 2021-11-01 PROCEDURE — 81001 URINALYSIS AUTO W/SCOPE: CPT

## 2021-11-01 PROCEDURE — 80179 DRUG ASSAY SALICYLATE: CPT

## 2021-11-01 PROCEDURE — 6370000000 HC RX 637 (ALT 250 FOR IP): Performed by: PSYCHIATRY & NEUROLOGY

## 2021-11-01 PROCEDURE — 99284 EMERGENCY DEPT VISIT MOD MDM: CPT

## 2021-11-01 PROCEDURE — 99214 OFFICE O/P EST MOD 30 MIN: CPT | Performed by: NURSE PRACTITIONER

## 2021-11-01 PROCEDURE — 80053 COMPREHEN METABOLIC PANEL: CPT

## 2021-11-01 PROCEDURE — G8427 DOCREV CUR MEDS BY ELIG CLIN: HCPCS | Performed by: NURSE PRACTITIONER

## 2021-11-01 PROCEDURE — 82077 ASSAY SPEC XCP UR&BREATH IA: CPT

## 2021-11-01 PROCEDURE — 93005 ELECTROCARDIOGRAM TRACING: CPT | Performed by: EMERGENCY MEDICINE

## 2021-11-01 PROCEDURE — 80143 DRUG ASSAY ACETAMINOPHEN: CPT

## 2021-11-01 PROCEDURE — 87086 URINE CULTURE/COLONY COUNT: CPT

## 2021-11-01 PROCEDURE — 80185 ASSAY OF PHENYTOIN TOTAL: CPT

## 2021-11-01 PROCEDURE — G8484 FLU IMMUNIZE NO ADMIN: HCPCS | Performed by: NURSE PRACTITIONER

## 2021-11-01 RX ORDER — HYDROXYZINE PAMOATE 50 MG/1
50 CAPSULE ORAL ONCE
Status: COMPLETED | OUTPATIENT
Start: 2021-11-01 | End: 2021-11-01

## 2021-11-01 RX ORDER — OLANZAPINE 5 MG/1
5 TABLET ORAL NIGHTLY
Status: DISCONTINUED | OUTPATIENT
Start: 2021-11-01 | End: 2021-11-02

## 2021-11-01 RX ORDER — BENZTROPINE MESYLATE 1 MG/ML
2 INJECTION INTRAMUSCULAR; INTRAVENOUS 2 TIMES DAILY PRN
Status: DISCONTINUED | OUTPATIENT
Start: 2021-11-01 | End: 2021-11-05 | Stop reason: HOSPADM

## 2021-11-01 RX ORDER — LAMOTRIGINE 150 MG/1
300 TABLET ORAL 2 TIMES DAILY
Qty: 120 TABLET | Refills: 3 | Status: ON HOLD | OUTPATIENT
Start: 2021-11-01 | End: 2021-11-05 | Stop reason: HOSPADM

## 2021-11-01 RX ORDER — PHENYTOIN SODIUM 100 MG/1
300 CAPSULE, EXTENDED RELEASE ORAL 2 TIMES DAILY
Status: DISCONTINUED | OUTPATIENT
Start: 2021-11-01 | End: 2021-11-02

## 2021-11-01 RX ORDER — HYDROXYZINE HYDROCHLORIDE 50 MG/ML
50 INJECTION, SOLUTION INTRAMUSCULAR EVERY 6 HOURS PRN
Status: DISCONTINUED | OUTPATIENT
Start: 2021-11-01 | End: 2021-11-05 | Stop reason: HOSPADM

## 2021-11-01 RX ORDER — MAGNESIUM HYDROXIDE/ALUMINUM HYDROXICE/SIMETHICONE 120; 1200; 1200 MG/30ML; MG/30ML; MG/30ML
30 SUSPENSION ORAL PRN
Status: DISCONTINUED | OUTPATIENT
Start: 2021-11-01 | End: 2021-11-05 | Stop reason: HOSPADM

## 2021-11-01 RX ORDER — HALOPERIDOL 5 MG/ML
5 INJECTION INTRAMUSCULAR EVERY 6 HOURS PRN
Status: DISCONTINUED | OUTPATIENT
Start: 2021-11-01 | End: 2021-11-05 | Stop reason: HOSPADM

## 2021-11-01 RX ORDER — PHENYTOIN SODIUM 100 MG/1
300 CAPSULE, EXTENDED RELEASE ORAL ONCE
Status: COMPLETED | OUTPATIENT
Start: 2021-11-01 | End: 2021-11-01

## 2021-11-01 RX ORDER — OLANZAPINE 5 MG/1
5 TABLET ORAL NIGHTLY
Status: ON HOLD | COMMUNITY
End: 2021-11-05 | Stop reason: HOSPADM

## 2021-11-01 RX ORDER — CIPROFLOXACIN 500 MG/1
500 TABLET, FILM COATED ORAL ONCE
Status: COMPLETED | OUTPATIENT
Start: 2021-11-01 | End: 2021-11-01

## 2021-11-01 RX ORDER — ACETAMINOPHEN 325 MG/1
650 TABLET ORAL EVERY 4 HOURS PRN
Status: DISCONTINUED | OUTPATIENT
Start: 2021-11-01 | End: 2021-11-05 | Stop reason: HOSPADM

## 2021-11-01 RX ORDER — HYDROXYZINE PAMOATE 50 MG/1
50 CAPSULE ORAL EVERY 6 HOURS PRN
Status: DISCONTINUED | OUTPATIENT
Start: 2021-11-01 | End: 2021-11-05 | Stop reason: HOSPADM

## 2021-11-01 RX ORDER — PHENYTOIN SODIUM 300 MG/1
300 CAPSULE, EXTENDED RELEASE ORAL 2 TIMES DAILY
Qty: 120 CAPSULE | Refills: 3 | Status: ON HOLD | OUTPATIENT
Start: 2021-11-01 | End: 2021-11-05 | Stop reason: HOSPADM

## 2021-11-01 RX ORDER — HALOPERIDOL 5 MG
5 TABLET ORAL EVERY 6 HOURS PRN
Status: DISCONTINUED | OUTPATIENT
Start: 2021-11-01 | End: 2021-11-05 | Stop reason: HOSPADM

## 2021-11-01 RX ORDER — ACETAMINOPHEN 500 MG
1000 TABLET ORAL ONCE
Status: COMPLETED | OUTPATIENT
Start: 2021-11-01 | End: 2021-11-01

## 2021-11-01 RX ORDER — NICOTINE 21 MG/24HR
1 PATCH, TRANSDERMAL 24 HOURS TRANSDERMAL DAILY
Status: DISCONTINUED | OUTPATIENT
Start: 2021-11-01 | End: 2021-11-01

## 2021-11-01 RX ADMIN — LAMOTRIGINE 150 MG: 25 TABLET ORAL at 11:30

## 2021-11-01 RX ADMIN — HYDROXYZINE PAMOATE 50 MG: 50 CAPSULE ORAL at 21:32

## 2021-11-01 RX ADMIN — PHENYTOIN SODIUM 300 MG: 100 CAPSULE ORAL at 21:31

## 2021-11-01 RX ADMIN — PHENYTOIN SODIUM 300 MG: 100 CAPSULE ORAL at 11:30

## 2021-11-01 RX ADMIN — CIPROFLOXACIN 500 MG: 500 TABLET, FILM COATED ORAL at 13:18

## 2021-11-01 RX ADMIN — HYDROXYZINE PAMOATE 50 MG: 50 CAPSULE ORAL at 11:30

## 2021-11-01 RX ADMIN — ACETAMINOPHEN 1000 MG: 500 TABLET ORAL at 11:30

## 2021-11-01 RX ADMIN — NICOTINE POLACRILEX 2 MG: 2 GUM, CHEWING BUCCAL at 21:35

## 2021-11-01 RX ADMIN — LAMOTRIGINE 300 MG: 200 TABLET ORAL at 21:31

## 2021-11-01 ASSESSMENT — ENCOUNTER SYMPTOMS
CONSTIPATION: 0
SHORTNESS OF BREATH: 0
ABDOMINAL PAIN: 0
NAUSEA: 0
WHEEZING: 0
VOMITING: 0
DIARRHEA: 0
SORE THROAT: 0
NAUSEA: 0
SHORTNESS OF BREATH: 0
VOMITING: 0
DIARRHEA: 0
BACK PAIN: 0
ABDOMINAL PAIN: 0
COUGH: 0
COLOR CHANGE: 0
TROUBLE SWALLOWING: 0
CHEST TIGHTNESS: 0
ABDOMINAL DISTENTION: 0
COUGH: 0

## 2021-11-01 ASSESSMENT — SLEEP AND FATIGUE QUESTIONNAIRES
DIFFICULTY FALLING ASLEEP: YES
AVERAGE NUMBER OF SLEEP HOURS: 5
SLEEP PATTERN: DIFFICULTY FALLING ASLEEP;DISTURBED/INTERRUPTED SLEEP;NIGHTMARES/TERRORS
DO YOU HAVE DIFFICULTY SLEEPING: YES
RESTFUL SLEEP: NO
DIFFICULTY ARISING: YES
DO YOU USE A SLEEP AID: YES
DIFFICULTY STAYING ASLEEP: YES

## 2021-11-01 ASSESSMENT — PAIN SCALES - GENERAL: PAINLEVEL_OUTOF10: 9

## 2021-11-01 ASSESSMENT — PATIENT HEALTH QUESTIONNAIRE - PHQ9: SUM OF ALL RESPONSES TO PHQ QUESTIONS 1-9: 24

## 2021-11-01 NOTE — ED TRIAGE NOTES
Pt had phone appt with neurologist nurse.  Telling her how depressed and hopeless she has felt and was told to come to ED for psych eval and possible admission to [facility]

## 2021-11-01 NOTE — ED NOTES
Provisional Diagnosis:Major depression, PTSD, Bipolar disorder. Psychosocial and Contextual Factors:  Pt lives with the her 3/6 year old son and  mother and brother of her  significant other. Significant other killed himself by hanging. Pt saw significant other hanged. Pt reports frequent nightmare and worsening depression since that time. C-SSRS Summary:     Patient: C-SSRS Suicide Screening  1) Within the past month, have you wished you were dead or wished you could go to sleep and not wake up? : Yes  2) Have you actually had any thoughts of killing yourself? : Yes  3) Have you been thinking about how you might kill yourself? : Yes  4) Have you had these thoughts and had some intention of acting on them? : Yes (\" yes, I dont want to tell you how\")  5) Have you started to work out or worked out the details of how to kill yourself? Do you intend to carry out this plan? : Yes  6) Have you ever done anything, started to do anything, or prepared to do anything to end your life?: Yes (Years ago as a teen)  Did this occur within the past 3 months? : No  Risk of Suicide: High Risk    Family: Not present. Agency: [facility]. Had in the past received med management there. Abuse Assessment  Physical Abuse: Denies  Verbal Abuse: Denies  Emotional abuse: Denies  Financial Abuse: Denies  Sexual abuse: Denies  Elder abuse: No    Clinical Summary:  Pt reports thoughts of suicide with a plan to jump from  A specific building \"tall enough to damage my body to be to s,ch to come back from or be hit by a train at night. She denies HI, She reports extreme sadness and anxiety. She denies hallucinations of any kind. There is no delusional thought expressed. She is currently off all psychotropics saying that the last psychotropic was  zyprexa  which she stopped about ten days ago she said due to , 'dizziness\" and fear that it might cause seizures.     Level of Care Disposition:  Per Dr Joy Masters Pauly Ozuna  11/01/21 [id number]

## 2021-11-01 NOTE — ED TRIAGE NOTES
Increasing depression with thoughts of suicide for many reasons states. She is suicidal with a plan that she stes, Is to terrible to discuss.

## 2021-11-01 NOTE — PROGRESS NOTES
Patient did not attend group despite staff encouragement.   Electronically signed by Michael Phillips on 11/1/2021 at 7:09 PM

## 2021-11-01 NOTE — PROGRESS NOTES
Received report from [address] [place] [address] reports thoughts of suicide with a plan to jump from  A specific building \"tall enough to damage my body to be to s,ch to come back from or be hit by a train at night. She denies HI, She reports extreme sadness and anxiety. She denies hallucinations of any kind. There is no delusional thought expressed. She is currently off all psychotropics saying that the last psychotropic was  zyprexa  which she stopped about ten days ago she said due to , 'dizziness\" and fear that it might cause seizures.

## 2021-11-01 NOTE — PROGRESS NOTES
Subjective:      Patient ID: Gloria Tilley is a 32 y.o. female who presents today for:  Chief Complaint   Patient presents with    Follow-up     Pt states that things have been miserable. She says she wakes up very hot and irritable, and dizzy. She says that one morning she was so dizzy that she passed out and it her head, and she says it has happened a few other times as well. She says that she just does not feel, good she says that she is very tired feeling. She says that her pcp has been changing her medications so much that she feels like that may be causing these issues. HPI   11/1/2021:  Pt contacted via telephone for virtual visit. Patient was supposed to come into the office today for an appointment but called this morning and said she did not have a ride. Of note patient has not shown up for her last 2 appointments. She was last seen in the office by me on 6/7/2021 for epilepsy. Please see previous documentation. Patient does have known history of medication noncompliance. Also with history of cocaine and illicit drug use. She has history of bipolar disease. She reports today that she stopped seeing her psychiatrist as she was not happy with her services. She states primary care has been managing her medications and she has had multiple medication changes in the last few months and was not tolerating them. She is currently stopped all psychiatric medications. She reports only medication she is taking are the Lamictal and phenytoin. Patient reports that she has not been feeling well for some time now. She reports that she has passed out with the last episode being approximately a week and a half ago. She reports that she has difficulty standing due to severe dizziness. She often reports significant diaphoresis and overheating. Her last seizure was in August 2021 for which she was seen and evaluated in [facility] EMERGENCY [facility] AT [place] emergency room.   At that time Dr. Jericho Mcleod was contacted and recommended admission and patient signed out AMA. She was noted to have therapeutic phenytoin level and it does not appear that Lamictal levels were drawn. Patient very tearful during our encounter today. She reports that she is not doing well in terms of depression. She reports that her lows right now are very low. When asked what she meant by that she reported \"you do not want to know\". She does report suicidal ideation but will not disclose her plan. She reports that she needs help managing this and is fearful. Due to COVID 19 outbreak, patient's office visit was converted to a virtual visit. Patient was contacted and agreed to proceed with a virtual visit via Telephone Visit  The risks and benefits of converting to a virtual visit were discussed in light of the current infectious disease epidemic. Patient also understood that insurance coverage and co-pays are up to their individual insurance plans. 6/7/2021:  Pt seen and examined in the office for hospital follow up. Patient is a 59-year-old  female with past medical history of migraine, varicella, seizures, bipolar disorder. Pt was admitted to [facility] from 2/20/2021 to 2/22/2021 for history of epilepsy with breakthrough seizure. Hospital records reviewed. Patient was initially seen and evaluated by Dr. Gary Vidal on 2/21/2021. She was admitted with status epilepticus. There is mention in Dr. Madeleine Pineda dictation that patient had EEG in the past that showed a left temporal focus. She was given Keppra upon admission. She was on Lamictal and Dilantin at home. She also has a history of bipolar disorder. Tox screen was positive for cocaine metabolites and marijuana. MRI of the brain was negative for any acute process or mesial temporal sclerosis. EEG was completed and was normal.  Patient was supposed to follow-up with Dr. Gary Vidal after hospital discharge for consideration of Xcopri.  She was a no-show for this appointment on 3/17/2021. Patient also with history of bipolar disorder and has not followed up with psychiatry in the last 6 months. She is currently on Abilify. Patient currently alert and oriented x3, no acute distress, cooperative. She reports that she continues to have seizures approximately 1 time per month. She states that her last seizure was yesterday. She woke up around 6 AM and was covered in urine and had significant pain in her left shoulder she thinks from a seizure. She was very tired and slept until approximately 3 PM that day. She bit her tongue slightly. She reports daily compliance with her Lamictal and Dilantin although had not taken her Dilantin dose yet today. Keppra was not given after hospitalization. We will like to avoid this if possible given her underlying anxiety and psychiatric diagnosis. Patient does report ongoing significant anxiety. She reports that her seizures are exacerbated by anxiety as well as her menses. She denies any new focal neuro deficits. No fevers. No unusual or unplanned weight loss. No suicidal ideation. Past Medical History:   Diagnosis Date    Anxiety     Migraine     Seizures ([facility] [address]     Varicella      No past surgical history on file.   Social History     Socioeconomic History    Marital status: Single     Spouse name: Not on file    Number of children: Not on file    Years of education: Not on file    Highest education level: Not on file   Occupational History    Not on file   Tobacco Use    Smoking status: Current Every Day Smoker     Packs/day: 0.50     Years: 9.00     Pack years: 4.50     Types: Cigarettes    Smokeless tobacco: Never Used   Vaping Use    Vaping Use: Never used   Substance and Sexual Activity    Alcohol use: No    Drug use: Not Currently    Sexual activity: Yes     Partners: Male   Other Topics Concern    Not on file   Social History Narrative    Not on file     Social Determinants of Health     Financial Resource Strain: Low Risk     Difficulty of Paying Living Expenses: Not hard at all   Food Insecurity: No Food Insecurity    Worried About Running Out of Food in the Last Year: Never true    Antione of Food in the Last Year: Never true   Transportation Needs:     Lack of Transportation (Medical):  Lack of Transportation (Non-Medical):    Physical Activity:     Days of Exercise per Week:     Minutes of Exercise per Session:    Stress:     Feeling of Stress :    Social Connections:     Frequency of Communication with Friends and Family:     Frequency of Social Gatherings with Friends and Family:     Attends Yazidism Services:     Active Member of Clubs or Organizations:     Attends Club or Organization Meetings:     Marital Status:    Intimate Partner Violence:     Fear of Current or Ex-Partner:     Emotionally Abused:     Physically Abused:     Sexually Abused:      Family History   Problem Relation Age of Onset    Cancer Other         brain, breast, lung    Coronary Art Dis Other     High Blood Pressure Brother     Obesity Brother     Cancer Maternal Grandmother     Allergies Maternal Grandmother     Colon Cancer Maternal Grandmother     Breast Cancer Maternal Grandmother     Ovarian Cancer Maternal Grandmother     Other Maternal Grandmother         eye cancer, loss of left eye    Cancer Maternal Grandfather         lung    Heart Disease Maternal Grandfather     Uterine Cancer Mother     Ovarian Cancer Mother     Breast Cancer Mother     Cancer Mother         behind right eye    Heart Attack Mother     Migraines Mother     Cancer Maternal Aunt         lung     Allergies   Allergen Reactions    Penicillins      GI Problems     Current Outpatient Medications on File Prior to Visit   Medication Sig Dispense Refill    acyclovir (ZOVIRAX) 400 MG tablet Take 1 tablet by mouth 3 times daily for 5 days Start at symptoms. No current facility-administered medications on file prior to visit. Review of Systems   Constitutional: Positive for fatigue. Negative for appetite change, chills and fever. HENT: Negative for hearing loss and trouble swallowing. Eyes: Negative for visual disturbance. Respiratory: Negative for cough, chest tightness, shortness of breath and wheezing. Cardiovascular: Negative for chest pain, palpitations and leg swelling. Gastrointestinal: Negative for abdominal distention, abdominal pain, constipation, diarrhea, nausea and vomiting. Musculoskeletal: Negative for gait problem. Skin: Negative for color change and rash. Neurological: Positive for dizziness, seizures and syncope. Negative for tremors, facial asymmetry, speech difficulty, weakness, light-headedness, numbness and headaches. Psychiatric/Behavioral: Positive for dysphoric mood and suicidal ideas. Negative for agitation, confusion and hallucinations. The patient is nervous/anxious. Objective: There were no vitals taken for this visit. Physical Exam  Deferred due to telephone encounter  No results found.     Lab Results   Component Value Date    WBC 10.0 08/08/2021    RBC 5.04 08/08/2021    RBC 4.70 06/01/2012    HGB 10.8 08/08/2021    HCT 34.5 08/08/2021    MCV 68.4 08/08/2021    MCH 21.5 08/08/2021    MCHC 31.4 08/08/2021    RDW 19.7 08/08/2021     08/08/2021    MPV 8.0 06/19/2014     Lab Results   Component Value Date     08/08/2021    K 4.0 08/08/2021    K 3.5 02/21/2021     08/08/2021    CO2 22 08/08/2021    BUN 7 08/08/2021    CREATININE 0.53 08/08/2021    GFRAA >60.0 08/08/2021    LABGLOM >60.0 08/08/2021    GLUCOSE 82 08/08/2021    GLUCOSE 77 06/01/2012    PROT 7.3 08/08/2021    LABALBU 4.1 08/08/2021    LABALBU 4.7 06/01/2012    CALCIUM 8.7 08/08/2021    BILITOT <0.2 08/08/2021    ALKPHOS 119 08/08/2021    AST 32 08/08/2021    ALT 37 08/08/2021     No results found for: PROTIME, INR  Lab Results   Component Value Date    TSH 1.480 03/14/2019    IRON 58 01/08/2014    TIBC 395 01/08/2014     No results found for: TRIG, HDL, LDLCALC, LDLDIRECT, LABVLDL  Lab Results   Component Value Date    LABAMPH Neg 08/08/2021    BARBSCNU Neg 08/08/2021    LABBENZ Neg 08/08/2021    LABBENZ NT DTCD 03/01/2013    LABMETH Neg 08/08/2021    OPIATESCREENURINE Neg 08/08/2021    PHENCYCLIDINESCREENURINE POSITIVE 08/08/2021    ETOH <10 08/08/2021     No results found for: LITHIUM, DILFRTOT, VALPROATE    Assessment and Plan:      1. Seizure ([address]  - lamoTRIgine (LAMICTAL) 150 MG tablet; Take 2 tablets by mouth 2 times daily  Dispense: 120 tablet; Refill: 3  - CBC; Future  - Comprehensive Metabolic Panel; Future  - Phenytoin Level, Total; Future  - Lamotrigine Level; Future  - Magnesium; Future  - Urine Drug Screen; Future    2. Temporal lobe epilepsy (HCC)  - phenytoin (PHENYTEK) 300 MG ER capsule; Take 1 capsule by mouth 2 times daily  Dispense: 120 capsule; Refill: 3    3. Syncope, unspecified syncope type    4. Bipolar disorder, in partial remission, most recent episode depressed [facility])  - [facility], Matthew Martinez MD, [place]    5. Suicidal ideation    6. Noncompliance    -Patient contacted today via telephone for virtual visit for follow-up on temporal lobe epilepsy. She does have history of previous EEG in the past that showed temporal lobe focus. Patient has longstanding history of bipolar disorder, medication noncompliance and illicit drug use as well. She has not been compliant with her follow-up here in neurology. Patient has not been following with psychiatry currently as well either as she was not happy with her services. Primary care has been managing her psychiatric medications and she reports that she was not tolerating them well therefore is discontinued all of her psychiatric medications. Medication she is currently taking her phenytoin and Lamictal and she reports daily compliance with these.   She was last seen in the emergency room on 8/8/2021 for breakthrough seizure and signed out AMA at that time.  -Patient reports today that she is having episodes of syncope, significant dizziness as well as severe depression and suicidal ideation. Patient very tearful on the telephone. When asked what her plan was she reported \"you do not want to know\". I advised her to come to the emergency room and she does not have a ride. She was then advised to call 911. Offered to call 911 for her but she declined. Emergency room was contacted and report was given. [place] [employer] was contacted and notified to do a welfare check on patient. No follow-ups on file.     BRUCE Cisse - CNP     Collaborating Physician Dr Jazmine Greene

## 2021-11-01 NOTE — ED PROVIDER NOTES
[facility] ED  eMERGENCYdEPARTMENT eNCOUnter      Pt Name: Jerica Rodríguez  MRN: [id number]  [place]fantionette 1995  Date of evaluation: 11/1/2021  Maryan Luong MD    CHIEF COMPLAINT           HPI  Jerica Rodríguez is a 32 y.o. female per chart review has a h/o bipolar, depression/anxiety, seizures presents to the ED with depression, SI.  Pt notes gradual onset, moderate, constant, worsening depression x 2 months. +SI. Pt denies HI, AVH. Pt denies fever, n/v, cp, sob, ab pain, dysuria, diarrhea. ROS  Review of Systems   Constitutional: Negative for activity change, chills and fever. HENT: Negative for ear pain and sore throat. Eyes: Negative for visual disturbance. Respiratory: Negative for cough and shortness of breath. Cardiovascular: Negative for chest pain, palpitations and leg swelling. Gastrointestinal: Negative for abdominal pain, diarrhea, nausea and vomiting. Genitourinary: Negative for dysuria. Musculoskeletal: Negative for back pain. Skin: Negative for rash. Neurological: Negative for dizziness and weakness. Psychiatric/Behavioral: Positive for decreased concentration, dysphoric mood, self-injury and suicidal ideas. Except as noted above the remainder of the review of systems was reviewed and negative. 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 CURRENTMEDICATIONS       Previous Medications    ACYCLOVIR (ZOVIRAX) 400 MG TABLET    Take 1 tablet by mouth 3 times daily for 5 days Start at symptoms.     LAMOTRIGINE (LAMICTAL) 150 MG TABLET    Take 2 tablets by mouth 2 times daily    OLANZAPINE (ZYPREXA) 5 MG TABLET    Take 5 mg by mouth nightly    PHENYTOIN (PHENYTEK) 300 MG ER CAPSULE    Take 1 capsule by mouth 2 times daily       ALLERGIES Stress:     Feeling of Stress :    Social Connections:     Frequency of Communication with Friends and Family:     Frequency of Social Gatherings with Friends and Family:     Attends Confucianism Services:     Active Member of Clubs or Organizations:     Attends Club or Organization Meetings:     Marital Status:    Intimate Partner Violence:     Fear of Current or Ex-Partner:     Emotionally Abused:     Physically Abused:     Sexually Abused:          PHYSICAL EXAM       ED Triage Vitals   BP Temp Temp src Pulse Resp SpO2 Height Weight   11/01/21 1016 11/01/21 1016 -- 11/01/21 1016 11/01/21 1016 11/01/21 1016 11/01/21 1019 11/01/21 1019   (!) 161/88 97.7 °F (36.5 °C)  104 16 99 % 5' 3\" (1.6 m) 160 lb (72.6 kg)       Physical Exam  Vitals and nursing note reviewed. Constitutional:       Appearance: She is well-developed. HENT:      Head: Normocephalic. Right Ear: External ear normal.      Left Ear: External ear normal.   Eyes:      Conjunctiva/sclera: Conjunctivae normal.      Pupils: Pupils are equal, round, and reactive to light. Cardiovascular:      Rate and Rhythm: Normal rate and regular rhythm. Heart sounds: Normal heart sounds. Pulmonary:      Effort: Pulmonary effort is normal.      Breath sounds: Normal breath sounds. Abdominal:      General: Bowel sounds are normal. There is no distension. Palpations: Abdomen is soft. Tenderness: There is no abdominal tenderness. Musculoskeletal:         General: Normal range of motion. Cervical back: Normal range of motion and neck supple. Skin:     General: Skin is warm and dry. Neurological:      Mental Status: She is alert and oriented to person, place, and time. Psychiatric:      Comments: Flat affect. No flight of ideas, circumferential thoughts, pressured speech. MDM  33 yo female presents to the ED with depression, SI.  Pt is afebrile, hemodynamically stable. Pt requesting her home meds.   Pt given PO dilantin and PO lamictal.  Pt also given PO vistaril and PO tylenol for anxiety and a headache. EKG shows NSR with HR 73, normal axis, normal intervals, no ST changes. Labs remarkable for WBC 11, dilantin 27.  UA shows UTI. Tox positive for PCP and THC. Pt given PO cipro in the ED. Pt is medically clear for psych evaluation. Case discussed with Dr. Lauryn Denis (psych) who recommended admission. Pt admitted to psych in stable condition. FINAL IMPRESSION      1.  Bipolar 1 disorder [facility])          DISPOSITION/PLAN   DISPOSITION Decision To Admit 11/01/2021 02:54:09 PM        DISCHARGE MEDICATIONS:  [unfilled]         Adeel Cortes MD(electronically signed)  Attending Emergency Physician            Adeel Cortes MD  11/01/21 [id number]

## 2021-11-01 NOTE — ED NOTES
Dr Devendra Kaur to bedside for head to toe exam.     Danielle Farrell.  [facility]  11/01/21 [id number]

## 2021-11-02 LAB
ALBUMIN SERPL-MCNC: 4.4 G/DL (ref 3.5–4.6)
ALP BLD-CCNC: 113 U/L (ref 40–130)
ALT SERPL-CCNC: 51 U/L (ref 0–33)
ANION GAP SERPL CALCULATED.3IONS-SCNC: 12 MEQ/L (ref 9–15)
AST SERPL-CCNC: 25 U/L (ref 0–35)
BILIRUB SERPL-MCNC: <0.2 MG/DL (ref 0.2–0.7)
BUN BLDV-MCNC: 8 MG/DL (ref 6–20)
CALCIUM SERPL-MCNC: 8.8 MG/DL (ref 8.5–9.9)
CHLORIDE BLD-SCNC: 102 MEQ/L (ref 95–107)
CO2: 23 MEQ/L (ref 20–31)
CREAT SERPL-MCNC: 0.66 MG/DL (ref 0.5–0.9)
GFR AFRICAN AMERICAN: >60
GFR NON-AFRICAN AMERICAN: >60
GLOBULIN: 2.8 G/DL (ref 2.3–3.5)
GLUCOSE BLD-MCNC: 99 MG/DL (ref 70–99)
POTASSIUM REFLEX MAGNESIUM: 4.5 MEQ/L (ref 3.4–4.9)
SODIUM BLD-SCNC: 137 MEQ/L (ref 135–144)
TOTAL PROTEIN: 7.2 G/DL (ref 6.3–8)

## 2021-11-02 PROCEDURE — 6370000000 HC RX 637 (ALT 250 FOR IP): Performed by: NURSE PRACTITIONER

## 2021-11-02 PROCEDURE — 80053 COMPREHEN METABOLIC PANEL: CPT

## 2021-11-02 PROCEDURE — 6370000000 HC RX 637 (ALT 250 FOR IP): Performed by: PSYCHIATRY & NEUROLOGY

## 2021-11-02 PROCEDURE — 99254 IP/OBS CNSLTJ NEW/EST MOD 60: CPT | Performed by: NURSE PRACTITIONER

## 2021-11-02 PROCEDURE — 93005 ELECTROCARDIOGRAM TRACING: CPT | Performed by: INTERNAL MEDICINE

## 2021-11-02 PROCEDURE — 36415 COLL VENOUS BLD VENIPUNCTURE: CPT

## 2021-11-02 PROCEDURE — 99223 1ST HOSP IP/OBS HIGH 75: CPT | Performed by: PSYCHIATRY & NEUROLOGY

## 2021-11-02 PROCEDURE — 1240000000 HC EMOTIONAL WELLNESS R&B

## 2021-11-02 PROCEDURE — 99254 IP/OBS CNSLTJ NEW/EST MOD 60: CPT

## 2021-11-02 RX ORDER — PHENYTOIN SODIUM 100 MG/1
200 CAPSULE, EXTENDED RELEASE ORAL 2 TIMES DAILY
Status: DISCONTINUED | OUTPATIENT
Start: 2021-11-02 | End: 2021-11-05 | Stop reason: HOSPADM

## 2021-11-02 RX ORDER — NICOTINE 21 MG/24HR
1 PATCH, TRANSDERMAL 24 HOURS TRANSDERMAL DAILY
Status: DISCONTINUED | OUTPATIENT
Start: 2021-11-02 | End: 2021-11-05 | Stop reason: HOSPADM

## 2021-11-02 RX ORDER — DIVALPROEX SODIUM 500 MG/1
500 TABLET, DELAYED RELEASE ORAL EVERY 12 HOURS SCHEDULED
Status: DISCONTINUED | OUTPATIENT
Start: 2021-11-02 | End: 2021-11-05 | Stop reason: HOSPADM

## 2021-11-02 RX ADMIN — ACETAMINOPHEN 650 MG: 325 TABLET ORAL at 17:37

## 2021-11-02 RX ADMIN — PHENYTOIN SODIUM 300 MG: 100 CAPSULE ORAL at 09:58

## 2021-11-02 RX ADMIN — ACETAMINOPHEN 650 MG: 325 TABLET ORAL at 13:38

## 2021-11-02 RX ADMIN — LAMOTRIGINE 300 MG: 200 TABLET ORAL at 09:58

## 2021-11-02 RX ADMIN — HYDROXYZINE PAMOATE 50 MG: 50 CAPSULE ORAL at 20:56

## 2021-11-02 RX ADMIN — HYDROXYZINE PAMOATE 50 MG: 50 CAPSULE ORAL at 08:03

## 2021-11-02 ASSESSMENT — PAIN SCALES - GENERAL
PAINLEVEL_OUTOF10: 10
PAINLEVEL_OUTOF10: 8
PAINLEVEL_OUTOF10: 3

## 2021-11-02 ASSESSMENT — ENCOUNTER SYMPTOMS
ABDOMINAL DISTENTION: 0
TROUBLE SWALLOWING: 0
CHEST TIGHTNESS: 0
NAUSEA: 0
COUGH: 0
VOMITING: 0
ABDOMINAL PAIN: 0
COLOR CHANGE: 0
WHEEZING: 0
SHORTNESS OF BREATH: 0

## 2021-11-02 ASSESSMENT — LIFESTYLE VARIABLES: HISTORY_ALCOHOL_USE: NO

## 2021-11-02 NOTE — CONSULTS
[place] [address] MEDICINE    HISTORY AND PHYSICAL EXAM    PATIENT NAME:  Alie Joshua    MRN:  [id number]  SERVICE DATE:  11/2/2021   SERVICE TIME:  10:19 AM    Primary Care Physician: Mackenzie Meyer PA-C         SUBJECTIVE  CHIEF COMPLAINT:  Medically appropriate for inpatient psychiatry admission. Consult for medical H/P encounter. HPI:  This is a 32 y.o. female with PMHx of seizure disorder, migraines, depression,  anxiety and noncompliance with medications who presented to emergency room for worsening depression. Tox positive for PCP and THC. Labs remarkable for WBC 11, dilantin 27.  UA shows UTI, patient given Cipro while in ED. No growth shown on culture. Patient medically cleared from emergency room for psychiatric care. Patient Seen, Chart, Labs, Radiologystudies, and Consults reviewed. Patient denies headache, chest pain, shortness of breath, N/V/D/C, fever/chills. PAST MEDICAL HISTORY:    Past Medical History:   Diagnosis Date    Anxiety     Herpes simplex virus (HSV) infection of vagina     Migraine     Seizures ([facility] [address]     Varicella      PAST SURGICAL HISTORY:  No past surgical history on file.   FAMILY HISTORY:    Family History   Problem Relation Age of Onset    Cancer Other         brain, breast, lung    Coronary Art Dis Other     High Blood Pressure Brother     Obesity Brother     Cancer Maternal Grandmother     Allergies Maternal Grandmother     Colon Cancer Maternal Grandmother     Breast Cancer Maternal Grandmother     Ovarian Cancer Maternal Grandmother     Other Maternal Grandmother         eye cancer, loss of left eye    Cancer Maternal Grandfather         lung    Heart Disease Maternal Grandfather     Uterine Cancer Mother     Ovarian Cancer Mother     Breast Cancer Mother     Cancer Mother         behind right eye    Heart Attack Mother     Migraines Mother     Cancer Maternal Aunt         lung     SOCIAL HISTORY:    Social History Socioeconomic History    Marital status: Single     Spouse name: Not on file    Number of children: Not on file    Years of education: Not on file    Highest education level: Not on file   Occupational History    Not on file   Tobacco Use    Smoking status: Current Every Day Smoker     Packs/day: 2.00     Years: 9.00     Pack years: 18.00     Types: Cigarettes    Smokeless tobacco: Never Used   Vaping Use    Vaping Use: Never used   Substance and Sexual Activity    Alcohol use: No    Drug use: Not Currently    Sexual activity: Yes     Partners: Male   Other Topics Concern    Not on file   Social History Narrative    Not on file     Social Determinants of Health     Financial Resource Strain: Low Risk     Difficulty of Paying Living Expenses: Not hard at all   Food Insecurity: No Food Insecurity    Worried About Running Out of Food in the Last Year: Never true    Antione of Food in the Last Year: Never true   Transportation Needs:     Lack of Transportation (Medical):      Lack of Transportation (Non-Medical):    Physical Activity:     Days of Exercise per Week:     Minutes of Exercise per Session:    Stress:     Feeling of Stress :    Social Connections:     Frequency of Communication with Friends and Family:     Frequency of Social Gatherings with Friends and Family:     Attends Sabianist Services:     Active Member of Clubs or Organizations:     Attends Club or Organization Meetings:     Marital Status:    Intimate Partner Violence:     Fear of Current or Ex-Partner:     Emotionally Abused:     Physically Abused:     Sexually Abused:      MEDICATIONS:    Current Facility-Administered Medications   Medication Dose Route Frequency Provider Last Rate Last Admin    nicotine (NICODERM CQ) 21 MG/24HR 1 patch  1 patch TransDERmal Daily Maximus Anguiano MD   1 patch at 11/02/21 0958    lamoTRIgine (LAMICTAL) tablet 300 mg  300 mg Oral BID Maximus Anguiano MD   300 mg at 11/02/21 0926  OLANZapine (ZYPREXA) tablet 5 mg  5 mg Oral Nightly Victor Manuel Wise MD        phenytoin (DILANTIN) ER capsule 300 mg  300 mg Oral BID Victor Manuel Wise MD   300 mg at 11/02/21 0958    acetaminophen (TYLENOL) tablet 650 mg  650 mg Oral Q4H PRN Victor Manuel Wise MD        magnesium hydroxide (MILK OF MAGNESIA) 400 MG/5ML suspension 30 mL  30 mL Oral Daily PRN Victor Manuel Wise MD        aluminum & magnesium hydroxide-simethicone (MAALOX) 200-200-20 MG/5ML suspension 30 mL  30 mL Oral PRN Victor Manuel Wise MD        haloperidol (HALDOL) tablet 5 mg  5 mg Oral Q6H PRN Victor Manuel Wise MD        Or    haloperidol lactate (HALDOL) injection 5 mg  5 mg IntraMUSCular Q6H PRN Victor Manuel Wise MD        benztropine mesylate (COGENTIN) injection 2 mg  2 mg IntraMUSCular BID PRN Victor Manuel Wise MD        hydrOXYzine (VISTARIL) capsule 50 mg  50 mg Oral Q6H PRN Victor Manuel Wise MD   50 mg at 11/02/21 0803    Or    hydrOXYzine (VISTARIL) injection 50 mg  50 mg IntraMUSCular Q6H PRN Victor Manuel Wise MD           ALLERGIES: Penicillins    REVIEW OF SYSTEM:   ROS as noted in HPI, 12 point ROS reviewed and otherwise negative. OBJECTIVE  PHYSICAL EXAM: /85   Pulse 95   Temp 97.3 °F (36.3 °C)   Resp 18   Ht 5' 3\" (1.6 m)   Wt 160 lb (72.6 kg)   LMP 10/01/2021   SpO2 100%   BMI 28.34 kg/m²   CONSTITUTIONAL:  awake, alert, cooperative, no apparent distress, and appears stated age  EYES:  Lids and lashes normal, pupils equal, round and reactive to light, extra ocular muscles intact, sclera clear, conjunctiva normal  ENT:  Normocephalic, without obvious abnormality, atraumatic, sinuses nontender on palpation, external ears without lesions, oral pharynx with moist mucus membranes, tonsils without erythema or exudates, gums normal and good dentition.   NECK:  Supple, symmetrical, trachea midline, no adenopathy, thyroid symmetric, not enlarged and no tenderness, skin normal  LUNGS:  No increased work of breathing, good air exchange, clear to auscultation bilaterally, no crackles or wheezing  CARDIOVASCULAR:  Normal apical impulse, regular rate and rhythm, normal S1 and S2, no S3 or S4, and no murmur noted  ABDOMEN:  No scars, normal bowel sounds, soft, non-distended, non-tender, no masses palpated, no hepatosplenomegally  MUSCULOSKELETAL:  There is no redness, warmth, or swelling of the joints. Full range of motion noted. Motor strength is 5 out of 5 all extremities bilaterally. Tone is normal.  NEUROLOGIC:  Awake, alert, oriented to name, place and time. Cranial nerves II-XII are grossly intact. Motor is 5 out of 5 bilaterally. Sensory is intact.  gait is normal.  SKIN:  no bruising or bleeding, normal skin color, texture, turgor, no redness, warmth, or swelling and no jaundice    DATA:     Diagnostic tests reviewed for today's visit:    Most recent labs and imaging results reviewed. VTE Prophylaxis:     ASSESSMENT AND PLAN  Active Problems:    Bipolar 1 disorder (HCC)  Plan: Patient admitted to behavorial health for evaluation and treatment     H/o seizure disorder: continue PO dilantin and PO Lamictal    Noncompliance with medications: Counseled on importance adherence to medications and need for control of chronic illnesses to decrease morbidity and mortality    This is only a history and physical examination and not medical management. The patient is to contact and follow up with their primary care physician and go over any abnormal labs, imaging, findings, medical concerns, or conditions that we have and have not addressed during this encounter.     Plan of care discussed with: patient    SIGNATURE: BRUCE Quintanilla CNP  DATE: November 2, 2021  TIME: 10:19 AM

## 2021-11-02 NOTE — PROGRESS NOTES
Behavioral Services  Medicare Certification Upon Admission    I certify that this patient's inpatient psychiatric hospital admission is medically necessary for:    [x] (1) Treatment which could reasonably be expected to improve this patient's condition,       [x] (2) Or for diagnostic study;     AND     [x](2) The inpatient psychiatric services are provided while the individual is under the care of a physician and are included in the individualized plan of care.     Estimated length of stay/service 3-5 days    Plan for post-hospital care OP care    Electronically signed by Porter Pedraza MD on 11/2/2021 at 10:23 AM

## 2021-11-02 NOTE — PROGRESS NOTES
Pt isolating to room. Pt sad, tearful, during interview. Pt voiced menstrual cramps, medicated with Tylenol. Pt reports showering today. Pt reports good appetite. Pt reports good sleep. Pt rates anxiety 4/10. Pt rates depression 5/10. On a scale from 1 through 10, 10 being the highest.Pt denies SI, HI and A/V hallucinations. Will continue to monitor.
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
## 2021-11-02 NOTE — PROGRESS NOTES
Awaken pt and explained and gave am meds, pt denied questions , reports the vistaril that was given this am was very helpful.

## 2021-11-02 NOTE — PROGRESS NOTES
Pt. refused to attend the 1000 skills group, despite staff encouragement. Electronically signed by Delmas Runner, [address] on 11/2/2021 at 12:52 PM

## 2021-11-02 NOTE — SUICIDE SAFETY PLAN
SAFETY PLAN    A suicide Safety Plan is a document that supports someone when they are having thoughts of suicide. Warning Signs that indicate a suicidal crisis may be developing: What (situations, thoughts, feelings, body sensations, behaviors, etc.) do you experience that lets you know you are beginning to think about suicide? 1. Increase frequency of feeling anxious  2. Can't stop \"flood\" of thoughts      Internal Coping Strategies:  What things can I do (relaxation techniques, hobbies, physical activities, etc.) to take my mind off my problems without contacting another person? 1. Listen to music  2. write  3. Journal    People and social settings that provide distraction: Who can I call or where can I go to distract me? 1. Name: Jose Dodd Phone: # in phone  2. Name: my son    3. Place:  Driving around             People whom I can ask for help: Who can I call when I need help - for example, friends, family, clergy, someone else? 1. Name:  Willlarissa Khan          Phone: [phone]  2. Name:  Dave Johnsonock    Phone: [phone]  3. Name: Iram Ryan    Phone: # in phone    Professionals or [facility] I can contact during a crisis: Who can I call for help - for example, my doctor, my psychiatrist, my psychologist, a mental health provider, a suicide hotline? 1. Suicide Prevention Lifeline: 4-529-914-TALK (4448)    2. [address] [place] [address] Emergency Services -  for example, [address] [place] [address] suicide hotline, [address] [place] [address] Avenue: [facility]  Emergency Services Address: [address] [place] [address]  Emergency Services Phone: [phone]    3. Call 499    Making the environment safe: How can I make my environment (house/apartment/living space) safer? For example, can I remove guns, medications, and other items? 1. Take medication  2.  Keep appointments

## 2021-11-02 NOTE — PROGRESS NOTES
Patient did not attend group despite staff encouragement.   Electronically signed by Francis Urbano on 11/1/2021 at 10:31 PM

## 2021-11-02 NOTE — PROGRESS NOTES
Patient presents very tearful and depressed. She describes a struggle being here and expressed just wanting to isolate. She admits that she has been feeling suicidal. When asked if she still feels that way she looked down and did not want to answer. She describes a difficult childhood and being estranged from her bio mother for 7 years. She spoke of building a life with her SO who unexpectedly ended his life by hanging himself. She saw his body hanging and has noghtmares and flashbacks. She lives with his mother, his brother and their child. Energy and concentration are low. No HI, and no hallucinations observed or reported. She describes many losses and much financial struggle. She is hopeles and helpless. She was oriented to the unit and intermittently erups into tears. She is cooperative.

## 2021-11-02 NOTE — PROGRESS NOTES
Patient did not attend group despite staff encouragement.   Electronically signed by Lenny Jean on 11/2/2021 at 5:25 PM

## 2021-11-02 NOTE — PROGRESS NOTES
Patient did not attend group despite staff encouragement.   Electronically signed by Veronica Osorio on 11/1/2021 at 8:03 PM

## 2021-11-02 NOTE — PROGRESS NOTES
Patient arrived to [address] [place] [address] accompanied by [employer]. Contraband and skin check both negative.

## 2021-11-02 NOTE — CARE COORDINATION
Brief Intervention and Referral to Treatment Summary    Patient was provided PHQ-9, AUDIT and DAST Screening:      PHQ-9 Score: 24  AUDIT Score:   0   DAST Score:    0  (tox pos. For THC, high levels of dextromethorphan)    Patients substance use is considered     Low Risk/Healthy x   Moderate Risk  Harmful  Dependent    Patients depression is considered:     Minimal  Mild   Moderate  Moderately Severe  Severe x    Brief Education Was Provided N/A      Pt denies use  Patient was receptive  Patient was not receptive      Brief Intervention Is Provided (Only for AUDIT or DAST)  N/A Patient denies use. Patient reports readiness to decrease and/or stop use and a plan was discussed   Patient denies readiness to decrease and/or stop use and a plan was not discussed      Recommendations/Referrals for Brief and/or Specialized Treatment Provided to Patient   Patient denies drug and alcohol use. As a result, no recommendations or referrals were provided to the patient at this time. * (tox pos.  For THC, high levels of dextromethorphan) *

## 2021-11-02 NOTE — PROGRESS NOTES
Pt. declined to attend the 0900 community meeting, despite staff encouragement. Electronically signed by Stephania Nguyen [address] on 11/2/2021 at 9:37 AM

## 2021-11-02 NOTE — H&P
[address] [place] [address] Department of Psychiatry    History and Physical - Adult         CHIEF COMPLAINT:  Depression SI    History obtained from:  patient    Patient was seen after discussing with the treatment team and reviewing the chart        CIRCUMSTANCES OF ADMISSION:     Pt lives with the her 3/6 year old son and  mother and brother of her  significant other. Significant other killed himself by hanging. Pt saw significant other hanged. Pt reports frequent nightmare and worsening depression since that time. Pt reports thoughts of suicide with a plan to jump from  A specific building \"tall enough to damage my body to be to s,ch to come back from or be hit by a train at night. She denies HI, She reports extreme sadness and anxiety. She denies hallucinations of any kind. There is no delusional thought expressed. She is currently off all psychotropics saying that the last psychotropic was  zyprexa  which she stopped about ten days ago she said due to , 'dizziness\" and fear that it might cause seizures. HISTORY OF PRESENT ILLNESS:      The patient is a 32 y.o. female single live with son, 332 year old and his grandmother, uncle with significant past history of bipolar depression and PTSD    Pt was seeing psychiatrist at [facility] a year ago. Was on abilify for several years. Did not like the care over there and so started seeing PCP. Was trying various medication including, abilify, buspar, switched abilify to seroquel and xanax, then to zyprexa and ativan. Pt had side effects from medication with dizziness and fall and so quit taking medication 10 days ago.      Pt has been getting worse last few weeks  Severity: Rating mood to be around 1/10 (10- good)  Quality:melancholic  Worse in the morning  Content: Hopeless, worthless and helpless feeling  Suicidal thoughts - was thinking of jumping off the window  Associated symptoms:  Poor concentration, anhedonia, decrease motivation  Sleep and appetite- poor    Stressors:good friend leaving to [place], pt not having a place by herself, no car and no job due to transportation. Significant other committed suicide sept 2019 by hanging self. Still grieving the loss of her partner    9 year seizure history with grand mal type. No seizure for past 2 1/2 months. The patient is not currently receiving care for the above psychiatric illness. Medications Prior to Admission:   Medications Prior to Admission: lamoTRIgine (LAMICTAL) 150 MG tablet, Take 2 tablets by mouth 2 times daily  phenytoin (PHENYTEK) 300 MG ER capsule, Take 1 capsule by mouth 2 times daily  OLANZapine (ZYPREXA) 5 MG tablet, Take 5 mg by mouth nightly  acyclovir (ZOVIRAX) 400 MG tablet, Take 1 tablet by mouth 3 times daily for 5 days Start at symptoms. Compliance:no    Psychiatric Review of Systems       Depression: yes     Saranya or Hypomania:  Not currently but has been manic in the past     Panic Attacks:  yes      Phobias:  no     Obsessions and Compulsions:  no     PTSD : yes     Hallucinations:  no     Delusions:  no    Substance Abuse History:  ETOH: no   Marijuana: occasional use  Opiates: no  Other Drugs: no      Past Psychiatric History:  Prior Diagnosis:  Bipolar depression PTSD  Psychiatrist: no   Therapist:yes  Hospitalization: yes- 4 years ago  Hx of Suicidal Attempts: yes as teenager overdosed  Hx of violence:  no  ECT: no  Previous discontinued Psychiatric Med Trials: as above    Past Medical History:        Diagnosis Date    Anxiety     Herpes simplex virus (HSV) infection of vagina     Migraine     Seizures ([facility] [address]     Varicella        Past Surgical History:    No past surgical history on file.     Allergies:   Penicillins    Family History  Family History   Problem Relation Age of Onset    Cancer Other         brain, breast, lung    Coronary Art Dis Other     High Blood Pressure Brother     Obesity Brother     Cancer Maternal Grandmother     Allergies Maternal Grandmother     Colon Cancer Maternal Grandmother     Breast Cancer Maternal Grandmother     Ovarian Cancer Maternal Grandmother     Other Maternal Grandmother         eye cancer, loss of left eye    Cancer Maternal Grandfather         lung    Heart Disease Maternal Grandfather     Uterine Cancer Mother     Ovarian Cancer Mother     Breast Cancer Mother     Cancer Mother         behind right eye    Heart Attack Mother     Migraines Mother     Cancer Maternal Aunt         lung         Social History:  Born [place] Raised[place]  Describes Childhood:   supportive  Education: [employer], some college  Employment: Unemployed, seeking work  Relationships: single  Children: 1  Current Support: extended family    Legal Hx: none  Access to weapons?:  No      EXAMINATION:    REVIEW OF SYSTEMS:    ROS:  [x] All negative/unchanged except if checked.  Explain positive(checked items) below:  [] Constitutional  [] Eyes  [] Ear/Nose/Mouth/Throat  [] Respiratory  [] CV  [] GI  []   [] Musculoskeletal  [] Skin/Breast  [] Neurological  [] Endocrine  [] Heme/Lymph  [] Allergic/Immunologic    Explanation:     Vitals:  /85   Pulse 95   Temp 97.3 °F (36.3 °C)   Resp 18   Ht 5' 3\" (1.6 m)   Wt 160 lb (72.6 kg)   LMP 10/01/2021   SpO2 100%   BMI 28.34 kg/m²      Neurologic Exam:   Muscle Strength & Tone: full ROM  Gait: normal gait   Involuntary Movements: No    Mental Status Examination:    Level of consciousness:  within normal limits   Appearance:  ill-appearing  Behavior/Motor:  psychomotor retardation  Attitude toward examiner:  cooperative  Speech:  slow   Mood: constricted, decreased range and depressed  Affect:  mood congruent  Thought processes:  slow   Thought content:  Suicidal Ideation:  active  Delusions:  no evidence of delusions  Perceptual Disturbance:  denies any perceptual disturbance  Cognition:  oriented to person, place, and time   Concentration poor  Memory intact  Insight poor   Judgement poor   Fund of Knowledge limited    Mini Mental Status 30/30      DIAGNOSIS:     Bipolar I disorder; depressed episode and severe       RISK ASSESSMENT:    SUICIDE RISK ASSESSMENT: high  HOMICIDE: low  AGITATION/VIOLENCE: low  ELOPEMENT: low    LABS: REVIEWED TODAY:  Recent Labs     11/01/21  1125   WBC 11.1*   HGB 11.3*   *     Recent Labs     11/01/21  1125 11/02/21  0726    137   K 3.8 4.5    102   CO2 23 23   BUN 8 8   CREATININE 0.60 0.66   GLUCOSE 86 99     Recent Labs     11/01/21  1125 11/02/21  0726   BILITOT <0.2 <0.2   ALKPHOS 109 113   AST 33 25   ALT 66* 51*     Lab Results   Component Value Date    LABAMPH Neg 11/01/2021    BARBSCNU Neg 11/01/2021    LABBENZ Neg 11/01/2021    LABBENZ NT DTCD 03/01/2013    LABMETH Neg 11/01/2021    OPIATESCREENURINE Neg 11/01/2021    PHENCYCLIDINESCREENURINE POSITIVE 11/01/2021    ETOH <10 11/01/2021     Lab Results   Component Value Date    TSH 0.934 11/01/2021     No results found for: LITHIUM  Lab Results   Component Value Date    CBMZ 2.1 (L) 08/08/2021     No results found for: LITHIUM, VALPROATE    FURTHER LABS ORDERED :      Radiology   No results found. EKG: TRACING REVIEWED    TREATMENT PLAN:    Risk Management:  close watch and suicide risk    Collateral Information:  Will obtain collateral information from the family or friends. Will obtain medical records as appropriate from out patient providers  Will consult the hospitalist for a physical exam to rule out any co-morbid physical condition. Home medication Reconciled       New Medications started during this admission :    See orders  Prn Haldol 5mg and Vistaril 50mg q6hr for extreme agitation. Trazodone as ordered for insomnia  Vistaril as ordered for anxiety  Discussed with the patient risk, benefit, alternative and common side effects for the  proposed medication treatment. Patient is consenting to the treatment.     Psychotherapy:   Encourage participation in milieu and group

## 2021-11-02 NOTE — CONSULTS
[facility] Neurology Consult Note  Name: Hansa Tariq  Age: 32 y.o. Gender: female  CodeStatus: Full Code  Allergies: Penicillins    Chief Complaint:Suicidal and Psychiatric Evaluation    Primary Care Provider: Eileen Donaldson PA-C  InpatientTreatment Team: Treatment Team: Attending Provider: Steven Caruso MD; Consulting Physician: Marco A Acevedo, BRUCE - CNP; Consulting Physician: Vani Demarco MD; Patient Care Tech: Katiuska Bailey; Registered Nurse: BRUCE Lopez - CNS; : Andra Ibarra MSW, LSW; LPN: Alize Davis LPN; Tech: Gregary Runner  Admission Date: 11/1/2021      HPI   Consulting provider: Dr. Esteban Gunter    Pt seen and examined on behavioral health unit for neurology consult. Patient is a 71-year-old  female with past medical history of migraine, temporal lobe epilepsy, HSV, anxiety, tobacco abuse who is well-known to our service as we are following her for her seizure disorder. She had virtual appointment with myself on 11/1/2021 for follow-up on her temporal lobe epilepsy. At the time of our virtual visit patient was on phenytoin 300 mg twice daily and Lamictal 300 mg twice daily for treatment of her temporal lobe epilepsy. She reported daily compliance with this. Last seizure was on 8/8/2021 for which she was seen at [facility] [place] emergency room with recommendations for admission but patient signed out AMA at that time. Patient has longstanding history of bipolar disorder, medication noncompliance and illicit drug use as well. She has not been compliant with her follow-up in neurology. Patient had not been following with psychiatry currently as well either as she was not happy with her services. Primary care had been managing her psychiatric medications and she reports that she was not tolerating them well therefore is discontinued all of her psychiatric medications. During our virtual visit yesterday patient complained of severe depression and was very tearful. She voiced suicidal ideation with plan. She was directed to call 911 which she did and was brought to the emergency room. She was subsequently admitted and diagnosed with bipolar disorder. Of note patient's previous significant other killed himself by hanging for which patient saw him hanged. She has significant nightmares and worsening depression since. Of note patient was also complaining during her virtual visit of ongoing severe dizziness with possible syncopal events.     Lab/diagnostic testing: Urine tox screen positive for cannabinoid and PCP, urinalysis cloudy with trace ketones and large leukocyte Estrace, urine micro with greater than 100 WBCs, urine culture negative thus far, COVID-19 not detected, phenytoin level 27.0, acetaminophen level less than 5, WBCs 11.1, hemoglobin 11.3, hematocrit 35.9, platelets 208, CK 70, sodium 136, potassium 3.8, chloride 101, CO2 23, anion gap 12, glucose 86, BUN 8, creatinine 0.60, calcium 9, total protein 7.4, albumin 4.5, alk phos 109, ALT 66, AST 33, ethanol less than 10, total cholesterol 256 with LDL of 468, salicylate less than 0.7, TSH 0.934,  Vitals:    11/02/21 0858   BP: 124/85   Pulse: 95   Resp:    Temp:    SpO2: 100%      Family History   Problem Relation Age of Onset    Cancer Other         brain, breast, lung    Coronary Art Dis Other     High Blood Pressure Brother     Obesity Brother     Cancer Maternal Grandmother     Allergies Maternal Grandmother     Colon Cancer Maternal Grandmother     Breast Cancer Maternal Grandmother     Ovarian Cancer Maternal Grandmother     Other Maternal Grandmother         eye cancer, loss of left eye    Cancer Maternal Grandfather         lung    Heart Disease Maternal Grandfather     Uterine Cancer Mother     Ovarian Cancer Mother     Breast Cancer Mother     Cancer Mother         behind right eye    Heart Attack Mother     Migraines Mother     Cancer Maternal Aunt         lung     Social History     Socioeconomic History    Marital status: Single     Spouse name: Not on file    Number of children: Not on file    Years of education: Not on file    Highest education level: Not on file   Occupational History    Not on file   Tobacco Use    Smoking status: Current Every Day Smoker     Packs/day: 2.00     Years: 9.00     Pack years: 18.00     Types: Cigarettes    Smokeless tobacco: Never Used   Vaping Use    Vaping Use: Never used   Substance and Sexual Activity    Alcohol use: No    Drug use: Not Currently    Sexual activity: Yes     Partners: Male   Other Topics Concern    Not on file   Social History Narrative    Not on file     Social Determinants of Health     Financial Resource Strain: Low Risk     Difficulty of Paying Living Expenses: Not hard at all   Food Insecurity: No Food Insecurity    Worried About Running Out of Food in the Last Year: Never true    Ran Out of Food in the Last Year: Never true   Transportation Needs:     Lack of Transportation (Medical):     Lack of Transportation (Non-Medical):    Physical Activity:     Days of Exercise per Week:     Minutes of Exercise per Session:    Stress:     Feeling of Stress :    Social Connections:     Frequency of Communication with Friends and Family:     Frequency of Social Gatherings with Friends and Family:     Attends Rastafarian Services: Active Member of Clubs or Organizations:     Attends Club or Organization Meetings:     Marital Status:    Intimate Partner Violence:     Fear of Current or Ex-Partner:     Emotionally Abused:     Physically Abused:     Sexually Abused:        Review of Systems   Constitutional: Positive for fatigue. Negative for activity change, appetite change, chills and fever. HENT: Negative for hearing loss and trouble swallowing. Eyes: Negative for visual disturbance. Respiratory: Negative for cough, chest tightness, shortness of breath and wheezing. Cardiovascular: Negative for chest pain, palpitations and leg swelling. Gastrointestinal: Negative for abdominal distention, abdominal pain, nausea and vomiting. Genitourinary: Negative for difficulty urinating. Musculoskeletal: Negative for gait problem. Skin: Negative for color change and rash. Neurological: Positive for dizziness and syncope. Negative for tremors, seizures, facial asymmetry, speech difficulty, weakness, light-headedness, numbness and headaches. Psychiatric/Behavioral: Positive for decreased concentration, dysphoric mood and suicidal ideas. Negative for agitation, confusion and hallucinations. The patient is not nervous/anxious. Physical Exam  Vitals and nursing note reviewed. Constitutional:       General: She is not in acute distress. Appearance: She is not ill-appearing or diaphoretic. HENT:      Head: Normocephalic and atraumatic. Eyes:      Extraocular Movements: Extraocular movements intact. Pupils: Pupils are equal, round, and reactive to light. Cardiovascular:      Rate and Rhythm: Normal rate and regular rhythm. Pulmonary:      Effort: Pulmonary effort is normal. No respiratory distress. Breath sounds: Normal breath sounds. Abdominal:      General: Bowel sounds are normal.      Palpations: Abdomen is soft. Skin:     General: Skin is warm and dry. Neurological:      General: No focal deficit present. Mental Status: She is alert and oriented to person, place, and time. Cranial Nerves: Cranial nerves are intact. No cranial nerve deficit. Motor: No weakness, tremor, atrophy, abnormal muscle tone, seizure activity or pronator drift.       Coordination: Coordination normal. Finger-Nose-Finger Test normal.      Gait: Gait normal.      Deep Tendon Reflexes: Reflexes normal.               Medications:  Reviewed    Infusion Medications:    Scheduled Medications:    nicotine  1 patch TransDERmal Daily    lurasidone  20 mg Oral Dinner    lamoTRIgine  300 mg Oral BID    phenytoin  300 mg Oral BID     PRN Meds: acetaminophen, magnesium hydroxide, aluminum & magnesium hydroxide-simethicone, haloperidol **OR** haloperidol lactate, benztropine mesylate, hydrOXYzine **OR** hydrOXYzine    Labs:   Recent Labs     11/01/21  1125   WBC 11.1*   HGB 11.3*   HCT 35.9*   *     Recent Labs     11/01/21  1125 11/02/21  0726    137   K 3.8 4.5    102   CO2 23 23   BUN 8 8   CREATININE 0.60 0.66   CALCIUM 9.0 8.8     Recent Labs     11/01/21  1125 11/02/21  0726   AST 33 25   ALT 66* 51*   BILITOT <0.2 <0.2   ALKPHOS 109 113     No results for input(s): INR in the last 72 hours. Recent Labs     11/01/21  1125   CKTOTAL 70       Urinalysis:   Lab Results   Component Value Date    NITRU Negative 11/01/2021    WBCUA >100 11/01/2021    BACTERIA Negative 11/01/2021    RBCUA 0-2 11/01/2021    BLOODU Negative 11/01/2021    SPECGRAV 1.020 11/01/2021    GLUCOSEU Negative 11/01/2021    GLUCOSEU NEG 11/25/2011       Radiology:   Most recent    EEG No valid procedures specified. MRI of Brain Results for orders placed during the hospital encounter of 01/02/21    MRI BRAIN W WO CONTRAST    Narrative  MRI BRAIN W WO CONTRAST : 1/4/2021    CLINICAL HISTORY: Seizure. COMPARISON: Head CT 1/2/2021. TECHNIQUE: Multiplanar MR imaging of the head was performed without contrast.      FINDINGS:    There is no evidence of mesial temporal sclerosis, abnormal enhancement, infarct, intracranial hemorrhage, mass effect, midline shift, extra-axial collection, or hydrocephalus. There is no significant atrophy or white matter changes, for age. Impression  NO ACUTE INTRACRANIAL PROCESS OR EVIDENCE OF MESIAL TEMPORAL SCLEROSIS IDENTIFIED. No results found for this or any previous visit. MRA of the Head and Neck: No results found for this or any previous visit. No results found for this or any previous visit. No results found for this or any previous visit.                             CT of the Head: Results for orders placed during the hospital encounter of 05/03/21    [address]    Narrative  CT Brain. Contrast medium:  without contrast.. History:  Seizure. Hit head. .    Technical factors: CT imaging of the brain was obtained and formatted as 5 mm contiguous axial images. 2.5 mm contiguous axial images were obtained through the osseous structures. Sagittal and coronal reconstruction obtained during postprocessing. Comparison:  February 20, 2021, January 2, 2021. Findings:    Extra-axial spaces:  Normal.    Intracranial hemorrhage:  None. Ventricular system:  Normal for age. Basal Cisterns:  Normal.    Cerebral Parenchyma:  Normal.    Midline Shift:  None. Cerebellum:  Normal.    Paranasal sinuses and mastoid air cells:  Rounded soft tissue attenuation anterior right maxillary sinus. Suzy Couch    Visualized Orbits:  Normal.    Impression  Impression:    Right maxillary retention cyst.      All CT scans at this facility use dose modulation, iterative reconstruction, and/or weight based dosing when appropriate to reduce radiation dose to as low as reasonably achievable. No results found for this or any previous visit. No results found for this or any previous visit. Carotid duplex: No results found for this or any previous visit. No results found for this or any previous visit. No results found for this or any previous visit. Echo No results found for this or any previous visit. Assessment/Plan:  Patient admitted to the behavioral health unit for bipolar disorder with depression and suicidal ideation. She is well-known to our practice for diagnosis of Temporal lobe epilepsy for which patient was on phenytoin 300 mg twice daily and Lamictal 300 mg twice daily prior to admission. Patient's phenytoin levels are supratherapeutic which may be contributing to her complaints of ongoing dizziness therefore we will decrease phenytoin. Will recheck levels in 2 days.   We will also obtain orthostatic blood pressures. Of note patient's tox screen comes back positive for PCP but this is likely false positive secondary to Lamictal use. Patient's case was discussed with psychiatry and Lamictal will be discontinued and patient will be initiated on Depakote for treatment of both her bipolar disorder as well as epilepsy. Patient has slightly elevated ALT therefore will need to follow LFTs closely. We will obtain EEG.      Collaborating physicians: Dr Christofer Servin and Dr Woo    Electronically signed by BRUCE Friedman CNP on 11/2/2021 at 3:00 PM      I personally spoke with and examined the patient [full extraocular movements with no nystagmus or diplopia, horizontally], fluent speech with no dysarthria, obeying commands in all 4 limbs without focal deficit and contributed to medical decision making

## 2021-11-03 LAB
EKG ATRIAL RATE: 73 BPM
EKG P AXIS: 63 DEGREES
EKG P-R INTERVAL: 150 MS
EKG Q-T INTERVAL: 406 MS
EKG QRS DURATION: 100 MS
EKG QTC CALCULATION (BAZETT): 447 MS
EKG R AXIS: 59 DEGREES
EKG T AXIS: 53 DEGREES
EKG VENTRICULAR RATE: 73 BPM
URINE CULTURE, ROUTINE: NORMAL

## 2021-11-03 PROCEDURE — 1240000000 HC EMOTIONAL WELLNESS R&B

## 2021-11-03 PROCEDURE — 6370000000 HC RX 637 (ALT 250 FOR IP): Performed by: PSYCHIATRY & NEUROLOGY

## 2021-11-03 PROCEDURE — 99232 SBSQ HOSP IP/OBS MODERATE 35: CPT | Performed by: PSYCHIATRY & NEUROLOGY

## 2021-11-03 PROCEDURE — 93010 ELECTROCARDIOGRAM REPORT: CPT | Performed by: INTERNAL MEDICINE

## 2021-11-03 PROCEDURE — 6370000000 HC RX 637 (ALT 250 FOR IP): Performed by: NURSE PRACTITIONER

## 2021-11-03 RX ORDER — ZOLPIDEM TARTRATE 5 MG/1
5 TABLET ORAL NIGHTLY PRN
Status: DISCONTINUED | OUTPATIENT
Start: 2021-11-03 | End: 2021-11-05 | Stop reason: HOSPADM

## 2021-11-03 RX ORDER — IBUPROFEN 600 MG/1
600 TABLET ORAL EVERY 6 HOURS PRN
Status: DISCONTINUED | OUTPATIENT
Start: 2021-11-03 | End: 2021-11-05 | Stop reason: HOSPADM

## 2021-11-03 RX ADMIN — ACETAMINOPHEN 650 MG: 325 TABLET ORAL at 06:12

## 2021-11-03 RX ADMIN — IBUPROFEN 600 MG: 600 TABLET, FILM COATED ORAL at 20:42

## 2021-11-03 RX ADMIN — ZOLPIDEM TARTRATE 5 MG: 5 TABLET ORAL at 20:44

## 2021-11-03 RX ADMIN — HYDROXYZINE PAMOATE 50 MG: 50 CAPSULE ORAL at 09:18

## 2021-11-03 RX ADMIN — IBUPROFEN 600 MG: 600 TABLET, FILM COATED ORAL at 13:57

## 2021-11-03 RX ADMIN — ACETAMINOPHEN 650 MG: 325 TABLET ORAL at 11:25

## 2021-11-03 ASSESSMENT — PAIN SCALES - GENERAL
PAINLEVEL_OUTOF10: 8
PAINLEVEL_OUTOF10: 8
PAINLEVEL_OUTOF10: 7
PAINLEVEL_OUTOF10: 7

## 2021-11-03 NOTE — CARE COORDINATION
Clarified number with pt whom states that the number called earlier is correct  Called x2 pt at a dentist apt will need to call back later

## 2021-11-03 NOTE — PROGRESS NOTES
[facility] FOLLOW-UP NOTE       11/3/2021     Patient was seen and examined in person, Chart reviewed   Patient's case discussed with staff/team    Chief Complaint: Depression    Interim History:     No change in presentation  Still depressed and anxious  Pt stay with her family, son is being taken care of  Pt report feeling hopeless and worthless  Tired and lethargic  Pt still grieving the loss of her partner  She lost all her possession after his loss  Feel safe in the hospital    Appetite:   [] Normal/Unchanged  [] Increased  [x] Decreased      Sleep:       [] Normal/Unchanged  [x] Fair       [] Poor              Energy:    [x] Normal/Unchanged  [] Increased  [] Decreased        SI [] Present  [x] Absent    HI  []Present  [x] Absent     Aggression:  [] yes  [x] no    Patient is [x] able  [] unable to CONTRACT FOR SAFETY     PAST MEDICAL/PSYCHIATRIC HISTORY:   Past Medical History:   Diagnosis Date    Anxiety     Herpes simplex virus (HSV) infection of vagina     Migraine     Seizures ([facility] [address]     Varicella        FAMILY/SOCIAL HISTORY:  Family History   Problem Relation Age of Onset    Cancer Other         brain, breast, lung    Coronary Art Dis Other     High Blood Pressure Brother     Obesity Brother     Cancer Maternal Grandmother     Allergies Maternal Grandmother     Colon Cancer Maternal Grandmother     Breast Cancer Maternal Grandmother     Ovarian Cancer Maternal Grandmother     Other Maternal Grandmother         eye cancer, loss of left eye    Cancer Maternal Grandfather         lung    Heart Disease Maternal Grandfather     Uterine Cancer Mother     Ovarian Cancer Mother     Breast Cancer Mother     Cancer Mother         behind right eye    Heart Attack Mother     Migraines Mother     Cancer Maternal Aunt         lung     Social History     Socioeconomic History    Marital status: Single     Spouse name: Not on file    Number of children: Not on file    Years of education: Not on file    Highest education level: Not on file   Occupational History    Not on file   Tobacco Use    Smoking status: Current Every Day Smoker     Packs/day: 2.00     Years: 9.00     Pack years: 18.00     Types: Cigarettes    Smokeless tobacco: Never Used   Vaping Use    Vaping Use: Never used   Substance and Sexual Activity    Alcohol use: No    Drug use: Not Currently    Sexual activity: Yes     Partners: Male   Other Topics Concern    Not on file   Social History Narrative    Not on file     Social Determinants of Health     Financial Resource Strain: Low Risk     Difficulty of Paying Living Expenses: Not hard at all   Food Insecurity: No Food Insecurity    Worried About Running Out of Food in the Last Year: Never true    Antione of Food in the Last Year: Never true   Transportation Needs:     Lack of Transportation (Medical):  Lack of Transportation (Non-Medical):    Physical Activity:     Days of Exercise per Week:     Minutes of Exercise per Session:    Stress:     Feeling of Stress :    Social Connections:     Frequency of Communication with Friends and Family:     Frequency of Social Gatherings with Friends and Family:     Attends Oriental orthodox Services:     Active Member of Clubs or Organizations:     Attends Club or Organization Meetings:     Marital Status:    Intimate Partner Violence:     Fear of Current or Ex-Partner:     Emotionally Abused:     Physically Abused:     Sexually Abused:            ROS:  [x] All negative/unchanged except if checked.  Explain positive(checked items) below:  [] Constitutional  [] Eyes  [] Ear/Nose/Mouth/Throat  [] Respiratory  [] CV  [] GI  []   [] Musculoskeletal  [] Skin/Breast  [] Neurological  [] Endocrine  [] Heme/Lymph  [] Allergic/Immunologic    Explanation:     MEDICATIONS:    Current Facility-Administered Medications:     ibuprofen (ADVIL;MOTRIN) tablet 600 mg, 600 mg, Oral, Q6H PRN, Gianna Saucedo Stefani Joseph MD    nicotine (NICODERM CQ) 21 MG/24HR 1 patch, 1 patch, TransDERmal, Daily, July Back MD, 1 patch at 11/03/21 0919    lurasidone (LATUDA) tablet 20 mg, 20 mg, Oral, Gallito Wei MD, 20 mg at 11/02/21 1738    divalproex (DEPAKOTE) DR tablet 500 mg, 500 mg, Oral, 2 times per day, BRUCE Mckeon CNP, 500 mg at 11/03/21 0841    phenytoin (DILANTIN) ER capsule 200 mg, 200 mg, Oral, BID, BRUCE Mckeon - CNP, 200 mg at 11/03/21 0919    influenza quadrivalent split vaccine (FLUZONE;FLUARIX;FLULAVAL;AFLURIA) injection 0.5 mL, 0.5 mL, IntraMUSCular, Prior to discharge, July Back MD    acetaminophen (TYLENOL) tablet 650 mg, 650 mg, Oral, Q4H PRN, July Back MD, 650 mg at 11/03/21 1125    magnesium hydroxide (MILK OF MAGNESIA) 400 MG/5ML suspension 30 mL, 30 mL, Oral, Daily PRN, July Back MD    aluminum & magnesium hydroxide-simethicone (MAALOX) 200-200-20 MG/5ML suspension 30 mL, 30 mL, Oral, PRN, July Back MD    haloperidol (HALDOL) tablet 5 mg, 5 mg, Oral, Q6H PRN **OR** haloperidol lactate (HALDOL) injection 5 mg, 5 mg, IntraMUSCular, Q6H PRN, July Back MD    benztropine mesylate (COGENTIN) injection 2 mg, 2 mg, IntraMUSCular, BID PRN, July Back MD    hydrOXYzine (VISTARIL) capsule 50 mg, 50 mg, Oral, Q6H PRN, 50 mg at 11/03/21 0918 **OR** hydrOXYzine (VISTARIL) injection 50 mg, 50 mg, IntraMUSCular, Q6H PRN, July Back MD      Examination:  /69   Pulse 75   Temp 98.6 °F (37 °C) (Oral)   Resp 18   Ht 5' 3\" (1.6 m)   Wt 160 lb (72.6 kg)   LMP 10/01/2021   SpO2 100%   BMI 28.34 kg/m²   Gait - steady  Medication side effects(SE): no    Mental Status Examination:    Level of consciousness:  within normal limits   Appearance:  fair grooming and fair hygiene  Behavior/Motor:  psychomotor retardation  Attitude toward examiner:  cooperative  Speech:  slow   Mood: decreased range and depressed  Affect: blunted  Thought processes:  slow   Thought content: hopeless and worthless  Suicidal Ideation:  passive  Delusions:  no evidence of delusions  Perceptual Disturbance:  denies any perceptual disturbance  Cognition:  oriented to person, place, and time   Concentration distractible  Insight fair   Judgement fair     ASSESSMENT:   Patient symptoms are:  [] Well controlled  [] Improving  [] Worsening  [x] No change      Diagnosis:   Principal Problem:    Bipolar 1 disorder ([facility] [address]  Active Problems:    Temporal lobe epilepsy, non-refractory ([facility]  Resolved Problems:    * No resolved hospital problems. *      LABS:    Recent Labs     11/01/21  1125   WBC 11.1*   HGB 11.3*   *     Recent Labs     11/01/21  1125 11/02/21  0726    137   K 3.8 4.5    102   CO2 23 23   BUN 8 8   CREATININE 0.60 0.66   GLUCOSE 86 99     Recent Labs     11/01/21  1125 11/02/21  0726   BILITOT <0.2 <0.2   ALKPHOS 109 113   AST 33 25   ALT 66* 51*     Lab Results   Component Value Date    LABAMPH Neg 11/01/2021    BARBSCNU Neg 11/01/2021    LABBENZ Neg 11/01/2021    LABBENZ NT DTCD 03/01/2013    LABMETH Neg 11/01/2021    OPIATESCREENURINE Neg 11/01/2021    PHENCYCLIDINESCREENURINE POSITIVE 11/01/2021    ETOH <10 11/01/2021     Lab Results   Component Value Date    TSH 0.934 11/01/2021     No results found for: LITHIUM  Lab Results   Component Value Date    CBMZ 2.1 (L) 08/08/2021       Treatment Plan:  Reviewed current Medications with the patient. Medication as ordered  Risks, benefits, side effects, drug-to-drug interactions and alternatives to treatment were discussed. Collateral information:   CD evaluation  Encourage patient to attend group and other milieu activities.   Discharge planning discussed with the patient and treatment team.    PSYCHOTHERAPY/COUNSELING:  [x] Therapeutic interview  [x] Supportive  [] CBT  [] Ongoing  [] Other    [x] Patient continues to need, on a daily basis, active treatment furnished directly by or requiring the supervision of inpatient psychiatric personnel      Anticipated Length of stay:            Electronically signed by Dinora Ortega MD on 11/3/2021 at 1:44 PM

## 2021-11-03 NOTE — PROGRESS NOTES
Pt. declined to attend the 0900 community meeting, despite staff encouragement. Electronically signed by Santana Ascencio, [address] on 11/3/2021 at 1:38 PM

## 2021-11-03 NOTE — PROGRESS NOTES
Pt out on unit,  social with peers. Pt observed sitting in dayroom, smiling, laughing. During interview Pt rates anxiety, 7/10, depression, 7/10, with 10 being the worst, smiling and laughing. Pt voiced missing her 3 yr old son. Pt reports showering today. Pt reports good appetite. Pt reports good sleep. Pt reports attending groups. Pt denies attending groups. Pt denies SI, HI and A/V hallucinations. Will continue to monitor.

## 2021-11-03 NOTE — PROGRESS NOTES
Patient to nurse's station requesting maxi pads and tylenol for menstrual cramps. Medicated with 650mg Tylenol per MD prn order.

## 2021-11-03 NOTE — PROGRESS NOTES
Explained and gave all am meds, pt voiced understanding, pt aware of lower dose of dilantin, reports anxiety #6 pt requested vistaril 50 mg and that was given. Pt reports menstrual cramps remain that tylenol is not helpful ,requesting Midol.

## 2021-11-03 NOTE — FLOWSHEET NOTE
Patient was noted to be on phone earlier this morning, talking and laughing then shortly after patient was noted to be sitting on side of bed crying and hyperventilating. Patient stated, \"I want to go home, I miss my baby. \"  This nurse tried to calm patient, she had already had visteril. Patient is now calm and co-operative with staff.

## 2021-11-03 NOTE — PROGRESS NOTES
Pt. refused to attend the 1000 skills group, despite staff encouragement. Electronically signed by Jesus Lara, [address] on 11/3/2021 at 1:39 PM

## 2021-11-03 NOTE — GROUP NOTE
Group Therapy Note    Date: 11/2/2021    Group Start Time: 4201  Group End Time: 0918  Group Topic: Wrap-Up    [facility]    [employer]        Group Therapy Note    Attendees: 10/23         Patient's Goal:  \"fix my meds\"    Notes:  Patient reported meeting her goal for the day. Patient stated something good that happened today was that she finally talked to her roommate. Patient participated in guided meditation. Status After Intervention:  Improved    Participation Level:  Active Listener and Interactive    Participation Quality: Appropriate and Sharing      Speech:  normal      Thought Process/Content: Logical      Affective Functioning: Congruent      Mood: anxious      Level of consciousness:  Alert and Attentive      Response to Learning: Progressing to goal      Endings: None Reported    Modes of Intervention: Support      Discipline Responsible: [address], [employer]      Signature:  [employer] eleni

## 2021-11-03 NOTE — GROUP NOTE
Group Therapy Note    Date: 11/3/2021    Group Start Time: 1630  Group End Time: 7554  Group Topic: Healthy Living/Wellness    [facility]    Cristal Hudson        Group Therapy Note    Attendees: 9/22         Patient's Goal:  To participate in an activity to socialize and share with peers    Notes:  Patient actively participated in group. Pt was seen laughing with peers. Status After Intervention:  Improved    Participation Level:  Active Listener and Interactive    Participation Quality: Appropriate, Attentive, Sharing and Supportive      Speech:  normal      Thought Process/Content: Logical      Affective Functioning: Congruent      Mood: elevated      Level of consciousness:  Alert and Attentive      Response to Learning: Progressing to goal      Endings: None Reported    Modes of Intervention: Education      Discipline Responsible: [address], [employer]      Signature:  Cristal Hudson

## 2021-11-03 NOTE — PROGRESS NOTES
Patient voiced anxiety, rate 4/10, with 10 being the worst, medicated with Vistaril per request. Will continue to monitor.

## 2021-11-04 LAB
EKG ATRIAL RATE: 77 BPM
EKG P AXIS: 31 DEGREES
EKG P-R INTERVAL: 144 MS
EKG Q-T INTERVAL: 388 MS
EKG QRS DURATION: 106 MS
EKG QTC CALCULATION (BAZETT): 439 MS
EKG R AXIS: 48 DEGREES
EKG T AXIS: 23 DEGREES
EKG VENTRICULAR RATE: 77 BPM
PHENYTOIN LEVEL: 19.4 UG/ML (ref 10–20)

## 2021-11-04 PROCEDURE — 1240000000 HC EMOTIONAL WELLNESS R&B

## 2021-11-04 PROCEDURE — 90833 PSYTX W PT W E/M 30 MIN: CPT | Performed by: PSYCHIATRY & NEUROLOGY

## 2021-11-04 PROCEDURE — 99232 SBSQ HOSP IP/OBS MODERATE 35: CPT | Performed by: NURSE PRACTITIONER

## 2021-11-04 PROCEDURE — 36415 COLL VENOUS BLD VENIPUNCTURE: CPT

## 2021-11-04 PROCEDURE — 6370000000 HC RX 637 (ALT 250 FOR IP): Performed by: PSYCHIATRY & NEUROLOGY

## 2021-11-04 PROCEDURE — 80185 ASSAY OF PHENYTOIN TOTAL: CPT

## 2021-11-04 PROCEDURE — 93010 ELECTROCARDIOGRAM REPORT: CPT | Performed by: INTERNAL MEDICINE

## 2021-11-04 PROCEDURE — 99232 SBSQ HOSP IP/OBS MODERATE 35: CPT | Performed by: PSYCHIATRY & NEUROLOGY

## 2021-11-04 PROCEDURE — 95816 EEG AWAKE AND DROWSY: CPT

## 2021-11-04 PROCEDURE — 6370000000 HC RX 637 (ALT 250 FOR IP): Performed by: NURSE PRACTITIONER

## 2021-11-04 RX ADMIN — IBUPROFEN 600 MG: 600 TABLET, FILM COATED ORAL at 17:51

## 2021-11-04 RX ADMIN — HYDROXYZINE PAMOATE 50 MG: 50 CAPSULE ORAL at 09:41

## 2021-11-04 RX ADMIN — ZOLPIDEM TARTRATE 5 MG: 5 TABLET ORAL at 21:21

## 2021-11-04 ASSESSMENT — ENCOUNTER SYMPTOMS
TROUBLE SWALLOWING: 0
NAUSEA: 0
ABDOMINAL PAIN: 0
COLOR CHANGE: 0
ABDOMINAL DISTENTION: 0
WHEEZING: 0
COUGH: 0
CHEST TIGHTNESS: 0
SHORTNESS OF BREATH: 0
VOMITING: 0

## 2021-11-04 ASSESSMENT — PAIN SCALES - GENERAL: PAINLEVEL_OUTOF10: 6

## 2021-11-04 NOTE — PROGRESS NOTES
Patient did not attend group despite staff encouragement.   Electronically signed by Andreia Madera on 11/4/2021 at 5:43 PM

## 2021-11-04 NOTE — FLOWSHEET NOTE
Patient is alert and orient x 4, out on unit, bright affect, socializing with peers. Patient reports good sleep and appetite,  Patient reports she feels much better. Patient has been attending groups. Patient states she is going home tomorrow and looks forward to being with her son. EEG is being done at this time.

## 2021-11-04 NOTE — GROUP NOTE
Group Therapy Note    Date: 11/4/2021    Group Start Time: 6847  Group End Time: 9289  Group Topic: Psychoeducation    [facility]    Kranthi Maki, [facility]; Kindra Hennessy, [facility]        Group Therapy Note    Attendees: 9/21         Patient's Goal:  Participation - cognitive skills, changing negative to positive - reframing  thoughts    Notes:  Brighter, interactive with group    Status After Intervention:  Improved    Participation Level:  Active Listener and Interactive    Participation Quality: Appropriate, Attentive, Sharing and Supportive      Speech:  normal      Thought Process/Content: Logical  Linear      Affective Functioning: Congruent      Mood: euthymic      Level of consciousness:  Alert, Oriented x4 and Attentive      Response to Learning: Able to verbalize current knowledge/experience, Able to verbalize/acknowledge new learning, Able to retain information, Capable of insight, Able to change behavior and Progressing to goal      Endings: None Reported    Modes of Intervention: Education, Support, Socialization, Exploration, Clarifying and Problem-solving      Discipline Responsible: /Counselor      Signature:  Kindra Hennessy, [facility]

## 2021-11-04 NOTE — PROGRESS NOTES
Patient voiced generalized pain, rate 7/10 with 10 being the worst, request Ambien for sleep. Medicated with Motrin and Ambien per request. Will continue to monitor.

## 2021-11-04 NOTE — PROGRESS NOTES
Pt out on unit, social with peers. Pt voiced discharge tomorrow, will follow up with a partner of Dr. Stacy Costa, outpatient therapy. Pt voiced decrease anxiety, depression. Pt voiced, \"when I first got here I was a mess. \" Pt voiced medication regime, effective. Pt voiced excitement upon discharge will reunite with 2 yr old son. Pt reports showering today. Pt reports good appetite. Pt reports good sleep. Pt rates anxiety 1/10. Pt rates depression 2/10. On a scale from 1 through 10, 10 being the highest.  Will continue to monitor.

## 2021-11-04 NOTE — PROGRESS NOTES
Morning [facility] attended the morning community meeting on 11/4/21. Topics discussed today     [x] Introduction   Day of the week and date   Mask distribution   Current mask requirements  [x]Teams   Explanation of  Green and Blue team criteria   Nurses assigned to each team for today   Explanation about green and blue paper  o Date  o Patient's Name  o Patient's Nurse  o Goals  [x] Visitation   Announce the visiting hours for the day   Announce which team is allowed to have visitors for the day   Review any updated Covid 19 requirements for visitors during visitation  o Vaccine Card or negative Covid test within 48 hours of visit  o State Identification   Patients are reminded to alert the  at least 1 hour before visitation   [x] Unit Orientation   Coffee use   Phone location and etiquette   Shower locations  [employer] and dryer location and process   Common area expectations   Staff rounds expectation  [x] Meals    Educate patient to the menu  o The patient is encouraged to fill out the menu to get preferences at mealtime  o The patient is educated that if they do not fill out the menu, they will get the standard tray  o The coffee pot is decaf, patient encouraged to order regular coffee from menu.    Educate patient to the meal process   Patient encouraged to eat snacks provided twice daily  o Snacks may stay in patient room     [x] Discharge Process   Discharge expectations   Fill out the survey after discharge   [x] Hygiene   Daily showers encouraged  o Showers availability discussed    Daily dressing encouraged  o Discussed wearing street clothing   Education provided on where to place linens and clothing  o Linens in the hamper  o personal clothing does not go into the linen hamper  [x] Group    Patient encouraged to attend group provided   Time of Group Meetings discussed   Gentle reminder that attendance is a Physician order  [x] Movement   Chair exercises completed   Stretching completed  Notes: Electronically signed by Sundar [address] on 11/4/2021 at 9:56 AM

## 2021-11-04 NOTE — PROGRESS NOTES
Ana Love \[facility]\"" 89. FOLLOW-UP NOTE       11/4/2021     Patient was seen and examined in person, Chart reviewed   Patient's case discussed with staff/team    Chief Complaint: Depression    Interim History:     Patient is making significant progress with her mood and anxiety  She denies any active suicidal thoughts  She feels that her current medication regimen is very effective  Feeling optimistic and future oriented  Denies any audiovisual hallucinations or paranoid thoughts  Has been eating and sleeping better    Appetite:   [] Normal/Unchanged  [] Increased  [x] Decreased      Sleep:       [] Normal/Unchanged  [x] Fair       [] Poor              Energy:    [x] Normal/Unchanged  [] Increased  [] Decreased        SI [] Present  [x] Absent    HI  []Present  [x] Absent     Aggression:  [] yes  [x] no    Patient is [x] able  [] unable to CONTRACT FOR SAFETY     PAST MEDICAL/PSYCHIATRIC HISTORY:   Past Medical History:   Diagnosis Date    Anxiety     Herpes simplex virus (HSV) infection of vagina     Migraine     Seizures ([facility] [address]     Varicella        FAMILY/SOCIAL HISTORY:  Family History   Problem Relation Age of Onset    Cancer Other         brain, breast, lung    Coronary Art Dis Other     High Blood Pressure Brother     Obesity Brother     Cancer Maternal Grandmother     Allergies Maternal Grandmother     Colon Cancer Maternal Grandmother     Breast Cancer Maternal Grandmother     Ovarian Cancer Maternal Grandmother     Other Maternal Grandmother         eye cancer, loss of left eye    Cancer Maternal Grandfather         lung    Heart Disease Maternal Grandfather     Uterine Cancer Mother     Ovarian Cancer Mother     Breast Cancer Mother     Cancer Mother         behind right eye    Heart Attack Mother     Migraines Mother     Cancer Maternal Aunt         lung     Social History     Socioeconomic History    Marital status: Single     Spouse name: Not on file   Yoshi Hernandez Number of children: Not on file    Years of education: Not on file    Highest education level: Not on file   Occupational History    Not on file   Tobacco Use    Smoking status: Current Every Day Smoker     Packs/day: 2.00     Years: 9.00     Pack years: 18.00     Types: Cigarettes    Smokeless tobacco: Never Used   Vaping Use    Vaping Use: Never used   Substance and Sexual Activity    Alcohol use: No    Drug use: Not Currently    Sexual activity: Yes     Partners: Male   Other Topics Concern    Not on file   Social History Narrative    Not on file     Social Determinants of Health     Financial Resource Strain: Low Risk     Difficulty of Paying Living Expenses: Not hard at all   Food Insecurity: No Food Insecurity    Worried About Running Out of Food in the Last Year: Never true    Antione of Food in the Last Year: Never true   Transportation Needs:     Lack of Transportation (Medical):  Lack of Transportation (Non-Medical):    Physical Activity:     Days of Exercise per Week:     Minutes of Exercise per Session:    Stress:     Feeling of Stress :    Social Connections:     Frequency of Communication with Friends and Family:     Frequency of Social Gatherings with Friends and Family:     Attends Adventism Services:     Active Member of Clubs or Organizations:     Attends Club or Organization Meetings:     Marital Status:    Intimate Partner Violence:     Fear of Current or Ex-Partner:     Emotionally Abused:     Physically Abused:     Sexually Abused:            ROS:  [x] All negative/unchanged except if checked.  Explain positive(checked items) below:  [] Constitutional  [] Eyes  [] Ear/Nose/Mouth/Throat  [] Respiratory  [] CV  [] GI  []   [] Musculoskeletal  [] Skin/Breast  [] Neurological  [] Endocrine  [] Heme/Lymph  [] Allergic/Immunologic    Explanation:     MEDICATIONS:    Current Facility-Administered Medications:     ibuprofen (ADVIL;MOTRIN) tablet 600 mg, 600 mg, Oral, Q6H PRN, Baltazar Pierce MD, 600 mg at 11/04/21 1751    zolpidem (AMBIEN) tablet 5 mg, 5 mg, Oral, Nightly PRN, Baltazar Pierce MD, 5 mg at 11/03/21 2044    nicotine (NICODERM CQ) 21 MG/24HR 1 patch, 1 patch, TransDERmal, Daily, Baltazar Pierce MD, 1 patch at 11/04/21 0950    lurasidone (LATUDA) tablet 20 mg, 20 mg, Oral, Dinner, Baltazar Pierce MD, 20 mg at 11/04/21 1746    divalproex (DEPAKOTE) DR tablet 500 mg, 500 mg, Oral, 2 times per day, BRUCE Dawkins CNP, 500 mg at 11/04/21 0941    phenytoin (DILANTIN) ER capsule 200 mg, 200 mg, Oral, BID, BRUCE Dawkins - CNP, 200 mg at 11/04/21 0941    influenza quadrivalent split vaccine (FLUZONE;FLUARIX;FLULAVAL;AFLURIA) injection 0.5 mL, 0.5 mL, IntraMUSCular, Prior to discharge, Baltazar Pierce MD    acetaminophen (TYLENOL) tablet 650 mg, 650 mg, Oral, Q4H PRN, Baltazar Pirece MD, 650 mg at 11/03/21 1125    magnesium hydroxide (MILK OF MAGNESIA) 400 MG/5ML suspension 30 mL, 30 mL, Oral, Daily PRN, Baltazar Pierce MD    aluminum & magnesium hydroxide-simethicone (MAALOX) 200-200-20 MG/5ML suspension 30 mL, 30 mL, Oral, PRN, Baltazar Pierce MD    haloperidol (HALDOL) tablet 5 mg, 5 mg, Oral, Q6H PRN **OR** haloperidol lactate (HALDOL) injection 5 mg, 5 mg, IntraMUSCular, Q6H PRN, Baltazar Pierce MD    benztropine mesylate (COGENTIN) injection 2 mg, 2 mg, IntraMUSCular, BID PRN, Baltazar Pierce MD    hydrOXYzine (VISTARIL) capsule 50 mg, 50 mg, Oral, Q6H PRN, 50 mg at 11/04/21 0941 **OR** hydrOXYzine (VISTARIL) injection 50 mg, 50 mg, IntraMUSCular, Q6H PRN, Baltazar Pierce MD      Examination:  /67   Pulse 98   Temp 98.7 °F (37.1 °C) (Oral)   Resp 16   Ht 5' 3\" (1.6 m)   Wt 160 lb (72.6 kg)   LMP 10/01/2021   SpO2 100%   BMI 28.34 kg/m²   Gait - steady  Medication side effects(SE): no    Mental Status Examination:    Level of consciousness:  within normal limits   Appearance:  fair grooming and fair minutes, other than E&M time spent, focusing on      - coping skills techniques     - Anxiety management techniques discussed including deep breathing exercise and PMR     - discussing patients strength and weakness      - Focusing on negative cognition and maladaptive thoughts, which is feeding and maintaining the depression symptoms      [x] Patient continues to need, on a daily basis, active treatment furnished directly by or requiring the supervision of inpatient psychiatric personnel      Anticipated Length of stay: tomorrow         Electronically signed by Mone Rosenthal MD on 11/4/2021 at 7:48 PM

## 2021-11-04 NOTE — GROUP NOTE
Group Therapy Note    Date: 11/4/2021    Group Start Time: 1000  Group End Time: 1050  Group Topic: Psychoeducation    [facility]    Mary Kate Kulkarni        Group Therapy Note    Attendees: 12         Patient's Goal:  \"If I am here, make it through the weekend\"    Notes:  Patient attended the 1000 skills group. Patient still has a flat affect but her mood has improved somewhat. She kept to herself but she shared openly on a 1:1 with this writer. She had low interests and did not work on a project. Status After Intervention:  Unchanged    Participation Level: None    Participation Quality: Did not work on a project. Speech:  Talkative on a 1:1.       Thought Process/Content: Linear      Affective Functioning: Flat      Mood: calm      Level of consciousness:  Alert      Response to Learning: Progressing to goal      Endings: None Reported    Modes of Intervention: Education, Socialization and Activity      Discipline Responsible: Psychoeducational Specialist      Signature:  Mary Kate Kulkarni

## 2021-11-04 NOTE — PROGRESS NOTES
Patient voiced generalized discomfort, rate 6/10 with 10 being the worst, medicated with Motrin per request. Will continue to monitor.

## 2021-11-04 NOTE — PROGRESS NOTES
Pt is up on unit, encouraged to attend groups, explained and gave am meds, pt requested vistaril ,50 mg po was given for c/o anxiety #6,pt reports missing her 3year old child, talks on the phone frequently to it.

## 2021-11-04 NOTE — GROUP NOTE
Group Therapy Note    Date: 11/4/2021    Group Start Time: 0130  Group End Time: 0200  Group Topic: Healthy Living/Wellness    [facility]    Phill Hoover RN        Group Therapy Note    Attendees: 8/20         Patient's Goal: Anxiety    Notes:  Breathing techniques/Bodys response    Status After Intervention:  Improved    Participation Level:  Active Listener    Participation Quality: Appropriate      Speech:  normal      Thought Process/Content: Logical      Affective Functioning: Congruent      Mood: euthymic      Level of consciousness:  Alert and Oriented x4      Response to Learning: Progressing to goal      Endings: None Reported    Modes of Intervention: Education      Discipline Responsible: Registered Nurse      Signature:  Nicolas Anaya RN

## 2021-11-04 NOTE — PROGRESS NOTES
Patient did not attend group despite staff encouragement.   Electronically signed by Cristal Hudson on 11/3/2021 at 10:05 PM

## 2021-11-04 NOTE — PROGRESS NOTES
[facility] Neurology Daily Progress Note  Name: Boone Peabody  Age: 32 y.o. Gender: female  CodeStatus: Full Code  Allergies: Penicillins    Chief Complaint:Suicidal and Psychiatric Evaluation    Primary Care Provider: Vilma Silverio PA-C  InpatientTreatment Team: Treatment Team: Attending Provider: Christiana Boeck, MD; Consulting Physician: Monae Simon APRN - CNP; Consulting Physician: Kaden Cid MD; Tech: [employer]; Tech: Tila Handing; : Yareli Cisneros MSW, LSW; LPN: Gwyn Prieto LPN; Registered Nurse: Gloria Corrales RN  Admission Date: 11/1/2021      HPI   Pt seen and examined for neuro follow up for history of temporal lobe epilepsy and bipolar depression. Currently A&o x3, NAD, cooperative. Mood improving. Tolerating med changes so far. No seizures. Dizziness resolved. Vitals:    11/04/21 0816   BP: 101/67   Pulse: 98   Resp: 16   Temp: 98.2 °F (36.8 °C)   SpO2: 100%      Review of Systems   Constitutional: Negative for appetite change, chills, fatigue and fever. HENT: Negative for hearing loss and trouble swallowing. Eyes: Negative for visual disturbance. Respiratory: Negative for cough, chest tightness, shortness of breath and wheezing. Cardiovascular: Negative for chest pain, palpitations and leg swelling. Gastrointestinal: Negative for abdominal distention, abdominal pain, nausea and vomiting. Musculoskeletal: Negative for gait problem. Skin: Negative for color change and rash. Neurological: Negative for dizziness, tremors, seizures, syncope, facial asymmetry, speech difficulty, weakness, light-headedness, numbness and headaches. Psychiatric/Behavioral: Positive for dysphoric mood. Negative for agitation, confusion and hallucinations. The patient is not nervous/anxious. Physical Exam  Vitals and nursing note reviewed. Constitutional:       General: She is not in acute distress. Appearance: She is not ill-appearing or diaphoretic. HENT:      Head: Normocephalic and atraumatic. Eyes:      Extraocular Movements: Extraocular movements intact. Pupils: Pupils are equal, round, and reactive to light. Cardiovascular:      Rate and Rhythm: Normal rate and regular rhythm. Pulmonary:      Effort: Pulmonary effort is normal. No respiratory distress. Breath sounds: Normal breath sounds. Abdominal:      General: Bowel sounds are normal.      Palpations: Abdomen is soft. Skin:     General: Skin is warm and dry. Neurological:      General: No focal deficit present. Mental Status: She is alert and oriented to person, place, and time. Mental status is at baseline. Cranial Nerves: Cranial nerves are intact. Motor: Motor function is intact. Gait: Gait is intact. Medications:  Reviewed    Infusion Medications:   Scheduled Medications:    nicotine  1 patch TransDERmal Daily    lurasidone  20 mg Oral Dinner    divalproex  500 mg Oral 2 times per day    phenytoin  200 mg Oral BID    influenza virus vaccine  0.5 mL IntraMUSCular Prior to discharge     PRN Meds: ibuprofen, zolpidem, acetaminophen, magnesium hydroxide, aluminum & magnesium hydroxide-simethicone, haloperidol **OR** haloperidol lactate, benztropine mesylate, hydrOXYzine **OR** hydrOXYzine    Labs:   No results for input(s): WBC, HGB, HCT, PLT in the last 72 hours. Recent Labs     11/02/21  0726      K 4.5      CO2 23   BUN 8   CREATININE 0.66   CALCIUM 8.8     Recent Labs     11/02/21  0726   AST 25   ALT 51*   BILITOT <0.2   ALKPHOS 113     No results for input(s): INR in the last 72 hours. No results for input(s): Patrisha Meigs in the last 72 hours.     Urinalysis:   Lab Results   Component Value Date    NITRU Negative 11/01/2021    WBCUA >100 11/01/2021    BACTERIA Negative 11/01/2021    RBCUA 0-2 11/01/2021    BLOODU Negative 11/01/2021    SPECGRAV 1.020 11/01/2021    GLUCOSEU Negative 11/01/2021    GLUCOSEU NEG 11/25/2011       Radiology:   Most recent    EEG No valid procedures specified. MRI of Brain Results for orders placed during the hospital encounter of 01/02/21    MRI BRAIN W WO CONTRAST    Narrative  MRI BRAIN W WO CONTRAST : 1/4/2021    CLINICAL HISTORY: Seizure. COMPARISON: Head CT 1/2/2021. TECHNIQUE: Multiplanar MR imaging of the head was performed without contrast.      FINDINGS:    There is no evidence of mesial temporal sclerosis, abnormal enhancement, infarct, intracranial hemorrhage, mass effect, midline shift, extra-axial collection, or hydrocephalus. There is no significant atrophy or white matter changes, for age. Impression  NO ACUTE INTRACRANIAL PROCESS OR EVIDENCE OF MESIAL TEMPORAL SCLEROSIS IDENTIFIED. No results found for this or any previous visit. MRA of the Head and Neck: No results found for this or any previous visit. No results found for this or any previous visit. No results found for this or any previous visit. CT of the Head: Results for orders placed during the hospital encounter of 05/03/21    [address] [place]    Narrative  CT Brain. Contrast medium:  without contrast.. History:  Seizure. Hit head. .    Technical factors: CT imaging of the brain was obtained and formatted as 5 mm contiguous axial images. 2.5 mm contiguous axial images were obtained through the osseous structures. Sagittal and coronal reconstruction obtained during postprocessing. Comparison:  February 20, 2021, January 2, 2021. Findings:    Extra-axial spaces:  Normal.    Intracranial hemorrhage:  None. Ventricular system:  Normal for age. Basal Cisterns:  Normal.    Cerebral Parenchyma:  Normal.    Midline Shift:  None. Cerebellum:  Normal.    Paranasal sinuses and mastoid air cells:  Rounded soft tissue attenuation anterior right maxillary sinus. Nesha Rios    Visualized Orbits:  Normal.    Impression  Impression:    Right maxillary retention cyst.      All CT scans at this facility use dose modulation, iterative reconstruction, and/or weight based dosing when appropriate to reduce radiation dose to as low as reasonably achievable. No results found for this or any previous visit. No results found for this or any previous visit. Carotid duplex: No results found for this or any previous visit. No results found for this or any previous visit. No results found for this or any previous visit. Echo No results found for this or any previous visit. Assessment/Plan:    Patient admitted to the behavioral health unit for bipolar disorder with depression and suicidal ideation. She is well-known to our practice for diagnosis of Temporal lobe epilepsy for which patient was on phenytoin 300 mg twice daily and Lamictal 300 mg twice daily prior to admission. Patient's phenytoin levels are supratherapeutic which may be contributing to her complaints of ongoing dizziness therefore we will decrease phenytoin. Will recheck levels in 2 days. We will also obtain orthostatic blood pressures. Of note patient's tox screen comes back positive for PCP but this is likely false positive secondary to Lamictal use. Patient's case was discussed with psychiatry and Lamictal will be discontinued and patient will be initiated on Depakote for treatment of both her bipolar disorder as well as epilepsy. Patient has slightly elevated ALT therefore will need to follow LFTs closely. We will obtain EEG.       Overall improved  No seizures while admitted  Tolerating med changes  Phenytoin level now therapeutic at 19.4  Dizziness resolved  OK to DC from neuro standpoint  Follow up office 2-4 weeks           Collaborating physicians: Dr Taty Maki and Dr Woo    Electronically signed by BRUCE Otero CNP on 11/4/2021 at 2:55 PM

## 2021-11-04 NOTE — PROGRESS NOTES
Patient did not attend group despite staff encouragement.   Electronically signed by Lenny Jean on 11/3/2021 at 10:29 PM

## 2021-11-05 VITALS
HEIGHT: 63 IN | WEIGHT: 160 LBS | BODY MASS INDEX: 28.35 KG/M2 | DIASTOLIC BLOOD PRESSURE: 67 MMHG | TEMPERATURE: 98.7 F | SYSTOLIC BLOOD PRESSURE: 101 MMHG | RESPIRATION RATE: 16 BRPM | OXYGEN SATURATION: 100 % | HEART RATE: 92 BPM

## 2021-11-05 PROCEDURE — 6370000000 HC RX 637 (ALT 250 FOR IP): Performed by: NURSE PRACTITIONER

## 2021-11-05 PROCEDURE — 6360000002 HC RX W HCPCS: Performed by: PSYCHIATRY & NEUROLOGY

## 2021-11-05 PROCEDURE — 90686 IIV4 VACC NO PRSV 0.5 ML IM: CPT | Performed by: PSYCHIATRY & NEUROLOGY

## 2021-11-05 PROCEDURE — 99239 HOSP IP/OBS DSCHRG MGMT >30: CPT | Performed by: PSYCHIATRY & NEUROLOGY

## 2021-11-05 PROCEDURE — 6370000000 HC RX 637 (ALT 250 FOR IP): Performed by: PSYCHIATRY & NEUROLOGY

## 2021-11-05 PROCEDURE — 95816 EEG AWAKE AND DROWSY: CPT

## 2021-11-05 PROCEDURE — G0008 ADMIN INFLUENZA VIRUS VAC: HCPCS | Performed by: PSYCHIATRY & NEUROLOGY

## 2021-11-05 RX ORDER — ZOLPIDEM TARTRATE 5 MG/1
5 TABLET ORAL NIGHTLY PRN
Qty: 15 TABLET | Refills: 1 | Status: SHIPPED | OUTPATIENT
Start: 2021-11-05 | End: 2021-11-19

## 2021-11-05 RX ORDER — PHENYTOIN SODIUM 200 MG/1
200 CAPSULE, EXTENDED RELEASE ORAL 2 TIMES DAILY
Qty: 60 CAPSULE | Refills: 1 | Status: SHIPPED | OUTPATIENT
Start: 2021-11-05 | End: 2021-11-07 | Stop reason: SDUPTHER

## 2021-11-05 RX ORDER — DIVALPROEX SODIUM 500 MG/1
500 TABLET, DELAYED RELEASE ORAL EVERY 12 HOURS SCHEDULED
Qty: 60 TABLET | Refills: 1 | Status: SHIPPED | OUTPATIENT
Start: 2021-11-05 | End: 2021-11-07 | Stop reason: SDUPTHER

## 2021-11-05 NOTE — CARE COORDINATION
FAMILY COLLATERAL NOTE    Family/Support Name: Michael [facility] #: [phone]  Relationship to Pt: babys grandmother      Placed call to above for 3rd attempt to make aware patient will be discharged today. Cannot leave a message , VM is full.              Burnard Bloodgo, [facility]

## 2021-11-05 NOTE — CARE COORDINATION
Discharge instructions reviewed verbally and in writing including f/u appointments. Patient verbalizes understanding and signed as such. All belongings returned for discharge. Patient denies SI, HI, A/V hallucinations, mood is stable.    Discharged with friend for transport home

## 2021-11-05 NOTE — GROUP NOTE
Group Therapy Note    Date: 11/4/2021    Group Start Time: 2110  Group End Time: 2120  Group Topic: Wrap-Up    [facility]    Lennox Aguilar        Group Therapy Note    Attendees: 10         Patient's Goal:  \"to get out of here\"    Notes:  Pt stated that she is leaving tomorrow    Status After Intervention:  Improved    Participation Level:  Active Listener and Interactive    Participation Quality: Appropriate      Speech:  normal      Thought Process/Content: Logical      Affective Functioning: Congruent      Mood: euthymic      Level of consciousness:  Alert and Oriented x4      Response to Learning: Able to verbalize current knowledge/experience      Endings: None Reported    Modes of Intervention: Socialization      Discipline Responsible: Behavorial Health Tech      Signature:  Lennox Aguilar

## 2021-11-05 NOTE — PROGRESS NOTES
Morning [facility] [facility] attended the morning community meeting on 11/5/21. Topics discussed today     [x] Introduction   Day of the week and date   Mask distribution   Current mask requirements  [x]Teams   Explanation of  Green and Blue team criteria   Nurses assigned to each team for today   Explanation about green and blue paper  o Date  o Patient's Name  o Patient's Nurse  o Goals  [x] Visitation   Announce the visiting hours for the day   Announce which team is allowed to have visitors for the day   Review any updated Covid 19 requirements for visitors during visitation  o Vaccine Card or negative Covid test within 48 hours of visit  o State Identification   Patients are reminded to alert the  at least 1 hour before visitation   [x] Unit Orientation   Coffee use   Phone location and etiquette   Shower locations  [employer] and dryer location and process   Common area expectations   Staff rounds expectation  [x] Meals    Educate patient to the menu  o The patient is encouraged to fill out the menu to get preferences at mealtime  o The patient is educated that if they do not fill out the menu, they will get the standard tray  o The coffee pot is decaf, patient encouraged to order regular coffee from menu.    Educate patient to the meal process   Patient encouraged to eat snacks provided twice daily  o Snacks may stay in patient room     [x] Discharge Process   Discharge expectations   Fill out the survey after discharge   [x] Hygiene   Daily showers encouraged  o Showers availability discussed    Daily dressing encouraged  o Discussed wearing street clothing   Education provided on where to place linens and clothing  o Linens in the hamper  o personal clothing does not go into the linen hamper  [x] Group    Patient encouraged to attend group provided   Time of Group Meetings discussed   Gentle reminder that attendance is a Physician order  [x] Movement   Chair exercises completed   Stretching completed  Notes:  Electronically signed by Maycol Garner on 11/5/2021 at 9:51 AM

## 2021-11-05 NOTE — GROUP NOTE
Group Therapy Note    Date: 11/4/2021    Group Start Time: 2040  Group End Time: 2110  Group Topic: Recreational    [facility] 3W I    Gina Sanchez        Group Therapy Note    Attendees: 10         Patient's Goal:  To play game of [employer] with peers    Notes:  Pt participated in game     Status After Intervention:  Unchanged    Participation Level:  Active Listener and Interactive    Participation Quality: Appropriate, Attentive and Supportive      Speech:  normal      Thought Process/Content: Logical      Affective Functioning: Congruent      Mood: euthymic      Level of consciousness:  Alert and Oriented x4      Response to Learning: Able to verbalize current knowledge/experience      Endings: None Reported    Modes of Intervention: Activity      Discipline Responsible: Behavorial Health Tech      Signature:  Gina Sanchez

## 2021-11-05 NOTE — DISCHARGE SUMMARY
DISCHARGE SUMMARY      Patient ID:  Nancy Frias  [id number]  32 y.o.  1995      Admit date: 2021    Discharge date and time: 2021    Admitting Physician: Shahzad Reynoso MD     Discharge Physician: Dr Jorge Pink MD    Admission Diagnoses: Bipolar 1 disorder [facility]) [F31.9]    Admission Condition: poor    Discharged Condition: stable    Admission Circumstance:     Pt lives with the her 3/6 year old son and  mother and brother of her  significant other. Significant other killed himself by hanging. Pt saw significant other hanged. Pt reports frequent nightmare and worsening depression since that time. Pt reports thoughts of suicide with a plan to jump from  A specific building \"tall enough to damage my body to be to s,ch to come back from or be hit by a train at night.  She denies HI, She reports extreme sadness and anxiety. She denies hallucinations of any kind. There is no delusional thought expressed. She is currently off all psychotropics saying that the last psychotropic was  zyprexa  which she stopped about ten days ago she said due to , 'dizziness\" and fear that it might cause seizures.        HISTORY OF PRESENT ILLNESS:       The patient is a 32 y.o. female single live with son, 332 year old and his grandmother, uncle with significant past history of bipolar depression and PTSD     Pt was seeing psychiatrist at [facility] a year ago. Was on abilify for several years. Did not like the care over there and so started seeing PCP. Was trying various medication including, abilify, buspar, switched abilify to seroquel and xanax, then to zyprexa and ativan.  Pt had side effects from medication with dizziness and fall and so quit taking medication 10 days ago.      Pt has been getting worse last few weeks  Severity: Rating mood to be around 1/10 (10- good)  Quality:melancholic  Worse in the morning  Content: Hopeless, worthless and helpless feeling  Suicidal thoughts - was thinking of jumping off the window  Associated symptoms:  Poor concentration, anhedonia, decrease motivation  Sleep and appetite- poor     Stressors:good friend leaving to [place], pt not having a place by herself, no car and no job due to transportation. Significant other committed suicide sept 2019 by hanging self. Still grieving the loss of her partner     9 year seizure history with grand mal type.  No seizure for past 2 1/2 months.     The patient is not currently receiving care for the above psychiatric illness.     Medications Prior to Admission:     Prescriptions Prior to Admission   Medications Prior to Admission: lamoTRIgine (LAMICTAL) 150 MG tablet, Take 2 tablets by mouth 2 times daily  phenytoin (PHENYTEK) 300 MG ER capsule, Take 1 capsule by mouth 2 times daily  OLANZapine (ZYPREXA) 5 MG tablet, Take 5 mg by mouth nightly  acyclovir (ZOVIRAX) 400 MG tablet, Take 1 tablet by mouth 3 times daily for 5 days Start at symptoms.        Compliance:no     Psychiatric Review of Systems       Depression: yes     Saranya or Hypomania:  Not currently but has been manic in the past     Panic Attacks:  yes      Phobias:  no     Obsessions and Compulsions:  no     PTSD : yes     Hallucinations:  no     Delusions:  no     Substance Abuse History:  ETOH: no   Marijuana: occasional use  Opiates: no  Other Drugs: no        Past Psychiatric History:  Prior Diagnosis:  Bipolar depression PTSD  Psychiatrist: no   Therapist:yes  Hospitalization: yes- 4 years ago  Hx of Suicidal Attempts: yes as teenager overdosed  Hx of violence:  no  ECT: no  Previous discontinued Psychiatric Med Trials: as above        PAST MEDICAL/PSYCHIATRIC HISTORY:   Past Medical History:   Diagnosis Date    Anxiety     Herpes simplex virus (HSV) infection of vagina     Migraine     Seizures (HCC)     Varicella        FAMILY/SOCIAL HISTORY:  Family History   Problem Relation Age of Onset    Cancer Other         brain, breast, lung    Coronary Art Dis Other     High Blood Pressure Brother     Obesity Brother     Cancer Maternal Grandmother     Allergies Maternal Grandmother     Colon Cancer Maternal Grandmother     Breast Cancer Maternal Grandmother     Ovarian Cancer Maternal Grandmother     Other Maternal Grandmother         eye cancer, loss of left eye    Cancer Maternal Grandfather         lung    Heart Disease Maternal Grandfather     Uterine Cancer Mother     Ovarian Cancer Mother    [facility] Breast Cancer Mother    [facility] Cancer Mother         behind right eye    Heart Attack Mother     Migraines Mother     Cancer Maternal Aunt         lung     Social History     Socioeconomic History    Marital status: Single     Spouse name: Not on file    Number of children: Not on file    Years of education: Not on file    Highest education level: Not on file   Occupational History    Not on file   Tobacco Use    Smoking status: Current Every Day Smoker     Packs/day: 2.00     Years: 9.00     Pack years: 18.00     Types: Cigarettes    Smokeless tobacco: Never Used   Vaping Use    Vaping Use: Never used   Substance and Sexual Activity    Alcohol use: No    Drug use: Not Currently    Sexual activity: Yes     Partners: Male   Other Topics Concern    Not on file   Social History Narrative    Not on file     Social Determinants of Health     Financial Resource Strain: Low Risk     Difficulty of Paying Living Expenses: Not hard at all   Food Insecurity: No Food Insecurity    Worried About Running Out of Food in the Last Year: Never true    Antione of Food in the Last Year: Never true   Transportation Needs:     Lack of Transportation (Medical):      Lack of Transportation (Non-Medical):    Physical Activity:     Days of Exercise per Week:     Minutes of Exercise per Session:    Stress:     Feeling of Stress :    Social Connections:     Frequency of Communication with Friends and Family:     Frequency of Social Gatherings with Friends and Family:     Attends Evangelical Services:     Active Member of Clubs or Organizations:     Attends Club or Organization Meetings:     Marital Status:    Intimate Partner Violence:     Fear of Current or Ex-Partner:     Emotionally Abused:     Physically Abused:     Sexually Abused:        MEDICATIONS:    Current Facility-Administered Medications:     ibuprofen (ADVIL;MOTRIN) tablet 600 mg, 600 mg, Oral, Q6H PRN, Perez Fischer MD, 600 mg at 11/04/21 1751    zolpidem (AMBIEN) tablet 5 mg, 5 mg, Oral, Nightly PRN, Perez Fischer MD, 5 mg at 11/04/21 2121    nicotine (NICODERM CQ) 21 MG/24HR 1 patch, 1 patch, TransDERmal, Daily, Perez Fischer MD, 1 patch at 11/05/21 0902    lurasidone (LATUDA) tablet 20 mg, 20 mg, Oral, Dinner, Perez Fischer MD, 20 mg at 11/04/21 1746    divalproex (DEPAKOTE) DR tablet 500 mg, 500 mg, Oral, 2 times per day, Kendallgeeta Bah APRN - CNP, 500 mg at 11/05/21 0902    phenytoin (DILANTIN) ER capsule 200 mg, 200 mg, Oral, BID, Kendall Pour, APRN - CNP, 200 mg at 11/05/21 0902    influenza quadrivalent split vaccine (FLUZONE;FLUARIX;FLULAVAL;AFLURIA) injection 0.5 mL, 0.5 mL, IntraMUSCular, Prior to discharge, Perez Fischer MD    acetaminophen (TYLENOL) tablet 650 mg, 650 mg, Oral, Q4H PRN, Perez Fischer MD, 650 mg at 11/03/21 1125    magnesium hydroxide (MILK OF MAGNESIA) 400 MG/5ML suspension 30 mL, 30 mL, Oral, Daily PRN, Perez Fischer MD    aluminum & magnesium hydroxide-simethicone (MAALOX) 200-200-20 MG/5ML suspension 30 mL, 30 mL, Oral, PRN, Perez Fischer MD    haloperidol (HALDOL) tablet 5 mg, 5 mg, Oral, Q6H PRN **OR** haloperidol lactate (HALDOL) injection 5 mg, 5 mg, IntraMUSCular, Q6H PRN, Perez Fischer MD    benztropine mesylate (COGENTIN) injection 2 mg, 2 mg, IntraMUSCular, BID PRN, Perez Fischer MD    hydrOXYzine (VISTARIL) capsule 50 mg, 50 mg, Oral, Q6H PRN, 50 mg at 11/04/21 0941 **OR** hydrOXYzine (VISTARIL) injection 50 mg, 50 mg, IntraMUSCular, Q6H Bentley SOTO MD    Examination:  /67   Pulse 92   Temp 98.7 °F (37.1 °C) (Oral)   Resp 16   Ht 5' 3\" (1.6 m)   Wt 160 lb (72.6 kg)   LMP 10/01/2021   SpO2 100%   BMI 28.34 kg/m²   Gait - steady    HOSPITAL COURSE[de-identified]  Following admission to the hospital, patient had a complete physical exam and blood work up  Patient was monitored closely with suicide precaution  Patient was started on medication as listed below  Was encouraged to participate in group and other milieu activity  Patient started to feel better with this combination of treatment. Significant progress in the symptoms since admission. Mood better, with the score of 2/10 - bad  No AVH or paranoid thoughts  No Hopeless or worthless feeling  No active SI/HI  Appetite:  [x] Normal  [] Increased  [] Decreased    Sleep:       [x] Normal  [] Fair       [] Poor            Energy:    [x] Normal  [] Increased  [] Decreased     SI [] Present  [x] Absent  HI  []Present  [x] Absent   Aggression:  [] yes  [] no  Patient is [x] able  [] unable to CONTRACT FOR SAFETY   Medication side effects(SE):  [x] None(Psych.  Meds.) [] Other      Mental Status Examination on discharge:    Level of consciousness:  within normal limits   Appearance:  well-appearing  Behavior/Motor:  no abnormalities noted  Attitude toward examiner:  attentive and good eye contact  Speech:  spontaneous, normal rate and normal volume   Mood: euthymic  Affect:  mood congruent  Thought processes:  coherent   Thought content:  Suicidal Ideation:  denies suicidal ideation  Delusions:  no evidence of delusions  Perceptual Disturbance:  denies any perceptual disturbance  Cognition:  oriented to person, place, and time   Concentration intact  Memory intact  Insight good   Judgement fair   Fund of Knowledge adequate      ASSESSMENT:  Patient symptoms are:  [x] Well controlled  [x] Improving  [] Worsening  [] No change      Diagnosis:  Principal Problem:    Bipolar 1 disorder [facility])  Active Problems:    Temporal lobe epilepsy, non-refractory ([facility] [address]  Resolved Problems:    * No resolved hospital problems. *      LABS:    No results for input(s): WBC, HGB, PLT in the last 72 hours. No results for input(s): NA, K, CL, CO2, BUN, CREATININE, GLUCOSE in the last 72 hours. No results for input(s): BILITOT, ALKPHOS, AST, ALT in the last 72 hours. Lab Results   Component Value Date    LABAMPH Neg 11/01/2021    BARBSCNU Neg 11/01/2021    LABBENZ Neg 11/01/2021    LABBENZ NT DTCD 03/01/2013    LABMETH Neg 11/01/2021    OPIATESCREENURINE Neg 11/01/2021    PHENCYCLIDINESCREENURINE POSITIVE 11/01/2021    ETOH <10 11/01/2021     Lab Results   Component Value Date    TSH 0.934 11/01/2021     No results found for: LITHIUM  Lab Results   Component Value Date    CBMZ 2.1 (L) 08/08/2021       RISK ASSESSMENT AT DISCHARGE: Low risk for suicide and homicide. Treatment Plan:  Reviewed current Medications with the patient. Education provided on the complaince with treatment. Risks, benefits, side effects, drug-to-drug interactions and alternatives to treatment were discussed. Encourage patient to attend outpatient follow up appointment and therapy. Patient was advised to call the outpatient provider, visit the nearest ED or call 911 if symptoms are not manageable. Patient's family member was contacted prior to the discharge. Medication List      START taking these medications    divalproex 500 MG DR tablet  Commonly known as: DEPAKOTE  Take 1 tablet by mouth every 12 hours     lurasidone 20 MG Tabs tablet  Commonly known as: LATUDA  Take 1 tablet by mouth Daily with supper     zolpidem 5 MG tablet  Commonly known as: AMBIEN  Take 1 tablet by mouth nightly as needed for Sleep for up to 14 days.         CHANGE how you take these medications    phenytoin 200 MG ER capsule  Commonly known as: PHENYTEK  Take 1 capsule by mouth 2 times daily  What changed:   · medication strength  · how much to take        STOP taking these medications    acyclovir 400 MG tablet  Commonly known as: ZOVIRAX     lamoTRIgine 150 MG tablet  Commonly known as: LAMICTAL     OLANZapine 5 MG tablet  Commonly known as: ZYPREXA           Where to Get Your Medications      These medications were sent to [address] [place] [address] [address] [place] [address] [address] [phone]  [address] [place] [address]    Phone: [phone]   · divalproex 500 MG DR tablet  · lurasidone 20 MG Tabs tablet  · phenytoin 200 MG ER capsule     You can get these medications from any pharmacy    Bring a paper prescription for each of these medications  · zolpidem 5 MG tablet           Reason for more than one antipsychotic:   [x] N/A  [] 3 failed monotherapy(drugs tried):  [] Cross over to a new antipsychotic  [] Taper to monotherapy from polypharmacy  [] Augmentation of Clozapine therapy due to treatment resistance to single therapy        TIME SPEND - 35 MINUTES TO COMPLETE THE EVALUATION, DISCHARGE SUMMARY, MEDICATION RECONCILIATION AND FOLLOW UP CARE     Signed:  Bhakti Ryan MD  11/5/2021  9:30 AM

## 2021-11-07 ENCOUNTER — TELEPHONE (OUTPATIENT)
Dept: NEUROLOGY | Age: 26
End: 2021-11-07

## 2021-11-07 RX ORDER — PHENYTOIN SODIUM 200 MG/1
200 CAPSULE, EXTENDED RELEASE ORAL 2 TIMES DAILY
Qty: 60 CAPSULE | Refills: 1 | Status: SHIPPED | OUTPATIENT
Start: 2021-11-07 | End: 2021-11-10 | Stop reason: DRUGHIGH

## 2021-11-07 RX ORDER — DIVALPROEX SODIUM 500 MG/1
500 TABLET, DELAYED RELEASE ORAL EVERY 12 HOURS SCHEDULED
Qty: 60 TABLET | Refills: 1 | Status: SHIPPED | OUTPATIENT
Start: 2021-11-07 | End: 2022-03-22 | Stop reason: SDUPTHER

## 2021-11-07 NOTE — PROCEDURES
[address]                      [address] [facility], [address] [facility]                          ELECTROENCEPHALOGRAM REPORT    PATIENT NAME: Vamsi Fox                  :        1995  MED REC NO:   [id number]                            ROOM:       [id number]  ACCOUNT NO:   [de-identified]                           ADMIT DATE: 2021  PROVIDER:     Joe Woo MD    DATE OF EE2021    EEG BACKGROUND:  Alpha 11-11 Hz symmetric, reactive. THETA:  None. DELTA:  2-3 Hz rhythmic delta intermittent but large amount present,  independent bi-temporally, worse on the right compared to the left. On  the right, this represented TIRDA (temporal intermittent rhythmic delta  activity). SLEEP:  Drowsiness, no activation. SPIKES:  None. SEIZURES:  None. HYPERVENTILATION:  Increased delta. PHOTIC DRIVING:  Good driving seen at 18 Hz. EK beats per minute normal sinus rhythm. CLINICAL INTERPRETATION:  No seizures or spikes were seen in this  recording, but the rhythmic independent bitemporal activity particularly  on the right side is suggestive of a seizure focus in that area.         Duane Mijares MD    D: 2021 12:33:57       T: 2021 13:28:54     [id number]  Job#: [id number]     Doc#: [id number]

## 2021-11-07 NOTE — TELEPHONE ENCOUNTER
Received message that pt did not get her new RX for seizure meds at [facility]. RX sent over. Attempted to call pt wit no answer. Message left on her voicemail advising.

## 2021-11-08 DIAGNOSIS — R56.9 SEIZURE (HCC): Primary | ICD-10-CM

## 2021-11-09 ENCOUNTER — HOSPITAL ENCOUNTER (OUTPATIENT)
Dept: LAB | Age: 26
Discharge: HOME | End: 2021-11-09
Payer: MEDICAID

## 2021-11-09 ENCOUNTER — TELEPHONE (OUTPATIENT)
Dept: NEUROLOGY | Age: 26
End: 2021-11-09

## 2021-11-09 DIAGNOSIS — R56.9 SEIZURE (HCC): Primary | ICD-10-CM

## 2021-11-09 DIAGNOSIS — R56.9 SEIZURE (HCC): ICD-10-CM

## 2021-11-09 LAB
ALBUMIN SERPL-MCNC: 4.3 G/DL (ref 3.5–4.6)
ALP BLD-CCNC: 106 U/L (ref 40–130)
ALT SERPL-CCNC: 29 U/L (ref 0–33)
AMPHETAMINE SCREEN, URINE: ABNORMAL
ANION GAP SERPL CALCULATED.3IONS-SCNC: 14 MEQ/L (ref 9–15)
AST SERPL-CCNC: 25 U/L (ref 0–35)
BARBITURATE SCREEN URINE: ABNORMAL
BENZODIAZEPINE SCREEN, URINE: ABNORMAL
BILIRUB SERPL-MCNC: <0.2 MG/DL (ref 0.2–0.7)
BUN BLDV-MCNC: 9 MG/DL (ref 6–20)
CALCIUM SERPL-MCNC: 9.2 MG/DL (ref 8.5–9.9)
CANNABINOID SCREEN URINE: POSITIVE
CHLORIDE BLD-SCNC: 102 MEQ/L (ref 95–107)
CO2: 23 MEQ/L (ref 20–31)
COCAINE METABOLITE SCREEN URINE: ABNORMAL
CREAT SERPL-MCNC: 0.61 MG/DL (ref 0.5–0.9)
GFR AFRICAN AMERICAN: >60
GFR NON-AFRICAN AMERICAN: >60
GLOBULIN: 2.8 G/DL (ref 2.3–3.5)
GLUCOSE BLD-MCNC: 123 MG/DL (ref 70–99)
HCT VFR BLD CALC: 36.6 % (ref 37–47)
HEMOGLOBIN: 11.5 G/DL (ref 12–16)
Lab: ABNORMAL
MAGNESIUM: 1.9 MG/DL (ref 1.7–2.4)
MCH RBC QN AUTO: 22.6 PG (ref 27–31.3)
MCHC RBC AUTO-ENTMCNC: 31.4 % (ref 33–37)
MCV RBC AUTO: 72 FL (ref 82–100)
METHADONE SCREEN, URINE: ABNORMAL
OPIATE SCREEN URINE: ABNORMAL
OXYCODONE URINE: ABNORMAL
PDW BLD-RTO: 20.1 % (ref 11.5–14.5)
PHENCYCLIDINE SCREEN URINE: ABNORMAL
PHENYTOIN LEVEL: 13.6 UG/ML (ref 10–20)
PLATELET # BLD: 461 K/UL (ref 130–400)
POTASSIUM SERPL-SCNC: 3.8 MEQ/L (ref 3.4–4.9)
PROPOXYPHENE SCREEN: ABNORMAL
RBC # BLD: 5.09 M/UL (ref 4.2–5.4)
SODIUM BLD-SCNC: 139 MEQ/L (ref 135–144)
TOTAL CK: 76 U/L (ref 0–170)
TOTAL PROTEIN: 7.1 G/DL (ref 6.3–8)
VALPROIC ACID LEVEL: 17.7 UG/ML (ref 50–100)
WBC # BLD: 9.3 K/UL (ref 4.8–10.8)

## 2021-11-09 PROCEDURE — 80307 DRUG TEST PRSMV CHEM ANLYZR: CPT

## 2021-11-09 PROCEDURE — 83735 ASSAY OF MAGNESIUM: CPT

## 2021-11-09 PROCEDURE — 80053 COMPREHEN METABOLIC PANEL: CPT

## 2021-11-09 PROCEDURE — 82550 ASSAY OF CK (CPK): CPT

## 2021-11-09 PROCEDURE — 80185 ASSAY OF PHENYTOIN TOTAL: CPT

## 2021-11-09 PROCEDURE — 80164 ASSAY DIPROPYLACETIC ACD TOT: CPT

## 2021-11-09 PROCEDURE — 36415 COLL VENOUS BLD VENIPUNCTURE: CPT

## 2021-11-09 PROCEDURE — 85027 COMPLETE CBC AUTOMATED: CPT

## 2021-11-09 NOTE — TELEPHONE ENCOUNTER
Patient had called into the office requesting to speak with me this morning. I called her back. She reports that she had another episode of seizure yesterday morning. She is feeling increasing fatigue which she feels is related to the Depakote. She was able to  the phenytoin and Depakote new prescriptions from her pharmacy and has been taking these. I advised her that I put in orders for laboratory testing to be done to assess her medication levels as well as for any underlying metabolic issues that might be contributing. Once I get laboratory testing results back I will call her with further recommendations on how to adjust her medications. Patient agreeable.   We went over her EEG results from her recent hospitalization that were normal.

## 2021-11-10 ENCOUNTER — TELEPHONE (OUTPATIENT)
Dept: NEUROLOGY | Age: 26
End: 2021-11-10

## 2021-11-10 DIAGNOSIS — R56.9 SEIZURE (HCC): Primary | ICD-10-CM

## 2021-11-10 RX ORDER — PHENYTOIN SODIUM 200 MG/1
200 CAPSULE, EXTENDED RELEASE ORAL EVERY MORNING
Qty: 30 CAPSULE | Refills: 3 | Status: SHIPPED | OUTPATIENT
Start: 2021-11-10 | End: 2022-03-22 | Stop reason: SDUPTHER

## 2021-11-10 RX ORDER — PHENYTOIN SODIUM 300 MG/1
300 CAPSULE, EXTENDED RELEASE ORAL EVERY EVENING
Qty: 30 CAPSULE | Refills: 3 | Status: SHIPPED | OUTPATIENT
Start: 2021-11-10 | End: 2022-03-22 | Stop reason: SDUPTHER

## 2021-11-10 NOTE — TELEPHONE ENCOUNTER
Received results of CK, valproic acid level, phenytoin level, CMP, magnesium, CBC, and urine tox screen. Labs reviewed. Patient noted to have low valproic acid level of 17.7. Phenytoin level 13.6. Called and discussed with patient. It is recommended that we increase Depakote to 500 mg 3 times a day but patient reports this is causing her extreme fatigue and she is now having difficulty caring for her son at home. She prefers not to increase the dose. We then discussed increasing phenytoin from 200 mg twice daily to 200 mg in the morning and 300 mg in the evening and patient was agreeable with that. She is to establish follow-up appoint with psychiatry. Will repeat phenytoin and valproic acid levels in 1 week.

## 2022-03-22 ENCOUNTER — OFFICE VISIT (OUTPATIENT)
Dept: NEUROLOGY | Age: 27
End: 2022-03-22
Payer: MEDICAID

## 2022-03-22 VITALS
SYSTOLIC BLOOD PRESSURE: 124 MMHG | HEART RATE: 94 BPM | BODY MASS INDEX: 26.43 KG/M2 | DIASTOLIC BLOOD PRESSURE: 78 MMHG | WEIGHT: 149.2 LBS

## 2022-03-22 DIAGNOSIS — G40.109 TEMPORAL LOBE EPILEPSY, NON-REFRACTORY (HCC): Primary | ICD-10-CM

## 2022-03-22 DIAGNOSIS — R55 SYNCOPE, UNSPECIFIED SYNCOPE TYPE: ICD-10-CM

## 2022-03-22 DIAGNOSIS — F41.9 ANXIETY: ICD-10-CM

## 2022-03-22 DIAGNOSIS — R56.9 SEIZURE (HCC): ICD-10-CM

## 2022-03-22 PROBLEM — Z91.199 NONCOMPLIANCE: Status: ACTIVE | Noted: 2022-03-22

## 2022-03-22 PROBLEM — R45.851 SUICIDAL IDEATION: Status: ACTIVE | Noted: 2022-03-22

## 2022-03-22 PROCEDURE — G8427 DOCREV CUR MEDS BY ELIG CLIN: HCPCS | Performed by: PSYCHIATRY & NEUROLOGY

## 2022-03-22 PROCEDURE — 99214 OFFICE O/P EST MOD 30 MIN: CPT | Performed by: PSYCHIATRY & NEUROLOGY

## 2022-03-22 PROCEDURE — 4004F PT TOBACCO SCREEN RCVD TLK: CPT | Performed by: PSYCHIATRY & NEUROLOGY

## 2022-03-22 PROCEDURE — G8417 CALC BMI ABV UP PARAM F/U: HCPCS | Performed by: PSYCHIATRY & NEUROLOGY

## 2022-03-22 PROCEDURE — G8482 FLU IMMUNIZE ORDER/ADMIN: HCPCS | Performed by: PSYCHIATRY & NEUROLOGY

## 2022-03-22 RX ORDER — PHENYTOIN SODIUM 200 MG/1
200 CAPSULE, EXTENDED RELEASE ORAL EVERY MORNING
Qty: 30 CAPSULE | Refills: 3 | Status: SHIPPED | OUTPATIENT
Start: 2022-03-22 | End: 2022-06-09 | Stop reason: SDUPTHER

## 2022-03-22 RX ORDER — ARIPIPRAZOLE 5 MG/1
5 TABLET ORAL DAILY
Qty: 30 TABLET | Refills: 3 | Status: SHIPPED | OUTPATIENT
Start: 2022-03-22 | End: 2022-04-14

## 2022-03-22 RX ORDER — PHENYTOIN SODIUM 300 MG/1
300 CAPSULE, EXTENDED RELEASE ORAL EVERY EVENING
Qty: 30 CAPSULE | Refills: 3 | Status: SHIPPED | OUTPATIENT
Start: 2022-03-22 | End: 2022-06-09 | Stop reason: SDUPTHER

## 2022-03-22 RX ORDER — DIVALPROEX SODIUM 500 MG/1
500 TABLET, DELAYED RELEASE ORAL EVERY 12 HOURS SCHEDULED
Qty: 60 TABLET | Refills: 1 | Status: SHIPPED | OUTPATIENT
Start: 2022-03-22 | End: 2022-06-01

## 2022-03-22 ASSESSMENT — PATIENT HEALTH QUESTIONNAIRE - PHQ9
SUM OF ALL RESPONSES TO PHQ QUESTIONS 1-9: 2
SUM OF ALL RESPONSES TO PHQ9 QUESTIONS 1 & 2: 2
1. LITTLE INTEREST OR PLEASURE IN DOING THINGS: 1
SUM OF ALL RESPONSES TO PHQ QUESTIONS 1-9: 2
2. FEELING DOWN, DEPRESSED OR HOPELESS: 1
SUM OF ALL RESPONSES TO PHQ QUESTIONS 1-9: 2
SUM OF ALL RESPONSES TO PHQ QUESTIONS 1-9: 2

## 2022-03-22 ASSESSMENT — ENCOUNTER SYMPTOMS
BACK PAIN: 0
SHORTNESS OF BREATH: 0
NAUSEA: 0
COLOR CHANGE: 0
PHOTOPHOBIA: 0
CHOKING: 0
TROUBLE SWALLOWING: 0
VOMITING: 0

## 2022-03-22 NOTE — PROGRESS NOTES
day  She finally was admitted to psychiatry in November and started on [address] she is not taking. Feeling more depressed. She has had 2 seizures one in January which was a prolonged seizure and very subtle 1 a few days ago where she had a déjà vu feeling. Patient is feeling somewhat anxious but has been off her medications and she did not want to see anyone else except me for her anxiety. 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 Social History     Socioeconomic History    Marital status: Single     Spouse name: Not on file    Number of children: Not on file    Years of education: Not on file    Highest education level: Not on file   Occupational History    Not on file   Tobacco Use    Smoking status: Current Every Day Smoker     Packs/day: 2.00     Years: 9.00     Pack years: 18.00     Types: Cigarettes    Smokeless tobacco: Never Used   Vaping Use    Vaping Use: Never used   Substance and Sexual Activity    Alcohol use: No    Drug use: Not Currently    Sexual activity: Yes     Partners: Male   Other Topics Concern    Not on file   Social History Narrative    Not on file     Social Determinants of Health     Financial Resource Strain: Low Risk     Difficulty of Paying Living Expenses: Not hard at all   Food Insecurity: No Food Insecurity    Worried About Running Out of Food in the Last Year: Never true    Antione of Food in the Last Year: Never true   Transportation Needs:     Lack of Transportation (Medical): Not on file    Lack of Transportation (Non-Medical):  Not on file   Physical Activity:     Days of Exercise per Week: Not on file    Minutes of Exercise per Session: Not on file   Stress:     Feeling of Stress : Not on file   Social Connections:     Frequency of Communication with Friends and Family: Not on file    Frequency of Social Gatherings with Friends and Family: Not on file    Attends Restorationist Services: Not on file    Active Member of Clubs or Organizations: Not on file    Attends Club or Organization Meetings: Not on file    Marital Status: Not on file   Intimate Partner Violence:     Fear of Current or Ex-Partner: Not on file    Emotionally Abused: Not on file    Physically Abused: Not on file    Sexually Abused: Not on file   Housing Stability:     Unable to Pay for Housing in the Last Year: Not on file    Number of [place] in the Last Year: Not on file    Unstable Housing in the Last Year: Not on file     Family History   Problem Relation Age of Onset    Cancer Other         brain, breast, lung    Coronary Art Dis Other     High Blood Pressure Brother     Obesity Brother     Cancer Maternal Grandmother     Allergies Maternal Grandmother     Colon Cancer Maternal Grandmother     Breast Cancer Maternal Grandmother     Ovarian Cancer Maternal Grandmother     Other Maternal Grandmother         eye cancer, loss of left eye    Cancer Maternal Grandfather         lung    Heart Disease Maternal Grandfather     Uterine Cancer Mother     Ovarian Cancer Mother     Breast Cancer Mother     Cancer Mother         behind right eye    Heart Attack Mother     Migraines Mother     Cancer Maternal Aunt         lung     Allergies   Allergen Reactions    Penicillins      GI Problems       Current Outpatient Medications   Medication Sig Dispense Refill    phenytoin (PHENYTEK) 300 MG ER capsule Take 1 capsule by mouth every evening To be taken with phenytoin ER 200mg in the AM 30 capsule 3    phenytoin (PHENYTEK) 200 MG ER capsule Take 1 capsule by mouth every morning To be taken with phenytoin ER 300mg QHS 30 capsule 3    divalproex (DEPAKOTE) 500 MG DR tablet Take 1 tablet by mouth every 12 hours 60 tablet 1    ARIPiprazole (ABILIFY) 5 MG tablet Take 1 tablet by mouth daily 30 tablet 3    lurasidone (LATUDA) 20 MG TABS tablet Take 1 tablet by mouth Daily with supper (Patient not taking: Reported on 3/22/2022) 15 tablet 2     No current facility-administered medications for this visit. Review of Systems   Constitutional: Negative for fever. HENT: Negative for ear pain, tinnitus and trouble swallowing. Eyes: Negative for photophobia and visual disturbance. Respiratory: Negative for choking and shortness of breath. Cardiovascular: Negative for chest pain and palpitations. Gastrointestinal: Negative for nausea and vomiting. Musculoskeletal: Negative for back pain, gait problem, joint swelling, myalgias, neck pain and neck stiffness. Skin: Negative for color change. Allergic/Immunologic: Negative for food allergies. Neurological: Positive for seizures. Negative for dizziness, tremors, syncope, facial asymmetry, speech difficulty, weakness, light-headedness, numbness and headaches. Psychiatric/Behavioral: Negative for behavioral problems, confusion, hallucinations and sleep disturbance. Objective:   /78 (Site: Left Upper Arm, Position: Sitting, Cuff Size: Medium Adult)   Pulse 94   Wt 149 lb 3.2 oz (67.7 kg)   BMI 26.43 kg/m²     Physical Exam  Vitals reviewed. Eyes:      Pupils: Pupils are equal, round, and reactive to light. Cardiovascular:      Rate and Rhythm: Normal rate and regular rhythm. Heart sounds: No murmur heard. Pulmonary:      Effort: Pulmonary effort is normal.      Breath sounds: Normal breath sounds. Abdominal:      General: Bowel sounds are normal.   Musculoskeletal:         General: Normal range of motion. Cervical back: Normal range of motion. Skin:     General: Skin is warm. Neurological:      Mental Status: She is alert and oriented to person, place, and time. Cranial Nerves: No cranial nerve deficit. Sensory: No sensory deficit. Motor: No abnormal muscle tone.       Coordination: Coordination normal.      Deep Tendon Reflexes: Reflexes are normal and symmetric. Babinski sign absent on the right side. Babinski sign absent on the left side. Psychiatric:         Mood and Affect: Mood normal.         No results found. Lab Results   Component Value Date    WBC 9.3 11/09/2021    RBC 5.09 11/09/2021    RBC 4.70 06/01/2012    HGB 11.5 11/09/2021    HCT 36.6 11/09/2021    MCV 72.0 11/09/2021    MCH 22.6 11/09/2021    MCHC 31.4 11/09/2021    RDW 20.1 11/09/2021     11/09/2021    MPV 8.0 06/19/2014     Lab Results   Component Value Date     11/09/2021    K 3.8 11/09/2021    K 4.5 11/02/2021     11/09/2021    CO2 23 11/09/2021    BUN 9 11/09/2021    CREATININE 0.61 11/09/2021    GFRAA >60.0 11/09/2021    LABGLOM >60.0 11/09/2021    GLUCOSE 123 11/09/2021    GLUCOSE 77 06/01/2012    PROT 7.1 11/09/2021    LABALBU 4.3 11/09/2021    LABALBU 4.7 06/01/2012    CALCIUM 9.2 11/09/2021    BILITOT <0.2 11/09/2021    ALKPHOS 106 11/09/2021    AST 25 11/09/2021    ALT 29 11/09/2021     No results found for: PROTIME, INR  Lab Results   Component Value Date    TSH 0.934 11/01/2021    IRON 58 01/08/2014    TIBC 395 01/08/2014     Lab Results   Component Value Date    TRIG 79 11/01/2021    HDL 74 11/01/2021    LDLCALC 166 11/01/2021     Lab Results   Component Value Date    LABAMPH Neg 11/09/2021    BARBSCNU Neg 11/09/2021    LABBENZ Neg 11/09/2021    LABBENZ NT DTCD 03/01/2013    LABMETH Neg 11/09/2021    OPIATESCREENURINE Neg 11/09/2021    PHENCYCLIDINESCREENURINE Neg 11/09/2021    ETOH <10 11/01/2021     Lab Results   Component Value Date    VALPROATE 17.7 11/09/2021       Assessment:       Diagnosis Orders   1. Temporal lobe epilepsy, non-refractory (HCC)     2. Seizure (HCC)  phenytoin (PHENYTEK) 300 MG ER capsule    phenytoin (PHENYTEK) 200 MG ER capsule   3. Anxiety     4. Syncope, unspecified syncope type       Number oTemporal lobe  epilepsy with response to phenytoin and Depakote.   The patient has had very infrequent breakthrough seizures and she attests that she does take her medication. She is on Dilantin 200 mg with another 300 mg and she is on Depakote 500 g twice a day. In the past we have noted that she was supposed to be on 3 and this makes her tired. She did breakthrough 2 seizures one was a generalized grand mal seizures and she did not go to the hospital the second 1 was a déjà vu feeling. Last seen she was suicidal and she was sent to the hospital in [place] police were contacted as well. She did get admitted to psych and was on medication such as Latuda. She then ran out of the medication and never went back to the psychiatrist.  Her anxieties are now coming back. She is not suicidal though. Recommended that we add Abilify for now and in the future she is likely to require a psychiatric evaluation. She would like to stay with me and not see anyone else though I did discuss tests some things are beyond my entry and I may need some help. She is agreeable to this. Recommended that we obtain blood test first to see if she requires increment of her Depakote due to the breakthrough seizures. Also follow-up has been discussed    Plan:      No orders of the defined types were placed in this encounter. Orders Placed This Encounter   Medications    phenytoin (PHENYTEK) 300 MG ER capsule     Sig: Take 1 capsule by mouth every evening To be taken with phenytoin ER 200mg in the AM     Dispense:  30 capsule     Refill:  3    phenytoin (PHENYTEK) 200 MG ER capsule     Sig: Take 1 capsule by mouth every morning To be taken with phenytoin ER 300mg QHS     Dispense:  30 capsule     Refill:  3    divalproex (DEPAKOTE) 500 MG DR tablet     Sig: Take 1 tablet by mouth every 12 hours     Dispense:  60 tablet     Refill:  1    ARIPiprazole (ABILIFY) 5 MG tablet     Sig: Take 1 tablet by mouth daily     Dispense:  30 tablet     Refill:  3       No follow-ups on file.       Bladimir Adrian MD

## 2022-03-23 ENCOUNTER — HOSPITAL ENCOUNTER (OUTPATIENT)
Dept: LAB | Age: 27
Discharge: HOME | End: 2022-03-23
Payer: MEDICAID

## 2022-03-23 DIAGNOSIS — R56.9 SEIZURE (HCC): ICD-10-CM

## 2022-03-23 LAB
ALBUMIN SERPL-MCNC: 4.5 G/DL (ref 3.5–4.6)
ALP BLD-CCNC: 98 U/L (ref 40–130)
ALT SERPL-CCNC: 25 U/L (ref 0–33)
AST SERPL-CCNC: 23 U/L (ref 0–35)
BASOPHILS ABSOLUTE: 0.2 K/UL (ref 0–0.2)
BASOPHILS RELATIVE PERCENT: 1.6 %
BILIRUB SERPL-MCNC: <0.2 MG/DL (ref 0.2–0.7)
BILIRUBIN DIRECT: <0.2 MG/DL (ref 0–0.4)
BILIRUBIN, INDIRECT: NORMAL MG/DL (ref 0–0.6)
EOSINOPHILS ABSOLUTE: 0.2 K/UL (ref 0–0.7)
EOSINOPHILS RELATIVE PERCENT: 1.8 %
HCT VFR BLD CALC: 40.2 % (ref 37–47)
HEMOGLOBIN: 12.8 G/DL (ref 12–16)
LYMPHOCYTES ABSOLUTE: 1.9 K/UL (ref 1–4.8)
LYMPHOCYTES RELATIVE PERCENT: 20.2 %
MCH RBC QN AUTO: 24.1 PG (ref 27–31.3)
MCHC RBC AUTO-ENTMCNC: 31.7 % (ref 33–37)
MCV RBC AUTO: 75.9 FL (ref 82–100)
MONOCYTES ABSOLUTE: 0.6 K/UL (ref 0.2–0.8)
MONOCYTES RELATIVE PERCENT: 6.2 %
NEUTROPHILS ABSOLUTE: 6.6 K/UL (ref 1.4–6.5)
NEUTROPHILS RELATIVE PERCENT: 70.2 %
PDW BLD-RTO: 20.4 % (ref 11.5–14.5)
PHENYTOIN LEVEL: 18.5 UG/ML (ref 10–20)
PLATELET # BLD: 413 K/UL (ref 130–400)
RBC # BLD: 5.29 M/UL (ref 4.2–5.4)
TOTAL PROTEIN: 7 G/DL (ref 6.3–8)
VALPROIC ACID LEVEL: 68.6 UG/ML (ref 50–100)
WBC # BLD: 9.4 K/UL (ref 4.8–10.8)

## 2022-03-23 PROCEDURE — 80164 ASSAY DIPROPYLACETIC ACD TOT: CPT

## 2022-03-23 PROCEDURE — 85025 COMPLETE CBC W/AUTO DIFF WBC: CPT

## 2022-03-23 PROCEDURE — 36415 COLL VENOUS BLD VENIPUNCTURE: CPT

## 2022-03-23 PROCEDURE — 80185 ASSAY OF PHENYTOIN TOTAL: CPT

## 2022-03-23 PROCEDURE — 80076 HEPATIC FUNCTION PANEL: CPT

## 2022-04-05 NOTE — TELEPHONE ENCOUNTER
----- Message from Lea Hobbs sent at 4/4/2022  3:20 PM EDT -----  Subject: Message to Provider    QUESTIONS  Information for Provider? Lemuel Delong called in requesting to speak to   someone clinical in regards to her medication. She and Dr. Bonita Mitchell had   decided to restart Abilify. She has been on it for 9 days and she is   feeling terrible - coker, sad. She is wondering if it is ok to just stop   taking the medication or if there is something else she could try. Please   give her a call back. ---------------------------------------------------------------------------  --------------  Steph TREVINO  What is the best way for the office to contact you? OK to leave message on   voicemail  Preferred Call Back Phone Number? [phone]  ---------------------------------------------------------------------------  --------------  SCRIPT ANSWERS  Relationship to Patient?  Self

## 2022-04-14 ENCOUNTER — TELEMEDICINE (OUTPATIENT)
Dept: FAMILY MEDICINE CLINIC | Age: 27
End: 2022-04-14
Payer: MEDICAID

## 2022-04-14 DIAGNOSIS — R45.89 DEPRESSED MOOD: Primary | ICD-10-CM

## 2022-04-14 DIAGNOSIS — F43.0 PANIC STATE AS ACUTE REACTION TO STRESS: ICD-10-CM

## 2022-04-14 DIAGNOSIS — F31.76 BIPOLAR DISORDER, IN FULL REMISSION, MOST RECENT EPISODE DEPRESSED (HCC): ICD-10-CM

## 2022-04-14 PROCEDURE — G8427 DOCREV CUR MEDS BY ELIG CLIN: HCPCS | Performed by: PHYSICIAN ASSISTANT

## 2022-04-14 PROCEDURE — 99214 OFFICE O/P EST MOD 30 MIN: CPT | Performed by: PHYSICIAN ASSISTANT

## 2022-04-14 RX ORDER — HYDROXYZINE HYDROCHLORIDE 25 MG/1
12.5 TABLET, FILM COATED ORAL 3 TIMES DAILY PRN
Qty: 90 TABLET | Refills: 1 | Status: SHIPPED | OUTPATIENT
Start: 2022-04-14 | End: 2022-07-12 | Stop reason: SDUPTHER

## 2022-04-14 RX ORDER — VILAZODONE HYDROCHLORIDE 20 MG/1
TABLET ORAL
Qty: 30 TABLET | Refills: 5 | Status: SHIPPED | OUTPATIENT
Start: 2022-04-14 | End: 2022-07-12

## 2022-04-14 ASSESSMENT — ENCOUNTER SYMPTOMS
COLOR CHANGE: 0
TROUBLE SWALLOWING: 0
CHEST TIGHTNESS: 1
ABDOMINAL DISTENTION: 0
SHORTNESS OF BREATH: 0
WHEEZING: 0
ABDOMINAL PAIN: 0

## 2022-04-14 NOTE — PROGRESS NOTES
2022    TELEHEALTH EVALUATION -- Audio/Visual (During HJKLS-03 public health emergency)    Due to Yony 19 outbreak, patient's office visit was converted to a virtual visit. Patient was contacted and agreed to proceed with a virtual visit via Telephone Visit--total length of call 15-20 minutes. The risks and benefits of converting to a virtual visit were discussed in light of the current infectious disease epidemic. Patient also understood that insurance coverage and co-pays are up to their individual insurance plans. HPI:    Fabian Angulo (:  1995) has requested an audio/video evaluation for the following concern(s):    VV today for follow up. Most recently saw Dr. Parul Lopez. Mood disorder. Has been on multiple mood stabilizers including: Previously was on several different medications including seroquel, ativan, zoloft, abilify, xanax and vistaril. No interest in those medications. Continues to have some days of irritability/'Highs\" and then will \"crash\". Was taking ativan 0.5 mg BID, PRN. Denies acute sarkis with inability to sleep, risky behaviors.     Was most recently started back on abilify 5 mg. Has noticed no change in her mood. Does not want to continue. Does not want medication that will give her side effects. Low tolerance threshold, seems to react. History of PTSD with loss of her significant other to suicide. Seizure disorder. Decompensated. Having breakthrough seizures. Working with neurology. Compliant with her seizure medication. Review of Systems   Constitutional: Negative for activity change, appetite change, chills, diaphoresis, fatigue, fever and unexpected weight change. HENT: Negative for congestion and trouble swallowing. Eyes: Negative for visual disturbance. Respiratory: Positive for chest tightness (improved with ativan/new medication). Negative for shortness of breath and wheezing.     Cardiovascular: Negative for chest pain, palpitations and leg swelling. Gastrointestinal: Negative for abdominal distention and abdominal pain. Genitourinary: Negative for difficulty urinating. Musculoskeletal: Negative for arthralgias. Skin: Negative for color change. Neurological: Positive for seizures. Negative for dizziness, speech difficulty, weakness, light-headedness and numbness. Psychiatric/Behavioral: Positive for agitation and sleep disturbance. Negative for behavioral problems, confusion, decreased concentration, dysphoric mood, self-injury and suicidal ideas. The patient is nervous/anxious. Prior to Visit Medications    Medication Sig Taking? Authorizing Provider   vilazodone HCl (VILAZODONE HCL) 20 MG TABS Take 1/2 tablet by mouth daily for 3 days then 1 tablet by mouth daily.   Take with a meal. Yes Tamar Meza PA-C   hydrOXYzine (ATARAX) 25 MG tablet Take 0.5 tablets by mouth 3 times daily as needed for Anxiety Yes Tamar Meza PA-C   phenytoin (PHENYTEK) 300 MG ER capsule Take 1 capsule by mouth every evening To be taken with phenytoin ER 200mg in the AM Yes Gentry Baker MD   phenytoin (PHENYTEK) 200 MG ER capsule Take 1 capsule by mouth every morning To be taken with phenytoin ER 300mg QHS Yes Juan Daniel Malin MD   divalproex (DEPAKOTE) 500 MG DR tablet Take 1 tablet by mouth every 12 hours Yes Gentry Baker MD       Social History     Tobacco Use    Smoking status: Current Every Day Smoker     Packs/day: 2.00     Years: 9.00     Pack years: 18.00     Types: Cigarettes    Smokeless tobacco: Never Used   Vaping Use    Vaping Use: Never used   Substance Use Topics    Alcohol use: No    Drug use: Not Currently        Allergies   Allergen Reactions    Penicillins      GI Problems   ,   Past Medical History:   Diagnosis Date    Anxiety     Herpes simplex virus (HSV) infection of vagina     Migraine     Seizures (HCC)     Varicella    , No past surgical history on file.,   Social History     Tobacco Use    Smoking status: Current Every Day Smoker     Packs/day: 2.00     Years: 9.00     Pack years: 18.00     Types: Cigarettes    Smokeless tobacco: Never Used   Vaping Use    Vaping Use: Never used   Substance Use Topics    Alcohol use: No    Drug use: Not Currently   ,   Family History   Problem Relation Age of Onset    Cancer Other         brain, breast, lung    Coronary Art Dis Other     High Blood Pressure Brother     Obesity Brother     Cancer Maternal Grandmother     Allergies Maternal Grandmother     Colon Cancer Maternal Grandmother     Breast Cancer Maternal Grandmother     Ovarian Cancer Maternal Grandmother     Other Maternal Grandmother         eye cancer, loss of left eye    Cancer Maternal Grandfather         lung    Heart Disease Maternal Grandfather     Uterine Cancer Mother     Ovarian Cancer Mother     Breast Cancer Mother     Cancer Mother         behind right eye    Heart Attack Mother     Migraines Mother     Cancer Maternal Aunt         lung   ,   Immunization History   Administered Date(s) Administered    DTP 1995, 1995, 01/22/1996    DTaP 1995, 1995, 01/22/1996, 03/20/1997, 11/11/1999, 06/01/2000    DTaP vaccine 03/20/1997, 11/11/1999, 06/01/2000    HIB PRP-T (ActHIB, Hiberix) 1995, 1995, 01/22/1996, 03/20/1997    HPV Quadrivalent (Gardasil) 06/12/2008, 08/19/2008, 12/19/2008    Hepatitis A 03/28/2013, 09/27/2013    Hepatitis B 1995, 1995, 02/24/1998    Hepatitis B Ped/Adol (Engerix-B, Recombivax HB) 1995, 1995, 02/24/1998    Hepatitis B vaccine 1995    Hib, unspecified 1995, 1995, 01/22/1996, 03/20/1997    Influenza 11/02/2012, 09/27/2013    Influenza Virus Vaccine 10/16/2009, 01/09/2012, 11/16/2018    Influenza, Quadv, IM, PF (6 mo and older Fluzone, Flulaval, Fluarix, and 3 yrs and older Afluria) 11/05/2021    MMR 03/20/1997, 11/11/1999, 06/01/2000    Meningococcal MCV4P (Menactra) 05/23/2011    Polio IPV (IPOL) 1995, 1995, 01/22/1996, 11/11/1999, 06/01/2000    Polio OPV 1995, 1995, 01/22/1996, 11/11/1999    Tdap (Boostrix, Adacel) 05/23/2011, 03/11/2019   ,   Health Maintenance   Topic Date Due    Varicella vaccine (1 of 2 - 2-dose childhood series) Never done    COVID-19 Vaccine (1) Never done    Pap smear  Never done    Pneumococcal 0-64 years Vaccine (1 - PCV) 06/29/2022 (Originally 2/12/2001)    Depression Monitoring  03/22/2023    DTaP/Tdap/Td vaccine (8 - Td or Tdap) 03/11/2029    Hepatitis A vaccine  Completed    Hepatitis B vaccine  Completed    Hib vaccine  Completed    Meningococcal (ACWY) vaccine  Completed    Flu vaccine  Completed    Hepatitis C screen  Completed    HIV screen  Completed       PHYSICAL EXAMINATION:  [ INSTRUCTIONS:  \"[x]\" Indicates a positive item  \"[]\" Indicates a negative item  -- DELETE ALL ITEMS NOT EXAMINED]  [x] Alert  [x] Oriented to person/place/time    [] No apparent distress  [] Toxic appearing    [] Face flushed appearing [] Sclera clear  [] Lips are cyanotic      [x] Breathing appears normal  [] Appears tachypneic      [] Rash on visible skin    [] Cranial Nerves II-XII grossly intact    [] Motor grossly intact in visible upper extremities    [] Motor grossly intact in visible lower extremities    [] Normal Mood  [] Anxious appearing    [] Depressed appearing  [] Confused appearing      [] Poor short term memory  [] Poor long term memory    [] OTHER:      Due to this being a TeleHealth encounter, evaluation of the following organ systems is limited: Vitals/Constitutional/EENT/Resp/CV/GI//MS/Neuro/Skin/Heme-Lymph-Imm. ASSESSMENT/PLAN:  1. Depressed mood    - vilazodone HCl (VILAZODONE HCL) 20 MG TABS; Take 1/2 tablet by mouth daily for 3 days then 1 tablet by mouth daily. Take with a meal.  Dispense: 30 tablet; Refill: 5    2.  Panic state as acute reaction to stress    - hydrOXYzine (ATARAX) 25 MG tablet; Take 0.5 tablets by mouth 3 times daily as needed for Anxiety  Dispense: 90 tablet; Refill: 1    3. Bipolar disorder, in full remission, most recent episode depressed ([facility] [address]    Does not want antipsychotic medication. Willing to try antidepressant. Trial viibryd and atarax--prn. Has appt with me in 2 weeks, in-office. Call office with any questions or concerns. No follow-ups on file. An  electronic signature was used to authenticate this note. --Safia Butler PA-C on 4/14/2022 at 2:59 PM        Pursuant to the emergency declaration under the [facility] waiver authority and the [employer] Act, this Virtual  Visit was conducted, with patient's consent, to reduce the patient's risk of exposure to COVID-19 and provide continuity of care for an established patient. Services were provided through a video synchronous discussion virtually to substitute for in-person clinic visit.

## 2022-06-01 RX ORDER — DIVALPROEX SODIUM 500 MG/1
500 TABLET, DELAYED RELEASE ORAL EVERY 12 HOURS SCHEDULED
Qty: 60 TABLET | Refills: 1 | Status: SHIPPED | OUTPATIENT
Start: 2022-06-01 | End: 2022-06-09 | Stop reason: SDUPTHER

## 2022-06-01 NOTE — TELEPHONE ENCOUNTER
Pharmacy is requesting medication refill.  Please approve or deny this request.    Rx requested:  Requested Prescriptions     Pending Prescriptions Disp Refills    divalproex (DEPAKOTE) 500 MG DR tablet [Pharmacy Med Name: divalproex 500 mg tablet,delayed release] 60 tablet 1     Sig: TAKE 1 TABLET BY MOUTH EVERY 12 HOURS         Last Office Visit:   3/22/2022      Next Visit Date:  Future Appointments   Date Time Provider Ermias Johnson   7/25/2022  3:30 PM Nikos Pham MD [facility]

## 2022-06-09 DIAGNOSIS — R56.9 SEIZURE (HCC): ICD-10-CM

## 2022-06-09 RX ORDER — PHENYTOIN SODIUM 300 MG/1
300 CAPSULE, EXTENDED RELEASE ORAL EVERY EVENING
Qty: 1 CAPSULE | Refills: 0 | Status: SHIPPED | OUTPATIENT
Start: 2022-06-09 | End: 2022-06-09

## 2022-06-09 RX ORDER — DIVALPROEX SODIUM 500 MG/1
500 TABLET, DELAYED RELEASE ORAL EVERY 12 HOURS SCHEDULED
Qty: 2 TABLET | Refills: 0 | Status: SHIPPED | OUTPATIENT
Start: 2022-06-09 | End: 2022-06-10 | Stop reason: SDUPTHER

## 2022-06-09 RX ORDER — PHENYTOIN SODIUM 200 MG/1
CAPSULE, EXTENDED RELEASE ORAL
Qty: 1 CAPSULE | Refills: 0 | Status: SHIPPED | OUTPATIENT
Start: 2022-06-09 | End: 2022-06-10 | Stop reason: SDUPTHER

## 2022-06-09 RX ORDER — PHENYTOIN SODIUM 300 MG/1
CAPSULE, EXTENDED RELEASE ORAL
Qty: 1 CAPSULE | Refills: 0 | Status: SHIPPED | OUTPATIENT
Start: 2022-06-09 | End: 2022-06-10 | Stop reason: SDUPTHER

## 2022-06-09 RX ORDER — PHENYTOIN SODIUM 200 MG/1
200 CAPSULE, EXTENDED RELEASE ORAL EVERY MORNING
Qty: 1 CAPSULE | Refills: 0 | Status: SHIPPED | OUTPATIENT
Start: 2022-06-09 | End: 2022-06-09

## 2022-06-10 DIAGNOSIS — R56.9 SEIZURE (HCC): ICD-10-CM

## 2022-06-10 RX ORDER — PHENYTOIN SODIUM 300 MG/1
CAPSULE, EXTENDED RELEASE ORAL
Qty: 5 CAPSULE | Refills: 0 | Status: SHIPPED | OUTPATIENT
Start: 2022-06-10 | End: 2022-07-11 | Stop reason: SDUPTHER

## 2022-06-10 RX ORDER — DIVALPROEX SODIUM 500 MG/1
500 TABLET, DELAYED RELEASE ORAL EVERY 12 HOURS SCHEDULED
Qty: 10 TABLET | Refills: 0 | Status: SHIPPED | OUTPATIENT
Start: 2022-06-10 | End: 2022-07-11 | Stop reason: SDUPTHER

## 2022-06-10 RX ORDER — PHENYTOIN SODIUM 200 MG/1
CAPSULE, EXTENDED RELEASE ORAL
Qty: 5 CAPSULE | Refills: 0 | Status: SHIPPED | OUTPATIENT
Start: 2022-06-10 | End: 2022-07-11 | Stop reason: SDUPTHER

## 2022-06-10 NOTE — TELEPHONE ENCOUNTER
[facility] called about the scripts they received for the Depakote 500 mg, phenytoin 200 mg and phenytoin 300 mg. They are asking if the qty of the medication is correct. Please advise, thank you.

## 2022-06-11 NOTE — TELEPHONE ENCOUNTER
Spoke with patient on 6/9/2022 at 525 pm  I sent her  1 day of her phenytoin and dilantin  Told to call pharmacy should one have rx and she will call dr Bren Patel to get other  She normally gets these from dr Bren Patel   Documented now  She knows to call me if she won't have enough for weekend.

## 2022-06-11 NOTE — TELEPHONE ENCOUNTER
Called [phone] and [phone] numbers and had  try as well   Unable to leave vm at either number to verify if patient received her rxs  So I sent her a few more days supply.    Sent my chart as well

## 2022-06-21 ENCOUNTER — TELEPHONE (OUTPATIENT)
Dept: FAMILY MEDICINE CLINIC | Age: 27
End: 2022-06-21

## 2022-06-21 RX ORDER — NICOTINE 21 MG/24HR
1 PATCH, TRANSDERMAL 24 HOURS TRANSDERMAL EVERY 24 HOURS
Qty: 30 PATCH | Refills: 3 | Status: SHIPPED | OUTPATIENT
Start: 2022-06-21 | End: 2022-08-18

## 2022-06-21 NOTE — TELEPHONE ENCOUNTER
Patient called in and is wondering if she cold be put on nicotine patches. She states she is smoking a pack a day?     Please advise and thank you

## 2022-06-23 NOTE — TELEPHONE ENCOUNTER
Pt has been wearing her patches but she states they are falling off of her arm they are not adhesive enough. She said the hospital prescribed her some other patches a while ago that worked well and is wondering if these ones can be prescribed instead.      Please advise and thank you

## 2022-07-11 ENCOUNTER — TELEPHONE (OUTPATIENT)
Dept: NEUROLOGY | Age: 27
End: 2022-07-11

## 2022-07-11 DIAGNOSIS — R56.9 SEIZURE (HCC): ICD-10-CM

## 2022-07-11 RX ORDER — PHENYTOIN SODIUM 200 MG/1
CAPSULE, EXTENDED RELEASE ORAL
Qty: 5 CAPSULE | Refills: 0 | Status: SHIPPED | OUTPATIENT
Start: 2022-07-11 | End: 2022-07-12 | Stop reason: SDUPTHER

## 2022-07-11 RX ORDER — PHENYTOIN SODIUM 300 MG/1
CAPSULE, EXTENDED RELEASE ORAL
Qty: 5 CAPSULE | Refills: 0 | Status: SHIPPED | OUTPATIENT
Start: 2022-07-11 | End: 2022-07-12 | Stop reason: SDUPTHER

## 2022-07-11 RX ORDER — DIVALPROEX SODIUM 500 MG/1
500 TABLET, DELAYED RELEASE ORAL EVERY 12 HOURS SCHEDULED
Qty: 60 TABLET | Refills: 0 | Status: SHIPPED | OUTPATIENT
Start: 2022-07-11 | End: 2022-07-12 | Stop reason: SDUPTHER

## 2022-07-11 NOTE — TELEPHONE ENCOUNTER
Patient is on abilify 5mg and she does not feel that it helps, She started taking 10mg and it helped.  Can a new script be done for 10 mg ?    mahnaz

## 2022-07-12 ENCOUNTER — TELEMEDICINE (OUTPATIENT)
Dept: FAMILY MEDICINE CLINIC | Age: 27
End: 2022-07-12
Payer: MEDICAID

## 2022-07-12 DIAGNOSIS — F31.75 BIPOLAR DISORDER, IN PARTIAL REMISSION, MOST RECENT EPISODE DEPRESSED (HCC): Primary | ICD-10-CM

## 2022-07-12 DIAGNOSIS — R56.9 SEIZURE (HCC): ICD-10-CM

## 2022-07-12 DIAGNOSIS — R46.89 COMPULSIVE BEHAVIOR: ICD-10-CM

## 2022-07-12 DIAGNOSIS — F43.0 PANIC STATE AS ACUTE REACTION TO STRESS: ICD-10-CM

## 2022-07-12 PROBLEM — F31.9 BIPOLAR 1 DISORDER (HCC): Status: RESOLVED | Noted: 2021-11-01 | Resolved: 2022-07-12

## 2022-07-12 PROBLEM — R45.851 SUICIDAL IDEATION: Status: RESOLVED | Noted: 2022-03-22 | Resolved: 2022-07-12

## 2022-07-12 PROBLEM — Z91.199 NONCOMPLIANCE: Status: RESOLVED | Noted: 2022-03-22 | Resolved: 2022-07-12

## 2022-07-12 PROBLEM — R55 SYNCOPE: Status: RESOLVED | Noted: 2022-03-22 | Resolved: 2022-07-12

## 2022-07-12 PROCEDURE — 99213 OFFICE O/P EST LOW 20 MIN: CPT | Performed by: PHYSICIAN ASSISTANT

## 2022-07-12 PROCEDURE — G8428 CUR MEDS NOT DOCUMENT: HCPCS | Performed by: PHYSICIAN ASSISTANT

## 2022-07-12 RX ORDER — FLUVOXAMINE MALEATE 50 MG/1
50 TABLET, COATED ORAL NIGHTLY
Qty: 90 TABLET | Refills: 1 | Status: SHIPPED | OUTPATIENT
Start: 2022-07-12

## 2022-07-12 RX ORDER — DIVALPROEX SODIUM 500 MG/1
500 TABLET, DELAYED RELEASE ORAL EVERY 12 HOURS SCHEDULED
Qty: 180 TABLET | Refills: 1 | Status: SHIPPED | OUTPATIENT
Start: 2022-07-12 | End: 2022-08-10

## 2022-07-12 RX ORDER — HYDROXYZINE HYDROCHLORIDE 25 MG/1
12.5 TABLET, FILM COATED ORAL 3 TIMES DAILY PRN
Qty: 90 TABLET | Refills: 1 | Status: SHIPPED | OUTPATIENT
Start: 2022-07-12 | End: 2022-10-31 | Stop reason: SDUPTHER

## 2022-07-12 RX ORDER — ARIPIPRAZOLE 10 MG/1
10 TABLET ORAL DAILY
Qty: 90 TABLET | Refills: 1 | Status: SHIPPED | OUTPATIENT
Start: 2022-07-12 | End: 2022-08-18

## 2022-07-12 RX ORDER — PHENYTOIN SODIUM 300 MG/1
CAPSULE, EXTENDED RELEASE ORAL
Qty: 90 CAPSULE | Refills: 1 | Status: SHIPPED | OUTPATIENT
Start: 2022-07-12

## 2022-07-12 RX ORDER — ARIPIPRAZOLE 5 MG/1
10 TABLET ORAL DAILY
COMMUNITY
Start: 2022-06-19 | End: 2022-07-12 | Stop reason: SDUPTHER

## 2022-07-12 RX ORDER — ARIPIPRAZOLE 10 MG/1
10 TABLET ORAL DAILY
Qty: 30 TABLET | Refills: 3 | Status: SHIPPED | OUTPATIENT
Start: 2022-07-12 | End: 2022-09-27 | Stop reason: SDUPTHER

## 2022-07-12 RX ORDER — PHENYTOIN SODIUM 200 MG/1
CAPSULE, EXTENDED RELEASE ORAL
Qty: 90 CAPSULE | Refills: 1 | Status: SHIPPED | OUTPATIENT
Start: 2022-07-12

## 2022-07-12 ASSESSMENT — ENCOUNTER SYMPTOMS
TROUBLE SWALLOWING: 0
SHORTNESS OF BREATH: 0
ABDOMINAL DISTENTION: 0
COLOR CHANGE: 0
WHEEZING: 0
CHEST TIGHTNESS: 0
ABDOMINAL PAIN: 0

## 2022-07-12 NOTE — PROGRESS NOTES
2022    TELEHEALTH EVALUATION -- Audio/Visual (During CGQSQ-63 public health emergency)    Due to Yony 19 outbreak, patient's office visit was converted to a virtual visit. Patient was contacted and agreed to proceed with a virtual visit via Telephone Visit-total length of call 15-20 minutes. The risks and benefits of converting to a virtual visit were discussed in light of the current infectious disease epidemic. Patient also understood that insurance coverage and co-pays are up to their individual insurance plans. HPI:    Jellyjose e Murillon (:  1995) has requested an audio/video evaluation for the following concern(s):    VV today for follow up. Last OV with me: 22 via VV. Mood disorder. Has been on multiple mood stabilizers including: Previously was on several different medications including seroquel, ativan, zoloft, abilify, xanax and vistaril. Past medications stopped due to side effects/inability to tolerate long term. Continues to have some days of irritability/'Highs\" and then will \"crash\".    Was taking ativan 0.5 mg BID, PRN. Denies acute sarkis with inability to sleep, no risky behaviors.     Started back on abilify, currently taking 10 mg. Taking vistaril prn, admits to taking medication 2-3 times throughout the day in order to feel 'calm'. History of PTSD with loss of her significant other to suicide.      Seizure disorder. Working with neurology. Compliant with her seizure medication. Needing refills of medications. Review of Systems   Constitutional: Negative for activity change, appetite change, chills, diaphoresis, fatigue, fever and unexpected weight change. HENT: Negative for congestion and trouble swallowing. Eyes: Negative for visual disturbance. Respiratory: Negative for chest tightness, shortness of breath and wheezing. Cardiovascular: Negative for chest pain, palpitations and leg swelling.    Gastrointestinal: Negative for abdominal Every Day Smoker     Packs/day: 2.00     Years: 9.00     Pack years: 18.00     Types: Cigarettes    Smokeless tobacco: Never Used   Vaping Use    Vaping Use: Never used   Substance Use Topics    Alcohol use: No    Drug use: Not Currently   ,   Family History   Problem Relation Age of Onset    Cancer Other         brain, breast, lung    Coronary Art Dis Other     High Blood Pressure Brother     Obesity Brother     Cancer Maternal Grandmother     Allergies Maternal Grandmother     Colon Cancer Maternal Grandmother     Breast Cancer Maternal Grandmother     Ovarian Cancer Maternal Grandmother     Other Maternal Grandmother         eye cancer, loss of left eye    Cancer Maternal Grandfather         lung    Heart Disease Maternal Grandfather     Uterine Cancer Mother     Ovarian Cancer Mother     Breast Cancer Mother     Cancer Mother         behind right eye    Heart Attack Mother     Migraines Mother     Cancer Maternal Aunt         lung   ,   Immunization History   Administered Date(s) Administered    DTP 1995, 1995, 01/22/1996    DTaP 1995, 1995, 01/22/1996, 03/20/1997, 11/11/1999, 06/01/2000    DTaP vaccine 03/20/1997, 11/11/1999, 06/01/2000    HIB PRP-T (ActHIB, Hiberix) 1995, 1995, 01/22/1996, 03/20/1997    HPV Quadrivalent (Gardasil) 06/12/2008, 08/19/2008, 12/19/2008    Hepatitis A 03/28/2013, 09/27/2013    Hepatitis B 1995, 1995, 02/24/1998    Hepatitis B Ped/Adol (Engerix-B, Recombivax HB) 1995, 1995, 02/24/1998    Hepatitis B vaccine 1995    Hib, unspecified 1995, 1995, 01/22/1996, 03/20/1997    Influenza 11/02/2012, 09/27/2013    Influenza Virus Vaccine 10/16/2009, 01/09/2012, 11/16/2018    Influenza, Quadv, IM, PF (6 mo and older Fluzone, Flulaval, Fluarix, and 3 yrs and older Afluria) 11/05/2021    MMR 03/20/1997, 11/11/1999, 06/01/2000    Meningococcal MCV4P (Menactra) 05/23/2011    Polio IPV (IPOL) 1995, 1995, 01/22/1996, 11/11/1999, 06/01/2000    Polio OPV 1995, 1995, 01/22/1996, 11/11/1999    Tdap (Boostrix, Adacel) 05/23/2011, 03/11/2019   ,   Health Maintenance   Topic Date Due    Varicella vaccine (1 of 2 - 2-dose childhood series) Never done    COVID-19 Vaccine (1) Never done    Pneumococcal 0-64 years Vaccine (1 - PCV) Never done    Pap smear  Never done    Flu vaccine (1) 09/01/2022    Depression Monitoring  03/22/2023    DTaP/Tdap/Td vaccine (8 - Td or Tdap) 03/11/2029    Hepatitis A vaccine  Completed    Hepatitis B vaccine  Completed    Hib vaccine  Completed    Meningococcal (ACWY) vaccine  Completed    Hepatitis C screen  Completed    HIV screen  Completed       PHYSICAL EXAMINATION:  [ INSTRUCTIONS:  \"[x]\" Indicates a positive item  \"[]\" Indicates a negative item  -- DELETE ALL ITEMS NOT EXAMINED]  [x] Alert  [x] Oriented to person/place/time    [] No apparent distress  [] Toxic appearing    [] Face flushed appearing [] Sclera clear  [] Lips are cyanotic      [x] Breathing appears normal  [] Appears tachypneic      [] Rash on visible skin    [] Cranial Nerves II-XII grossly intact    [] Motor grossly intact in visible upper extremities    [] Motor grossly intact in visible lower extremities    [] Normal Mood  [] Anxious appearing    [] Depressed appearing  [] Confused appearing      [] Poor short term memory  [] Poor long term memory    [] OTHER:      Due to this being a TeleHealth encounter, evaluation of the following organ systems is limited: Vitals/Constitutional/EENT/Resp/CV/GI//MS/Neuro/Skin/Heme-Lymph-Imm. ASSESSMENT/PLAN:  1. Bipolar disorder, in partial remission, most recent episode depressed (HCC)    - ARIPiprazole (ABILIFY) 10 MG tablet; Take 1 tablet by mouth daily  Dispense: 90 tablet; Refill: 1    2.  Seizure ([address]    - phenytoin (PHENYTEK) 300 MG ER capsule; 200 mg po once daily in the am and phenytoin ER 300mg QHS  Dispense: 90 capsule; Refill: 1  - phenytoin (PHENYTEK) 200 MG ER capsule; 200 mg po once daily in the am and phenytoin ER 300mg QHS  Dispense: 90 capsule; Refill: 1  - divalproex (DEPAKOTE) 500 MG DR tablet; Take 1 tablet by mouth every 12 hours  Dispense: 180 tablet; Refill: 1    3. Panic state as acute reaction to stress    - fluvoxaMINE (LUVOX) 50 MG tablet; Take 1 tablet by mouth nightly  Dispense: 90 tablet; Refill: 1  - hydrOXYzine HCl (ATARAX) 25 MG tablet; Take 0.5 tablets by mouth 3 times daily as needed for Anxiety  Dispense: 90 tablet; Refill: 1    4. Compulsive behavior    - fluvoxaMINE (LUVOX) 50 MG tablet; Take 1 tablet by mouth nightly  Dispense: 90 tablet; Refill: 1    Continue current medications, these were refilled today. Trial luvox at night, monitor response. Return in about 4 weeks (around 8/9/2022). An  electronic signature was used to authenticate this note. --Madeleine Tejada PA-C on 7/12/2022 at 1:30 PM        Pursuant to the emergency declaration under the [facility] waiver authority and the [employer] Act, this Virtual  Visit was conducted, with patient's consent, to reduce the patient's risk of exposure to COVID-19 and provide continuity of care for an established patient. Services were provided through a video synchronous discussion virtually to substitute for in-person clinic visit.

## 2022-07-14 ENCOUNTER — OFFICE VISIT (OUTPATIENT)
Dept: FAMILY MEDICINE CLINIC | Age: 27
End: 2022-07-14
Payer: MEDICAID

## 2022-07-14 ENCOUNTER — HOSPITAL ENCOUNTER (OUTPATIENT)
Dept: LAB | Age: 27
Discharge: HOME | End: 2022-07-14

## 2022-07-14 VITALS
OXYGEN SATURATION: 99 % | DIASTOLIC BLOOD PRESSURE: 66 MMHG | BODY MASS INDEX: 23.25 KG/M2 | TEMPERATURE: 97.4 F | HEIGHT: 63 IN | WEIGHT: 131.2 LBS | SYSTOLIC BLOOD PRESSURE: 118 MMHG | HEART RATE: 73 BPM

## 2022-07-14 DIAGNOSIS — R63.0 NO APPETITE: ICD-10-CM

## 2022-07-14 DIAGNOSIS — D53.9 ANEMIA ASSOCIATED WITH NUTRITIONAL DEFICIENCY: ICD-10-CM

## 2022-07-14 DIAGNOSIS — G40.909 SEIZURE DISORDER (HCC): ICD-10-CM

## 2022-07-14 DIAGNOSIS — G40.909 SEIZURE DISORDER (HCC): Primary | ICD-10-CM

## 2022-07-14 LAB
CONTROL: NORMAL
INFLUENZA A ANTIBODY: NEGATIVE
INFLUENZA B ANTIBODY: NEGATIVE
Lab: NORMAL
PERFORMING INSTRUMENT: NORMAL
PREGNANCY TEST URINE, POC: NEGATIVE
QC PASS/FAIL: NORMAL
SARS-COV-2, POC: NORMAL

## 2022-07-14 PROCEDURE — 81025 URINE PREGNANCY TEST: CPT | Performed by: NURSE PRACTITIONER

## 2022-07-14 PROCEDURE — 87804 INFLUENZA ASSAY W/OPTIC: CPT | Performed by: NURSE PRACTITIONER

## 2022-07-14 PROCEDURE — 99213 OFFICE O/P EST LOW 20 MIN: CPT | Performed by: NURSE PRACTITIONER

## 2022-07-14 PROCEDURE — 87426 SARSCOV CORONAVIRUS AG IA: CPT | Performed by: NURSE PRACTITIONER

## 2022-07-14 RX ORDER — PANTOPRAZOLE SODIUM 20 MG/1
20 TABLET, DELAYED RELEASE ORAL DAILY
Qty: 14 TABLET | Refills: 0 | Status: SHIPPED | OUTPATIENT
Start: 2022-07-14 | End: 2022-08-18

## 2022-07-14 SDOH — ECONOMIC STABILITY: FOOD INSECURITY: WITHIN THE PAST 12 MONTHS, YOU WORRIED THAT YOUR FOOD WOULD RUN OUT BEFORE YOU GOT MONEY TO BUY MORE.: NEVER TRUE

## 2022-07-14 SDOH — ECONOMIC STABILITY: FOOD INSECURITY: WITHIN THE PAST 12 MONTHS, THE FOOD YOU BOUGHT JUST DIDN'T LAST AND YOU DIDN'T HAVE MONEY TO GET MORE.: NEVER TRUE

## 2022-07-14 ASSESSMENT — SOCIAL DETERMINANTS OF HEALTH (SDOH): HOW HARD IS IT FOR YOU TO PAY FOR THE VERY BASICS LIKE FOOD, HOUSING, MEDICAL CARE, AND HEATING?: NOT VERY HARD

## 2022-07-14 NOTE — PATIENT INSTRUCTIONS
Patient Education        Seizure: Care Instructions  Overview     Seizures are caused by abnormal patterns of electrical signals in the brain. They are different for each person. Seizures can affect movement, speech, vision, or awareness. Some people have only slight shaking of a hand and do not pass out. Other people may pass out and have shaking of the whole body. Some people appear to stare into space. They are awake, but they can't respond normally. Later, they may not rememberwhat happened. You may need tests to identify the type and cause of the seizures. A seizure may occur only once, or you may have them more than one time. Taking medicines as directed and following up with your doctor may help keep you fromhaving more seizures. The doctor has checked you carefully, but problems can develop later. If you notice any problems or new symptoms, get medical treatment right away. Follow-up care is a key part of your treatment and safety. Be sure to make and go to all appointments, and call your doctor if you are having problems. It's also a good idea to know your test results and keep alist of the medicines you take. How can you care for yourself at home?  Be safe with medicines. Take your medicines exactly as prescribed. Call your doctor if you think you are having a problem with your medicine.  Do not do any activity that could be dangerous to you or others until your doctor says it is safe to do so. For example, do not drive a car, operate machinery, swim, or climb ladders.  Be sure that anyone treating you for any health problem knows that you have had a seizure and what medicines you are taking for it.  Identify and avoid things that may make you more likely to have a seizure. These may include lack of sleep, alcohol or drug use, stress, or not eating.  If possible, take a shower instead of a bath. Having a seizure while in a bath can increase the risk of drowning.   When should you call for help?   Call 911 anytime you think you may need emergency care. For example, call if:     You have another seizure.      You have new symptoms, such as trouble walking, speaking, or thinking clearly. Call your doctor now or seek immediate medical care if:     You are not acting normally. Watch closely for changes in your health, and be sure to contact your doctor ifyou have any problems. Where can you learn more? Go to [email or web address]_[email or web address][email or web address] org and sign in to your [employer] account. Enter T986 in the [employer] box to learn more about \"Seizure: Care Instructions. \"     If you do not have an account, please click on the \"Sign Up Now\" link. Current as of: December 13, 2021               Content Version: 13.3  © 2325-9145 Healthwise, Incorporated. Care instructions adapted under license by [facility] ([facility]). If you have questions about a medical condition or this instruction, always ask your healthcare professional. [address] any warranty or liability for your use of this information.

## 2022-07-14 NOTE — PROGRESS NOTES
No Food Insecurity    Worried About Running Out of Food in the Last Year: Never true    Ran Out of Food in the Last Year: Never true   Transportation Needs: Not on file   Physical Activity: Not on file   Stress: Not on file   Social Connections: Not on file   Intimate Partner Violence: Not on file   Housing Stability: Not on file     Family History   Problem Relation Age of Onset    Cancer Other         brain, breast, lung    Coronary Art Dis Other     High Blood Pressure Brother     Obesity Brother     Cancer Maternal Grandmother     Allergies Maternal Grandmother     Colon Cancer Maternal Grandmother     Breast Cancer Maternal Grandmother     Ovarian Cancer Maternal Grandmother     Other Maternal Grandmother         eye cancer, loss of left eye    Cancer Maternal Grandfather         lung    Heart Disease Maternal Grandfather     Uterine Cancer Mother     Ovarian Cancer Mother     Breast Cancer Mother     Cancer Mother         behind right eye    Heart Attack Mother     Migraines Mother     Cancer Maternal Aunt         lung     Allergies   Allergen Reactions    Penicillins      GI Problems     Current Outpatient Medications   Medication Sig Dispense Refill    pantoprazole (PROTONIX) 20 MG tablet Take 1 tablet by mouth daily for 14 days 14 tablet 0    ARIPiprazole (ABILIFY) 10 MG tablet Take 1 tablet by mouth daily 30 tablet 3    phenytoin (PHENYTEK) 300 MG ER capsule 200 mg po once daily in the am and phenytoin ER 300mg QHS 90 capsule 1    phenytoin (PHENYTEK) 200 MG ER capsule 200 mg po once daily in the am and phenytoin ER 300mg QHS 90 capsule 1    divalproex (DEPAKOTE) 500 MG DR tablet Take 1 tablet by mouth every 12 hours 180 tablet 1    ARIPiprazole (ABILIFY) 10 MG tablet Take 1 tablet by mouth daily 90 tablet 1    fluvoxaMINE (LUVOX) 50 MG tablet Take 1 tablet by mouth nightly 90 tablet 1    hydrOXYzine HCl (ATARAX) 25 MG tablet Take 0.5 tablets by mouth 3 times daily as needed for Anxiety 90 tablet 1 nicotine (NICODERM CQ) 21 MG/24HR Place 1 patch onto the skin every 24 hours 30 patch 3     No current facility-administered medications for this visit. Review of Systems   Constitutional:  Positive for activity change, appetite change and fatigue. Gastrointestinal:  Positive for nausea and vomiting. Neurological:  Positive for dizziness. Psychiatric/Behavioral:  Positive for decreased concentration and dysphoric mood. Objective:   /66   Pulse 73   Temp 97.4 °F (36.3 °C)   Ht 5' 3\" (1.6 m)   Wt 131 lb 3.2 oz (59.5 kg)   SpO2 99%   BMI 23.24 kg/m²     Physical Exam  Constitutional:       General: She is not in acute distress. Appearance: She is well-developed. HENT:      Head: Normocephalic and atraumatic. Nose: Nose normal.   Eyes:      Pupils: Pupils are equal, round, and reactive to light. Cardiovascular:      Rate and Rhythm: Normal rate and regular rhythm. Heart sounds: Normal heart sounds. Pulmonary:      Effort: Pulmonary effort is normal.      Breath sounds: Normal breath sounds. Abdominal:      General: Bowel sounds are normal.      Palpations: Abdomen is soft. Musculoskeletal:         General: Normal range of motion. Cervical back: Normal range of motion and neck supple. Skin:     General: Skin is warm and dry. Capillary Refill: Capillary refill takes less than 2 seconds. Neurological:      General: No focal deficit present. Mental Status: She is alert and oriented to person, place, and time. Psychiatric:         Behavior: Behavior normal.       Assessment:       Diagnosis Orders   1. Seizure disorder ([address]        2. Anemia associated with nutritional deficiency  Vitamin B12 & Folate    Comprehensive Metabolic Panel      3.  Flu-like symptoms  POCT COVID-19, Antigen    POCT Influenza A/B    POCT urine pregnancy        Results for POC orders placed in visit on 07/14/22   POCT Influenza A/B   Result Value Ref Range    Influenza A Ab tablet by mouth daily for 14 days     Dispense:  14 tablet     Refill:  0     There are no discontinued medications. Return for follow up with PCP. Reviewed with the patient/family: current clinical status & medications. Side effects of the medication prescribed today, as well as treatment plan/rationale and result expectations have been discussed with the patient/family who expresses understanding. Patient will be discharged home in stable condition. Follow up with PCP to evaluate treatment results or return if symptoms worsen or fail to improve. Discussed signs and symptoms which require immediate follow-up in ED/call to 911. Understanding verbalized. I have reviewed the patient's medical history in detail and updated the computerized patient record.     Kim Kearns, BRUCE - CNP

## 2022-07-20 ASSESSMENT — ENCOUNTER SYMPTOMS
NAUSEA: 1
VOMITING: 1

## 2022-08-10 ENCOUNTER — TELEPHONE (OUTPATIENT)
Dept: FAMILY MEDICINE CLINIC | Age: 27
End: 2022-08-10

## 2022-08-10 DIAGNOSIS — R56.9 SEIZURE (HCC): ICD-10-CM

## 2022-08-10 RX ORDER — DIVALPROEX SODIUM 500 MG/1
500 TABLET, DELAYED RELEASE ORAL EVERY 12 HOURS SCHEDULED
Qty: 60 TABLET | Refills: 0 | Status: SHIPPED | OUTPATIENT
Start: 2022-08-10 | End: 2022-09-27 | Stop reason: SDUPTHER

## 2022-08-10 NOTE — TELEPHONE ENCOUNTER
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

## 2022-08-18 ENCOUNTER — OFFICE VISIT (OUTPATIENT)
Dept: FAMILY MEDICINE CLINIC | Age: 27
End: 2022-08-18
Payer: MEDICAID

## 2022-08-18 VITALS
SYSTOLIC BLOOD PRESSURE: 100 MMHG | BODY MASS INDEX: 23.74 KG/M2 | HEIGHT: 63 IN | RESPIRATION RATE: 16 BRPM | DIASTOLIC BLOOD PRESSURE: 70 MMHG | OXYGEN SATURATION: 94 % | HEART RATE: 91 BPM | WEIGHT: 134 LBS

## 2022-08-18 DIAGNOSIS — S46.012D TRAUMATIC INCOMPLETE TEAR OF LEFT ROTATOR CUFF, SUBSEQUENT ENCOUNTER: ICD-10-CM

## 2022-08-18 DIAGNOSIS — M25.512 CHRONIC LEFT SHOULDER PAIN: Primary | ICD-10-CM

## 2022-08-18 DIAGNOSIS — G89.29 CHRONIC LEFT SHOULDER PAIN: Primary | ICD-10-CM

## 2022-08-18 PROCEDURE — 4004F PT TOBACCO SCREEN RCVD TLK: CPT | Performed by: PHYSICIAN ASSISTANT

## 2022-08-18 PROCEDURE — G8420 CALC BMI NORM PARAMETERS: HCPCS | Performed by: PHYSICIAN ASSISTANT

## 2022-08-18 PROCEDURE — 99214 OFFICE O/P EST MOD 30 MIN: CPT | Performed by: PHYSICIAN ASSISTANT

## 2022-08-18 PROCEDURE — G8427 DOCREV CUR MEDS BY ELIG CLIN: HCPCS | Performed by: PHYSICIAN ASSISTANT

## 2022-08-18 RX ORDER — PREDNISONE 10 MG/1
TABLET ORAL
Qty: 51 TABLET | Refills: 0 | Status: SHIPPED | OUTPATIENT
Start: 2022-08-18 | End: 2022-08-25

## 2022-08-18 NOTE — PROGRESS NOTES
Subjective  Tamara Mcgarry, 32 y.o. female presents today with:  Chief Complaint   Patient presents with    Follow-up    Shoulder Injury     Pt injured left shoulder x2 years ago and in the last two days the pain has increased, pt states she had excruciating pain while trying to sleep      Shoulder Injury   Associated symptoms include numbness (intermittent, LUE). In the office today to discuss acute on chronic L shoulder pain. History of L shoulder injury several years ago after falling when having a seizure. Initially had some discomfort/pain. Over the past several months, she has been having worsening L shoulder discomfort with any type of activity. Discomfort is now affecting her ADLs. She has taken OTC medication including NSAID and tylenol--no relief. Will intermittently have tingling, numbness of L hand. She had a MRI of L shoulder join 2/21/21. Impression       Findings compatible with prior anterior inferior shoulder dislocation with labral tear and Hill-Sachs lesion as detailed. Moderate glenohumeral joint effusion with areas of synovitis and numerous tiny loose bodies. Low-grade partial-thickness intrasubstance tear along the mid and inferior fibers of subscapularis tendon. Low-grade intrasubstance tearing along the myotendinous junction of teres minor. Possible low-grade intrasubstance tearing of proximal pectoralis minor muscle. Mildly displaced dorsally strain. Review of Systems   Constitutional:  Positive for activity change. Negative for diaphoresis and fatigue. Respiratory:  Negative for chest tightness. Musculoskeletal:  Positive for arthralgias, joint swelling and myalgias. Neurological:  Positive for weakness and numbness (intermittent, LUE). Negative for dizziness, tremors, light-headedness and headaches.      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 Migraine     Seizures ([facility]     Varicella      No past surgical history on file.   Social History     Socioeconomic History    Marital status: Single     Spouse name: Not on file    Number of children: Not on file    Years of education: Not on file    Highest education level: Not on file   Occupational History    Not on file   Tobacco Use    Smoking status: Every Day     Packs/day: 2.00     Years: 9.00     Pack years: 18.00     Types: Cigarettes    Smokeless tobacco: Never   Vaping Use    Vaping Use: Never used   Substance and Sexual Activity    Alcohol use: No    Drug use: Not Currently    Sexual activity: Yes     Partners: Male   Other Topics Concern    Not on file   Social History Narrative    Not on file     Social Determinants of Health     Financial Resource Strain: Low Risk     Difficulty of Paying Living Expenses: Not very hard   Food Insecurity: No Food Insecurity    Worried About Running Out of Food in the Last Year: Never true    Ran Out of Food in the Last Year: Never true   Transportation Needs: Not on file   Physical Activity: Not on file   Stress: Not on file   Social Connections: Not on file   Intimate Partner Violence: Not on file   Housing Stability: Not on file     Family History   Problem Relation Age of Onset    Cancer Other         brain, breast, lung    Coronary Art Dis Other     High Blood Pressure Brother     Obesity Brother     Cancer Maternal Grandmother     Allergies Maternal Grandmother     Colon Cancer Maternal Grandmother     Breast Cancer Maternal Grandmother     Ovarian Cancer Maternal Grandmother     Other Maternal Grandmother         eye cancer, loss of left eye    Cancer Maternal Grandfather         lung    Heart Disease Maternal Grandfather     Uterine Cancer Mother     Ovarian Cancer Mother     Breast Cancer Mother     Cancer Mother         behind right eye    Heart Attack Mother     Migraines Mother     Cancer Maternal Aunt         lung     Allergies   Allergen Reactions Penicillins      GI Problems     Current Outpatient Medications   Medication Sig Dispense Refill    divalproex (DEPAKOTE) 500 MG DR tablet TAKE 1 TABLET BY MOUTH EVERY 12 HOURS 60 tablet 0    ARIPiprazole (ABILIFY) 10 MG tablet Take 1 tablet by mouth daily 30 tablet 3    phenytoin (PHENYTEK) 300 MG ER capsule 200 mg po once daily in the am and phenytoin ER 300mg QHS 90 capsule 1    phenytoin (PHENYTEK) 200 MG ER capsule 200 mg po once daily in the am and phenytoin ER 300mg QHS 90 capsule 1    fluvoxaMINE (LUVOX) 50 MG tablet Take 1 tablet by mouth nightly 90 tablet 1    hydrOXYzine HCl (ATARAX) 25 MG tablet Take 0.5 tablets by mouth 3 times daily as needed for Anxiety 90 tablet 1     No current facility-administered medications for this visit. PMH, Surgical Hx, Family Hx, and Social Hx reviewed and updated. Health Maintenance reviewed. Objective  Vitals:    08/18/22 1336   BP: 100/70   Site: Right Upper Arm   Position: Sitting   Cuff Size: Medium Adult   Pulse: 91   Resp: 16   SpO2: 94%   Weight: 134 lb (60.8 kg)   Height: 5' 3\" (1.6 m)     BP Readings from Last 3 Encounters:   08/25/22 (!) 140/100   08/21/22 (!) 143/89   08/18/22 100/70     Wt Readings from Last 3 Encounters:   08/25/22 134 lb (60.8 kg)   08/21/22 134 lb (60.8 kg)   08/18/22 134 lb (60.8 kg)     Physical Exam  Vitals reviewed. Constitutional:       General: She is not in acute distress. Appearance: Normal appearance. She is not ill-appearing. HENT:      Head: Normocephalic and atraumatic. Right Ear: External ear normal.      Left Ear: External ear normal.   Cardiovascular:      Rate and Rhythm: Normal rate and regular rhythm. Pulmonary:      Effort: Pulmonary effort is normal.      Breath sounds: Normal breath sounds. Musculoskeletal:         General: Tenderness present. No swelling. Comments: Mild guarding with LUE.  +neer/coelho exam.  +lift-off. TTP across shoulder girdle.     Neurological:      Mental Status: She is alert. Mortimer Coward was seen today for follow-up and shoulder injury. Diagnoses and all orders for this visit:    Chronic left shoulder pain  -     MRI SHOULDER LEFT WO CONTRAST; Future  -     [address] [facility] [place]    Traumatic incomplete tear of left rotator cuff, subsequent encounter  -     MRI SHOULDER LEFT WO CONTRAST; Future  -     [address]    Other orders  -     Discontinue: predniSONE (DELTASONE) 10 MG tablet; Take 6 tabs x 6 days, then 5 x 1, 4 x 1, 3 x 1, 2 x 1, 1 x 1  Trial 10 day prednisone. Shoulder sling provided for PRN use. Referral to [facility] for evaluation. Repeat MRI. Contact office with any questions or concerns. Orders Placed This Encounter   Procedures    MRI SHOULDER LEFT WO CONTRAST     Standing Status:   Future     Number of Occurrences:   1     Standing Expiration Date:   8/18/2023     Order Specific Question:   Reason for exam:     Answer:   chronic L shoulder pain with history of rotator tear and labral tear     Order Specific Question:   Reason for exam:     Answer:   worsening shoulder instability. Order Specific Question:   What is the sedation requirement?      Answer:   None    [address] [facility]     Referral Priority:   Routine     Referral Type:   Eval and Treat     Referral Reason:   Specialty Services Required     Requested Specialty:   Orthopedic Surgery     Number of Visits Requested:   1     Orders Placed This Encounter   Medications    DISCONTD: predniSONE (DELTASONE) 10 MG tablet     Sig: Take 6 tabs x 6 days, then 5 x 1, 4 x 1, 3 x 1, 2 x 1, 1 x 1     Dispense:  51 tablet     Refill:  0     Medications Discontinued During This Encounter   Medication Reason    ARIPiprazole (ABILIFY) 10 MG tablet LIST CLEANUP    pantoprazole (PROTONIX) 20 MG tablet LIST CLEANUP    ARIPiprazole (ABILIFY) 10 MG tablet LIST CLEANUP nicotine (NICODERM CQ) 21 MG/24HR LIST CLEANUP     Return in about 6 weeks (around 9/29/2022) for follow up in office. Reviewed with the patient: current clinical status, medications, activities and diet. Side effects, adverse effects of the medication prescribed today, as well as treatment plan/ rationale and result expectations have been discussed with the patient who expresses understanding and desires to proceed. Close follow up to evaluate treatment results and for coordination of care. I have reviewed the patient's medical history in detail and updated the computerized patient record.     Tamar Meza PA-C

## 2022-08-21 ENCOUNTER — HOSPITAL ENCOUNTER (EMERGENCY)
Age: 27
Discharge: HOME | End: 2022-08-21
Payer: MEDICAID

## 2022-08-21 VITALS
RESPIRATION RATE: 18 BRPM | OXYGEN SATURATION: 98 % | TEMPERATURE: 97.3 F | DIASTOLIC BLOOD PRESSURE: 89 MMHG | BODY MASS INDEX: 23.74 KG/M2 | HEART RATE: 81 BPM | HEIGHT: 63 IN | SYSTOLIC BLOOD PRESSURE: 143 MMHG | WEIGHT: 134 LBS

## 2022-08-21 DIAGNOSIS — G89.29 CHRONIC LEFT SHOULDER PAIN: Primary | ICD-10-CM

## 2022-08-21 DIAGNOSIS — M25.512 CHRONIC LEFT SHOULDER PAIN: Primary | ICD-10-CM

## 2022-08-21 PROCEDURE — 99284 EMERGENCY DEPT VISIT MOD MDM: CPT

## 2022-08-21 PROCEDURE — 6370000000 HC RX 637 (ALT 250 FOR IP): Performed by: PHYSICIAN ASSISTANT

## 2022-08-21 PROCEDURE — 6360000002 HC RX W HCPCS: Performed by: PHYSICIAN ASSISTANT

## 2022-08-21 PROCEDURE — 96372 THER/PROPH/DIAG INJ SC/IM: CPT

## 2022-08-21 RX ORDER — HYDROCODONE BITARTRATE AND ACETAMINOPHEN 5; 325 MG/1; MG/1
1 TABLET ORAL ONCE
Status: COMPLETED | OUTPATIENT
Start: 2022-08-21 | End: 2022-08-21

## 2022-08-21 RX ORDER — KETOROLAC TROMETHAMINE 30 MG/ML
60 INJECTION, SOLUTION INTRAMUSCULAR; INTRAVENOUS ONCE
Status: COMPLETED | OUTPATIENT
Start: 2022-08-21 | End: 2022-08-21

## 2022-08-21 RX ORDER — HYDROCODONE BITARTRATE AND ACETAMINOPHEN 5; 325 MG/1; MG/1
1 TABLET ORAL EVERY 6 HOURS PRN
Qty: 10 TABLET | Refills: 0 | Status: SHIPPED | OUTPATIENT
Start: 2022-08-21 | End: 2022-10-04 | Stop reason: SDUPTHER

## 2022-08-21 RX ORDER — ETODOLAC 400 MG/1
400 TABLET, FILM COATED ORAL 2 TIMES DAILY
Qty: 14 TABLET | Refills: 0 | Status: SHIPPED | OUTPATIENT
Start: 2022-08-21 | End: 2022-08-25

## 2022-08-21 RX ADMIN — HYDROCODONE BITARTRATE AND ACETAMINOPHEN 1 TABLET: 5; 325 TABLET ORAL at 21:47

## 2022-08-21 RX ADMIN — KETOROLAC TROMETHAMINE 60 MG: 30 INJECTION, SOLUTION INTRAMUSCULAR at 21:46

## 2022-08-21 ASSESSMENT — PAIN DESCRIPTION - LOCATION
LOCATION: SHOULDER
LOCATION: SHOULDER

## 2022-08-21 ASSESSMENT — PAIN DESCRIPTION - DESCRIPTORS: DESCRIPTORS: ACHING

## 2022-08-21 ASSESSMENT — PAIN DESCRIPTION - ORIENTATION
ORIENTATION: LEFT
ORIENTATION: LEFT

## 2022-08-21 ASSESSMENT — ENCOUNTER SYMPTOMS
EYE PAIN: 0
ABDOMINAL PAIN: 0
COLOR CHANGE: 0
ALLERGIC/IMMUNOLOGIC NEGATIVE: 1
APNEA: 0
TROUBLE SWALLOWING: 0
SHORTNESS OF BREATH: 0

## 2022-08-21 ASSESSMENT — PAIN DESCRIPTION - FREQUENCY: FREQUENCY: INTERMITTENT

## 2022-08-21 ASSESSMENT — PAIN DESCRIPTION - ONSET: ONSET: ON-GOING

## 2022-08-21 ASSESSMENT — PAIN - FUNCTIONAL ASSESSMENT: PAIN_FUNCTIONAL_ASSESSMENT: 0-10

## 2022-08-21 ASSESSMENT — PAIN SCALES - GENERAL
PAINLEVEL_OUTOF10: 10
PAINLEVEL_OUTOF10: 10

## 2022-08-21 ASSESSMENT — PAIN DESCRIPTION - PAIN TYPE: TYPE: ACUTE PAIN

## 2022-08-22 NOTE — ED NOTES
Patient D/C instructions have been reviewed, patient is to follow up with PCP   Patient voiced understanding, no questions or concerns noted at this time.        Kishore Sawyer, [place]  08/21/22 2750

## 2022-08-22 NOTE — ED PROVIDER NOTES
[address] [facility] ED  eMERGENCYdEPARTMENT eNCOUnter      Pt Name: Janny Urbano  MRN: [id number]  [place] 1995  Date of evaluation: 8/21/2022  Provider:Walker Diego PA-C    CHIEF COMPLAINT       Chief Complaint   Patient presents with    Shoulder Pain         HISTORY OF PRESENT ILLNESS  (Location/Symptom, Timing/Onset, Context/Setting, Quality, Duration, Modifying Factors, Severity.)   Janny Urbano is a 32 y.o. female who presents to the emergency department with complaints of chronic left shoulder pain. Patient states that she has seen her family physician who has her scheduled for an MRI and had written her prescription for steroids which she is taken. Patient states no relief from the pain that she rates at a 10 out of 10 and constant. Patient is scheduled for her MRI though she does not have a job yet and already has a referral to orthopedics. HPI    Nursing Notes were reviewed and I agree. REVIEW OF SYSTEMS    (2-9 systems for level 4, 10 or more for level 5)     Review of Systems   Constitutional:  Negative for diaphoresis and fever. HENT:  Negative for hearing loss and trouble swallowing. Eyes:  Negative for pain. Respiratory:  Negative for apnea and shortness of breath. Cardiovascular:  Negative for chest pain. Gastrointestinal:  Negative for abdominal pain. Endocrine: Negative. Genitourinary:  Negative for hematuria. Musculoskeletal:  Positive for arthralgias. Negative for neck pain and neck stiffness. Skin:  Negative for color change. Allergic/Immunologic: Negative. Neurological:  Negative for dizziness and numbness. Hematological: Negative. Psychiatric/Behavioral: Negative. All other systems reviewed and are negative. Except as noted above the remainder of the review of systems was reviewed and negative.        PAST MEDICAL HISTORY     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 Migraine     Seizures ([facility] [address]     Varicella          SURGICAL HISTORY     No past surgical history on file.       CURRENT MEDICATIONS       Previous Medications    ARIPIPRAZOLE (ABILIFY) 10 MG TABLET    Take 1 tablet by mouth daily    DIVALPROEX (DEPAKOTE) 500 MG DR TABLET    TAKE 1 TABLET BY MOUTH EVERY 12 HOURS    FLUVOXAMINE (LUVOX) 50 MG TABLET    Take 1 tablet by mouth nightly    HYDROXYZINE HCL (ATARAX) 25 MG TABLET    Take 0.5 tablets by mouth 3 times daily as needed for Anxiety    PHENYTOIN (PHENYTEK) 200 MG ER CAPSULE    200 mg po once daily in the am and phenytoin ER 300mg QHS    PHENYTOIN (PHENYTEK) 300 MG ER CAPSULE    200 mg po once daily in the am and phenytoin ER 300mg QHS    PREDNISONE (DELTASONE) 10 MG TABLET    Take 6 tabs x 6 days, then 5 x 1, 4 x 1, 3 x 1, 2 x 1, 1 x 1       ALLERGIES     Penicillins    FAMILY HISTORY       Family History   Problem Relation Age of Onset    Cancer Other         brain, breast, lung    Coronary Art Dis Other     High Blood Pressure Brother     Obesity Brother     Cancer Maternal Grandmother     Allergies Maternal Grandmother     Colon Cancer Maternal Grandmother     Breast Cancer Maternal Grandmother     Ovarian Cancer Maternal Grandmother     Other Maternal Grandmother         eye cancer, loss of left eye    Cancer Maternal Grandfather         lung    Heart Disease Maternal Grandfather     Uterine Cancer Mother     Ovarian Cancer Mother     Breast Cancer Mother     Cancer Mother         behind right eye    Heart Attack Mother     Migraines Mother     Cancer Maternal Aunt         lung          SOCIAL HISTORY       Social History     Socioeconomic History    Marital status: Single   Tobacco Use    Smoking status: Every Day     Packs/day: 2.00     Years: 9.00     Pack years: 18.00     Types: Cigarettes    Smokeless tobacco: Never   Vaping Use    Vaping Use: Never used   Substance and Sexual Activity    Alcohol use: No    Drug use: Not Currently    Sexual activity: Yes Partners: Male     Social Determinants of Health     Financial Resource Strain: Low Risk     Difficulty of Paying Living Expenses: Not very hard   Food Insecurity: No Food Insecurity    Worried About [address] in the Last Year: Never true    Ran Out of Food in the Last Year: Never true       SCREENINGS    [place] Coma Scale  Eye Opening: Spontaneous  Best Verbal Response: Oriented  Best Motor Response: Obeys commands  [place] Coma Scale Score: 15      PHYSICAL EXAM    (up to 7 forlevel 4, 8 or more for level 5)     ED Triage Vitals [08/21/22 2131]   BP Temp Temp Source Heart Rate Resp SpO2 Height Weight   (!) 143/89 97.3 °F (36.3 °C) Oral 81 18 98 % 5' 3\" (1.6 m) 134 lb (60.8 kg)       Physical Exam  Vitals and nursing note reviewed. Constitutional:       General: She is not in acute distress. Appearance: She is well-developed. HENT:      Head: Normocephalic and atraumatic. Nose: Nose normal.      Mouth/Throat:      Mouth: Mucous membranes are moist.   Eyes:      General: No scleral icterus. Pupils: Pupils are equal, round, and reactive to light. Neck:      Trachea: No tracheal deviation. Cardiovascular:      Rate and Rhythm: Normal rate and regular rhythm. Heart sounds: Normal heart sounds. No murmur heard. Pulmonary:      Effort: Pulmonary effort is normal. No respiratory distress. Breath sounds: Normal breath sounds. No wheezing or rales. Abdominal:      General: Bowel sounds are normal. There is no distension. Palpations: Abdomen is soft. Tenderness: There is no abdominal tenderness. There is no guarding. Musculoskeletal:         General: Normal range of motion. Left shoulder: Tenderness present. Arms:       Cervical back: Normal range of motion and neck supple. Skin:     General: Skin is warm and dry. Findings: No erythema or rash. Neurological:      Mental Status: She is alert and oriented to person, place, and time.       Deep Tendon Reflexes: Reflexes are normal and symmetric. Psychiatric:         Behavior: Behavior normal.         Thought Content: Thought content normal.         Judgment: Judgment normal.         DIAGNOSTIC RESULTS     RADIOLOGY:   Non-plain film images such as CT, Ultrasound and MRI are read by the radiologist. Plain radiographic images are visualized and preliminarilyinterpreted by Imelda Nunez PA-C with the below findings:      Interpretation per the Radiologist below, if available at the time of this note:    No orders to display       LABS:  Labs Reviewed - No data to display    All other labs were within normal range or not returnedas of this dictation. EMERGENCYDEPARTMENT COURSE and DIFFERENTIAL DIAGNOSIS/MDM:   Vitals:    Vitals:    08/21/22 2131   BP: (!) 143/89   Pulse: 81   Resp: 18   Temp: 97.3 °F (36.3 °C)   TempSrc: Oral   SpO2: 98%   Weight: 134 lb (60.8 kg)   Height: 5' 3\" (1.6 m)       REASSESSMENT        Patient presents with an acute exacerbation of chronic shoulder pain. Patient will given a prescription for hydrocodone as well as Lodine and referred to her PCP for further follow-up    MDM      PROCEDURES:    Procedures      FINAL IMPRESSION      1. Chronic left shoulder pain          DISPOSITION/PLAN   DISPOSITION Decision To Discharge 08/21/2022 09:49:19 PM      PATIENT REFERRED TO:  Alana Riedel, PA-C  [address] [place] [address]  [phone]    Call in 1 day      DISCHARGE MEDICATIONS:  New Prescriptions    ETODOLAC (LODINE) 400 MG TABLET    Take 1 tablet by mouth 2 times daily    HYDROCODONE-ACETAMINOPHEN (NORCO) 5-325 MG PER TABLET    Take 1 tablet by mouth every 6 hours as needed for Pain for up to 3 days. Intended supply: 3 days.  Take lowest dose possible to manage pain       (Please note that portions of this note were completed with a voice recognition program.  Efforts were made to edit the dictations but occasionally words are mis-transcribed.)    Imelda Nunez RULA Mehta PA-C  08/21/22 7398

## 2022-08-22 NOTE — ED TRIAGE NOTES
Patient to ER for left shoulder pain. History of imcomplete tear. She states she has a history of seizures and injured it a couple weeks ago during a seizure episode. She was prescribed steroids which reduced her swelling, but she reports it also increased the pain. When her arm hangs at her side she states that her fingers tingle and go numb. She arrived with arm in sling and states that is the only relief she can get.

## 2022-08-25 ENCOUNTER — OFFICE VISIT (OUTPATIENT)
Dept: FAMILY MEDICINE CLINIC | Age: 27
End: 2022-08-25
Payer: MEDICAID

## 2022-08-25 VITALS
DIASTOLIC BLOOD PRESSURE: 100 MMHG | BODY MASS INDEX: 23.74 KG/M2 | HEART RATE: 106 BPM | WEIGHT: 134 LBS | SYSTOLIC BLOOD PRESSURE: 140 MMHG | OXYGEN SATURATION: 97 % | RESPIRATION RATE: 16 BRPM | HEIGHT: 63 IN

## 2022-08-25 DIAGNOSIS — S46.012D TRAUMATIC INCOMPLETE TEAR OF LEFT ROTATOR CUFF, SUBSEQUENT ENCOUNTER: ICD-10-CM

## 2022-08-25 DIAGNOSIS — G89.29 CHRONIC LEFT SHOULDER PAIN: Primary | ICD-10-CM

## 2022-08-25 DIAGNOSIS — M25.512 CHRONIC LEFT SHOULDER PAIN: Primary | ICD-10-CM

## 2022-08-25 PROCEDURE — G8420 CALC BMI NORM PARAMETERS: HCPCS | Performed by: PHYSICIAN ASSISTANT

## 2022-08-25 PROCEDURE — 99214 OFFICE O/P EST MOD 30 MIN: CPT | Performed by: PHYSICIAN ASSISTANT

## 2022-08-25 PROCEDURE — G8427 DOCREV CUR MEDS BY ELIG CLIN: HCPCS | Performed by: PHYSICIAN ASSISTANT

## 2022-08-25 PROCEDURE — 4004F PT TOBACCO SCREEN RCVD TLK: CPT | Performed by: PHYSICIAN ASSISTANT

## 2022-08-25 NOTE — PROGRESS NOTES
Subjective  Lonia Nya, 32 y.o. female presents today with:  Chief Complaint   Patient presents with    Follow-up    Shoulder Pain     worsening       HPI  In office today for ED follow up. ED follow up. Presented to ED on 8/21/22 for acute on chronic L shoulder pain. History of shoulder dislocation with injury several years ago. We discussed L shoulder pain at last OV; patient has been having pain with stability concerns at last OV. MRI was ordered at that time given abnormal MRI from several years ago. Was started on oral prednisone taper at last OV. No relief from medication. She is in office today have acute on chronic, worsening pain. Wearing a shoulder sling as she does not feel stable without it. Pain is 10/10 and worse with movement. Unsure what she should do. Review of Systems   Constitutional:  Positive for activity change. Negative for fever. Respiratory:  Negative for chest tightness. Cardiovascular:  Negative for chest pain. Musculoskeletal:  Positive for arthralgias, joint swelling, myalgias, neck pain and neck stiffness. Negative for gait problem. Neurological:  Positive for weakness and numbness. Psychiatric/Behavioral:  Positive for sleep disturbance. 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     Migraine     Seizures ([facility] [address]     Varicella      No past surgical history on file.   Social History     Socioeconomic History    Marital status: Single     Spouse name: Not on file    Number of children: Not on file    Years of education: Not on file    Highest education level: Not on file   Occupational History    Not on file   Tobacco Use    Smoking status: Every Day     Packs/day: 2.00     Years: 9.00     Pack years: 18.00     Types: Cigarettes    Smokeless tobacco: Never   Vaping Use    Vaping Use: Never used   Substance and Sexual Activity    Alcohol use: No    Drug use: Not Currently Sexual activity: Yes     Partners: Male   Other Topics Concern    Not on file   Social History Narrative    Not on file     Social Determinants of Health     Financial Resource Strain: Low Risk     Difficulty of Paying Living Expenses: Not very hard   Food Insecurity: No Food Insecurity    Worried About Running Out of Food in the Last Year: Never true    Ran Out of Food in the Last Year: Never true   Transportation Needs: Not on file   Physical Activity: Not on file   Stress: Not on file   Social Connections: Not on file   Intimate Partner Violence: Not on file   Housing Stability: Not on file     Family History   Problem Relation Age of Onset    Cancer Other         brain, breast, lung    Coronary Art Dis Other     High Blood Pressure Brother     Obesity Brother     Cancer Maternal Grandmother     Allergies Maternal Grandmother     Colon Cancer Maternal Grandmother     Breast Cancer Maternal Grandmother     Ovarian Cancer Maternal Grandmother     Other Maternal Grandmother         eye cancer, loss of left eye    Cancer Maternal Grandfather         lung    Heart Disease Maternal Grandfather     Uterine Cancer Mother     Ovarian Cancer Mother     Breast Cancer Mother     Cancer Mother         behind right eye    Heart Attack Mother     Migraines Mother     Cancer Maternal Aunt         lung     Allergies   Allergen Reactions    Penicillins      GI Problems     Current Outpatient Medications   Medication Sig Dispense Refill    divalproex (DEPAKOTE) 500 MG DR tablet TAKE 1 TABLET BY MOUTH EVERY 12 HOURS 60 tablet 0    ARIPiprazole (ABILIFY) 10 MG tablet Take 1 tablet by mouth daily 30 tablet 3    phenytoin (PHENYTEK) 300 MG ER capsule 200 mg po once daily in the am and phenytoin ER 300mg QHS 90 capsule 1    phenytoin (PHENYTEK) 200 MG ER capsule 200 mg po once daily in the am and phenytoin ER 300mg QHS 90 capsule 1    fluvoxaMINE (LUVOX) 50 MG tablet Take 1 tablet by mouth nightly 90 tablet 1    hydrOXYzine HCl (ATARAX) 25 MG tablet Take 0.5 tablets by mouth 3 times daily as needed for Anxiety 90 tablet 1     No current facility-administered medications for this visit. PMH, Surgical Hx, Family Hx, and Social Hx reviewed and updated. Health Maintenance reviewed. Objective  Vitals:    08/18/22 1336   BP: 100/70   Site: Right Upper Arm   Position: Sitting   Cuff Size: Medium Adult   Pulse: 91   Resp: 16   SpO2: 94%   Weight: 134 lb (60.8 kg)   Height: 5' 3\" (1.6 m)     BP Readings from Last 3 Encounters:   08/25/22 (!) 140/100   08/21/22 (!) 143/89   08/18/22 100/70     Wt Readings from Last 3 Encounters:   08/25/22 134 lb (60.8 kg)   08/21/22 134 lb (60.8 kg)   08/18/22 134 lb (60.8 kg)     Physical Exam  Constitutional:       General: She is in acute distress. HENT:      Head: Normocephalic and atraumatic. Right Ear: External ear normal.      Left Ear: External ear normal.   Cardiovascular:      Rate and Rhythm: Normal rate. Musculoskeletal:         General: Swelling, tenderness and deformity present. Comments: Guarding today of LUE. Skin:     General: Skin is warm. Findings: No erythema. Neurological:      Mental Status: She is alert. Motor: Weakness present. Corrie Logan was seen today for follow-up and shoulder injury. Diagnoses and all orders for this visit:    Chronic left shoulder pain  -     MRI SHOULDER LEFT WO CONTRAST; Future  -     [facility] [place]    Traumatic incomplete tear of left rotator cuff, subsequent encounter  -     MRI SHOULDER LEFT WO CONTRAST; Future  -     [facility] [place]    Worsening L shoulder pain today. Unstable L shoulder joint. Attempted [facility] for emergent appointment; no available OVs today. Patient does not want to present back to ED. Appointment made with ortho, she is to present to [place] office for [facility].     Orders Placed This Encounter Procedures    MRI SHOULDER LEFT WO CONTRAST     Standing Status:   Future     Number of Occurrences:   1     Standing Expiration Date:   8/18/2023     Order Specific Question:   Reason for exam:     Answer:   chronic L shoulder pain with history of rotator tear and labral tear     Order Specific Question:   Reason for exam:     Answer:   worsening shoulder instability. Order Specific Question:   What is the sedation requirement? Answer:   None    [facility] [place]     Referral Priority:   Routine     Referral Type:   Eval and Treat     Referral Reason:   Specialty Services Required     Requested Specialty:   Orthopedic Surgery     Number of Visits Requested:   1     Orders Placed This Encounter   Medications    DISCONTD: predniSONE (DELTASONE) 10 MG tablet     Sig: Take 6 tabs x 6 days, then 5 x 1, 4 x 1, 3 x 1, 2 x 1, 1 x 1     Dispense:  51 tablet     Refill:  0     Medications Discontinued During This Encounter   Medication Reason    ARIPiprazole (ABILIFY) 10 MG tablet LIST CLEANUP    pantoprazole (PROTONIX) 20 MG tablet LIST CLEANUP    ARIPiprazole (ABILIFY) 10 MG tablet LIST CLEANUP    nicotine (NICODERM CQ) 21 MG/24HR LIST CLEANUP     Return in about 6 weeks (around 9/29/2022) for follow up in office. Reviewed with the patient: current clinical status, medications, activities and diet. Side effects, adverse effects of the medication prescribed today, as well as treatment plan/ rationale and result expectations have been discussed with the patient who expresses understanding and desires to proceed. Close follow up to evaluate treatment results and for coordination of care. I have reviewed the patient's medical history in detail and updated the computerized patient record.     Tamar Meza PA-C

## 2022-08-26 ENCOUNTER — HOSPITAL ENCOUNTER (OUTPATIENT)
Dept: MRI IMAGING | Age: 27
Discharge: HOME | End: 2022-08-28
Payer: MEDICAID

## 2022-08-26 DIAGNOSIS — M25.512 CHRONIC LEFT SHOULDER PAIN: ICD-10-CM

## 2022-08-26 DIAGNOSIS — G89.29 CHRONIC LEFT SHOULDER PAIN: ICD-10-CM

## 2022-08-26 PROCEDURE — 73221 MRI JOINT UPR EXTREM W/O DYE: CPT

## 2022-08-29 PROBLEM — S46.012A TRAUMATIC INCOMPLETE TEAR OF LEFT ROTATOR CUFF: Status: ACTIVE | Noted: 2022-08-29

## 2022-08-29 PROBLEM — G89.29 CHRONIC LEFT SHOULDER PAIN: Status: ACTIVE | Noted: 2021-02-20

## 2022-08-29 ASSESSMENT — ENCOUNTER SYMPTOMS: CHEST TIGHTNESS: 0

## 2022-08-30 ENCOUNTER — SCHEDULED TELEPHONE ENCOUNTER (OUTPATIENT)
Dept: ORTHOPEDIC SURGERY | Age: 27
End: 2022-08-30
Payer: MEDICAID

## 2022-08-30 ENCOUNTER — TELEPHONE (OUTPATIENT)
Dept: FAMILY MEDICINE CLINIC | Age: 27
End: 2022-08-30

## 2022-08-30 DIAGNOSIS — M24.412 SHOULDER DISLOCATION, RECURRENT, LEFT: Primary | ICD-10-CM

## 2022-08-30 DIAGNOSIS — F43.0 PANIC STATE AS ACUTE REACTION TO STRESS: ICD-10-CM

## 2022-08-30 PROCEDURE — 99202 OFFICE O/P NEW SF 15 MIN: CPT | Performed by: PHYSICIAN ASSISTANT

## 2022-08-30 RX ORDER — LORAZEPAM 0.5 MG/1
0.5 TABLET ORAL 2 TIMES DAILY PRN
Qty: 20 TABLET | Refills: 0 | Status: SHIPPED | OUTPATIENT
Start: 2022-08-30 | End: 2022-09-09

## 2022-08-30 ASSESSMENT — ENCOUNTER SYMPTOMS
GASTROINTESTINAL NEGATIVE: 1
RESPIRATORY NEGATIVE: 1
EYES NEGATIVE: 1

## 2022-08-30 NOTE — PROGRESS NOTES
Negative. Objective   Patient-Reported Vitals  No data recorded     I explained the MRI scan to the patient. I told her that she has if it cannot degradation of her labrum. I suggested consultation with one of our shoulder surgeons. Further explained to her that they may need a MRI arthrogram of the shoulder. Total Time: minutes: 11-20 minutes     Afua Thomas was evaluated through a synchronous (real-time) audio only encounter. Patient identification was verified at the start of the visit. She (or guardian if applicable) is aware that this is a billable service, which includes applicable co-pays. This visit was conducted with patient's (and/or legal guardian's) verbal consent. She has not had a related appointment within my department in the past 7 days or scheduled within the next 24 hours. The patient was located at Home: [address] [place] [address] [address] [id number]. The provider was located at [facility] (Appt Dept): [facility],  [address] [place] [address].      THOMPSON Tapia

## 2022-08-30 NOTE — TELEPHONE ENCOUNTER
Patient states that she is having headaches, heart is pounding, patient feels dizzy. Patient believes that she is having a panic attack.      Patient is requesting an appointment with you,    Please advise and thank you,

## 2022-09-01 NOTE — TELEPHONE ENCOUNTER
Pt states she is not taking the prednisone made her sick she believes thi sis anxiety or panic attacks, pt states she is having surgery by Dr. Piero Dinh on her shoulder they will be calling her today to schedule the Surgery, pt would still like a phone visit with you to discuss the anxiety

## 2022-09-03 NOTE — TELEPHONE ENCOUNTER
I addressed this already in a [employer] message. I sent the ativan. Prednisone was stopped over a week ago. Please schedule pre-op and we can discuss anxiety then, if needed.

## 2022-09-04 DIAGNOSIS — M25.512 CHRONIC LEFT SHOULDER PAIN: Primary | ICD-10-CM

## 2022-09-04 DIAGNOSIS — G89.29 CHRONIC LEFT SHOULDER PAIN: Primary | ICD-10-CM

## 2022-09-04 RX ORDER — HYDROCODONE BITARTRATE AND ACETAMINOPHEN 5; 325 MG/1; MG/1
1 TABLET ORAL EVERY 8 HOURS PRN
Qty: 21 TABLET | Refills: 0 | Status: SHIPPED | OUTPATIENT
Start: 2022-09-04 | End: 2022-09-11

## 2022-09-09 ENCOUNTER — OFFICE VISIT (OUTPATIENT)
Dept: ORTHOPEDIC SURGERY | Age: 27
End: 2022-09-09
Payer: MEDICAID

## 2022-09-09 ENCOUNTER — TELEPHONE (OUTPATIENT)
Dept: FAMILY MEDICINE CLINIC | Age: 27
End: 2022-09-09

## 2022-09-09 VITALS
TEMPERATURE: 97.6 F | WEIGHT: 134 LBS | HEART RATE: 98 BPM | OXYGEN SATURATION: 98 % | HEIGHT: 63 IN | BODY MASS INDEX: 23.74 KG/M2

## 2022-09-09 DIAGNOSIS — M24.412 SHOULDER DISLOCATION, RECURRENT, LEFT: Primary | ICD-10-CM

## 2022-09-09 PROCEDURE — G8420 CALC BMI NORM PARAMETERS: HCPCS | Performed by: ORTHOPAEDIC SURGERY

## 2022-09-09 PROCEDURE — 99204 OFFICE O/P NEW MOD 45 MIN: CPT | Performed by: ORTHOPAEDIC SURGERY

## 2022-09-09 PROCEDURE — 4004F PT TOBACCO SCREEN RCVD TLK: CPT | Performed by: ORTHOPAEDIC SURGERY

## 2022-09-09 PROCEDURE — G8427 DOCREV CUR MEDS BY ELIG CLIN: HCPCS | Performed by: ORTHOPAEDIC SURGERY

## 2022-09-09 ASSESSMENT — ENCOUNTER SYMPTOMS: BACK PAIN: 0

## 2022-09-09 NOTE — TELEPHONE ENCOUNTER
Patient came in to advise Vivian Bianchi of how her fu went with ortho Dr. Stacy Mims has her to schedule with Dr. Lesley Stallings [facility]) to give medical clearance for shoulder surgery.  Then she will schedule soon with ortho

## 2022-09-09 NOTE — PROGRESS NOTES
Subjective:      Patient ID: Tea Randall is a 32 y.o. female who presents today for:  Chief Complaint   Patient presents with    Pain     Consult left shoulder. Chronic left shoulder pain. MRI done on Aug 26th 2022       HPI  A 26-year of age right-hand-dominant female who is applying for disability because of chronic seizure disorder has had recurrent instability in her left shoulder. She initially dislocated the shoulder after a seizure and she fell down the steps. Subsequent to that she describes that she has had 100 dislocation episodes that she reduces on her own now. She reports that happens easily with just simple activity. She had recently gone to the emergency department on August 21, 2022 and saw physician assistant Magali Meehan. He was given pain medication and loading. She had seen Rola. She was told that therapy would not benefit her. She has had frequent and common dislocations with the last month occurring at least a half dozen times. She has pain which is associated with the dislocations. 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     Migraine     Seizures ([facility] [address]     Varicella       No past surgical history on file.   Social History     Socioeconomic History    Marital status: Single     Spouse name: Not on file    Number of children: Not on file    Years of education: Not on file    Highest education level: Not on file   Occupational History    Not on file   Tobacco Use    Smoking status: Every Day     Packs/day: 2.00     Years: 9.00     Pack years: 18.00     Types: Cigarettes    Smokeless tobacco: Never   Vaping Use    Vaping Use: Never used   Substance and Sexual Activity    Alcohol use: No    Drug use: Not Currently    Sexual activity: Yes     Partners: Male   Other Topics Concern    Not on file   Social History Narrative    Not on file     Social Determinants of Health     Financial Resource Strain: Low Risk     Difficulty of Paying Living Expenses: Not very hard   Food Insecurity: No Food Insecurity    Worried About Running Out of Food in the Last Year: Never true    Ran Out of Food in the Last Year: Never true   Transportation Needs: Not on file   Physical Activity: Not on file   Stress: Not on file   Social Connections: Not on file   Intimate Partner Violence: Not on file   Housing Stability: Not on file     Family History   Problem Relation Age of Onset    Cancer Other         brain, breast, lung    Coronary Art Dis Other     High Blood Pressure Brother     Obesity Brother     Cancer Maternal Grandmother     Allergies Maternal Grandmother     Colon Cancer Maternal Grandmother     Breast Cancer Maternal Grandmother     Ovarian Cancer Maternal Grandmother     Other Maternal Grandmother         eye cancer, loss of left eye    Cancer Maternal Grandfather         lung    Heart Disease Maternal Grandfather     Uterine Cancer Mother     Ovarian Cancer Mother     Breast Cancer Mother     Cancer Mother         behind right eye    Heart Attack Mother     Migraines Mother     Cancer Maternal Aunt         lung     Allergies   Allergen Reactions    Penicillins      GI Problems     Current Outpatient Medications on File Prior to Visit   Medication Sig Dispense Refill    HYDROcodone-acetaminophen (NORCO) 5-325 MG per tablet Take 1 tablet by mouth every 8 hours as needed for Pain for up to 7 days. 21 tablet 0    LORazepam (ATIVAN) 0.5 MG tablet Take 1 tablet by mouth 2 times daily as needed for Anxiety for up to 10 days.  20 tablet 0    divalproex (DEPAKOTE) 500 MG DR tablet TAKE 1 TABLET BY MOUTH EVERY 12 HOURS 60 tablet 0    ARIPiprazole (ABILIFY) 10 MG tablet Take 1 tablet by mouth daily 30 tablet 3    phenytoin (PHENYTEK) 300 MG ER capsule 200 mg po once daily in the am and phenytoin ER 300mg QHS 90 capsule 1    phenytoin (PHENYTEK) 200 MG ER capsule 200 mg po once daily in the am and phenytoin ER 300mg QHS 90 capsule 1 fluvoxaMINE (LUVOX) 50 MG tablet Take 1 tablet by mouth nightly 90 tablet 1    hydrOXYzine HCl (ATARAX) 25 MG tablet Take 0.5 tablets by mouth 3 times daily as needed for Anxiety 90 tablet 1     No current facility-administered medications on file prior to visit. Review of Systems   Constitutional:  Negative for fever. Cardiovascular:  Negative for leg swelling. Musculoskeletal:  Negative for arthralgias, back pain, gait problem, joint swelling and neck pain. Skin:  Negative for rash. Neurological:  Negative for weakness and numbness. Objective:   Pulse 98   Temp 97.6 °F (36.4 °C) (Temporal)   Ht 5' 3\" (1.6 m)   Wt 134 lb (60.8 kg)   SpO2 98%   BMI 23.74 kg/m²     Ortho Exam  Patient is in a sling. She comes out of the sling. She has excellent range of motion of the left elbow and wrist.  She protects the shoulder with range of motion. Her axillary patch is intact. Medial and lateral antebrachial cutaneous nerve distributions are intact. Radial, ulnar, median nerve motor and sensory distribution are intact. Radial pulses palpable and symmetric bilaterally. Radiographs and Laboratory Studies:     Diagnostic Imaging Studies:    I reviewed emergency department records from August 21, 2022. I reviewed MRI images of the left shoulder from February 21, 2021 and August 26, 2022. This is consistent with anterior inferior labral tear as well as a significant Hill-Sachs deformity. There is some mild tendinosis of the rotator cuff. Laboratory Studies:   Lab Results   Component Value Date    WBC 9.4 03/23/2022    HGB 12.8 03/23/2022    HCT 40.2 03/23/2022    MCV 75.9 (L) 03/23/2022     (H) 03/23/2022     Lab Results   Component Value Date    SEDRATE 6 07/25/2013     No results found for: CRP    Assessment:      Diagnosis Orders   1. Shoulder dislocation, recurrent, left               Plan: We reviewed the findings.   We discussed extensively nonoperative versus operative options. At this time because of the frequent instability the patient would like to proceed with an operative approach. We reviewed left arthroscopic shoulder surgery anterior labral repair and capsular plication. We did discuss the potential for recurrent instability given the significant Hill-Sachs deformity. We discussed other reconstructive procedures. Given her young age it was agreed to proceed with the arthroscopic approach understanding potential for recurrent instability. She was informed of the incisions. She was informed of risk to include but not limited to recurrent instability, infection, wound complications, arthrofibrosis, DVT, pulmonary embolus, neurovascular injury, complex regional pain syndrome, medical anesthetic risk. All questions were answered and consent was obtained. No orders of the defined types were placed in this encounter. No orders of the defined types were placed in this encounter. Return for Post operative. Gale Sotomayor MD           [address] [place] [address] and Sports Medicine  Ph: [phone]       Ph: [phone]   Fx: [phone]       Fx: [phone]    Surgery Scheduling, Pre-Op and Post-Op Information Form  Call to [facility] at [phone] at least 24 hours Prior to Procedure Date     Surgery Location: [facility] Surgery: [address] [place] [address]    OFFICE   Surgeon: Gale Sotomayor MD  Surgery Date:   Time:    Patient: Tea Randall   : 1995   #: [id number]  Gender: female  Home Phone: [phone] Mobile Phone: [phone]  Emergency Contact: [address] [place] [address] Phone: [phone]  Insurance Co: Payor: John Vogel /  /  /  ID No:  [id number]        PROVIDER  Diagnosis: Left shoulder recurrent instability. Procedure/Consent: Left arthroscopic shoulder surgery anterior labral repair and capsular plication.   CPT CODES:   Case Comments/Implants: Mytec suture anchors  Surgery Schedule Type: Outpatient  Anesthesia Requested: General and Regional Block  Referring Family Doctor: RULA Bourne  [x] [facility] Date/Time: _________________________________________________________  History & Physical: [] Physician will Provide [] Attached [] Dictated [] Other  [x] Follow Anesthesia Pre-Op Orders for X-rays, [facility]     [x] SN & PT to evaluate and treat/educate disease management, medications, home safety & equipment needs for total joint patients  [] Other: ____________________________________________________  Consults: Medical/Cardiac Clearance done by  ____________________    PRE-OP ORDERS:  Allergies: Penicillins Latex Allergies: _____  Diabetic: _____  [] IV ________________________  [x] IV Start with J-loop     Preprinted Orders: Attached [] Yes [] No   Antibiotic Pre-Op: [x] ANCEF 2 gram IVPB if > 120 kg 3 grams IVPB within 1 hr. of Incision, if Allergic, use Vancomycin 1 gram IV, 2 hrs.  Pre-op  [] TXA Protocol [] Other:   [x] NPO   [] Betablocker (if needed) _____________________________________   [] Knee high anti-embolic hose [] Thigh high anti-embolic hose   Other: ______________________________________________________    Physician Signature Required:Electronically signed by Pili Mack MD on 9/9/2022 at 1:46 PM    Date/Time: 9/9/2022

## 2022-09-19 PROBLEM — M25.512 PAIN IN LEFT SHOULDER: Status: ACTIVE | Noted: 2022-08-25

## 2022-09-19 PROBLEM — S46.012A STRAIN OF MUSC/TEND THE ROTATOR CUFF OF LEFT SHOULDER, INIT: Status: ACTIVE | Noted: 2022-08-25

## 2022-09-27 ENCOUNTER — TELEMEDICINE (OUTPATIENT)
Dept: NEUROLOGY | Age: 27
End: 2022-09-27
Payer: MEDICAID

## 2022-09-27 DIAGNOSIS — F41.9 ANXIETY: ICD-10-CM

## 2022-09-27 DIAGNOSIS — G60.0 HEREDITARY MOTOR AND SENSORY NEUROPATHY: ICD-10-CM

## 2022-09-27 DIAGNOSIS — R56.9 SEIZURE (HCC): ICD-10-CM

## 2022-09-27 DIAGNOSIS — G40.109 TEMPORAL LOBE EPILEPSY, NON-REFRACTORY (HCC): Primary | ICD-10-CM

## 2022-09-27 PROCEDURE — 99214 OFFICE O/P EST MOD 30 MIN: CPT | Performed by: PSYCHIATRY & NEUROLOGY

## 2022-09-27 PROCEDURE — G8420 CALC BMI NORM PARAMETERS: HCPCS | Performed by: PSYCHIATRY & NEUROLOGY

## 2022-09-27 PROCEDURE — 4004F PT TOBACCO SCREEN RCVD TLK: CPT | Performed by: PSYCHIATRY & NEUROLOGY

## 2022-09-27 PROCEDURE — G8427 DOCREV CUR MEDS BY ELIG CLIN: HCPCS | Performed by: PSYCHIATRY & NEUROLOGY

## 2022-09-27 RX ORDER — ARIPIPRAZOLE 10 MG/1
10 TABLET ORAL DAILY
Qty: 30 TABLET | Refills: 3 | Status: SHIPPED | OUTPATIENT
Start: 2022-09-27

## 2022-09-27 RX ORDER — DIVALPROEX SODIUM 500 MG/1
500 TABLET, DELAYED RELEASE ORAL EVERY 12 HOURS SCHEDULED
Qty: 60 TABLET | Refills: 3 | Status: SHIPPED | OUTPATIENT
Start: 2022-09-27 | End: 2022-10-26 | Stop reason: DRUGHIGH

## 2022-09-27 ASSESSMENT — ENCOUNTER SYMPTOMS
CHOKING: 0
PHOTOPHOBIA: 0
COLOR CHANGE: 0
NAUSEA: 0
BACK PAIN: 0
SHORTNESS OF BREATH: 0
VOMITING: 0
TROUBLE SWALLOWING: 0

## 2022-09-27 NOTE — PROGRESS NOTES
Subjective:      Patient ID: Afshin Macias is a 32 y.o. female who presents today for:  Chief Complaint   Patient presents with    Follow-up     Pt is having shoulder surgery on rotator cuff. Surgery is 10/14/2022. No recent seizures it has been 6 months since last one. Due to COVID 19 outbreak, patient's office visit was converted to a virtual visit. Patient was contacted and agreed to proceed with a virtual visit via Doxy. me  The risks and benefits of converting to a virtual visit were discussed in light of the current infectious disease epidemic. Patient also understood that insurance coverage and co-pays are up to their individual insurance plans. HPI 32 right-handed female with a history of temporal lobe epilepsy well-controlled on Depakote. Patient also has a sensorimotor neuropathy. Patient has not a seizure for the last 6 months. Her main issues appears to be anxiety disorder and we have tried her on multiple medications including Graciela Ishihara which she was not taking when last seen and she is now on Abilify. She tried on hydroxyzine which has not helped. She was on Ativan which did help but she did not want to be on this for a long time which is reasonable. She has not any further breakthrough seizures. And also is on phenytoin milligrams in the morning and 300 mg at night. Notes any recent labs. I did obtain levels in March which were therapeutic with a Dilantin level of 18.5 and a Depakote level of 68.6. Patient is very compliant. She is having shoulder surgery and requires clearance from her    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     Migraine     Seizures (HCC)     Varicella      No past surgical history on file.   Social History     Socioeconomic History    Marital status: Single     Spouse name: Not on file    Number of children: Not on file    Years of education: Not on file    Highest education level: Not on file Occupational History    Not on file   Tobacco Use    Smoking status: Every Day     Packs/day: 2.00     Years: 9.00     Pack years: 18.00     Types: Cigarettes    Smokeless tobacco: Never   Vaping Use    Vaping Use: Never used   Substance and Sexual Activity    Alcohol use: No    Drug use: Not Currently    Sexual activity: Yes     Partners: Male   Other Topics Concern    Not on file   Social History Narrative    Not on file     Social Determinants of Health     Financial Resource Strain: Low Risk     Difficulty of Paying Living Expenses: Not very hard   Food Insecurity: No Food Insecurity    Worried About Running Out of Food in the Last Year: Never true    Ran Out of Food in the Last Year: Never true   Transportation Needs: Not on file   Physical Activity: Not on file   Stress: Not on file   Social Connections: Not on file   Intimate Partner Violence: Not on file   Housing Stability: Not on file     Family History   Problem Relation Age of Onset    Cancer Other         brain, breast, lung    Coronary Art Dis Other     High Blood Pressure Brother     Obesity Brother     Cancer Maternal Grandmother     Allergies Maternal Grandmother     Colon Cancer Maternal Grandmother     Breast Cancer Maternal Grandmother     Ovarian Cancer Maternal Grandmother     Other Maternal Grandmother         eye cancer, loss of left eye    Cancer Maternal Grandfather         lung    Heart Disease Maternal Grandfather     Uterine Cancer Mother     Ovarian Cancer Mother     Breast Cancer Mother     Cancer Mother         behind right eye    Heart Attack Mother     Migraines Mother     Cancer Maternal Aunt         lung     Allergies   Allergen Reactions    Penicillins      GI Problems       Current Outpatient Medications   Medication Sig Dispense Refill    divalproex (DEPAKOTE) 500 MG DR tablet TAKE 1 TABLET BY MOUTH EVERY 12 HOURS 60 tablet 0    ARIPiprazole (ABILIFY) 10 MG tablet Take 1 tablet by mouth daily 30 tablet 3    phenytoin (PHENYTEK) 300 MG ER capsule 200 mg po once daily in the am and phenytoin ER 300mg QHS 90 capsule 1    phenytoin (PHENYTEK) 200 MG ER capsule 200 mg po once daily in the am and phenytoin ER 300mg QHS 90 capsule 1    fluvoxaMINE (LUVOX) 50 MG tablet Take 1 tablet by mouth nightly 90 tablet 1    hydrOXYzine HCl (ATARAX) 25 MG tablet Take 0.5 tablets by mouth 3 times daily as needed for Anxiety 90 tablet 1     No current facility-administered medications for this visit. Review of Systems   Constitutional:  Negative for fever. HENT:  Negative for ear pain, tinnitus and trouble swallowing. Eyes:  Negative for photophobia and visual disturbance. Respiratory:  Negative for choking and shortness of breath. Cardiovascular:  Negative for chest pain and palpitations. Gastrointestinal:  Negative for nausea and vomiting. Musculoskeletal:  Negative for back pain, gait problem, joint swelling, myalgias, neck pain and neck stiffness. Skin:  Negative for color change. Allergic/Immunologic: Negative for food allergies. Neurological:  Negative for dizziness, tremors, seizures, syncope, facial asymmetry, speech difficulty, weakness, light-headedness, numbness and headaches. Psychiatric/Behavioral:  Negative for behavioral problems, confusion, hallucinations and sleep disturbance. Objective: There were no vitals taken for this visit. Physical Exam not done though on the video patient has normal speech and normal cranial nerves    MRI SHOULDER LEFT WO CONTRAST    Result Date: 8/26/2022  EXAM: MRI SHOULDER LEFT WO CONTRAST HISTORY:  M25.512 Chronic left shoulder pain ICD10 TECHNIQUE: Multiplanar multisequence MRI of the left shoulder was performed without contrast. COMPARISON:  MRI of the shoulder February 21, 2021 FINDINGS: The acromioclavicular joint is intact. The acromion is slightly curved. Coracoclavicular ligament intact. No subacromial/subdeltoid bursal fluid or edema.  Mild subscapularis tendinosis. The supraspinatus, infraspinatus, and teres minor tendons are intact. No atrophy or fatty infiltration of the rotator cuff musculature. The intra-articular and extra-articular long head biceps tendon is intact. The biceps tendon resides within the bicipital groove. The anterior inferior labrum is not visualized. There is tiny full-thickness cartilage defects of the anterior inferior glenoid adjacent to the expected location of the anterior inferior labrum. Partial-thickness cartilage loss of the inferomedial humeral head. Hill-Sachs lesion measuring approximately 1.5 cm for a maximum depth of approximately 8 mm has not significantly changed during the interval. Small glenohumeral joint effusion. Hyperintense T2 structures of the posterior subcutaneous soft tissues measuring up to 6 mm likely representing epidermal inclusion cyst.     The anterior inferior labrum is not visualized. Interval development of tiny full-thickness cartilage defects of the anterior inferior glenoid adjacent to the expected location of the anterior inferior labrum. Partial thickness cartilage loss of the inferomedial humeral head. Mild subscapularis tendinosis. No significant interval change of a large Hill-Sachs lesion.        Lab Results   Component Value Date/Time    WBC 9.4 03/23/2022 10:07 AM    RBC 5.29 03/23/2022 10:07 AM    RBC 4.70 06/01/2012 05:04 PM    HGB 12.8 03/23/2022 10:07 AM    HCT 40.2 03/23/2022 10:07 AM    MCV 75.9 03/23/2022 10:07 AM    MCH 24.1 03/23/2022 10:07 AM    MCHC 31.7 03/23/2022 10:07 AM    RDW 20.4 03/23/2022 10:07 AM     03/23/2022 10:07 AM    MPV 8.0 06/19/2014 02:56 PM     Lab Results   Component Value Date/Time     11/09/2021 02:47 PM    K 3.8 11/09/2021 02:47 PM    K 4.5 11/02/2021 07:26 AM     11/09/2021 02:47 PM    CO2 23 11/09/2021 02:47 PM    BUN 9 11/09/2021 02:47 PM    CREATININE 0.61 11/09/2021 02:47 PM    GFRAA >60.0 11/09/2021 02:47 PM    LABGLOM >60.0 11/09/2021 02:47 PM    GLUCOSE 123 11/09/2021 02:47 PM    GLUCOSE 77 06/01/2012 05:04 PM    PROT 7.0 03/23/2022 10:07 AM    LABALBU 4.5 03/23/2022 10:07 AM    LABALBU 4.7 06/01/2012 05:04 PM    CALCIUM 9.2 11/09/2021 02:47 PM    BILITOT <0.2 03/23/2022 10:07 AM    ALKPHOS 98 03/23/2022 10:07 AM    AST 23 03/23/2022 10:07 AM    ALT 25 03/23/2022 10:07 AM     No results found for: PROTIME, INR  Lab Results   Component Value Date/Time    TSH 0.934 11/01/2021 11:25 AM    IRON 58 01/08/2014 03:48 PM    TIBC 395 01/08/2014 03:48 PM     Lab Results   Component Value Date/Time    TRIG 79 11/01/2021 11:25 AM    HDL 74 11/01/2021 11:25 AM    LDLCALC 166 11/01/2021 11:25 AM     Lab Results   Component Value Date/Time    LABAMPH Neg 11/09/2021 03:10 PM    BARBSCNU Neg 11/09/2021 03:10 PM    LABBENZ Neg 11/09/2021 03:10 PM    LABBENZ NT DTCD 03/01/2013 01:08 PM    LABMETH Neg 11/09/2021 03:10 PM    OPIATESCREENURINE Neg 11/09/2021 03:10 PM    PHENCYCLIDINESCREENURINE Neg 11/09/2021 03:10 PM    ETOH <10 11/01/2021 11:25 AM     Lab Results   Component Value Date/Time    VALPROATE 68.6 03/23/2022 10:07 AM       Assessment:       Diagnosis Orders   1. Temporal lobe epilepsy, non-refractory ([facility] [address]        2. Seizure ([facility] [address]        3. Anxiety        4. Hereditary motor and sensory neuropathy        Temporal lobe epilepsy which is very well controlled on Depakote 5 mg 3 times a day with a therapeutic level of 65 and Dilantin level of 18.7. She is on Dilantin 200 mg in the morning and 3 mg at night. She is not any seizures for 6 months. Her main issues appears to be anxiety and she has battled with this for quite some time and has been tried on multiple medications. She is now on Luvox and she also is on Abilify. Patient has been tried on hydroxyzine and Ativan in the past.  I recommended that she follow-up with a psychiatrist for the same as we are running out of options.   Patient is cleared from surgery from our angle that she should take her morning dose before surgery as she is high risk for seizures will follow in a few months had is a seizure she is on Depakote 500 mg twice a day she is what is listed in my last note as well. Plan:      No orders of the defined types were placed in this encounter. No orders of the defined types were placed in this encounter. No follow-ups on file.       Betty Kelley MD

## 2022-09-29 ENCOUNTER — PATIENT MESSAGE (OUTPATIENT)
Dept: FAMILY MEDICINE CLINIC | Age: 27
End: 2022-09-29

## 2022-09-29 ENCOUNTER — TELEPHONE (OUTPATIENT)
Dept: ORTHOPEDIC SURGERY | Age: 27
End: 2022-09-29

## 2022-09-29 DIAGNOSIS — M25.512 CHRONIC LEFT SHOULDER PAIN: ICD-10-CM

## 2022-09-29 DIAGNOSIS — G89.29 CHRONIC LEFT SHOULDER PAIN: ICD-10-CM

## 2022-09-29 NOTE — TELEPHONE ENCOUNTER
Surgery Scheduling and Authorization Information    Surgery Date:10/14/22  Surgery good through dates: 10/14/22-1/12-23  CPT Code(s) 40403  Procedure(s) L arthroscopic shoulder surgery anterior labral repair       Approved [x] Not Required [] Denied []  Reference # [id number]  Auth #  [id number]  How authorization obtained:  [] web portal             [x] via phone       [x] Via fax    Provider  [] Zehra Dia  [] Abdi Cutler  [] Charleen Sotelo  [] Hernando Alvarado  [] Aung Andrade  [x] Verena Sanchez  [] [facility]  [] Jovanni Chadwick  [] Lesa Wall  [] Primo Taveras      Surgery Sheet Faxed to Scheduling [x]  Surgery and Prior Authorization Information Sheet Scanned [x]

## 2022-09-29 NOTE — TELEPHONE ENCOUNTER
From: Ervin Webb  To: Domitila De Andaho Meza  Sent: 9/29/2022 1:31 PM EDT  Subject: Dislocations    Good afternoon,     I'm having the operation to repair it in a couple weeks, but sheesh my arm won't stay in place. I'm miserable here, have had it dislocate (and have had difficulty when popping it back in every time) three times just since last night. This pain is a 10, I've tried everything OTC, can you please write me something till the 14th for this pain these dislocations are leaving behind? Also curious if my  has attempted contacting you about filling out paperwork for social security. The appeal was filed again and a  was to set a hearing date as of the 16th. Hope you're having a good day.     Mikayla Carranza

## 2022-10-04 RX ORDER — HYDROCODONE BITARTRATE AND ACETAMINOPHEN 5; 325 MG/1; MG/1
1 TABLET ORAL EVERY 6 HOURS PRN
Qty: 10 TABLET | Refills: 0 | Status: SHIPPED | OUTPATIENT
Start: 2022-10-04 | End: 2022-10-10 | Stop reason: SDUPTHER

## 2022-10-10 DIAGNOSIS — G89.29 CHRONIC LEFT SHOULDER PAIN: ICD-10-CM

## 2022-10-10 DIAGNOSIS — M25.512 CHRONIC LEFT SHOULDER PAIN: ICD-10-CM

## 2022-10-10 RX ORDER — HYDROCODONE BITARTRATE AND ACETAMINOPHEN 5; 325 MG/1; MG/1
1 TABLET ORAL EVERY 6 HOURS PRN
Qty: 10 TABLET | Refills: 0 | Status: SHIPPED | OUTPATIENT
Start: 2022-10-10 | End: 2022-10-13

## 2022-10-12 ENCOUNTER — OFFICE VISIT (OUTPATIENT)
Dept: ORTHOPEDIC SURGERY | Age: 27
End: 2022-10-12
Payer: MEDICAID

## 2022-10-12 VITALS
OXYGEN SATURATION: 98 % | DIASTOLIC BLOOD PRESSURE: 76 MMHG | BODY MASS INDEX: 23.74 KG/M2 | HEIGHT: 63 IN | WEIGHT: 134 LBS | HEART RATE: 62 BPM | TEMPERATURE: 98.4 F | SYSTOLIC BLOOD PRESSURE: 120 MMHG

## 2022-10-12 DIAGNOSIS — Z01.818 PREOP EXAMINATION: ICD-10-CM

## 2022-10-12 DIAGNOSIS — Z01.818 PREOP EXAMINATION: Primary | ICD-10-CM

## 2022-10-12 LAB
ANION GAP SERPL CALCULATED.3IONS-SCNC: 7 MEQ/L (ref 9–15)
BUN BLDV-MCNC: 9 MG/DL (ref 6–20)
CALCIUM SERPL-MCNC: 8.4 MG/DL (ref 8.5–9.9)
CHLORIDE BLD-SCNC: 102 MEQ/L (ref 95–107)
CO2: 26 MEQ/L (ref 20–31)
CREAT SERPL-MCNC: 0.55 MG/DL (ref 0.5–0.9)
GFR AFRICAN AMERICAN: >60
GFR NON-AFRICAN AMERICAN: >60
GLUCOSE BLD-MCNC: 82 MG/DL (ref 70–99)
HCG(URINE) PREGNANCY TEST: NEGATIVE
HCT VFR BLD CALC: 34.3 % (ref 37–47)
HEMOGLOBIN: 11.2 G/DL (ref 12–16)
MCH RBC QN AUTO: 25.7 PG (ref 27–31.3)
MCHC RBC AUTO-ENTMCNC: 32.7 % (ref 33–37)
MCV RBC AUTO: 78.5 FL (ref 82–100)
PDW BLD-RTO: 16.8 % (ref 11.5–14.5)
PLATELET # BLD: 409 K/UL (ref 130–400)
POTASSIUM SERPL-SCNC: 3.9 MEQ/L (ref 3.4–4.9)
RBC # BLD: 4.37 M/UL (ref 4.2–5.4)
SODIUM BLD-SCNC: 135 MEQ/L (ref 135–144)
WBC # BLD: 12.5 K/UL (ref 4.8–10.8)

## 2022-10-12 PROCEDURE — 99242 OFF/OP CONSLTJ NEW/EST SF 20: CPT | Performed by: PHYSICIAN ASSISTANT

## 2022-10-12 PROCEDURE — 99406 BEHAV CHNG SMOKING 3-10 MIN: CPT | Performed by: PHYSICIAN ASSISTANT

## 2022-10-12 PROCEDURE — G8484 FLU IMMUNIZE NO ADMIN: HCPCS | Performed by: PHYSICIAN ASSISTANT

## 2022-10-12 PROCEDURE — G8427 DOCREV CUR MEDS BY ELIG CLIN: HCPCS | Performed by: PHYSICIAN ASSISTANT

## 2022-10-12 PROCEDURE — G8420 CALC BMI NORM PARAMETERS: HCPCS | Performed by: PHYSICIAN ASSISTANT

## 2022-10-12 NOTE — PROGRESS NOTES
[address] [place] [address] and Sports Medicine    H&P: Preadmission Testing     Patient: Isidra Greene  YOB: 1995  MRN: [id number]    Subjective:     Chief Complaint   Patient presents with    Pre-op Exam     LEFT ARTHROSCOPIC SHOULDER  SURGERY ANTERIOR LABRAL REPAIR Surgery 10/14. HPI: Isidra Greene is a 32 y.o. femaleis here for preop evaluation for anterior labral repair with Dr. Vlad Robertson this Friday who is referring surgeon. This is on the left arm. They have a pertinent history of panic attacks, epilepsy, smoking. We talked about negative consequences smoking for 4 minutes both long-term and short-term. There is no known history of heart disease or infarction. There is no recent chest pain or discomfort, palpitations, shortness of breath, BRAUN, difficulties with exercise, bilateral ankle swelling. Not taking any blood thinners. There is no known history of obstructive or restrictive lung disease. +  smoking. No recent wheezing or use of any kind of inhalers. No recent hospitalizations for any lung-related illnesses. No recent Covid infection. No known history of gastric issues which includes ulcers, herniations, bariatric surgeries. No recent GI bleeds notable history of seizures, last was about 6 months ago. She is seeing Dr. Frances Zhu for this. Is under control. No known history of hyper or hypercoagulable states. They are not diabetic. They have normal kidney function. 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 [address]     Varicella      Past Surgical History:    No past surgical history on file. Medications Prior to Admission:    Current Outpatient Medications   Medication Sig Dispense Refill    HYDROcodone-acetaminophen (NORCO) 5-325 MG per tablet Take 1 tablet by mouth every 6 hours as needed for Pain for up to 3 days. Intended supply: 3 days.  Take lowest dose possible to manage pain 10 tablet 0    divalproex (DEPAKOTE) 500 MG DR tablet Take 1 tablet by mouth every 12 hours 60 tablet 3    ARIPiprazole (ABILIFY) 10 MG tablet Take 1 tablet by mouth daily 30 tablet 3    phenytoin (PHENYTEK) 300 MG ER capsule 200 mg po once daily in the am and phenytoin ER 300mg QHS 90 capsule 1    phenytoin (PHENYTEK) 200 MG ER capsule 200 mg po once daily in the am and phenytoin ER 300mg QHS 90 capsule 1    fluvoxaMINE (LUVOX) 50 MG tablet Take 1 tablet by mouth nightly 90 tablet 1    hydrOXYzine HCl (ATARAX) 25 MG tablet Take 0.5 tablets by mouth 3 times daily as needed for Anxiety 90 tablet 1     No current facility-administered medications for this visit.        Allergies:    Penicillins    Social History:   Social History     Socioeconomic History    Marital status: Single     Spouse name: Not on file    Number of children: Not on file    Years of education: Not on file    Highest education level: Not on file   Occupational History    Not on file   Tobacco Use    Smoking status: Every Day     Packs/day: 2.00     Years: 9.00     Pack years: 18.00     Types: Cigarettes    Smokeless tobacco: Never   Vaping Use    Vaping Use: Never used   Substance and Sexual Activity    Alcohol use: No    Drug use: Not Currently    Sexual activity: Yes     Partners: Male   Other Topics Concern    Not on file   Social History Narrative    Not on file     Social Determinants of Health     Financial Resource Strain: Low Risk     Difficulty of Paying Living Expenses: Not very hard   Food Insecurity: No Food Insecurity    Worried About Running Out of Food in the Last Year: Never true    Ran Out of Food in the Last Year: Never true   Transportation Needs: Not on file   Physical Activity: Not on file   Stress: Not on file   Social Connections: Not on file   Intimate Partner Violence: Not on file   Housing Stability: Not on file       Family History:       Problem Relation Age of Onset    Cancer Other brain, breast, lung    Coronary Art Dis Other     High Blood Pressure Brother     Obesity Brother     Cancer Maternal Grandmother     Allergies Maternal Grandmother     Colon Cancer Maternal Grandmother     Breast Cancer Maternal Grandmother     Ovarian Cancer Maternal Grandmother     Other Maternal Grandmother         eye cancer, loss of left eye    Cancer Maternal Grandfather         lung    Heart Disease Maternal Grandfather     Uterine Cancer Mother     Ovarian Cancer Mother     Breast Cancer Mother     Cancer Mother         behind right eye    Heart Attack Mother     Migraines Mother     Cancer Maternal Aunt         lung       Objective: There were no vitals taken for this visit. Physical Exam  Constitutional:       General: She is not in acute distress. Appearance: Normal appearance. She is not ill-appearing. HENT:      Head: Normocephalic. Nose: Nose normal. No congestion or rhinorrhea. Mouth/Throat:      Mouth: Mucous membranes are moist.      Pharynx: Oropharynx is clear. No oropharyngeal exudate or posterior oropharyngeal erythema. Eyes:      Extraocular Movements: Extraocular movements intact. Pupils: Pupils are equal, round, and reactive to light. Cardiovascular:      Rate and Rhythm: Normal rate and regular rhythm. Pulses: Normal pulses. Heart sounds: Normal heart sounds. Pulmonary:      Effort: Pulmonary effort is normal.      Breath sounds: Normal breath sounds. No wheezing, rhonchi or rales. Abdominal:      General: Abdomen is flat. Bowel sounds are normal.      Palpations: Abdomen is soft. Tenderness: There is no abdominal tenderness. Skin:     General: Skin is warm and dry. Capillary Refill: Capillary refill takes less than 2 seconds. Comments: No swelling or erythema over the incision site   Neurological:      General: No focal deficit present. Mental Status: She is alert and oriented to person, place, and time.           Radiographs and Laboratory Studies:       Laboratory Studies:   Lab Results   Component Value Date    WBC 9.4 03/23/2022    HGB 12.8 03/23/2022    HCT 40.2 03/23/2022    MCV 75.9 (L) 03/23/2022     (H) 03/23/2022     Lab Results   Component Value Date    SEDRATE 6 07/25/2013     No results found for: CRP    Assessment and Plan:      Diagnosis Orders   1. Preop examination  Pregnancy, Urine    CBC    Basic Metabolic Panel          Procedure: Left anterior labral repair  Blood work  was ordered and will be reviewed. I'll see them back 2 weeks postoperatively.     Aneudy Garibay PA-C  [address] and Sports Medicine  [phone]

## 2022-10-12 NOTE — PATIENT INSTRUCTIONS
-please walk over to our lab for your blood work, located right outside our office near the elevators    -stop taking asprin and motrin until after your surgery. All blood thinners need to be stopped at least 5 days in advance prior to surgery. If you experiencing pain, the only medication you can take for that is tylenol 3-4x / day not to exceed 4000 mg.  -our surgery department will reach out the you the day before your surgery and let you know what time to arrive at the [address] [place] [address] Road (door B)  -no eating / drinking the after midnight the night before surgery.  Sips of water the morning of for medications is OK  -if you have any questions, please call our office and I will be happy to answer them

## 2022-10-14 ENCOUNTER — HOSPITAL ENCOUNTER (OUTPATIENT)
Age: 27
Setting detail: OUTPATIENT SURGERY
Discharge: HOME | End: 2022-10-14
Attending: ORTHOPAEDIC SURGERY | Admitting: ORTHOPAEDIC SURGERY
Payer: MEDICAID

## 2022-10-14 ENCOUNTER — ANESTHESIA (OUTPATIENT)
Dept: OPERATING ROOM | Age: 27
End: 2022-10-14
Payer: MEDICAID

## 2022-10-14 ENCOUNTER — ANESTHESIA EVENT (OUTPATIENT)
Dept: OPERATING ROOM | Age: 27
End: 2022-10-14
Payer: MEDICAID

## 2022-10-14 VITALS
TEMPERATURE: 97.6 F | OXYGEN SATURATION: 97 % | HEIGHT: 63 IN | HEART RATE: 90 BPM | SYSTOLIC BLOOD PRESSURE: 246 MMHG | BODY MASS INDEX: 23.74 KG/M2 | RESPIRATION RATE: 18 BRPM | WEIGHT: 134 LBS | DIASTOLIC BLOOD PRESSURE: 68 MMHG

## 2022-10-14 DIAGNOSIS — M24.412 SHOULDER DISLOCATION, RECURRENT, LEFT: Primary | ICD-10-CM

## 2022-10-14 LAB
HCG, URINE, POC: NEGATIVE
Lab: NORMAL
NEGATIVE QC PASS/FAIL: NORMAL
POSITIVE QC PASS/FAIL: NORMAL

## 2022-10-14 PROCEDURE — 6360000002 HC RX W HCPCS: Performed by: NURSE ANESTHETIST, CERTIFIED REGISTERED

## 2022-10-14 PROCEDURE — C1713 ANCHOR/SCREW BN/BN,TIS/BN: HCPCS | Performed by: ORTHOPAEDIC SURGERY

## 2022-10-14 PROCEDURE — 6360000002 HC RX W HCPCS: Performed by: ANESTHESIOLOGY

## 2022-10-14 PROCEDURE — 7100000010 HC PHASE II RECOVERY - FIRST 15 MIN: Performed by: ORTHOPAEDIC SURGERY

## 2022-10-14 PROCEDURE — 3600000014 HC SURGERY LEVEL 4 ADDTL 15MIN: Performed by: ORTHOPAEDIC SURGERY

## 2022-10-14 PROCEDURE — 7100000000 HC PACU RECOVERY - FIRST 15 MIN: Performed by: ORTHOPAEDIC SURGERY

## 2022-10-14 PROCEDURE — 7100000001 HC PACU RECOVERY - ADDTL 15 MIN: Performed by: ORTHOPAEDIC SURGERY

## 2022-10-14 PROCEDURE — 64415 NJX AA&/STRD BRCH PLXS IMG: CPT | Performed by: ANESTHESIOLOGY

## 2022-10-14 PROCEDURE — 2709999900 HC NON-CHARGEABLE SUPPLY: Performed by: ORTHOPAEDIC SURGERY

## 2022-10-14 PROCEDURE — 2720000010 HC SURG SUPPLY STERILE: Performed by: ORTHOPAEDIC SURGERY

## 2022-10-14 PROCEDURE — 2580000003 HC RX 258: Performed by: ORTHOPAEDIC SURGERY

## 2022-10-14 PROCEDURE — 6370000000 HC RX 637 (ALT 250 FOR IP): Performed by: ANESTHESIOLOGY

## 2022-10-14 PROCEDURE — 29806 SHO ARTHRS SRG CAPSULORRAPHY: CPT | Performed by: ORTHOPAEDIC SURGERY

## 2022-10-14 PROCEDURE — 6360000002 HC RX W HCPCS: Performed by: ORTHOPAEDIC SURGERY

## 2022-10-14 PROCEDURE — 6370000000 HC RX 637 (ALT 250 FOR IP): Performed by: ORTHOPAEDIC SURGERY

## 2022-10-14 PROCEDURE — 3600000004 HC SURGERY LEVEL 4 BASE: Performed by: ORTHOPAEDIC SURGERY

## 2022-10-14 PROCEDURE — 29807 SHO ARTHRS SRG RPR SLAP LES: CPT | Performed by: ORTHOPAEDIC SURGERY

## 2022-10-14 PROCEDURE — 7100000011 HC PHASE II RECOVERY - ADDTL 15 MIN: Performed by: ORTHOPAEDIC SURGERY

## 2022-10-14 PROCEDURE — 3700000001 HC ADD 15 MINUTES (ANESTHESIA): Performed by: ORTHOPAEDIC SURGERY

## 2022-10-14 PROCEDURE — 3700000000 HC ANESTHESIA ATTENDED CARE: Performed by: ORTHOPAEDIC SURGERY

## 2022-10-14 DEVICE — IMPLANTABLE DEVICE: Type: IMPLANTABLE DEVICE | Site: SHOULDER | Status: FUNCTIONAL

## 2022-10-14 RX ORDER — ONDANSETRON 2 MG/ML
4 INJECTION INTRAMUSCULAR; INTRAVENOUS
Status: DISCONTINUED | OUTPATIENT
Start: 2022-10-14 | End: 2022-10-14 | Stop reason: HOSPADM

## 2022-10-14 RX ORDER — MAGNESIUM HYDROXIDE 1200 MG/15ML
LIQUID ORAL CONTINUOUS PRN
Status: COMPLETED | OUTPATIENT
Start: 2022-10-14 | End: 2022-10-14

## 2022-10-14 RX ORDER — SODIUM CHLORIDE 9 MG/ML
25 INJECTION, SOLUTION INTRAVENOUS PRN
Status: DISCONTINUED | OUTPATIENT
Start: 2022-10-14 | End: 2022-10-14 | Stop reason: HOSPADM

## 2022-10-14 RX ORDER — FENTANYL CITRATE 50 UG/ML
INJECTION, SOLUTION INTRAMUSCULAR; INTRAVENOUS PRN
Status: DISCONTINUED | OUTPATIENT
Start: 2022-10-14 | End: 2022-10-14 | Stop reason: SDUPTHER

## 2022-10-14 RX ORDER — LIDOCAINE HYDROCHLORIDE 20 MG/ML
INJECTION, SOLUTION INTRAVENOUS PRN
Status: DISCONTINUED | OUTPATIENT
Start: 2022-10-14 | End: 2022-10-14 | Stop reason: SDUPTHER

## 2022-10-14 RX ORDER — ONDANSETRON 2 MG/ML
INJECTION INTRAMUSCULAR; INTRAVENOUS PRN
Status: DISCONTINUED | OUTPATIENT
Start: 2022-10-14 | End: 2022-10-14 | Stop reason: SDUPTHER

## 2022-10-14 RX ORDER — GINSENG 100 MG
CAPSULE ORAL PRN
Status: DISCONTINUED | OUTPATIENT
Start: 2022-10-14 | End: 2022-10-14 | Stop reason: ALTCHOICE

## 2022-10-14 RX ORDER — MEPERIDINE HYDROCHLORIDE 25 MG/ML
12.5 INJECTION INTRAMUSCULAR; INTRAVENOUS; SUBCUTANEOUS
Status: DISCONTINUED | OUTPATIENT
Start: 2022-10-14 | End: 2022-10-14 | Stop reason: HOSPADM

## 2022-10-14 RX ORDER — PROPOFOL 10 MG/ML
INJECTION, EMULSION INTRAVENOUS PRN
Status: DISCONTINUED | OUTPATIENT
Start: 2022-10-14 | End: 2022-10-14 | Stop reason: SDUPTHER

## 2022-10-14 RX ORDER — OXYCODONE HYDROCHLORIDE 5 MG/1
5 TABLET ORAL PRN
Status: COMPLETED | OUTPATIENT
Start: 2022-10-14 | End: 2022-10-14

## 2022-10-14 RX ORDER — DEXAMETHASONE SODIUM PHOSPHATE 4 MG/ML
INJECTION, SOLUTION INTRA-ARTICULAR; INTRALESIONAL; INTRAMUSCULAR; INTRAVENOUS; SOFT TISSUE PRN
Status: DISCONTINUED | OUTPATIENT
Start: 2022-10-14 | End: 2022-10-14 | Stop reason: SDUPTHER

## 2022-10-14 RX ORDER — METOCLOPRAMIDE HYDROCHLORIDE 5 MG/ML
10 INJECTION INTRAMUSCULAR; INTRAVENOUS
Status: DISCONTINUED | OUTPATIENT
Start: 2022-10-14 | End: 2022-10-14 | Stop reason: HOSPADM

## 2022-10-14 RX ORDER — ROPIVACAINE HYDROCHLORIDE 5 MG/ML
INJECTION, SOLUTION EPIDURAL; INFILTRATION; PERINEURAL PRN
Status: DISCONTINUED | OUTPATIENT
Start: 2022-10-14 | End: 2022-10-14 | Stop reason: SDUPTHER

## 2022-10-14 RX ORDER — FENTANYL CITRATE 50 UG/ML
50 INJECTION, SOLUTION INTRAMUSCULAR; INTRAVENOUS EVERY 10 MIN PRN
Status: DISCONTINUED | OUTPATIENT
Start: 2022-10-14 | End: 2022-10-14 | Stop reason: HOSPADM

## 2022-10-14 RX ORDER — MIDAZOLAM HYDROCHLORIDE 1 MG/ML
INJECTION INTRAMUSCULAR; INTRAVENOUS PRN
Status: DISCONTINUED | OUTPATIENT
Start: 2022-10-14 | End: 2022-10-14 | Stop reason: SDUPTHER

## 2022-10-14 RX ORDER — SODIUM CHLORIDE 0.9 % (FLUSH) 0.9 %
5-40 SYRINGE (ML) INJECTION EVERY 12 HOURS SCHEDULED
Status: DISCONTINUED | OUTPATIENT
Start: 2022-10-14 | End: 2022-10-14 | Stop reason: HOSPADM

## 2022-10-14 RX ORDER — DIPHENHYDRAMINE HYDROCHLORIDE 50 MG/ML
12.5 INJECTION INTRAMUSCULAR; INTRAVENOUS
Status: DISCONTINUED | OUTPATIENT
Start: 2022-10-14 | End: 2022-10-14 | Stop reason: HOSPADM

## 2022-10-14 RX ORDER — SODIUM CHLORIDE 0.9 % (FLUSH) 0.9 %
5-40 SYRINGE (ML) INJECTION PRN
Status: DISCONTINUED | OUTPATIENT
Start: 2022-10-14 | End: 2022-10-14 | Stop reason: HOSPADM

## 2022-10-14 RX ORDER — CELECOXIB 100 MG/1
100 CAPSULE ORAL DAILY
Qty: 60 CAPSULE | Refills: 1 | Status: SHIPPED | OUTPATIENT
Start: 2022-10-14 | End: 2022-12-13

## 2022-10-14 RX ORDER — OXYCODONE HYDROCHLORIDE 5 MG/1
10 TABLET ORAL PRN
Status: COMPLETED | OUTPATIENT
Start: 2022-10-14 | End: 2022-10-14

## 2022-10-14 RX ORDER — OXYCODONE HYDROCHLORIDE AND ACETAMINOPHEN 5; 325 MG/1; MG/1
1 TABLET ORAL EVERY 6 HOURS PRN
Qty: 28 TABLET | Refills: 0 | Status: SHIPPED | OUTPATIENT
Start: 2022-10-14 | End: 2022-10-21

## 2022-10-14 RX ADMIN — FENTANYL CITRATE 50 MCG: 50 INJECTION, SOLUTION INTRAMUSCULAR; INTRAVENOUS at 11:07

## 2022-10-14 RX ADMIN — PROPOFOL 150 MG: 10 INJECTION, EMULSION INTRAVENOUS at 10:45

## 2022-10-14 RX ADMIN — FENTANYL CITRATE 50 MCG: 50 INJECTION, SOLUTION INTRAMUSCULAR; INTRAVENOUS at 12:26

## 2022-10-14 RX ADMIN — ONDANSETRON 4 MG: 2 INJECTION INTRAMUSCULAR; INTRAVENOUS at 11:56

## 2022-10-14 RX ADMIN — ROPIVACAINE HYDROCHLORIDE 30 ML: 5 INJECTION, SOLUTION EPIDURAL; INFILTRATION; PERINEURAL at 10:03

## 2022-10-14 RX ADMIN — FENTANYL CITRATE 50 MCG: 50 INJECTION, SOLUTION INTRAMUSCULAR; INTRAVENOUS at 11:52

## 2022-10-14 RX ADMIN — MIDAZOLAM HYDROCHLORIDE 2 MG: 1 INJECTION, SOLUTION INTRAMUSCULAR; INTRAVENOUS at 10:00

## 2022-10-14 RX ADMIN — CEFAZOLIN 2000 MG: 10 INJECTION, POWDER, FOR SOLUTION INTRAVENOUS at 10:53

## 2022-10-14 RX ADMIN — DEXAMETHASONE SODIUM PHOSPHATE 4 MG: 4 INJECTION, SOLUTION INTRAMUSCULAR; INTRAVENOUS at 10:45

## 2022-10-14 RX ADMIN — FENTANYL CITRATE 50 MCG: 50 INJECTION, SOLUTION INTRAMUSCULAR; INTRAVENOUS at 12:39

## 2022-10-14 RX ADMIN — LIDOCAINE HYDROCHLORIDE 50 MG: 20 INJECTION, SOLUTION INTRAVENOUS at 10:45

## 2022-10-14 RX ADMIN — OXYCODONE 5 MG: 5 TABLET ORAL at 13:01

## 2022-10-14 ASSESSMENT — PAIN DESCRIPTION - LOCATION
LOCATION: SHOULDER
LOCATION: SHOULDER

## 2022-10-14 ASSESSMENT — PAIN SCALES - GENERAL
PAINLEVEL_OUTOF10: 7
PAINLEVEL_OUTOF10: 0
PAINLEVEL_OUTOF10: 4
PAINLEVEL_OUTOF10: 4
PAINLEVEL_OUTOF10: 6
PAINLEVEL_OUTOF10: 4

## 2022-10-14 ASSESSMENT — PAIN DESCRIPTION - ORIENTATION
ORIENTATION: LEFT
ORIENTATION: LEFT

## 2022-10-14 ASSESSMENT — PAIN DESCRIPTION - DESCRIPTORS: DESCRIPTORS: BURNING

## 2022-10-14 NOTE — DISCHARGE INSTRUCTIONS
[address] and Sports Medicine      Orthopedic care:  Dr. Alexandria Bustillo PA-C    Procedure:  Arthroscopic labral repair      Activity and Dressing Care:   -Use sling with restraint at all times. Can take off for hygiene purposes ONLY   -You should also wear your sling to bed. Sleep in a recliner if possible or prop pillows behind your back for comfort.   -Keep dressing clean and dry for 4 days. After 4 days may remove dressing leave Steri-Strips intact until they fall off on their own. You can shower once the dressing is removed. Do not tub bathe. Medications:  -Take Celebrex for first-line pain, Percocets if pain is unrelieved with that. Try to limit the quantity of Percocet you take as they are highly addictive    Additional Instructions:  -Instructions as to how to use the [employer] ice machine will be provided to you by the nursing staff.   You may also use ice 15-20 minutes every 4 hours  -If you develop fevers, chills, redness at the incision sites call our office immediately as these could be signs of infection      Dr. Alexandria Bustillo PA-C  [address] [facility]  [address] [place] [address], [address] [address]  [place] [address] [place], [place] [address]connie Cuello

## 2022-10-14 NOTE — ANESTHESIA PRE PROCEDURE
Department of Anesthesiology  Preprocedure Note       Name:  Onesimo Hernandez   Age:  32 y.o.  :  1995                                          MRN:  [id number]         Date:  10/14/2022      Surgeon: Meera Amos):  Roseann Coulter MD    Procedure: Procedure(s):  LEFT ARTHROSCOPIC SHOULDER  SURGERY ANTERIOR LABRAL REPAIR AND CAPSULAR PLICATION  MYTEC SUTURE ANCHORS  GENERAL AND REGIONAL BLOCK  (PAT [place]). KATEY    Medications prior to admission:   Prior to Admission medications    Medication Sig Start Date End Date Taking? Authorizing Provider   divalproex (DEPAKOTE) 500 MG DR tablet Take 1 tablet by mouth every 12 hours 9/27/22 10/27/22  Roman Serrano MD   ARIPiprazole (ABILIFY) 10 MG tablet Take 1 tablet by mouth daily 22   Roman Serrano MD   phenytoin (PHENYTEK) 300 MG ER capsule 200 mg po once daily in the am and phenytoin ER 300mg QHS 22   Tamar Meza PA-C   phenytoin (PHENYTEK) 200 MG ER capsule 200 mg po once daily in the am and phenytoin ER 300mg QHS 22   Tamar Meza PA-C   fluvoxaMINE (LUVOX) 50 MG tablet Take 1 tablet by mouth nightly 22   Tamar Meza PA-C   hydrOXYzine HCl (ATARAX) 25 MG tablet Take 0.5 tablets by mouth 3 times daily as needed for Anxiety 22   Tamar Meza PA-C       Current medications:    Current Facility-Administered Medications   Medication Dose Route Frequency Provider Last Rate Last Admin    ceFAZolin (ANCEF) 2000 mg in dextrose 5 % 100 mL IVPB  2,000 mg IntraVENous On Call to 600 Yelena Pina MD           Allergies:     Allergies   Allergen Reactions    Penicillins      GI Problems       Problem List:    Patient Active Problem List   Diagnosis Code    Anxiety F41.9    Bipolar disorder ([facility] [address] F31.9    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    Nicotine use disorder F17.200    Temporal lobe epilepsy, non-refractory ([facility] [address] G40.109    Vitamin D deficiency E55.9    Chronic left shoulder pain M25.512, G89.29    Panic state as acute reaction to stress F43.0    Grief reaction F43.21    Seizure (HCC) R56.9    Compulsive behavior R46.89    Strain of musc/tend the rotator cuff of left shoulder, init S46.012A    Shoulder dislocation, recurrent, left M24.412    Pain in left shoulder M25.512       Past Medical History:        Diagnosis Date    Anxiety     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 Social History:    Social History     Tobacco Use    Smoking status: Every Day     Packs/day: 2.00     Years: 9.00     Pack years: 18.00     Types: Cigarettes    Smokeless tobacco: Never   Substance Use Topics    Alcohol use: No                                Ready to quit: Not Answered  Counseling given: Not Answered      Vital Signs (Current): There were no vitals filed for this visit.                                            BP Readings from Last 3 Encounters:   10/12/22 120/76   08/25/22 (!) 140/100   08/21/22 (!) 143/89       NPO Status:                                                                                 BMI:   Wt Readings from Last 3 Encounters:   10/12/22 134 lb (60.8 kg)   09/09/22 134 lb (60.8 kg)   08/25/22 134 lb (60.8 kg)     There is no height or weight on file to calculate BMI.    CBC:   Lab Results   Component Value Date/Time    WBC 12.5 10/12/2022 04:00 PM    RBC 4.37 10/12/2022 04:00 PM    RBC 4.70 06/01/2012 05:04 PM    HGB 11.2 10/12/2022 04:00 PM    HCT 34.3 10/12/2022 04:00 PM    MCV 78.5 10/12/2022 04:00 PM    RDW 16.8 10/12/2022 04:00 PM     10/12/2022 04:00 PM       CMP:   Lab Results   Component Value Date/Time     10/12/2022 04:00 PM    K 3.9 10/12/2022 04:00 PM    K 4.5 11/02/2021 07:26 AM     10/12/2022 04:00 PM    CO2 26 10/12/2022 04:00 PM    BUN 9 10/12/2022 04:00 PM    CREATININE 0.55 10/12/2022 04:00 PM    GFRAA >60.0 10/12/2022 04:00 PM    LABGLOM >60.0 10/12/2022 04:00 PM    GLUCOSE 82 10/12/2022 04:00 PM    GLUCOSE 77 06/01/2012 05:04 PM    PROT 7.0 03/23/2022 10:07 AM    CALCIUM 8.4 10/12/2022 04:00 PM    BILITOT <0.2 03/23/2022 10:07 AM    ALKPHOS 98 03/23/2022 10:07 AM    AST 23 03/23/2022 10:07 AM    ALT 25 03/23/2022 10:07 AM       POC Tests: No results for input(s): POCGLU, POCNA, POCK, POCCL, POCBUN, POCHEMO, POCHCT in the last 72 hours. Coags: No results found for: PROTIME, INR, APTT    HCG (If Applicable):   Lab Results   Component Value Date    PREGTESTUR Negative 10/12/2022        ABGs: No results found for: PHART, PO2ART, BHT5NTB, INJ0OEX, BEART, V5YWDINP     Type & Screen (If Applicable):  No results found for: LABABO, LABRH    Drug/Infectious Status (If Applicable):  No results found for: HIV, HEPCAB    COVID-19 Screening (If Applicable):   Lab Results   Component Value Date/Time    COVID19 Not-Detected 07/14/2022 12:01 PM    COVID19 Not Detected 11/01/2021 10:54 AM           Anesthesia Evaluation  Patient summary reviewed and Nursing notes reviewed no history of anesthetic complications:   Airway: Mallampati: II  TM distance: >3 FB   Neck ROM: full  Mouth opening: > = 3 FB   Dental: normal exam         Pulmonary:Negative Pulmonary ROS and normal exam                               Cardiovascular:Negative CV ROS                      Neuro/Psych:   (+) neuromuscular disease:,             GI/Hepatic/Renal: Neg GI/Hepatic/Renal ROS            Endo/Other: Negative Endo/Other ROS                    Abdominal:             Vascular: negative vascular ROS. Other Findings:           Anesthesia Plan      general     ASA 2           MIPS: Prophylactic antiemetics administered. Anesthetic plan and risks discussed with patient. Plan discussed with CRNA.     Attending anesthesiologist reviewed and agrees with Preprocedure content      Post-op pain plan if not by surgeon: single peripheral nerve block            Augustin Roper MD   10/14/2022

## 2022-10-14 NOTE — PROGRESS NOTES
Dc instructions given to pt and boyfriend, verbalized understanding, medicated pt per request for 4/10 burning to left shoulder, alert, pt denies needs, pt ate and drank without difficulty iv dc'd

## 2022-10-14 NOTE — OP NOTE
Operative Note      Patient: Lauryn Sargent  YOB: 1995  MRN: [id number]    Date of Procedure: 10/14/2022    Pre-Op Diagnosis: LEFT SHOULDER RECURRENT INSTABILITY    Post-Op Diagnosis:  Right shoulder anterior labral tear, inferior labral tear, superior labral tear. Procedure(s):  LEFT ARTHROSCOPIC SHOULDER  SURGERY ANTERIOR LABRAL REPAIR AND CAPSULAR PLICATION, superior labral repair (SLAP). Surgeon(s):  Anne Perez MD    Assistant:   Physician Assistant: Fermin Calderon PA-C    Anesthesia: General    Estimated Blood Loss (mL): Minimal    Complications: None    Specimens:   * No specimens in log *    Implants:  Implant Name Type Inv. Item Serial No.  Lot No. LRB No. Used Action   ANCHOR SUT HIP BIOCRYL RAPIDE PUSH IN W/ ORTHOCORD 2-0 ROT - [id number]  ANCHOR SUT HIP BIOCRYL RAPIDE PUSH IN W/ ORTHOCORD 2-0 ROT  [address] [id number] 5 Implanted         Drains: * No LDAs found *    Findings: Anterior labral tear, inferior labral tear, superior labral tear anterior posterior. Large Hill-Sachs lesion. Bony deficiency anteriorly. Physician assistant was needed for exposure, operating the camera, suture management, final wound closure, and transported to the postanesthesia care unit. Detailed Description of Procedure:   26-year of age female who is had recurrent shoulder instability on the left. She has had multiple dislocations. MRI was consistent with a large glenoid labral tear as well as Hill-Sachs lesion. Risks and benefits of nonoperative operative treatment reviewed. She want to proceed with an operative approach. Left arthroscopic shoulder surgery labral repair and capsular plication was reviewed. It was reviewed with her some of the bony deficiency and the potential for chronic recurrent instability and need for further surgery. She was informed of the incisions, mobilization recovery.   She was informed of risk to include but not limited to recurrent instability, recurrent tear, need for further surgery, infection, wound complications, arthrofibrosis, DVT, pulmonary embolus, neurovascular injury, complex regional pain syndrome, medical and anesthetic risk. All questions were answered consent was obtained. Patient was brought to the operating where after induction of general and regional anesthesia she was placed in an arthroscopic shoulder chair. Exam under anesthesia demonstrated range of motion of 180/70/80. She had evidence of anterior inferior instability on the left. Her left upper extremities then prepped and draped in usual sterile fashion. She was given prophylactic and biotics preoperatively. Timeout was taken confirming surgical site, procedure, instrumentation, fire risk, and antibiotics. 60 cc of sterile saline introduced into the glenohumeral joint via posterior approach. 15 blade was used to make a posterior skin incision. The arthroscope inserted bluntly into the glenohumeral joint. Arthroscopic evaluation demonstrated a labral tear extending from approximately 6 o'clock position to 1:30 position on the labrum. This involved the anterior labrum, and an inferior labrum, and superior labrum extending anteriorly and posteriorly. The biceps anchor was unstable. There is evidence of deficiency of the anterior glenoid. There is also a large Hill-Sachs lesion. 2 7 mm distal rib cannulas were placed anteriorly. Using a rasp debridement was performed of the glenoid surface to create a cancellus bleeding bed. Next 5 Carmelo anchors were placed extending from the anterior inferior surface of the glenoid proximally. Sutures were then passed inferiorly with suture shuttle device great mattress few sutures. The labrum and the capsule was advanced onto the glenoid rim tensioned down. Capsular plication was performed inferiorly.   Superiorly where the glenoid was completely stripped of the labrum simple sutures were passed and the labrum was repaired back down to the anterior surface of the glenoid. Superiorly where the biceps anchor was unstable superior anterior and a superior posterior anchor was placed anterior and posterior to the biceps anchor. Sutures were passed with suture shuttle device and a superior labral anterior posterior repair was completed. Final XCOPY images were taken. The shoulder was then copiously irrigated through inflow and outflow cannulas. Arthroscopy instruments were removed. Portals were closed with 4 Monocryl subcuticular suture. Bacitracin Sterile dressings were applied. Patient was placed in a sling and swath shoulder mobilization and a Cryo/Cuff was placed.     Electronically signed by Evon Sandhu MD on 10/14/2022 at 11:52 AM

## 2022-10-14 NOTE — ANESTHESIA PROCEDURE NOTES
Peripheral Block    Patient location during procedure: pre-op  Reason for block: post-op pain management  Start time: 10/14/2022 10:00 AM  Staffing  Performed: anesthesiologist   Anesthesiologist: Catarina Watts MD  Preanesthetic Checklist  Completed: patient identified, IV checked, site marked, risks and benefits discussed, surgical/procedural consents, equipment checked, pre-op evaluation, timeout performed, anesthesia consent given, oxygen available, monitors applied/VS acknowledged, fire risk safety assessment completed and verbalized and blood product R/B/A discussed and consented  Peripheral Block   Patient position: supine  Prep: ChloraPrep  Provider prep: mask and sterile gloves  Patient monitoring: cardiac monitor, continuous pulse ox, frequent blood pressure checks, IV access and oxygen  Block type: Brachial plexus  Supraclavicular  Laterality: left  Injection technique: single-shot  Guidance: ultrasound guided  Local infiltration: ropivacaine  Infiltration strength: 0.5 %  Local infiltration: ropivacaine  Dose: 30 mL    Needle   Needle type: insulated echogenic nerve stimulator needle   Needle gauge: 21 G  Needle localization: ultrasound guidance  Test dose: negative  Needle length: 10 cm  Assessment   Injection assessment: negative aspiration for heme, no paresthesia on injection, local visualized surrounding nerve on ultrasound, no intravascular symptoms and low pressure verified by pressure monitor  Paresthesia pain: none  Slow fractionated injection: yes  Hemodynamics: stable  Outcomes: uncomplicated

## 2022-10-14 NOTE — ANESTHESIA POSTPROCEDURE EVALUATION
Department of Anesthesiology  Postprocedure Note    Patient: Gino Sweeney  MRN: [id number]  YOB: 1995  Date of evaluation: 10/14/2022      Procedure Summary     Date: 10/14/22 Room / Location: [address]    Anesthesia Start: 1837 Anesthesia Stop: 3320    Procedure: LEFT ARTHROSCOPIC SHOULDER  SURGERY ANTERIOR LABRAL REPAIR AND CAPSULAR PLICATION (Left: Shoulder) Diagnosis:       Shoulder instability, left      (LEFT SHOULDER RECURRENT INSTABILITY)    Surgeons: Demetris Tovar MD Responsible Provider: Chauncey Urena MD    Anesthesia Type: general ASA Status: 2          Anesthesia Type: No value filed.     Antonio Phase I:      Antonio Phase II:        Anesthesia Post Evaluation    Patient location during evaluation: PACU  Level of consciousness: awake  Pain score: 0  Airway patency: patent  Nausea & Vomiting: no vomiting and no nausea  Complications: no  Cardiovascular status: hemodynamically stable  Respiratory status: acceptable  Hydration status: stable

## 2022-10-18 ENCOUNTER — PATIENT MESSAGE (OUTPATIENT)
Dept: ORTHOPEDIC SURGERY | Age: 27
End: 2022-10-18

## 2022-10-18 DIAGNOSIS — M24.412 SHOULDER DISLOCATION, RECURRENT, LEFT: Primary | ICD-10-CM

## 2022-10-19 ENCOUNTER — TELEPHONE (OUTPATIENT)
Dept: FAMILY MEDICINE CLINIC | Age: 27
End: 2022-10-19

## 2022-10-19 RX ORDER — OXYCODONE HYDROCHLORIDE AND ACETAMINOPHEN 5; 325 MG/1; MG/1
1 TABLET ORAL EVERY 6 HOURS PRN
Qty: 28 TABLET | Refills: 0 | Status: SHIPPED | OUTPATIENT
Start: 2022-10-19 | End: 2022-10-26

## 2022-10-19 NOTE — TELEPHONE ENCOUNTER
Patient states that she had surgery on her  shoulder on Friday. She is in intolerable pain and the medications given post-op are not helping. Patient ha tried calling Ortho several times with no one answering the phone. She also sent a my chart message regarding her pain level. I recommended (after speaking with Bho) that she try calling Ortho again and if she did not get an answer and the pain is that bad, to go to the ED. As you are not in the office today.

## 2022-10-19 NOTE — TELEPHONE ENCOUNTER
From: Ervin Webb  To: Yamilka Lazar  Sent: 10/18/2022 5:25 PM EDT  Subject: Post Op pain    Hello,    I am writing today because I am in extreme pain, neither the Celebrex nor the Percocet are helping and I'm not sure what to do. I've been icing the area basically non-stop also and exercising my forearm the way I was shown. I understand that it isn't going to magically get better overnight but I'm dealing with a 10/10 and it hasn't stopped. Any assistance would be greatly appreciated, I don't come in until the 28th.     Kind regards,    Jericho Montalvo

## 2022-10-20 NOTE — TELEPHONE ENCOUNTER
Chart reviewed. No documented telephone encounters from ortho; can we see if the office was open yesterday? Can we please call ortho and get her in for a post-op visit re: pain.

## 2022-10-21 ENCOUNTER — TELEPHONE (OUTPATIENT)
Dept: NEUROLOGY | Age: 27
End: 2022-10-21

## 2022-10-21 DIAGNOSIS — R56.9 SEIZURE (HCC): Primary | ICD-10-CM

## 2022-10-21 NOTE — TELEPHONE ENCOUNTER
Patient had seizure today ( first one in 7 month ) . She states that she did have surgery on 10/14/2022 and she is in a lot of pain. I advised that with the stress of surgery and anesthesia that it takes a toll on you and cause one.  She currently takes depakote 500mg bid and dilantin 200mg am and 300mg pm      Please advise if you would like to do anything.    [phone]

## 2022-10-23 DIAGNOSIS — R46.89 COMPULSIVE BEHAVIOR: ICD-10-CM

## 2022-10-23 DIAGNOSIS — R56.9 SEIZURE (HCC): ICD-10-CM

## 2022-10-23 DIAGNOSIS — F43.0 PANIC STATE AS ACUTE REACTION TO STRESS: ICD-10-CM

## 2022-10-24 ENCOUNTER — HOSPITAL ENCOUNTER (EMERGENCY)
Age: 27
Discharge: HOME | End: 2022-10-24
Payer: MEDICAID

## 2022-10-24 VITALS
HEART RATE: 91 BPM | HEIGHT: 63 IN | BODY MASS INDEX: 23.74 KG/M2 | TEMPERATURE: 98.7 F | RESPIRATION RATE: 16 BRPM | DIASTOLIC BLOOD PRESSURE: 56 MMHG | OXYGEN SATURATION: 100 % | WEIGHT: 134 LBS | SYSTOLIC BLOOD PRESSURE: 122 MMHG

## 2022-10-24 DIAGNOSIS — K08.89 PAIN, DENTAL: Primary | ICD-10-CM

## 2022-10-24 DIAGNOSIS — K05.10 GINGIVITIS: ICD-10-CM

## 2022-10-24 DIAGNOSIS — R56.9 SEIZURE (HCC): ICD-10-CM

## 2022-10-24 LAB
ALBUMIN SERPL-MCNC: 4.2 G/DL (ref 3.5–4.6)
ALP BLD-CCNC: 97 U/L (ref 40–130)
ALT SERPL-CCNC: 46 U/L (ref 0–33)
ANION GAP SERPL CALCULATED.3IONS-SCNC: 10 MEQ/L (ref 9–15)
AST SERPL-CCNC: 27 U/L (ref 0–35)
BILIRUB SERPL-MCNC: <0.2 MG/DL (ref 0.2–0.7)
BUN BLDV-MCNC: 10 MG/DL (ref 6–20)
CALCIUM SERPL-MCNC: 8.9 MG/DL (ref 8.5–9.9)
CHLORIDE BLD-SCNC: 100 MEQ/L (ref 95–107)
CO2: 27 MEQ/L (ref 20–31)
CREAT SERPL-MCNC: 0.56 MG/DL (ref 0.5–0.9)
GFR SERPL CREATININE-BSD FRML MDRD: >60 ML/MIN/{1.73_M2}
GLOBULIN: 2.4 G/DL (ref 2.3–3.5)
GLUCOSE BLD-MCNC: 79 MG/DL (ref 70–99)
HCT VFR BLD CALC: 32.7 % (ref 37–47)
HEMOGLOBIN: 10.7 G/DL (ref 12–16)
MCH RBC QN AUTO: 25.6 PG (ref 27–31.3)
MCHC RBC AUTO-ENTMCNC: 32.9 % (ref 33–37)
MCV RBC AUTO: 77.9 FL (ref 79.4–94.8)
PDW BLD-RTO: 16.9 % (ref 11.5–14.5)
PHENYTOIN LEVEL: 13.2 UG/ML (ref 10–20)
PLATELET # BLD: 456 K/UL (ref 130–400)
POTASSIUM SERPL-SCNC: 4.8 MEQ/L (ref 3.4–4.9)
RBC # BLD: 4.19 M/UL (ref 4.2–5.4)
SODIUM BLD-SCNC: 137 MEQ/L (ref 135–144)
TOTAL CK: 145 U/L (ref 0–170)
TOTAL PROTEIN: 6.6 G/DL (ref 6.3–8)
VALPROIC ACID LEVEL: 40.1 UG/ML (ref 50–100)
WBC # BLD: 9.8 K/UL (ref 4.8–10.8)

## 2022-10-24 PROCEDURE — 99283 EMERGENCY DEPT VISIT LOW MDM: CPT

## 2022-10-24 RX ORDER — PHENYTOIN SODIUM 200 MG/1
CAPSULE, EXTENDED RELEASE ORAL
Qty: 90 CAPSULE | Refills: 1 | OUTPATIENT
Start: 2022-10-24

## 2022-10-24 RX ORDER — FLUVOXAMINE MALEATE 50 MG/1
50 TABLET, COATED ORAL NIGHTLY
Qty: 90 TABLET | Refills: 1 | OUTPATIENT
Start: 2022-10-24

## 2022-10-24 RX ORDER — HYDROXYZINE HYDROCHLORIDE 25 MG/1
TABLET, FILM COATED ORAL
Qty: 90 TABLET | Refills: 1 | OUTPATIENT
Start: 2022-10-24

## 2022-10-24 RX ORDER — CLINDAMYCIN HYDROCHLORIDE 150 MG/1
450 CAPSULE ORAL 3 TIMES DAILY
Qty: 63 CAPSULE | Refills: 0 | Status: SHIPPED | OUTPATIENT
Start: 2022-10-24 | End: 2022-10-31

## 2022-10-24 RX ORDER — IBUPROFEN 800 MG/1
800 TABLET ORAL 2 TIMES DAILY PRN
Qty: 20 TABLET | Refills: 0 | Status: SHIPPED | OUTPATIENT
Start: 2022-10-24 | End: 2022-11-03

## 2022-10-24 RX ORDER — CHLORHEXIDINE GLUCONATE 0.12 MG/ML
RINSE ORAL
Qty: 420 ML | Refills: 0 | Status: SHIPPED | OUTPATIENT
Start: 2022-10-24

## 2022-10-24 RX ORDER — PHENYTOIN SODIUM 300 MG/1
CAPSULE, EXTENDED RELEASE ORAL
Qty: 90 CAPSULE | Refills: 1 | OUTPATIENT
Start: 2022-10-24

## 2022-10-24 ASSESSMENT — PAIN DESCRIPTION - DESCRIPTORS: DESCRIPTORS: THROBBING

## 2022-10-24 ASSESSMENT — PAIN DESCRIPTION - FREQUENCY: FREQUENCY: CONTINUOUS

## 2022-10-24 ASSESSMENT — PAIN SCALES - GENERAL: PAINLEVEL_OUTOF10: 8

## 2022-10-24 ASSESSMENT — PAIN - FUNCTIONAL ASSESSMENT
PAIN_FUNCTIONAL_ASSESSMENT: NONE - DENIES PAIN
PAIN_FUNCTIONAL_ASSESSMENT: 0-10

## 2022-10-24 ASSESSMENT — PAIN DESCRIPTION - LOCATION: LOCATION: MOUTH

## 2022-10-24 ASSESSMENT — PAIN DESCRIPTION - PAIN TYPE: TYPE: ACUTE PAIN

## 2022-10-26 ENCOUNTER — TELEPHONE (OUTPATIENT)
Dept: NEUROLOGY | Age: 27
End: 2022-10-26

## 2022-10-26 DIAGNOSIS — R56.9 SEIZURE (HCC): Primary | ICD-10-CM

## 2022-10-26 RX ORDER — DIVALPROEX SODIUM 500 MG/1
500 TABLET, DELAYED RELEASE ORAL 3 TIMES DAILY
Qty: 60 TABLET | Refills: 3 | Status: SHIPPED | OUTPATIENT
Start: 2022-10-26 | End: 2022-11-25

## 2022-10-26 NOTE — TELEPHONE ENCOUNTER
Laboratory testing reviewed. Patient's valproic acid level slightly subtherapeutic. Called and spoke with her. She reports daily compliance with 500 mg twice a day. Will increase dose to 3 times daily as she had a recent breakthrough seizure. CMP reviewed and patient's ALT is mildly elevated. This will need to be rechecked in 2 weeks along with valproic acid level. We will place this lab testing currently. Will forward message to Dr. Skyler Hutchins as he is currently managing her seizures to make sure he is okay with this plan and does not want to make any further changes.

## 2022-10-27 ASSESSMENT — ENCOUNTER SYMPTOMS
VOMITING: 0
RHINORRHEA: 0
VOICE CHANGE: 0
WHEEZING: 0
SINUS PRESSURE: 0
SINUS PAIN: 0
PHOTOPHOBIA: 0
COUGH: 0
TROUBLE SWALLOWING: 0
SORE THROAT: 0
SHORTNESS OF BREATH: 0
ABDOMINAL PAIN: 0
NAUSEA: 0

## 2022-10-27 NOTE — TELEPHONE ENCOUNTER
Spoke with patient she stated she is feeling better now has a follow up appointment with ortho 10/28/22

## 2022-10-27 NOTE — ED PROVIDER NOTES
[facility] [facility]  EMERGENCY DEPARTMENT ENCOUNTER      Pt Name: Currie Goodpasture  MRN: [id number]  [place] 1995  Date of evaluation: 10/24/2022  Provider: Roberto Harris Dr       Chief Complaint   Patient presents with    Dental Pain     Tooth pain and gum discharge. HISTORY OF PRESENT ILLNESS   (Location/Symptom, Timing/Onset, Context/Setting, Quality, Duration, Modifying Factors, Severity)  Note limiting factors. Currie Goodpasture is a 32 y.o. female who presents to the emergency department for evaluation of dental pain x 2 days. She states pain is diffuse, lower. Along the gums. Has tried topical benozcaine and putting Vicks over her chin without relief. Does not have dental follow up. She is a smoker. She states that when she pressed on her gums there was \"pus-appearing\" drainage. No fever, chills, facial or neck swelling difficulty swallowing pharyngitis, voice change. HPI    Nursing Notes were reviewed. REVIEW OF SYSTEMS    (2-9 systems for level 4, 10 or more for level 5)     Review of Systems   Constitutional:  Negative for chills and fever. HENT:  Positive for dental problem. Negative for congestion, ear discharge, ear pain, rhinorrhea, sinus pressure, sinus pain, sore throat, trouble swallowing and voice change. Eyes:  Negative for photophobia. Respiratory:  Negative for cough, shortness of breath and wheezing. Cardiovascular:  Negative for chest pain and palpitations. Gastrointestinal:  Negative for abdominal pain, nausea and vomiting. Genitourinary:  Negative for dysuria, frequency and hematuria. Musculoskeletal:  Negative for myalgias. Allergic/Immunologic: Negative for immunocompromised state. Neurological:  Negative for dizziness, weakness and headaches. All other systems reviewed and are negative. Except as noted above the remainder of the review of systems was reviewed and negative.        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 [address]     Seizures ([facility] [address]     Varicella          SURGICAL HISTORY       Past Surgical History:   Procedure Laterality Date    SHOULDER ARTHROSCOPY Left 10/14/2022    LEFT ARTHROSCOPIC SHOULDER  SURGERY ANTERIOR LABRAL REPAIR AND CAPSULAR PLICATION performed by Anne Perez MD at [address] [place] [address]       Discharge Medication List as of 10/24/2022  3:39 PM        CONTINUE these medications which have NOT CHANGED    Details   oxyCODONE-acetaminophen (PERCOCET) 5-325 MG per tablet Take 1 tablet by mouth every 6 hours as needed for Pain for up to 7 days. Intended supply: 7 days. Take lowest dose possible to manage pain, Disp-28 tablet, R-0Normal      celecoxib (CELEBREX) 100 MG capsule Take 1 capsule by mouth daily, Disp-60 capsule, R-1Normal      ARIPiprazole (ABILIFY) 10 MG tablet Take 1 tablet by mouth daily, Disp-30 tablet, R-3Normal      divalproex (DEPAKOTE) 500 MG DR tablet Take 1 tablet by mouth every 12 hours, Disp-60 tablet, R-3Normal      !! phenytoin (PHENYTEK) 300 MG ER capsule 200 mg po once daily in the am and phenytoin ER 300mg QHS, Disp-90 capsule, R-1Normal      !! phenytoin (PHENYTEK) 200 MG ER capsule 200 mg po once daily in the am and phenytoin ER 300mg QHS, Disp-90 capsule, R-1Normal      fluvoxaMINE (LUVOX) 50 MG tablet Take 1 tablet by mouth nightly, Disp-90 tablet, R-1Normal      hydrOXYzine HCl (ATARAX) 25 MG tablet Take 0.5 tablets by mouth 3 times daily as needed for Anxiety, Disp-90 tablet, R-1Normal       !! - Potential duplicate medications found. Please discuss with provider.           ALLERGIES     Penicillins    FAMILY HISTORY       Family History   Problem Relation Age of Onset    Cancer Other         brain, breast, lung    Coronary Art Dis Other     High Blood Pressure Brother     Obesity Brother     Cancer Maternal Grandmother     Allergies Maternal Grandmother     Colon Cancer Maternal Grandmother     Breast Cancer Maternal Grandmother     Ovarian Cancer Maternal Grandmother     Other Maternal Grandmother         eye cancer, loss of left eye    Cancer Maternal Grandfather         lung    Heart Disease Maternal Grandfather     Uterine Cancer Mother     Ovarian Cancer Mother     Breast Cancer Mother     Cancer Mother         behind right eye    Heart Attack Mother     Migraines Mother     Cancer Maternal Aunt         lung          SOCIAL HISTORY       Social History     Socioeconomic History    Marital status: Single   Tobacco Use    Smoking status: Every Day     Packs/day: 2.00     Years: 9.00     Pack years: 18.00     Types: Cigarettes    Smokeless tobacco: Never   Vaping Use    Vaping Use: Never used   Substance and Sexual Activity    Alcohol use: No    Drug use: Not Currently    Sexual activity: Yes     Partners: Male     Social Determinants of Health     Financial Resource Strain: Low Risk     Difficulty of Paying Living Expenses: Not very hard   Food Insecurity: No Food Insecurity    Worried About Running Out of Food in the Last Year: Never true    Ran Out of Food in the Last Year: Never true       SCREENINGS         Hi Coma Scale  Eye Opening: Spontaneous  Best Verbal Response: Oriented  Best Motor Response: Obeys commands  [place] Coma Scale Score: 15                     CIWA Assessment  BP: (!) 122/56  Heart Rate: 91                 PHYSICAL EXAM    (up to 7 for level 4, 8 or more for level 5)     ED Triage Vitals [10/24/22 1524]   BP Temp Temp src Heart Rate Resp SpO2 Height Weight   (!) 122/56 98.7 °F (37.1 °C) -- 91 16 100 % 5' 3\" (1.6 m) 134 lb (60.8 kg)       Physical Exam  Constitutional:       General: She is not in acute distress. Appearance: She is well-developed. She is not ill-appearing, toxic-appearing or diaphoretic. HENT:      Head: Normocephalic and atraumatic.       Nose: Nose normal.      Mouth/Throat:      Lips: Pink.      Mouth: Mucous membranes are moist.      Dentition: Abnormal dentition. Does not have dentures. Dental tenderness (lower teeth, diffuse), gingival swelling (diffuse, upper and lower gingiva) and dental caries present. No dental abscesses or gum lesions. Tongue: No lesions. Tongue does not deviate from midline. Pharynx: Oropharynx is clear. Uvula midline. No pharyngeal swelling, oropharyngeal exudate, posterior oropharyngeal erythema or uvula swelling. Tonsils: No tonsillar exudate or tonsillar abscesses. Comments: No facial or neck swelling. No trismus, drooling, stridor. Patent airway. Speaking in full and clear sentence. No abscess or drainage noted. Eyes:      Pupils: Pupils are equal, round, and reactive to light. Cardiovascular:      Rate and Rhythm: Normal rate and regular rhythm. Heart sounds: No murmur heard. No friction rub. No gallop. Pulmonary:      Effort: Pulmonary effort is normal.      Breath sounds: Normal breath sounds. Abdominal:      General: There is no distension. Tenderness: There is no abdominal tenderness. Musculoskeletal:         General: No swelling. Cervical back: Normal range of motion. Skin:     General: Skin is warm and dry. Neurological:      Mental Status: She is alert and oriented to person, place, and time.        DIAGNOSTIC RESULTS     EKG: All EKG's are interpreted by the Emergency Department Physician who either signs or Co-signs this chart in the absence of a cardiologist.        RADIOLOGY:   Non-plain film images such as CT, Ultrasound and MRI are read by the radiologist. Plain radiographic images are visualized and preliminarily interpreted by the emergency physician with the below findings:        Interpretation per the Radiologist below, if available at the time of this note:    No orders to display         ED BEDSIDE ULTRASOUND:   Performed by ED Physician - none    LABS:  Labs Reviewed - No data to display    All other labs were within normal range or not returned as of this dictation. EMERGENCY DEPARTMENT COURSE and DIFFERENTIAL DIAGNOSIS/MDM:   Vitals:    Vitals:    10/24/22 1524   BP: (!) 122/56   Pulse: 91   Resp: 16   Temp: 98.7 °F (37.1 °C)   SpO2: 100%   Weight: 134 lb (60.8 kg)   Height: 5' 3\" (1.6 m)       MDM    Pt presents with dental pain. Afebrile, HD stable. She has diffuse dental caries and gingivitis. Will treat with clindamycin, peridex, ibuprofen. Referred to [facility] and dental clinic for follow up. Encouraged to quit smoking. Stable for discharge. Return to the ED for worsening sx, given warning signs for which she should return. REASSESSMENT          CRITICAL CARE TIME   Total Critical Care time was 0 minutes, excluding separately reportable procedures. There was a high probability of clinically significant/life threatening deterioration in the patient's condition which required my urgent intervention. CONSULTS:  None    PROCEDURES:  Unless otherwise noted below, none     Procedures        FINAL IMPRESSION      1. Pain, dental    2.  Gingivitis          DISPOSITION/PLAN   DISPOSITION Decision To Discharge 10/24/2022 03:43:27 PM      PATIENT REFERRED TO:  [facility], [address]  Kaylan Chew   [phone]    Schedule an appointment as soon as possible for a visit   As needed, If symptoms worsen    [facility]  [address] [place] [address]  [phone]  Schedule an appointment as soon as possible for a visit in 1 day      [facility]) ED  [address] [address] [phone]  Go to   As needed, If symptoms worsen      DISCHARGE MEDICATIONS:  Discharge Medication List as of 10/24/2022  3:39 PM        START taking these medications    Details   clindamycin (CLEOCIN) 150 MG capsule Take 3 capsules by mouth 3 times daily for 7 days, Disp-63 capsule, R-0Normal      chlorhexidine (PERIDEX) 0.12 % solution Swish 15 mL (1 capful) for 30 seconds after toothbrushing then expectorate. Repeat twice daily (morning and evening) until symptoms resolve. Use in addition to regular dental cleaning., Disp-420 mL, R-0Normal      ibuprofen (ADVIL;MOTRIN) 800 MG tablet Take 1 tablet by mouth 2 times daily as needed for Pain (take with food), Disp-20 tablet, R-0Normal           Controlled Substances Monitoring:     No flowsheet data found.     (Please note that portions of this note were completed with a voice recognition program.  Efforts were made to edit the dictations but occasionally words are mis-transcribed.)    Brody Lockhart PA-C (electronically signed)             Brody Lockhart PA-C  10/27/22 [id number]

## 2022-10-28 ENCOUNTER — OFFICE VISIT (OUTPATIENT)
Dept: ORTHOPEDIC SURGERY | Age: 27
End: 2022-10-28

## 2022-10-28 VITALS
BODY MASS INDEX: 23.74 KG/M2 | HEIGHT: 63 IN | WEIGHT: 134 LBS | HEART RATE: 86 BPM | TEMPERATURE: 97.6 F | OXYGEN SATURATION: 98 %

## 2022-10-28 DIAGNOSIS — M24.412 SHOULDER DISLOCATION, RECURRENT, LEFT: Primary | ICD-10-CM

## 2022-10-28 DIAGNOSIS — F43.0 PANIC STATE AS ACUTE REACTION TO STRESS: ICD-10-CM

## 2022-10-28 PROCEDURE — 99024 POSTOP FOLLOW-UP VISIT: CPT | Performed by: PHYSICIAN ASSISTANT

## 2022-10-28 NOTE — PROGRESS NOTES
[facility]      Subjective:      Chief Complaint   Patient presents with    Post-Op Check     LEFT ARTHROSCOPIC SHOULDER  SURGERY ANTERIOR LABRAL REPAIR AND CAPSULAR PLICATION (Left: Shoulder) Patient states she feels a burning sensation. Patient says her left shoulder aches. Pt rates pain 5/10         HPI: Gino Sweeney is a 32 y.o. female who is 2 weeks after left labral repair by Dr. Laly Strong. She has been compliant with her sling and swath restrictions. She has been doing any pendulum or range of motion exercises.     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     Migraine     Seizure disorder (HCC)     Seizures ([facility] [address]     Varicella       Past Surgical History:   Procedure Laterality Date    SHOULDER ARTHROSCOPY Left 10/14/2022    LEFT ARTHROSCOPIC SHOULDER  SURGERY ANTERIOR LABRAL REPAIR AND CAPSULAR PLICATION performed by Demetris Tovar MD at [address] [place] [address] History     Socioeconomic History    Marital status: Single     Spouse name: Not on file    Number of children: Not on file    Years of education: Not on file    Highest education level: Not on file   Occupational History    Not on file   Tobacco Use    Smoking status: Every Day     Packs/day: 2.00     Years: 9.00     Pack years: 18.00     Types: Cigarettes    Smokeless tobacco: Never   Vaping Use    Vaping Use: Never used   Substance and Sexual Activity    Alcohol use: No    Drug use: Not Currently    Sexual activity: Yes     Partners: Male   Other Topics Concern    Not on file   Social History Narrative    Not on file     Social Determinants of Health     Financial Resource Strain: Low Risk     Difficulty of Paying Living Expenses: Not very hard   Food Insecurity: No Food Insecurity    Worried About Running Out of Food in the Last Year: Never true    Ran Out of Food in the Last Year: Never true   Transportation Needs: Not on file   Physical Activity: Not on file   Stress: Not on file   Social Connections: Not on file   Intimate Partner Violence: Not on file   Housing Stability: Not on file     Family History   Problem Relation Age of Onset    Cancer Other         brain, breast, lung    Coronary Art Dis Other     High Blood Pressure Brother     Obesity Brother     Cancer Maternal Grandmother     Allergies Maternal Grandmother     Colon Cancer Maternal Grandmother     Breast Cancer Maternal Grandmother     Ovarian Cancer Maternal Grandmother     Other Maternal Grandmother         eye cancer, loss of left eye    Cancer Maternal Grandfather         lung    Heart Disease Maternal Grandfather     Uterine Cancer Mother     Ovarian Cancer Mother     Breast Cancer Mother     Cancer Mother         behind right eye    Heart Attack Mother     Migraines Mother     Cancer Maternal Aunt         lung     Allergies   Allergen Reactions    Penicillins      GI Problems     Current Outpatient Medications on File Prior to Visit   Medication Sig Dispense Refill    divalproex (DEPAKOTE) 500 MG DR tablet Take 1 tablet by mouth 3 times daily 60 tablet 3    clindamycin (CLEOCIN) 150 MG capsule Take 3 capsules by mouth 3 times daily for 7 days 63 capsule 0    chlorhexidine (PERIDEX) 0.12 % solution Swish 15 mL (1 capful) for 30 seconds after toothbrushing then expectorate. Repeat twice daily (morning and evening) until symptoms resolve. Use in addition to regular dental cleaning.  420 mL 0    ibuprofen (ADVIL;MOTRIN) 800 MG tablet Take 1 tablet by mouth 2 times daily as needed for Pain (take with food) 20 tablet 0    celecoxib (CELEBREX) 100 MG capsule Take 1 capsule by mouth daily 60 capsule 1    ARIPiprazole (ABILIFY) 10 MG tablet Take 1 tablet by mouth daily 30 tablet 3    phenytoin (PHENYTEK) 300 MG ER capsule 200 mg po once daily in the am and phenytoin ER 300mg QHS 90 capsule 1    phenytoin (PHENYTEK) 200 MG ER capsule 200 mg po once daily in the am and phenytoin ER 300mg QHS 90 capsule 1    fluvoxaMINE (LUVOX) 50 MG tablet Take 1 tablet by mouth nightly 90 tablet 1    hydrOXYzine HCl (ATARAX) 25 MG tablet Take 0.5 tablets by mouth 3 times daily as needed for Anxiety 90 tablet 1     No current facility-administered medications on file prior to visit. Objective:   LMP 10/22/2022 (Exact Date)       Radiographs and Laboratory Studies:   Previous diagnostic imaging studies were reviewed. Laboratory Studies:   Lab Results   Component Value Date    WBC 9.8 10/24/2022    HGB 10.7 (L) 10/24/2022    HCT 32.7 (L) 10/24/2022    MCV 77.9 (L) 10/24/2022     (H) 10/24/2022     Lab Results   Component Value Date    SEDRATE 6 07/25/2013     No results found for: CRP    Assessment and Plan:      Diagnosis Orders   1. Shoulder dislocation, recurrent, left  Ambulatory referral to Physical Therapy          2 weeks after labral repair of the left shoulder for chronic dislocation. She is wearing her sling and swath, she has not been compliant with wearing it. Not doing any pendulum exercises. Incisions are healing nicely. He is instructed to keep the sling and swath on at all times except for hygiene purposes, she understands consequences of possible retearing of the labrum-capsule complex. We will need to stay in that sling in slot for another 4 weeks. We will provide her prescription for therapy but not to start until the last week of November. I'll see her back before that appointment with PT    The above plan was discussed in thorough detail with Dr. Yeimi Laguna and the patient. No orders of the defined types were placed in this encounter. No orders of the defined types were placed in this encounter. No follow-ups on file.     Jeyson Valdez PA-C  [facility]  [phone]

## 2022-10-29 DIAGNOSIS — F43.0 PANIC STATE AS ACUTE REACTION TO STRESS: ICD-10-CM

## 2022-10-31 RX ORDER — HYDROXYZINE HYDROCHLORIDE 25 MG/1
12.5 TABLET, FILM COATED ORAL 3 TIMES DAILY PRN
Qty: 90 TABLET | Refills: 1 | Status: SHIPPED | OUTPATIENT
Start: 2022-10-31

## 2022-10-31 RX ORDER — HYDROXYZINE HYDROCHLORIDE 25 MG/1
12.5 TABLET, FILM COATED ORAL 3 TIMES DAILY PRN
Qty: 90 TABLET | Refills: 1 | OUTPATIENT
Start: 2022-10-31

## 2022-11-28 ENCOUNTER — OFFICE VISIT (OUTPATIENT)
Dept: ORTHOPEDIC SURGERY | Age: 27
End: 2022-11-28

## 2022-11-28 DIAGNOSIS — M24.412 SHOULDER DISLOCATION, RECURRENT, LEFT: Primary | ICD-10-CM

## 2022-11-28 PROCEDURE — 99024 POSTOP FOLLOW-UP VISIT: CPT | Performed by: PHYSICIAN ASSISTANT

## 2022-11-28 NOTE — PROGRESS NOTES
[facility]      Subjective:      Chief Complaint   Patient presents with    Post-Op Check     Post op visit for LT shoulder dislocation, 1 month out       HPI: Madalyn Velázquez is a 32 y.o. female who is 6 weeks after left labral reconstruction by Dr. Mustapha Cam. She has been noncompliant with her sling and has missed multiple appointments. She unfortunately dislocated her shoulder again. She only did it once ever since the surgery which occurred about 2 to 3 weeks ago. She was able to reduce it on her own, then go to the emergency department. He does have some pain and soreness in that shoulder no numbness and tingling distally.     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     Migraine     Seizure disorder (HCC)     Seizures ([facility] [address]     Varicella       Past Surgical History:   Procedure Laterality Date    SHOULDER ARTHROSCOPY Left 10/14/2022    LEFT ARTHROSCOPIC SHOULDER  SURGERY ANTERIOR LABRAL REPAIR AND CAPSULAR PLICATION performed by Ruma Park MD at [address] [place] [address] History     Socioeconomic History    Marital status: Single     Spouse name: Not on file    Number of children: Not on file    Years of education: Not on file    Highest education level: Not on file   Occupational History    Not on file   Tobacco Use    Smoking status: Every Day     Packs/day: 2.00     Years: 9.00     Pack years: 18.00     Types: Cigarettes    Smokeless tobacco: Never   Vaping Use    Vaping Use: Never used   Substance and Sexual Activity    Alcohol use: No    Drug use: Not Currently    Sexual activity: Yes     Partners: Male   Other Topics Concern    Not on file   Social History Narrative    Not on file     Social Determinants of Health     Financial Resource Strain: Low Risk     Difficulty of Paying Living Expenses: Not very hard   Food Insecurity: No Food Insecurity    Worried About Running Out of Food in the Last Year: Never true    Ran Out of Food in the Last Year: Never true   Transportation Needs: Not on file   Physical Activity: Not on file   Stress: Not on file   Social Connections: Not on file   Intimate Partner Violence: Not on file   Housing Stability: Not on file     Family History   Problem Relation Age of Onset    Cancer Other         brain, breast, lung    Coronary Art Dis Other     High Blood Pressure Brother     Obesity Brother     Cancer Maternal Grandmother     Allergies Maternal Grandmother     Colon Cancer Maternal Grandmother     Breast Cancer Maternal Grandmother     Ovarian Cancer Maternal Grandmother     Other Maternal Grandmother         eye cancer, loss of left eye    Cancer Maternal Grandfather         lung    Heart Disease Maternal Grandfather     Uterine Cancer Mother     Ovarian Cancer Mother     Breast Cancer Mother     Cancer Mother         behind right eye    Heart Attack Mother     Migraines Mother     Cancer Maternal Aunt         lung     Allergies   Allergen Reactions    Penicillins      GI Problems     Current Outpatient Medications on File Prior to Visit   Medication Sig Dispense Refill    hydrOXYzine HCl (ATARAX) 25 MG tablet Take 0.5 tablets by mouth 3 times daily as needed for Anxiety 90 tablet 1    divalproex (DEPAKOTE) 500 MG DR tablet Take 1 tablet by mouth 3 times daily 60 tablet 3    chlorhexidine (PERIDEX) 0.12 % solution Swish 15 mL (1 capful) for 30 seconds after toothbrushing then expectorate. Repeat twice daily (morning and evening) until symptoms resolve. Use in addition to regular dental cleaning.  420 mL 0    ibuprofen (ADVIL;MOTRIN) 800 MG tablet Take 1 tablet by mouth 2 times daily as needed for Pain (take with food) 20 tablet 0    celecoxib (CELEBREX) 100 MG capsule Take 1 capsule by mouth daily 60 capsule 1    ARIPiprazole (ABILIFY) 10 MG tablet Take 1 tablet by mouth daily 30 tablet 3    phenytoin (PHENYTEK) 300 MG ER capsule 200 mg po once daily in the am and phenytoin ER 300mg QHS 90 capsule 1    phenytoin (PHENYTEK) 200 MG ER capsule 200 mg po once daily in the am and phenytoin ER 300mg QHS 90 capsule 1    fluvoxaMINE (LUVOX) 50 MG tablet Take 1 tablet by mouth nightly 90 tablet 1     No current facility-administered medications on file prior to visit. Objective: There were no vitals taken for this visit. Radiographs and Laboratory Studies:   Previous diagnostic imaging studies were reviewed. Much of the physical exam was deferred today however at the left shoulder she does have about 90 degrees of flexion, abduction, external rotation was deferred to prevent dislocation. Enough 50% of internal rotation. 3-5 strength compared to the contralateral side in all directions. Laboratory Studies:   Lab Results   Component Value Date    WBC 9.8 10/24/2022    HGB 10.7 (L) 10/24/2022    HCT 32.7 (L) 10/24/2022    MCV 77.9 (L) 10/24/2022     (H) 10/24/2022     Lab Results   Component Value Date    SEDRATE 6 07/25/2013     No results found for: CRP    Assessment and Plan:      Diagnosis Orders   1. Shoulder dislocation, recurrent, left            Here 6 weeks after labral repair by Dr. Jason Dumont. She has been noncompliant with her sling and swath. She states that she dislocated about 3 weeks ago. SHe was to able to reduce this on her own. She has missed multiple appointments. Concern is due to her noncompliance and redislocation that her repair likely did not hold. At this point we will initiate therapy to work on her motion and strength. Inflammatories as needed for pain as well as ice machine. She can do pendulum exercises until she starts with therapy, we will see her back in 6 weeks to see how she is progressing with therapy. If she redislocated again she needs to come in the office sooner. The above plan was discussed in thorough detail with Dr. Christ Paulino and the patient. No orders of the defined types were placed in this encounter.     No orders of the defined types were placed in this encounter. No follow-ups on file.     Kishore Bell PA-C  [facility]  [phone]

## 2022-12-22 NOTE — PROGRESS NOTES
BEHAVIORAL HEALTH FOLLOW-UP NOTE     12/17/2017     Patient was seen and examined in person, Chart reviewed   Patient's case discussed with staff/team    Chief Complaint: depression    Interim History:     Pt report less racing thoughts  Less depressed  Not having any suicidal thoughts  BF - visited her and they are getting along  No agitation  Appetite:   [x] Normal/Unchanged  [] Increased  [] Decreased      Sleep:       [] Normal/Unchanged  [x] Fair       [] Poor              Energy:    [] Normal/Unchanged  [] Increased  [x] Decreased        SI [] Present  [x] Absent    HI  []Present  [x] Absent     Aggression:  [] yes  [x] no    Patient is [x] able  [] unable to CONTRACT FOR SAFETY     PAST MEDICAL/PSYCHIATRIC HISTORY:   Past Medical History:   Diagnosis Date    Migraine     Varicella        FAMILY/SOCIAL HISTORY:  Family History   Problem Relation Age of Onset    Cancer Other      brain, breast, lung    Coronary Art Dis Other     High Blood Pressure Brother     Obesity Brother     Cancer Maternal Grandmother     Allergies Maternal Grandmother     Colon Cancer Maternal Grandmother     Breast Cancer Maternal Grandmother     Ovarian Cancer Maternal Grandmother     Other Maternal Grandmother      eye cancer, loss of left eye    Cancer Maternal Grandfather      lung    Heart Disease Maternal Grandfather     Uterine Cancer Mother     Ovarian Cancer Mother     Breast Cancer Mother     Cancer Mother      behind right eye    Heart Attack Mother     Migraines Mother     Cancer Maternal Aunt      lung     Social History     Social History    Marital status: Single     Spouse name: N/A    Number of children: N/A    Years of education: N/A     Occupational History    Not on file.      Social History Main Topics    Smoking status: Current Every Day Smoker     Packs/day: 0.50     Types: Cigarettes    Smokeless tobacco: Never Used    Alcohol use No    Drug use: Yes     Types: Marijuana    Sexual Medicare Annual Wellness Visit    Silverio Price is here for Medicare AWV    Assessment & Plan          PLAN    No follow-ups on file. Subjective       Patient's complete Health Risk Assessment and screening values have been reviewed and are found in Flowsheets. The following problems were reviewed today and where indicated follow up appointments were made and/or referrals ordered. Positive Risk Factor Screenings with Interventions:       Cognitive: Words recalled: 0 Words Recalled   Clock Drawing Test (CDT): Normal   Total Score: (!) 2   Total Score Interpretation: Abnormal Mini-Cog      Interventions:  Patient comments: in  [facility]  study as  marker            General HRA Questions:  Select all that apply: (!) New or Increased Fatigue    Fatigue Interventions:    Mild  at  times       Weight and Activity:  Physical Activity: Sufficiently Active    Days of Exercise per Week: 5 days    Minutes of Exercise per Session: 60 min     On average, how many days per week do you engage in moderate to strenuous exercise (like a brisk walk)?: 5 days  Have you lost any weight without trying in the past 3 months?: No  Body mass index: (!) 33.22    Obesity Interventions:      Needs      weight  loss           Hearing Screen:  Do you or your family notice any trouble with your hearing that hasn't been managed with hearing aids?: (!) Yes    Interventions:     Has  cochlear    Vision Screen:  Do you have difficulty driving, watching TV, or doing any of your daily activities because of your eyesight?: No  Have you had an eye exam within the past year?: (!) No  No results found. Interventions:    Vision  stable                       Objective   Vitals:    12/22/22 1109   BP: 110/68   Site: Right Upper Arm   Position: Sitting   Cuff Size: Medium Adult   Pulse: 64   Resp: 16   Weight: 212 lb 2 oz (96.2 kg)   Height: 5' 7\" (1.702 m)      Body mass index is 33.22 kg/m².              No Known Allergies  Prior to Visit Medications Medication Sig Taking? Authorizing Provider   donepezil (ARICEPT) 5 MG tablet take 1 tablet by mouth once daily Yes Historical Provider, MD   cyanocobalamin 500 MCG tablet Take 500 mcg by mouth daily Yes Historical Provider, MD   metoprolol succinate (TOPROL XL) 25 MG extended release tablet take 1 tablet by mouth once daily Yes Guicho Santiago MD   dilTIAZem (CARDIZEM CD) 120 MG extended release capsule Take 1 capsule by mouth daily Yes Guicho Santiago MD   ticagrelor (BRILINTA) 60 MG TABS tablet Take 1 tablet by mouth 2 times daily Yes Guicho Santiago MD   atorvastatin (LIPITOR) 40 MG tablet Take 1 tablet by mouth nightly Yes Guicho Santiago MD   nitroGLYCERIN (NITROSTAT) 0.4 MG SL tablet Place 1 tablet under the tongue every 5 minutes as needed for Chest pain Yes Guicho Santiago MD   CPAP Machine MISC by Does not apply route Please change CPAP pressure to 10 cm H20. Yes Lucy Bonds PA-C   aspirin 81 MG tablet Take 81 mg by mouth daily Yes Historical Provider, MD Emery (Including outside providers/suppliers regularly involved in providing care):   Patient Care Team:  Tiffany Smith MD as PCP - General (Family Medicine)  Tiffany Smith MD as PCP - [facility] Empaneled Provider     Reviewed and updated this visit:  Tobacco  Allergies  Meds  Med Hx  Surg Hx  Soc Hx  Fam Hx             Belén Mejia (:  1950) is a 67 y.o. male,Established patient, here for evaluation of the following chief complaint(s):  Medicare AWV         ASSESSMENT/    ICD-10-CM    1. Coronary artery disease involving native coronary artery of native heart without angina pectoris  I25.10 TSH with Reflex      2. Alzheimer's disease, unspecified  G30.9       3. Polyp of colon, unspecified part of colon, unspecified type  K63.5       4. PAF (paroxysmal atrial fibrillation) (HCC)  I48.0 TSH with Reflex      5. Pure hypercholesterolemia  E78.00       6.  Sensorineural hearing loss (SNHL) of both ears  H90.3 activity: Not on file     Other Topics Concern    Not on file     Social History Narrative    No narrative on file           ROS:  [x] All negative/unchanged except if checked.  Explain positive(checked items) below:  [] Constitutional  [] Eyes  [] Ear/Nose/Mouth/Throat  [] Respiratory  [] CV  [] GI  []   [] Musculoskeletal  [] Skin/Breast  [] Neurological  [] Endocrine  [] Heme/Lymph  [] Allergic/Immunologic    Explanation:     MEDICATIONS:    Current Facility-Administered Medications:     nicotine polacrilex (NICORETTE) gum 2 mg, 2 mg, Oral, PRN, Cassie Napier MD, 2 mg at 12/17/17 0048    brexpiprazole (REXULTI) tablet 1 mg, 1 mg, Oral, Daily, Cassie Napier MD, 1 mg at 12/17/17 6873    lamoTRIgine (LAMICTAL) tablet 150 mg, 150 mg, Oral, Daily, Agustin Ramos MD, 150 mg at 12/17/17 6635    acetaminophen (TYLENOL) tablet 650 mg, 650 mg, Oral, Q4H PRN, Agustin Ramos MD    magnesium hydroxide (MILK OF MAGNESIA) 400 MG/5ML suspension 30 mL, 30 mL, Oral, Daily PRN, Agustin Ramos MD    hydrOXYzine (VISTARIL) capsule 50 mg, 50 mg, Oral, Q6H PRN, 50 mg at 12/16/17 1911 **OR** hydrOXYzine (VISTARIL) injection 50 mg, 50 mg, Intramuscular, Q6H PRN, Agustin Ramos MD    haloperidol lactate (HALDOL) injection 5 mg, 5 mg, Intramuscular, Q6H PRN **OR** haloperidol (HALDOL) tablet 5 mg, 5 mg, Oral, Q6H PRN, Agustin Ramos MD    traZODone (DESYREL) tablet 50 mg, 50 mg, Oral, Nightly PRN, Agustin Ramos MD      Examination:  /74   Pulse 109   Temp 99 °F (37.2 °C) (Oral)   Resp 16   Ht 5' 3\" (1.6 m)   Wt 125 lb (56.7 kg)   SpO2 98%   BMI 22.14 kg/m²   Gait - steady  Medication side effects(SE): no    Mental Status Examination:    Level of consciousness:  within normal limits   Appearance:  fair grooming and fair hygiene  Behavior/Motor:  no abnormalities noted  Attitude toward examiner:  cooperative  Speech:  spontaneous and normal rate   Mood: dysthymic  Affect:  mood congruent  Thought 7. Benign prostatic hyperplasia without lower urinary tract symptoms  N40.0 PSA, Prostatic Specific Antigen               PLAN:     Current Outpatient Medications   Medication Sig Dispense Refill    donepezil (ARICEPT) 10 MG tablet Take 1 tablet by mouth nightly 30 tablet 3    cyanocobalamin 500 MCG tablet Take 500 mcg by mouth daily      metoprolol succinate (TOPROL XL) 25 MG extended release tablet take 1 tablet by mouth once daily 90 tablet 3    dilTIAZem (CARDIZEM CD) 120 MG extended release capsule Take 1 capsule by mouth daily 90 capsule 3    ticagrelor (BRILINTA) 60 MG TABS tablet Take 1 tablet by mouth 2 times daily 180 tablet 3    atorvastatin (LIPITOR) 40 MG tablet Take 1 tablet by mouth nightly 90 tablet 3    nitroGLYCERIN (NITROSTAT) 0.4 MG SL tablet Place 1 tablet under the tongue every 5 minutes as needed for Chest pain 25 tablet 3    CPAP Machine MISC by Does not apply route Please change CPAP pressure to 10 cm H20. 1 each 0    aspirin 81 MG tablet Take 81 mg by mouth daily       No current facility-administered medications for this visit.       Orders Placed This Encounter   Procedures    PSA, Prostatic Specific Antigen     Standing Status:   Future     Standing Expiration Date:   12/22/2023    TSH with Reflex     Standing Status:   Future     Standing Expiration Date:   12/22/2023            See in    6  mths and  rectalk  then   Subjective   SUBJECTIVE/OBJECTIVE:  HPI    Alzheimers  noted     with marker  noted         Arthritis  noted and  with pain    Ashd  stable   stable       Kidney  stone    stable        Par    atrial  fib  stable      Bph     no  issues      Left  hip  noted          colonoscopy  Past Medical History:   Diagnosis Date    Cancer ([facility] [address]     skin    Colon polyps 2013      sheik    due  2018    Coronary artery disease 2016    stent  to lad  and  70% circ    H/O colonoscopy 2003    negative    Hyperlipidemia     Kidney stone 1994    Loss of hearing     FABY (obstructive sleep processes:  linear and goal directed   Thought content:  Suicidal Ideation:  denies suicidal ideation  Delusions:  no evidence of delusions  Perceptual Disturbance:  denies any perceptual disturbance  Cognition:  oriented to person, place, and time   Concentration intact  Insight fair   Judgement fair     ASSESSMENT:   Patient symptoms are:  [] Well controlled  [] Improving  [] Worsening  [x] No change      Diagnosis:   Principal Problem:    Bipolar depression ([facility] [address]  Active Problems:    Anxiety disorder      LABS:    Recent Labs      12/15/17   1948   WBC  10.8   HGB  12.9   PLT  391     Recent Labs      12/15/17   1948   NA  139   K  3.8   CL  101   CO2  26   BUN  7   CREATININE  0.75   GLUCOSE  101     Recent Labs      12/15/17   1948   BILITOT  0.2   ALKPHOS  45   AST  19   ALT  14     Lab Results   Component Value Date    LABAMPH Neg 12/15/2017    BARBSCNU Neg 12/15/2017    LABBENZ Neg 12/15/2017    LABBENZ NT DTCD 03/01/2013    OPIATESCREENURINE Neg 12/15/2017    PHENCYCLIDINESCREENURINE Neg 12/15/2017    ETOH <10 12/15/2017     Lab Results   Component Value Date    TSH 0.721 12/15/2017     No results found for: LITHIUM  No results found for: VALPROATE, CBMZ      Treatment Plan:  Reviewed current Medications with the patient. Risks, benefits, side effects, drug-to-drug interactions and alternatives to treatment were discussed. Collateral information  CD evaluation  Encourage patient to attend group and other milieu activities.   Discharge planning discussed with the patient and treatment team.    PSYCHOTHERAPY/COUNSELING:  [x] Therapeutic interview  [x] Supportive  [] CBT  [] Ongoing  [] Other    [x] Patient continues to need, on a daily basis, active treatment furnished directly by or requiring the supervision of inpatient psychiatric personnel      Anticipated Length of stay:            Electronically signed by Sherryle Holler, MD on 12/17/2017 at 10:18 AM apnea)     Osteoarthritis     PAF (paroxysmal atrial fibrillation) ([facility] [address]      Past Surgical History:   Procedure Laterality Date    CARPAL TUNNEL RELEASE      left     CHOLECYSTECTOMY, LAPAROSCOPIC  3/11    COCHLEAR IMPLANT Right 08/2017    at [address] [place] [address]  dr sylvester    COLONOSCOPY   2013      sheik   colon polyps    CORONARY ANGIOPLASTY WITH STENT PLACEMENT  2016  may    stent  Lda  steph    JOINT REPLACEMENT      bilateral knees    KNEE ARTHROSCOPY  1998; 4/04    left:  Right 9/04    ROTATOR CUFF REPAIR  2004    right/ left     SKIN BIOPSY      SKIN CANCER EXCISION      left AC    TOTAL KNEE ARTHROPLASTY      left 10/06; right 6/08     Social History     Socioeconomic History    Marital status:      Spouse name: Iris Hutchinson    Number of children: 3    Years of education: Not on file    Highest education level: Not on file   Occupational History    Not on file   Tobacco Use    Smoking status: Never    Smokeless tobacco: Never   Substance and Sexual Activity    Alcohol use: Yes     Comment: 1-2 beers a day    Drug use: No    Sexual activity: Yes     Partners: Female   Other Topics Concern    Not on file   Social History Narrative    Not on file     Social Determinants of Health     Financial Resource Strain: Low Risk     Difficulty of Paying Living Expenses: Not hard at all   Food Insecurity: No Food Insecurity    Worried About Running Out of Food in the Last Year: Never true    Ran Out of Food in the Last Year: Never true   Transportation Needs: Not on file   Physical Activity: Sufficiently Active    Days of Exercise per Week: 5 days    Minutes of Exercise per Session: 60 min   Stress: Not on file   Social Connections: Not on file   Intimate Partner Violence: Not on file   Housing Stability: Not on file     Family History   Problem Relation Age of Onset    Heart Disease Father     High Blood Pressure Father       Review of Systems   Constitutional:  Negative for fatigue and fever.    HENT:  Negative for congestion, ear pain, postnasal drip, sore throat and trouble swallowing. Eyes:  Negative for pain. Respiratory:  Negative for cough, chest tightness and shortness of breath. Cardiovascular:  Negative for chest pain, palpitations and leg swelling. Gastrointestinal:  Negative for abdominal pain, blood in stool, constipation and nausea. Genitourinary:  Negative for difficulty urinating, frequency and urgency. Musculoskeletal:  Negative for arthralgias, back pain, joint swelling and neck stiffness. Skin:  Negative for rash. Neurological:  Negative for dizziness, weakness and headaches. Hematological:  Negative for adenopathy. Does not bruise/bleed easily. Psychiatric/Behavioral:  Negative for behavioral problems, dysphoric mood and sleep disturbance. /68 (Site: Right Upper Arm, Position: Sitting, Cuff Size: Medium Adult)   Pulse 64   Resp 16   Ht 5' 7\" (1.702 m)   Wt 212 lb 2 oz (96.2 kg)   BMI 33.22 kg/m²    Objective   Physical Exam  Vitals and nursing note reviewed. Constitutional:       Appearance: He is well-developed. HENT:      Head: Normocephalic and atraumatic. Comments:   Cochlear      implant      Right Ear: External ear normal.      Left Ear: External ear normal.      Nose: Nose normal.   Eyes:      Conjunctiva/sclera: Conjunctivae normal.      Pupils: Pupils are equal, round, and reactive to light. Comments: Fundi nl   Neck:      Thyroid: No thyromegaly. Cardiovascular:      Rate and Rhythm: Normal rate and regular rhythm. Heart sounds: Normal heart sounds. Pulmonary:      Effort: Pulmonary effort is normal.      Breath sounds: Normal breath sounds. No wheezing or rales. Abdominal:      General: Bowel sounds are normal.      Palpations: Abdomen is soft. There is no mass. Tenderness: There is no abdominal tenderness. Musculoskeletal:         General: Normal range of motion. Cervical back: Normal range of motion and neck supple. Lymphadenopathy:      Cervical: No cervical adenopathy. Skin:     General: Skin is warm and dry. Findings: No rash. Neurological:      Mental Status: He is alert and oriented to person, place, and time. Cranial Nerves: No cranial nerve deficit. Deep Tendon Reflexes: Reflexes are normal and symmetric. An electronic signature was used to authenticate this note.     --Татьяна Strange MD

## 2022-12-28 DIAGNOSIS — F43.0 PANIC STATE AS ACUTE REACTION TO STRESS: ICD-10-CM

## 2022-12-28 RX ORDER — HYDROXYZINE HYDROCHLORIDE 25 MG/1
TABLET, FILM COATED ORAL
Qty: 90 TABLET | Refills: 1 | Status: SHIPPED | OUTPATIENT
Start: 2022-12-28

## 2022-12-28 NOTE — TELEPHONE ENCOUNTER
Comments:     Last Office Visit (last PCP visit):   8/25/2022    Next Visit Date:  No future appointments. **If hasn't been seen in over a year OR hasn't followed up according to last diabetes/ADHD visit, make appointment for patient before sending refill to provider. Rx requested:  Requested Prescriptions     Pending Prescriptions Disp Refills    hydrOXYzine HCl (ATARAX) 25 MG tablet [Pharmacy Med Name: hydroxyzine HCl 25 mg tablet] 90 tablet 1     Sig: take 1/2 tablet by mouth THREE TIMES DAILY AS NEEDED FOR ANXIETY.

## 2023-01-05 ENCOUNTER — APPOINTMENT (OUTPATIENT)
Dept: GENERAL RADIOLOGY | Age: 28
End: 2023-01-05
Payer: MEDICAID

## 2023-01-05 ENCOUNTER — TELEPHONE (OUTPATIENT)
Dept: ORTHOPEDIC SURGERY | Age: 28
End: 2023-01-05

## 2023-01-05 ENCOUNTER — HOSPITAL ENCOUNTER (EMERGENCY)
Age: 28
Discharge: HOME HEALTH | End: 2023-01-07
Attending: EMERGENCY MEDICINE
Payer: MEDICAID

## 2023-01-05 ENCOUNTER — TELEPHONE (OUTPATIENT)
Dept: NEUROLOGY | Age: 28
End: 2023-01-05

## 2023-01-05 VITALS
HEART RATE: 87 BPM | OXYGEN SATURATION: 100 % | SYSTOLIC BLOOD PRESSURE: 113 MMHG | TEMPERATURE: 98.4 F | HEIGHT: 63 IN | RESPIRATION RATE: 18 BRPM | DIASTOLIC BLOOD PRESSURE: 73 MMHG | WEIGHT: 130 LBS | BODY MASS INDEX: 23.04 KG/M2

## 2023-01-05 DIAGNOSIS — R56.9 SEIZURE (HCC): Primary | ICD-10-CM

## 2023-01-05 LAB
ALBUMIN SERPL-MCNC: 4.3 G/DL (ref 3.5–4.6)
ALP BLD-CCNC: 102 U/L (ref 40–130)
ALT SERPL-CCNC: 26 U/L (ref 0–33)
ANION GAP SERPL CALCULATED.3IONS-SCNC: 11 MEQ/L (ref 9–15)
AST SERPL-CCNC: 19 U/L (ref 0–35)
BILIRUB SERPL-MCNC: <0.2 MG/DL (ref 0.2–0.7)
BUN BLDV-MCNC: 8 MG/DL (ref 6–20)
CALCIUM SERPL-MCNC: 8.8 MG/DL (ref 8.5–9.9)
CHLORIDE BLD-SCNC: 101 MEQ/L (ref 95–107)
CO2: 24 MEQ/L (ref 20–31)
CREAT SERPL-MCNC: 0.49 MG/DL (ref 0.5–0.9)
GFR SERPL CREATININE-BSD FRML MDRD: >60 ML/MIN/{1.73_M2}
GLOBULIN: 2.6 G/DL (ref 2.3–3.5)
GLUCOSE BLD-MCNC: 103 MG/DL (ref 70–99)
HCT VFR BLD CALC: 37.7 % (ref 37–47)
HEMOGLOBIN: 12.1 G/DL (ref 12–16)
MCH RBC QN AUTO: 23.9 PG (ref 27–31.3)
MCHC RBC AUTO-ENTMCNC: 32.2 % (ref 33–37)
MCV RBC AUTO: 74.2 FL (ref 79.4–94.8)
PDW BLD-RTO: 18 % (ref 11.5–14.5)
PHENYTOIN LEVEL: 8.3 UG/ML (ref 10–20)
PLATELET # BLD: 379 K/UL (ref 130–400)
POTASSIUM SERPL-SCNC: 4.2 MEQ/L (ref 3.4–4.9)
RBC # BLD: 5.08 M/UL (ref 4.2–5.4)
SODIUM BLD-SCNC: 136 MEQ/L (ref 135–144)
TOTAL PROTEIN: 6.9 G/DL (ref 6.3–8)
WBC # BLD: 8.2 K/UL (ref 4.8–10.8)

## 2023-01-05 PROCEDURE — 80185 ASSAY OF PHENYTOIN TOTAL: CPT

## 2023-01-05 PROCEDURE — 85027 COMPLETE CBC AUTOMATED: CPT

## 2023-01-05 PROCEDURE — 4500000002 HC ER NO CHARGE

## 2023-01-05 PROCEDURE — 73030 X-RAY EXAM OF SHOULDER: CPT

## 2023-01-05 PROCEDURE — 36415 COLL VENOUS BLD VENIPUNCTURE: CPT

## 2023-01-05 PROCEDURE — 80053 COMPREHEN METABOLIC PANEL: CPT

## 2023-01-05 ASSESSMENT — PAIN DESCRIPTION - ORIENTATION: ORIENTATION: LEFT

## 2023-01-05 ASSESSMENT — PAIN - FUNCTIONAL ASSESSMENT: PAIN_FUNCTIONAL_ASSESSMENT: 0-10

## 2023-01-05 ASSESSMENT — PAIN DESCRIPTION - FREQUENCY: FREQUENCY: CONTINUOUS

## 2023-01-05 ASSESSMENT — PAIN DESCRIPTION - PAIN TYPE: TYPE: CHRONIC PAIN

## 2023-01-05 ASSESSMENT — PAIN DESCRIPTION - LOCATION: LOCATION: SHOULDER

## 2023-01-05 ASSESSMENT — PAIN DESCRIPTION - ONSET: ONSET: ON-GOING

## 2023-01-05 ASSESSMENT — PAIN SCALES - GENERAL: PAINLEVEL_OUTOF10: 10

## 2023-01-05 NOTE — ED TRIAGE NOTES
Pt to ed from home via triage with c/o pain to left shoulder and seizure  Pt reports having a \"partial seizure\" while waiting to be seen  Pt states she remained seated, denies injury or trauma from seizure  Pt reports hx of seizures, states she is med compliant and that she has had \"3 or 4\" seizures in the past month  Pt states she has a call in to Dr Daniel Davidson   Pt reports left shoulder pain ongoing, immobilizer in place  Pt reports history of frequent dislocation before and after rotator cuff repair in October, pt also reports noncompliance with immobilizer  On arrival pt skin WDI, respirations even and unlabored   Pt ambulates with steady even gait  Pt calm and cooperative, alert and oriented. No s/s of acute distress noted.

## 2023-01-05 NOTE — TELEPHONE ENCOUNTER
Pts left shoulder continues to dislocate. She is scheduled with you tomorrow 1/6 but is asking if you can prescribe her something for the pain so she can sleep tonight. [facility]  In [place]

## 2023-01-05 NOTE — TELEPHONE ENCOUNTER
Patient states that for the past 1.5 months she has had 3 seizures. She currently takes dilantin 200mg am and dilantin er 300mg at bed time and depakote 500mg tid.  She states that she has not missed any medications      Please advise

## 2023-01-06 LAB
EKG ATRIAL RATE: 71 BPM
EKG P AXIS: 56 DEGREES
EKG P-R INTERVAL: 154 MS
EKG Q-T INTERVAL: 400 MS
EKG QRS DURATION: 90 MS
EKG QTC CALCULATION (BAZETT): 434 MS
EKG R AXIS: 54 DEGREES
EKG T AXIS: 56 DEGREES
EKG VENTRICULAR RATE: 71 BPM

## 2023-01-19 DIAGNOSIS — R46.89 COMPULSIVE BEHAVIOR: ICD-10-CM

## 2023-01-19 DIAGNOSIS — F43.0 PANIC STATE AS ACUTE REACTION TO STRESS: ICD-10-CM

## 2023-01-19 RX ORDER — ARIPIPRAZOLE 10 MG/1
10 TABLET ORAL DAILY
Qty: 30 TABLET | Refills: 3 | OUTPATIENT
Start: 2023-01-19

## 2023-01-19 NOTE — TELEPHONE ENCOUNTER
Rx request   Requested Prescriptions     Pending Prescriptions Disp Refills    fluvoxaMINE (LUVOX) 50 MG tablet [Pharmacy Med Name: fluvoxamine 50 mg tablet] 90 tablet 1     Sig: Take 1 tablet by mouth nightly     LOV 8/25/2022  Last refill 7/12/22  Next Visit Date:  No future appointments.

## 2023-01-20 RX ORDER — FLUVOXAMINE MALEATE 50 MG/1
50 TABLET, COATED ORAL NIGHTLY
Qty: 90 TABLET | Refills: 1 | Status: SHIPPED | OUTPATIENT
Start: 2023-01-20

## 2023-01-23 RX ORDER — ARIPIPRAZOLE 10 MG/1
10 TABLET ORAL DAILY
Qty: 30 TABLET | Refills: 3 | OUTPATIENT
Start: 2023-01-23

## 2023-01-29 DIAGNOSIS — R56.9 SEIZURE (HCC): ICD-10-CM

## 2023-01-30 RX ORDER — DIVALPROEX SODIUM 500 MG/1
TABLET, DELAYED RELEASE ORAL
Qty: 60 TABLET | Refills: 3 | OUTPATIENT
Start: 2023-01-30

## 2023-02-03 ENCOUNTER — OFFICE VISIT (OUTPATIENT)
Dept: FAMILY MEDICINE CLINIC | Age: 28
End: 2023-02-03
Payer: MEDICAID

## 2023-02-03 VITALS
WEIGHT: 141 LBS | DIASTOLIC BLOOD PRESSURE: 80 MMHG | RESPIRATION RATE: 18 BRPM | HEART RATE: 120 BPM | TEMPERATURE: 98.9 F | HEIGHT: 63 IN | BODY MASS INDEX: 24.98 KG/M2 | OXYGEN SATURATION: 98 % | SYSTOLIC BLOOD PRESSURE: 132 MMHG

## 2023-02-03 DIAGNOSIS — F43.0 PANIC STATE AS ACUTE REACTION TO STRESS: ICD-10-CM

## 2023-02-03 DIAGNOSIS — R46.89 COMPULSIVE BEHAVIOR: ICD-10-CM

## 2023-02-03 DIAGNOSIS — R56.9 SEIZURE (HCC): ICD-10-CM

## 2023-02-03 DIAGNOSIS — Z13.31 POSITIVE DEPRESSION SCREENING: Primary | ICD-10-CM

## 2023-02-03 PROCEDURE — G8420 CALC BMI NORM PARAMETERS: HCPCS | Performed by: NURSE PRACTITIONER

## 2023-02-03 PROCEDURE — 99214 OFFICE O/P EST MOD 30 MIN: CPT | Performed by: NURSE PRACTITIONER

## 2023-02-03 PROCEDURE — 4004F PT TOBACCO SCREEN RCVD TLK: CPT | Performed by: NURSE PRACTITIONER

## 2023-02-03 PROCEDURE — G8484 FLU IMMUNIZE NO ADMIN: HCPCS | Performed by: NURSE PRACTITIONER

## 2023-02-03 PROCEDURE — G8427 DOCREV CUR MEDS BY ELIG CLIN: HCPCS | Performed by: NURSE PRACTITIONER

## 2023-02-03 RX ORDER — PHENYTOIN SODIUM 300 MG/1
CAPSULE, EXTENDED RELEASE ORAL
Qty: 90 CAPSULE | Refills: 1 | Status: SHIPPED | OUTPATIENT
Start: 2023-02-03

## 2023-02-03 RX ORDER — PHENYTOIN SODIUM 200 MG/1
CAPSULE, EXTENDED RELEASE ORAL
Qty: 90 CAPSULE | Refills: 1 | Status: SHIPPED | OUTPATIENT
Start: 2023-02-03

## 2023-02-03 RX ORDER — FLUVOXAMINE MALEATE 50 MG/1
50 TABLET, COATED ORAL NIGHTLY
Qty: 90 TABLET | Refills: 1 | Status: SHIPPED | OUTPATIENT
Start: 2023-02-03

## 2023-02-03 RX ORDER — IBUPROFEN 800 MG/1
800 TABLET ORAL 2 TIMES DAILY PRN
Qty: 20 TABLET | Refills: 0 | Status: SHIPPED | OUTPATIENT
Start: 2023-02-03 | End: 2023-02-13

## 2023-02-03 RX ORDER — HYDROXYZINE HYDROCHLORIDE 25 MG/1
25 TABLET, FILM COATED ORAL EVERY 8 HOURS PRN
Qty: 90 TABLET | Refills: 1 | Status: SHIPPED | OUTPATIENT
Start: 2023-02-03 | End: 2023-04-04

## 2023-02-03 RX ORDER — ARIPIPRAZOLE 20 MG/1
20 TABLET ORAL DAILY
Qty: 30 TABLET | Refills: 3 | Status: SHIPPED | OUTPATIENT
Start: 2023-02-03

## 2023-02-03 ASSESSMENT — PATIENT HEALTH QUESTIONNAIRE - PHQ9
7. TROUBLE CONCENTRATING ON THINGS, SUCH AS READING THE NEWSPAPER OR WATCHING TELEVISION: 1
5. POOR APPETITE OR OVEREATING: 1
10. IF YOU CHECKED OFF ANY PROBLEMS, HOW DIFFICULT HAVE THESE PROBLEMS MADE IT FOR YOU TO DO YOUR WORK, TAKE CARE OF THINGS AT HOME, OR GET ALONG WITH OTHER PEOPLE: 1
4. FEELING TIRED OR HAVING LITTLE ENERGY: 1
6. FEELING BAD ABOUT YOURSELF - OR THAT YOU ARE A FAILURE OR HAVE LET YOURSELF OR YOUR FAMILY DOWN: 1
8. MOVING OR SPEAKING SO SLOWLY THAT OTHER PEOPLE COULD HAVE NOTICED. OR THE OPPOSITE, BEING SO FIGETY OR RESTLESS THAT YOU HAVE BEEN MOVING AROUND A LOT MORE THAN USUAL: 1
1. LITTLE INTEREST OR PLEASURE IN DOING THINGS: 1
SUM OF ALL RESPONSES TO PHQ QUESTIONS 1-9: 9
SUM OF ALL RESPONSES TO PHQ QUESTIONS 1-9: 9
9. THOUGHTS THAT YOU WOULD BE BETTER OFF DEAD, OR OF HURTING YOURSELF: 1
SUM OF ALL RESPONSES TO PHQ QUESTIONS 1-9: 9
2. FEELING DOWN, DEPRESSED OR HOPELESS: 1
SUM OF ALL RESPONSES TO PHQ QUESTIONS 1-9: 8
SUM OF ALL RESPONSES TO PHQ9 QUESTIONS 1 & 2: 2
3. TROUBLE FALLING OR STAYING ASLEEP: 1

## 2023-02-03 ASSESSMENT — COLUMBIA-SUICIDE SEVERITY RATING SCALE - C-SSRS
2. HAVE YOU ACTUALLY HAD ANY THOUGHTS OF KILLING YOURSELF?: YES
1. WITHIN THE PAST MONTH, HAVE YOU WISHED YOU WERE DEAD OR WISHED YOU COULD GO TO SLEEP AND NOT WAKE UP?: YES
5. HAVE YOU STARTED TO WORK OUT OR WORKED OUT THE DETAILS OF HOW TO KILL YOURSELF? DO YOU INTEND TO CARRY OUT THIS PLAN?: NO
3. HAVE YOU BEEN THINKING ABOUT HOW YOU MIGHT KILL YOURSELF?: NO
6. HAVE YOU EVER DONE ANYTHING, STARTED TO DO ANYTHING, OR PREPARED TO DO ANYTHING TO END YOUR LIFE?: NO
4. HAVE YOU HAD THESE THOUGHTS AND HAD SOME INTENTION OF ACTING ON THEM?: NO

## 2023-02-03 NOTE — PROGRESS NOTES
Subjective:      Patient ID: Zay Acevedo is a 32 y.o. female who presents today for:  Chief Complaint   Patient presents with    Depression     Patient is not feeling good mentally       HPI Ms. Tim Stacy is a 32year old female with history of bipolar disorder, anxiety, depression and compulsive behavior  who presented to the walk in clinic  with reports of depression. She is tearful. Denies suicidal ideation. Admits to non-compliance with medication. States she ran out of her medications approximately three weeks ago. Psychiatric:   Mood: anxious, depressed, and sad  Affect: variable  Thoughts: logical  Speech: regular rate and rhythm  Sensorium: No A/V hallucinations  Safety: no SI/HI       On the basis of positive PHQ-9 screening (PHQ-9 Total Score: 9), the following plan was implemented: additional evaluation and assessment performed, follow-up plan includes but not limited to: Medication management and Referral to /Specialist for evaluation and management. Patient will follow-up in 1 week(s) with psychologist.     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     Migraine     Seizure disorder ([facility]     Seizures ([facility]     Varicella      Past Surgical History:   Procedure Laterality Date    SHOULDER ARTHROSCOPY Left 10/14/2022    LEFT ARTHROSCOPIC SHOULDER  SURGERY ANTERIOR LABRAL REPAIR AND CAPSULAR PLICATION performed by Hanna Meyer MD at [address] [place] [address] History     Socioeconomic History    Marital status: Single     Spouse name: Not on file    Number of children: Not on file    Years of education: Not on file    Highest education level: Not on file   Occupational History    Not on file   Tobacco Use    Smoking status: Every Day     Packs/day: 1.00     Years: 9.00     Pack years: 9.00     Types: Cigarettes    Smokeless tobacco: Never   Vaping Use    Vaping Use: Never used   Substance and Sexual Activity    Alcohol use: No    Drug use:  Yes Types:  Marijuana [place])    Sexual activity: Yes     Partners: Male   Other Topics Concern    Not on file   Social History Narrative    Not on file     Social Determinants of Health     Financial Resource Strain: Low Risk     Difficulty of Paying Living Expenses: Not very hard   Food Insecurity: No Food Insecurity    Worried About Running Out of Food in the Last Year: Never true    Ran Out of Food in the Last Year: Never true   Transportation Needs: Not on file   Physical Activity: Not on file   Stress: Not on file   Social Connections: Not on file   Intimate Partner Violence: Not on file   Housing Stability: Not on file     Family History   Problem Relation Age of Onset    Cancer Other         brain, breast, lung    Coronary Art Dis Other     High Blood Pressure Brother     Obesity Brother     Cancer Maternal Grandmother     Allergies Maternal Grandmother     Colon Cancer Maternal Grandmother     Breast Cancer Maternal Grandmother     Ovarian Cancer Maternal Grandmother     Other Maternal Grandmother         eye cancer, loss of left eye    Cancer Maternal Grandfather         lung    Heart Disease Maternal Grandfather     Uterine Cancer Mother     Ovarian Cancer Mother     Breast Cancer Mother     Cancer Mother         behind right eye    Heart Attack Mother     Migraines Mother     Cancer Maternal Aunt         lung     Allergies   Allergen Reactions    Penicillins      GI Problems     Current Outpatient Medications   Medication Sig Dispense Refill    ARIPiprazole (ABILIFY) 20 MG tablet Take 1 tablet by mouth daily 30 tablet 3    phenytoin (PHENYTEK) 200 MG ER capsule 200 mg po once daily in the am and phenytoin ER 300mg QHS 90 capsule 1    phenytoin (PHENYTEK) 300 MG ER capsule 200 mg po once daily in the am and phenytoin ER 300mg QHS 90 capsule 1    ibuprofen (ADVIL;MOTRIN) 800 MG tablet Take 1 tablet by mouth 2 times daily as needed for Pain (take with food) 20 tablet 0    fluvoxaMINE (LUVOX) 50 MG tablet Take 1 tablet by mouth nightly 90 tablet 1    hydrOXYzine HCl (ATARAX) 25 MG tablet Take 1 tablet by mouth every 8 hours as needed for Itching 90 tablet 1    divalproex (DEPAKOTE) 500 MG DR tablet Take 1 tablet by mouth 3 times daily 60 tablet 3    ARIPiprazole (ABILIFY) 10 MG tablet Take 1 tablet by mouth daily (Patient not taking: Reported on 2/3/2023) 30 tablet 3     No current facility-administered medications for this visit. Objective:   /80 (Site: Left Upper Arm, Position: Sitting, Cuff Size: Small Adult)   Pulse (!) 120   Temp 98.9 °F (37.2 °C) (Tympanic)   Resp 18   Ht 5' 3\" (1.6 m)   Wt 141 lb (64 kg)   LMP 01/04/2023   SpO2 98%   BMI 24.98 kg/m²         Assessment:       Diagnosis Orders   1. Positive depression screening        2. Seizure ([address]  phenytoin (PHENYTEK) 200 MG ER capsule    phenytoin (PHENYTEK) 300 MG ER capsule      3. Panic state as acute reaction to stress  fluvoxaMINE (LUVOX) 50 MG tablet    hydrOXYzine HCl (ATARAX) 25 MG tablet      4. Compulsive behavior  fluvoxaMINE (LUVOX) 50 MG tablet        No results found for this visit on 02/03/23. Plan:     Assessment & Plan   Danny Ledbetter was seen today for depression. Diagnoses and all orders for this visit:    Positive depression screening    Seizure ([address]  -     phenytoin (PHENYTEK) 200 MG ER capsule; 200 mg po once daily in the am and phenytoin ER 300mg QHS  -     phenytoin (PHENYTEK) 300 MG ER capsule; 200 mg po once daily in the am and phenytoin ER 300mg QHS    Panic state as acute reaction to stress  -     fluvoxaMINE (LUVOX) 50 MG tablet; Take 1 tablet by mouth nightly  -     hydrOXYzine HCl (ATARAX) 25 MG tablet; Take 1 tablet by mouth every 8 hours as needed for Itching    Compulsive behavior  -     fluvoxaMINE (LUVOX) 50 MG tablet; Take 1 tablet by mouth nightly    Other orders  -     ARIPiprazole (ABILIFY) 20 MG tablet; Take 1 tablet by mouth daily  -     ibuprofen (ADVIL;MOTRIN) 800 MG tablet;  Take 1 tablet by mouth 2 times daily as needed for Pain (take with food)    No orders of the defined types were placed in this encounter. Orders Placed This Encounter   Medications    ARIPiprazole (ABILIFY) 20 MG tablet     Sig: Take 1 tablet by mouth daily     Dispense:  30 tablet     Refill:  3    phenytoin (PHENYTEK) 200 MG ER capsule     Si mg po once daily in the am and phenytoin ER 300mg QHS     Dispense:  90 capsule     Refill:  1    phenytoin (PHENYTEK) 300 MG ER capsule     Si mg po once daily in the am and phenytoin ER 300mg QHS     Dispense:  90 capsule     Refill:  1    ibuprofen (ADVIL;MOTRIN) 800 MG tablet     Sig: Take 1 tablet by mouth 2 times daily as needed for Pain (take with food)     Dispense:  20 tablet     Refill:  0    fluvoxaMINE (LUVOX) 50 MG tablet     Sig: Take 1 tablet by mouth nightly     Dispense:  90 tablet     Refill:  1     We are requesting this refill to have on file for next month s order. hydrOXYzine HCl (ATARAX) 25 MG tablet     Sig: Take 1 tablet by mouth every 8 hours as needed for Itching     Dispense:  90 tablet     Refill:  1     Medications Discontinued During This Encounter   Medication Reason    chlorhexidine (PERIDEX) 0.12 % solution Therapy completed    celecoxib (CELEBREX) 100 MG capsule LIST CLEANUP    phenytoin (PHENYTEK) 300 MG ER capsule REORDER    phenytoin (PHENYTEK) 200 MG ER capsule REORDER    ibuprofen (ADVIL;MOTRIN) 800 MG tablet REORDER    hydrOXYzine HCl (ATARAX) 25 MG tablet REORDER    fluvoxaMINE (LUVOX) 50 MG tablet REORDER     No follow-ups on file. Reviewed with the patient/family: current clinical status & medications. Side effects of the medication prescribed today, as well as treatment plan/rationale and result expectations have been discussed with the patient/family who expresses understanding. Patient will be discharged home in stable condition.     Follow up with PCP to evaluate treatment results or return if symptoms worsen or fail to improve. Discussed signs and symptoms which require immediate follow-up in ED/call to 911. Understanding verbalized. I have reviewed the patient's medical history in detail and updated the computerized patient record. BRUCE Rabago CNP         PHQ-9 score today: (PHQ-9 Total Score: 9), additional evaluation and assessment performed, follow-up plan includes but not limited to: Medication management and Referral to /Specialist  for evaluation and management.

## 2023-02-14 ENCOUNTER — TELEPHONE (OUTPATIENT)
Dept: FAMILY MEDICINE CLINIC | Age: 28
End: 2023-02-14

## 2023-02-14 NOTE — TELEPHONE ENCOUNTER
----- Message from Norma Leal sent at 2/13/2023  3:39 PM EST -----  Subject: Message to Provider    QUESTIONS  Information for Provider? patient scheduled next available appointment on   4/3 with PCP. she is asking if she could be seen sooner. has been crying a   lot lately \"having lots of lows not wanting to be around people, no   interest in doing things\". ---------------------------------------------------------------------------  --------------  Daryle Haver [facility] [place]  [phone]; OK to leave message on voicemail  ---------------------------------------------------------------------------  --------------  SCRIPT ANSWERS  Relationship to Patient?  Self

## 2023-02-22 DIAGNOSIS — R56.9 SEIZURE (HCC): ICD-10-CM

## 2023-02-22 RX ORDER — DIVALPROEX SODIUM 500 MG/1
TABLET, DELAYED RELEASE ORAL
Qty: 60 TABLET | Refills: 3 | Status: SHIPPED | OUTPATIENT
Start: 2023-02-22

## 2023-02-22 NOTE — TELEPHONE ENCOUNTER
Pharmacy is requesting medication refill.  Please approve or deny this request.    Rx requested:  Requested Prescriptions     Pending Prescriptions Disp Refills    divalproex (DEPAKOTE) 500 MG DR tablet [Pharmacy Med Name: divalproex 500 mg tablet,delayed release] 60 tablet 3     Sig: Take 1 tablet by mouth every 12 hours         Last Office Visit:   9/27/2022      Next Visit Date:  Future Appointments   Date Time Provider Ermias Gagnonisti   2/27/2023 10:45 AM Lizzie Quinonez PA-C [facility]   4/3/2023  3:00 PM Lizzie Quinonez PA-C [address] [place] [address]  75 y/o F PMHx DLD and urinary incontinence presenting with episode of dizziness. Work up including CT Head and MRI were negative for stroke and MRA neck and head showed no stenosis. Patient's symptoms improved with meclizine. Will send her home with prescription for meclizine.     Discharge home, no services needed. Plan to follow up with Dr. Schofield in 1-2 weeks

## 2023-02-23 DIAGNOSIS — F43.0 PANIC STATE AS ACUTE REACTION TO STRESS: ICD-10-CM

## 2023-02-24 RX ORDER — HYDROXYZINE HYDROCHLORIDE 25 MG/1
25 TABLET, FILM COATED ORAL EVERY 8 HOURS PRN
Qty: 90 TABLET | Refills: 1 | OUTPATIENT
Start: 2023-02-24 | End: 2023-04-25

## 2023-02-27 ENCOUNTER — OFFICE VISIT (OUTPATIENT)
Dept: FAMILY MEDICINE CLINIC | Age: 28
End: 2023-02-27
Payer: MEDICAID

## 2023-02-27 ENCOUNTER — CARE COORDINATION (OUTPATIENT)
Dept: CARE COORDINATION | Age: 28
End: 2023-02-27

## 2023-02-27 VITALS
SYSTOLIC BLOOD PRESSURE: 130 MMHG | HEART RATE: 100 BPM | WEIGHT: 137 LBS | OXYGEN SATURATION: 98 % | HEIGHT: 63 IN | BODY MASS INDEX: 24.27 KG/M2 | DIASTOLIC BLOOD PRESSURE: 90 MMHG | RESPIRATION RATE: 18 BRPM

## 2023-02-27 DIAGNOSIS — F43.0 PANIC STATE AS ACUTE REACTION TO STRESS: ICD-10-CM

## 2023-02-27 DIAGNOSIS — M24.412 SHOULDER DISLOCATION, RECURRENT, LEFT: ICD-10-CM

## 2023-02-27 DIAGNOSIS — R56.9 SEIZURE (HCC): ICD-10-CM

## 2023-02-27 DIAGNOSIS — F31.75 BIPOLAR DISORDER, IN PARTIAL REMISSION, MOST RECENT EPISODE DEPRESSED (HCC): Primary | ICD-10-CM

## 2023-02-27 PROCEDURE — G8420 CALC BMI NORM PARAMETERS: HCPCS | Performed by: PHYSICIAN ASSISTANT

## 2023-02-27 PROCEDURE — 99214 OFFICE O/P EST MOD 30 MIN: CPT | Performed by: PHYSICIAN ASSISTANT

## 2023-02-27 PROCEDURE — 4004F PT TOBACCO SCREEN RCVD TLK: CPT | Performed by: PHYSICIAN ASSISTANT

## 2023-02-27 PROCEDURE — G8427 DOCREV CUR MEDS BY ELIG CLIN: HCPCS | Performed by: PHYSICIAN ASSISTANT

## 2023-02-27 PROCEDURE — G8484 FLU IMMUNIZE NO ADMIN: HCPCS | Performed by: PHYSICIAN ASSISTANT

## 2023-02-27 RX ORDER — LORAZEPAM 0.5 MG/1
0.5 TABLET ORAL 2 TIMES DAILY PRN
Qty: 20 TABLET | Refills: 0 | Status: SHIPPED | OUTPATIENT
Start: 2023-02-27 | End: 2023-03-09

## 2023-02-27 RX ORDER — PHENYTOIN SODIUM 300 MG/1
CAPSULE, EXTENDED RELEASE ORAL
Qty: 90 CAPSULE | Refills: 1
Start: 2023-02-27

## 2023-02-27 RX ORDER — PHENYTOIN SODIUM 200 MG/1
CAPSULE, EXTENDED RELEASE ORAL
Qty: 90 CAPSULE | Refills: 1
Start: 2023-02-27

## 2023-02-27 SDOH — ECONOMIC STABILITY: FOOD INSECURITY: WITHIN THE PAST 12 MONTHS, THE FOOD YOU BOUGHT JUST DIDN'T LAST AND YOU DIDN'T HAVE MONEY TO GET MORE.: NEVER TRUE

## 2023-02-27 SDOH — ECONOMIC STABILITY: HOUSING INSECURITY
IN THE LAST 12 MONTHS, WAS THERE A TIME WHEN YOU DID NOT HAVE A STEADY PLACE TO SLEEP OR SLEPT IN A SHELTER (INCLUDING NOW)?: YES

## 2023-02-27 SDOH — ECONOMIC STABILITY: FOOD INSECURITY: WITHIN THE PAST 12 MONTHS, YOU WORRIED THAT YOUR FOOD WOULD RUN OUT BEFORE YOU GOT MONEY TO BUY MORE.: NEVER TRUE

## 2023-02-27 SDOH — ECONOMIC STABILITY: INCOME INSECURITY: HOW HARD IS IT FOR YOU TO PAY FOR THE VERY BASICS LIKE FOOD, HOUSING, MEDICAL CARE, AND HEATING?: NOT HARD AT ALL

## 2023-02-27 ASSESSMENT — ENCOUNTER SYMPTOMS
BACK PAIN: 0
CHEST TIGHTNESS: 0
COLOR CHANGE: 0

## 2023-02-27 NOTE — CARE COORDINATION
Patient was referred to his writer by Matt Wagoner PA-C to assist with housing. Telephone call with patient who indicated that she is in need housing. She is staying with various people. Discussed Homeless Shelter and need to contact [employer]. She has left [employer] a message and waiting to hear back. She is in need of housing for her and her child. Discussed plan to email Abhilash [email or web address] pre applications for [address] [place] [address] and [address] [place] [address] 195, [address] [facility] and efficiency/Disability  Housing. Also expressed plan to mail out information. She stated at this time she has no income. She is receiving food stamps. She has an  who is helping her with her disability  with another hearing on 4/17/2023. She claims that it has been three years that  has been trying to help her with disability. She does not have transportation at this time. She expressed plan to contact Destini Mir regarding transportation benefits. Emailed patient housing information and pre applications for [address] [place] [address].

## 2023-02-27 NOTE — LETTER
[facility]      Dear Batsheva Farley,    I hope this letter finds you doing well! I am sending you resource information   [address] and Pre -Applications for [employer]. I hope you find the information helpful. Please look them over and let me know if you have any questions or need further assistance. You can contact me at any of the numbers listed below. Sincerely,    TONY Gray  , [facility]  Cell Phone: [phone]  Email: [email or web address]
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
## 2023-02-27 NOTE — PROGRESS NOTES
Subjective  Maribel Jimenez, 29 y.o. female presents today with:  Chief Complaint   Patient presents with    Depression     Would like to discuss her psych medications      HPI  In the office today for follow up. Was seen recently in walk-in clinic. History of seizure disorder. She has been compliant with her seizure medication therapy. Last known seizure was about 6 weeks ago. She is followed by neurology, Dr. Pamela Ruelas. History of L shoulder dislocation, recurrent. She had surgery with Dr. Kimani Pompa on 10/41/22. Was unable to attend post-op PT due to transportation issues. Reports that occurrence of dislocation is less, but she has had several episodes of L shoulder dislocation. Open to seeing second opinion for her shoulder. Improved ROM, improved pain, overall. Was seen by Rick Munoz CNP on 2/3/23 for worsening mood/depression. Patient was without her medications; they were refilled at time of OV. Denies SI/HI. Situational anxiety and depression as she is currently without permanent housing/residence. Denies risky behavior, manic episodes. Taking ability 20 mg, vistaril PRN. Rolling her own cigarettes. Denies alcohol or drug use. Moved out of her boyfriend's apartment as he has not been in a good place with his mental health. Not working given seizure disorder history and behavioral health. Her son is living the grandmother. Open to referral to social work for any help/assistance. Review of Systems   Constitutional:  Positive for activity change. Negative for diaphoresis and fatigue. Respiratory:  Negative for chest tightness. Cardiovascular:  Negative for chest pain and leg swelling. Musculoskeletal:  Positive for arthralgias, joint swelling and myalgias. Negative for back pain, gait problem and neck pain. Skin:  Negative for color change, rash and wound. Neurological:  Positive for weakness (improved) and numbness (intermittent, LUE).  Negative for dizziness, tremors, seizures, facial asymmetry, light-headedness and headaches. Psychiatric/Behavioral:  Positive for dysphoric mood. Negative for agitation, behavioral problems, confusion, decreased concentration, hallucinations, self-injury, sleep disturbance and suicidal ideas. The patient is nervous/anxious. The patient is not hyperactive. 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     Migraine     Seizure disorder (HCC)     Seizures ([facility]     Varicella      Past Surgical History:   Procedure Laterality Date    SHOULDER ARTHROSCOPY Left 10/14/2022    LEFT ARTHROSCOPIC SHOULDER  SURGERY ANTERIOR LABRAL REPAIR AND CAPSULAR PLICATION performed by Casey Martin MD at [address] [place] [address] History     Socioeconomic History    Marital status: Single     Spouse name: Not on file    Number of children: Not on file    Years of education: Not on file    Highest education level: Not on file   Occupational History    Not on file   Tobacco Use    Smoking status: Every Day     Packs/day: 1.00     Years: 9.00     Pack years: 9.00     Types: Cigarettes    Smokeless tobacco: Never   Vaping Use    Vaping Use: Never used   Substance and Sexual Activity    Alcohol use: No    Drug use: Yes     Types: Marijuana [place])    Sexual activity: Yes     Partners: Male   Other Topics Concern    Not on file   Social History Narrative    Not on file     Social Determinants of Health     Financial Resource Strain: Low Risk     Difficulty of Paying Living Expenses: Not hard at all   Food Insecurity: No Food Insecurity    Worried About Running Out of Food in the Last Year: Never true    [address] in the Last Year: Never true   Transportation Needs: Unmet Transportation Needs    Lack of Transportation (Medical):  Not on file    Lack of Transportation (Non-Medical): Yes   Physical Activity: Not on file   Stress: Not on file   Social Connections: Not on file   Intimate Partner Violence: Not on file   Housing Stability: High Risk    Unable to Pay for Housing in the Last Year: Not on file    Number of Places Lived in the Last Year: Not on file    Unstable Housing in the Last Year: Yes     Family History   Problem Relation Age of Onset    Cancer Other         brain, breast, lung    Coronary Art Dis Other     High Blood Pressure Brother     Obesity Brother     Cancer Maternal Grandmother     Allergies Maternal Grandmother     Colon Cancer Maternal Grandmother     Breast Cancer Maternal Grandmother     Ovarian Cancer Maternal Grandmother     Other Maternal Grandmother         eye cancer, loss of left eye    Cancer Maternal Grandfather         lung    Heart Disease Maternal Grandfather     Uterine Cancer Mother     Ovarian Cancer Mother     Breast Cancer Mother     Cancer Mother         behind right eye    Heart Attack Mother     Migraines Mother     Cancer Maternal Aunt         lung     Allergies   Allergen Reactions    Penicillins      GI Problems     Current Outpatient Medications   Medication Sig Dispense Refill    cariprazine hcl (VRAYLAR) 1.5 MG capsule Take 1 capsule by mouth daily 30 capsule 5    phenytoin (PHENYTEK) 200 MG ER capsule 200 mg po once daily in the am and phenytoin ER 300mg QHS 90 capsule 1    phenytoin (PHENYTEK) 300 MG ER capsule 200 mg po once daily in the am and phenytoin ER 300mg QHS 90 capsule 1    LORazepam (ATIVAN) 0.5 MG tablet Take 1 tablet by mouth 2 times daily as needed for Anxiety for up to 10 days.  20 tablet 0    cariprazine hcl (VRAYLAR) 1.5 MG capsule Take 1 capsule by mouth daily Lot [id number]  Exp 12/24  X4 boxes 30 capsule 0    divalproex (DEPAKOTE) 500 MG DR tablet Take 1 tablet by mouth every 12 hours 60 tablet 3    ARIPiprazole (ABILIFY) 20 MG tablet Take 1 tablet by mouth daily 30 tablet 3    ibuprofen (ADVIL;MOTRIN) 800 MG tablet Take 1 tablet by mouth 2 times daily as needed for Pain (take with food) 20 tablet 0    fluvoxaMINE (LUVOX) 50 MG tablet Take 1 tablet by mouth nightly 90 tablet 1    hydrOXYzine HCl (ATARAX) 25 MG tablet Take 1 tablet by mouth every 8 hours as needed for Itching 90 tablet 1     No current facility-administered medications for this visit. PMH, Surgical Hx, Family Hx, and Social Hx reviewed and updated. Health Maintenance reviewed. Objective  Vitals:    02/27/23 1052 02/27/23 1056   BP: (!) 130/90 (!) 130/90   Site: Left Upper Arm    Position: Sitting    Cuff Size: Medium Adult    Pulse: 100    Resp: 18    SpO2: 98%    Weight: 137 lb (62.1 kg)    Height: 5' 3\" (1.6 m)      BP Readings from Last 3 Encounters:   02/27/23 (!) 130/90   02/03/23 132/80   01/05/23 113/73     Wt Readings from Last 3 Encounters:   02/27/23 137 lb (62.1 kg)   02/03/23 141 lb (64 kg)   01/05/23 130 lb (59 kg)     Physical Exam  Vitals reviewed. Constitutional:       General: She is not in acute distress. Appearance: Normal appearance. She is not ill-appearing. HENT:      Head: Normocephalic and atraumatic. Right Ear: External ear normal.      Left Ear: External ear normal.   Cardiovascular:      Rate and Rhythm: Normal rate and regular rhythm. Pulmonary:      Effort: Pulmonary effort is normal.      Breath sounds: Normal breath sounds. Musculoskeletal:         General: No swelling or tenderness. Skin:     General: Skin is warm and dry. Neurological:      General: No focal deficit present. Mental Status: She is alert and oriented to person, place, and time. Psychiatric:         Mood and Affect: Mood normal.      Comments: Tearful, appears stressed. Has no stable living arrangement. Assessment & Plan    Diagnosis Orders   1. Bipolar disorder, in partial remission, most recent episode depressed (HCC)  cariprazine hcl (VRAYLAR) 1.5 MG capsule      2. Seizure ([address]  phenytoin (PHENYTEK) 200 MG ER capsule    phenytoin (PHENYTEK) 300 MG ER capsule      3. Shoulder dislocation, recurrent, left  [address] [place] [facility]      4. Panic state as acute reaction to stress  LORazepam (ATIVAN) 0.5 MG tablet      Second opinion re: L shoulder. Discussed her needing to attend therapy. Samples of vraylar given to patient to start. May help with mood/depression better than abilify. OARRS report ran and is consistent with current and past history concerning scheduled medications. Orders Placed This Encounter   Procedures    [address], [address]     Referral Priority:   Routine     Referral Type:   Eval and Treat     Referral Reason:   Specialty Services Required     Referred to Provider:   Ronal العلي MD     Requested Specialty:   Orthopedic Surgery Sports Medicine     Number of Visits Requested:   1     Orders Placed This Encounter   Medications    cariprazine hcl (VRAYLAR) 1.5 MG capsule     Sig: Take 1 capsule by mouth daily     Dispense:  30 capsule     Refill:  5    phenytoin (PHENYTEK) 200 MG ER capsule     Si mg po once daily in the am and phenytoin ER 300mg QHS     Dispense:  90 capsule     Refill:  1    phenytoin (PHENYTEK) 300 MG ER capsule     Si mg po once daily in the am and phenytoin ER 300mg QHS     Dispense:  90 capsule     Refill:  1    LORazepam (ATIVAN) 0.5 MG tablet     Sig: Take 1 tablet by mouth 2 times daily as needed for Anxiety for up to 10 days. Dispense:  20 tablet     Refill:  0    cariprazine hcl (VRAYLAR) 1.5 MG capsule     Sig: Take 1 capsule by mouth daily Lot [id number]  Exp 12/24  X4 boxes     Dispense:  30 capsule     Refill:  0     Medications Discontinued During This Encounter   Medication Reason    phenytoin (PHENYTEK) 200 MG ER capsule DUPLICATE    phenytoin (PHENYTEK) 300 MG ER capsule DUPLICATE    ARIPiprazole (ABILIFY) 10 MG tablet LIST CLEANUP    LORazepam (ATIVAN) 0.5 MG tablet REORDER     No follow-ups on file. Reviewed with the patient: current clinical status, medications, activities and diet.      Side effects, adverse effects of the medication prescribed today, as well as treatment plan/ rationale and result expectations have been discussed with the patient who expresses understanding and desires to proceed. Close follow up to evaluate treatment results and for coordination of care. I have reviewed the patient's medical history in detail and updated the computerized patient record.     Tamar Meza PA-C

## 2023-02-28 RX ORDER — LORAZEPAM 0.5 MG/1
0.5 TABLET ORAL 2 TIMES DAILY PRN
Qty: 20 TABLET | Refills: 0 | OUTPATIENT
Start: 2023-02-28 | End: 2023-03-10

## 2023-03-07 DIAGNOSIS — F43.0 PANIC STATE AS ACUTE REACTION TO STRESS: ICD-10-CM

## 2023-03-07 NOTE — TELEPHONE ENCOUNTER
Comments:     Last Office Visit (last PCP visit):   2/27/2023    Next Visit Date:  Future Appointments   Date Time Provider Ermias Elizabeth   4/3/2023  3:00 PM RULA Adam [facility]   4/18/2023  3:00 PM RULA E[place] [facility] [place]       **If hasn't been seen in over a year OR hasn't followed up according to last diabetes/ADHD visit, make appointment for patient before sending refill to provider. Rx requested:  Requested Prescriptions     Pending Prescriptions Disp Refills    LORazepam (ATIVAN) 0.5 MG tablet 20 tablet 0     Sig: Take 1 tablet by mouth 2 times daily as needed for Anxiety for up to 10 days.

## 2023-03-08 ENCOUNTER — CARE COORDINATION (OUTPATIENT)
Dept: CARE COORDINATION | Age: 28
End: 2023-03-08

## 2023-03-08 RX ORDER — LORAZEPAM 0.5 MG/1
0.5 TABLET ORAL 2 TIMES DAILY PRN
Qty: 20 TABLET | Refills: 0 | OUTPATIENT
Start: 2023-03-08 | End: 2023-03-18

## 2023-03-08 NOTE — TELEPHONE ENCOUNTER
The Rx should last at least 10 days. Is she taking more than prescribed, this was also just for temporary usage.

## 2023-03-08 NOTE — TELEPHONE ENCOUNTER
Patient states she has only take 2 a day. She is going through a lot right now. Staying at the [facility] and not sleeping well. Please advise.     [phone]

## 2023-03-09 DIAGNOSIS — F43.0 PANIC STATE AS ACUTE REACTION TO STRESS: ICD-10-CM

## 2023-03-09 RX ORDER — LORAZEPAM 0.5 MG/1
0.5 TABLET ORAL 2 TIMES DAILY PRN
Qty: 30 TABLET | Refills: 1 | Status: SHIPPED | OUTPATIENT
Start: 2023-03-09 | End: 2023-04-08

## 2023-03-16 ENCOUNTER — CARE COORDINATION (OUTPATIENT)
Dept: CARE COORDINATION | Age: 28
End: 2023-03-16

## 2023-03-23 ENCOUNTER — CARE COORDINATION (OUTPATIENT)
Dept: CARE COORDINATION | Age: 28
End: 2023-03-23

## 2023-03-23 DIAGNOSIS — F43.0 PANIC STATE AS ACUTE REACTION TO STRESS: ICD-10-CM

## 2023-03-23 RX ORDER — HYDROXYZINE HYDROCHLORIDE 25 MG/1
25 TABLET, FILM COATED ORAL EVERY 8 HOURS PRN
Qty: 90 TABLET | Refills: 1 | Status: SHIPPED | OUTPATIENT
Start: 2023-03-23 | End: 2023-05-22

## 2023-03-23 NOTE — TELEPHONE ENCOUNTER
Rx request   Requested Prescriptions     Pending Prescriptions Disp Refills    hydrOXYzine HCl (ATARAX) 25 MG tablet [Pharmacy Med Name: hydroxyzine HCl 25 mg tablet] 90 tablet 1     Sig: Take 1 tablet by mouth every 8 hours as needed for Itching     LOV 2/3/2023  Last refill 2/3/23  Next Visit Date:  Future Appointments   Date Time Provider Ermias Johnson   4/3/2023  3:00 PM Cameron Haas PA-C [facility]   4/18/2023  3:00 PM Cameron Haas PA-C [address] [place] [address]

## 2023-03-23 NOTE — CARE COORDINATION
Telephone call to patient. .  Left voicemail of purpose of call with request for return phone call. Call back number was provided. Iker Jean

## 2023-03-30 ENCOUNTER — CARE COORDINATION (OUTPATIENT)
Dept: CARE COORDINATION | Age: 28
End: 2023-03-30

## 2023-03-30 NOTE — CARE COORDINATION
Telephone call to patient. She continues to live at [facility]. She has been approved for assistance with [address] [place] [address] and will pay her rent 800 dollars for two bedroom. Apartment. [facility] paying for  security and first and last month rent. .  She is working with a Realtors to find a place and 's at [facility]. .  She just needs to find a place. She has a hearing with disability on 4/7/2023. Patient asking that housing information be resent to her at [address], [address] [place] [address]. She cannot find information sent to her.

## 2023-03-30 NOTE — LETTER
[address] [facility]  [address]  [place], [address] [address]    Dear Frances Galicia,    I hope this letter finds you doing well! I am sending you resource information on [employer], [employer] for Elderly or residents with disabilities and Pre-Applications for [employer]. .  I hope you find the information helpful. Please look them over and let me know if you have any questions or need further assistance. You can contact me at any of the numbers listed below. Sincerely,    TONY Sandoval  , [facility]  Cell Phone: [phone]  Email: [email or web address]

## 2023-04-03 ENCOUNTER — OFFICE VISIT (OUTPATIENT)
Dept: FAMILY MEDICINE CLINIC | Age: 28
End: 2023-04-03
Payer: MEDICAID

## 2023-04-03 VITALS
SYSTOLIC BLOOD PRESSURE: 130 MMHG | RESPIRATION RATE: 18 BRPM | WEIGHT: 135 LBS | DIASTOLIC BLOOD PRESSURE: 82 MMHG | HEART RATE: 99 BPM | BODY MASS INDEX: 23.92 KG/M2 | OXYGEN SATURATION: 97 % | HEIGHT: 63 IN

## 2023-04-03 DIAGNOSIS — R56.9 SEIZURE (HCC): ICD-10-CM

## 2023-04-03 DIAGNOSIS — F43.0 PANIC STATE AS ACUTE REACTION TO STRESS: ICD-10-CM

## 2023-04-03 DIAGNOSIS — R46.89 COMPULSIVE BEHAVIOR: ICD-10-CM

## 2023-04-03 DIAGNOSIS — J01.00 ACUTE NON-RECURRENT MAXILLARY SINUSITIS: Primary | ICD-10-CM

## 2023-04-03 PROCEDURE — 99214 OFFICE O/P EST MOD 30 MIN: CPT | Performed by: PHYSICIAN ASSISTANT

## 2023-04-03 PROCEDURE — G8420 CALC BMI NORM PARAMETERS: HCPCS | Performed by: PHYSICIAN ASSISTANT

## 2023-04-03 PROCEDURE — G8427 DOCREV CUR MEDS BY ELIG CLIN: HCPCS | Performed by: PHYSICIAN ASSISTANT

## 2023-04-03 PROCEDURE — 4004F PT TOBACCO SCREEN RCVD TLK: CPT | Performed by: PHYSICIAN ASSISTANT

## 2023-04-03 RX ORDER — PHENYTOIN SODIUM 200 MG/1
CAPSULE, EXTENDED RELEASE ORAL
Qty: 90 CAPSULE | Refills: 4 | Status: SHIPPED | OUTPATIENT
Start: 2023-04-03

## 2023-04-03 RX ORDER — CEFDINIR 300 MG/1
300 CAPSULE ORAL 2 TIMES DAILY
Qty: 20 CAPSULE | Refills: 0 | Status: SHIPPED | OUTPATIENT
Start: 2023-04-03

## 2023-04-03 RX ORDER — FLUVOXAMINE MALEATE 50 MG/1
75 TABLET, COATED ORAL NIGHTLY
Qty: 135 TABLET | Refills: 1 | Status: SHIPPED | OUTPATIENT
Start: 2023-04-03

## 2023-04-03 RX ORDER — LORAZEPAM 0.5 MG/1
0.5 TABLET ORAL 2 TIMES DAILY PRN
Qty: 60 TABLET | Refills: 2 | Status: SHIPPED | OUTPATIENT
Start: 2023-04-03 | End: 2023-05-03

## 2023-04-03 RX ORDER — PHENYTOIN SODIUM 300 MG/1
CAPSULE, EXTENDED RELEASE ORAL
Qty: 90 CAPSULE | Refills: 4 | Status: SHIPPED | OUTPATIENT
Start: 2023-04-03

## 2023-04-03 NOTE — PROGRESS NOTES
04/03/23 1509   BP: 130/82   Site: Left Upper Arm   Position: Sitting   Cuff Size: Medium Adult   Pulse: 99   Resp: 18   SpO2: 97%   Weight: 135 lb (61.2 kg)   Height: 5' 3\" (1.6 m)     BP Readings from Last 3 Encounters:   04/03/23 130/82   02/27/23 (!) 130/90   02/03/23 132/80     Wt Readings from Last 3 Encounters:   04/03/23 135 lb (61.2 kg)   02/27/23 137 lb (62.1 kg)   02/03/23 141 lb (64 kg)     Physical Exam  Vitals reviewed. Constitutional:       General: She is not in acute distress. Appearance: Normal appearance. She is not ill-appearing. HENT:      Head: Normocephalic and atraumatic. Right Ear: External ear normal.      Left Ear: External ear normal.      Nose:      Right Sinus: Maxillary sinus tenderness present. Left Sinus: Maxillary sinus tenderness present. Mouth/Throat:      Mouth: Mucous membranes are moist.   Cardiovascular:      Rate and Rhythm: Normal rate and regular rhythm. Pulmonary:      Effort: Pulmonary effort is normal.      Breath sounds: Normal breath sounds. Musculoskeletal:         General: No swelling or tenderness. Skin:     General: Skin is warm and dry. Neurological:      General: No focal deficit present. Mental Status: She is alert and oriented to person, place, and time. Psychiatric:         Mood and Affect: Mood normal.      Comments: Improved mood noted today. Feeling better. Assessment & Plan    Diagnosis Orders   1. Acute non-recurrent maxillary sinusitis  cefdinir (OMNICEF) 300 MG capsule      2. Panic state as acute reaction to stress  LORazepam (ATIVAN) 0.5 MG tablet    fluvoxaMINE (LUVOX) 50 MG tablet      3. Seizure ([address]  phenytoin (PHENYTEK) 200 MG ER capsule    phenytoin (PHENYTEK) 300 MG ER capsule      4. Compulsive behavior  fluvoxaMINE (LUVOX) 50 MG tablet      Medications adjusted. Omnicef for sinusitis. 6 week follow up. Contact office with any questions or concerns.      Medications Discontinued During This

## 2023-04-06 ENCOUNTER — CARE COORDINATION (OUTPATIENT)
Dept: CARE COORDINATION | Age: 28
End: 2023-04-06

## 2023-04-07 ASSESSMENT — ENCOUNTER SYMPTOMS
COLOR CHANGE: 0
CHEST TIGHTNESS: 0
BACK PAIN: 0

## 2023-04-17 ENCOUNTER — CARE COORDINATION (OUTPATIENT)
Dept: CARE COORDINATION | Age: 28
End: 2023-04-17

## 2023-04-18 ENCOUNTER — HOSPITAL ENCOUNTER (EMERGENCY)
Age: 28
Discharge: HOME | End: 2023-04-18
Payer: MEDICAID

## 2023-04-18 ENCOUNTER — APPOINTMENT (OUTPATIENT)
Dept: GENERAL RADIOLOGY | Age: 28
End: 2023-04-18
Payer: MEDICAID

## 2023-04-18 VITALS
HEIGHT: 63 IN | RESPIRATION RATE: 18 BRPM | HEART RATE: 87 BPM | BODY MASS INDEX: 23.04 KG/M2 | OXYGEN SATURATION: 100 % | SYSTOLIC BLOOD PRESSURE: 129 MMHG | WEIGHT: 130 LBS | DIASTOLIC BLOOD PRESSURE: 77 MMHG | TEMPERATURE: 98.2 F

## 2023-04-18 DIAGNOSIS — M25.512 ACUTE PAIN OF LEFT SHOULDER: Primary | ICD-10-CM

## 2023-04-18 LAB
HCG, URINE, POC: NEGATIVE
Lab: NORMAL
NEGATIVE QC PASS/FAIL: NORMAL
POSITIVE QC PASS/FAIL: NORMAL

## 2023-04-18 PROCEDURE — 73030 X-RAY EXAM OF SHOULDER: CPT

## 2023-04-18 PROCEDURE — 6370000000 HC RX 637 (ALT 250 FOR IP): Performed by: NURSE PRACTITIONER

## 2023-04-18 PROCEDURE — 99283 EMERGENCY DEPT VISIT LOW MDM: CPT

## 2023-04-18 RX ORDER — IBUPROFEN 600 MG/1
600 TABLET ORAL EVERY 8 HOURS PRN
Qty: 20 TABLET | Refills: 0 | Status: SHIPPED | OUTPATIENT
Start: 2023-04-18

## 2023-04-18 RX ORDER — HYDROCODONE BITARTRATE AND ACETAMINOPHEN 5; 325 MG/1; MG/1
1 TABLET ORAL ONCE
Status: COMPLETED | OUTPATIENT
Start: 2023-04-18 | End: 2023-04-18

## 2023-04-18 RX ORDER — HYDROCODONE BITARTRATE AND ACETAMINOPHEN 5; 325 MG/1; MG/1
1 TABLET ORAL EVERY 6 HOURS PRN
Qty: 6 TABLET | Refills: 0 | Status: SHIPPED | OUTPATIENT
Start: 2023-04-18 | End: 2023-04-21

## 2023-04-18 RX ORDER — IBUPROFEN 800 MG/1
800 TABLET ORAL ONCE
Status: COMPLETED | OUTPATIENT
Start: 2023-04-18 | End: 2023-04-18

## 2023-04-18 RX ADMIN — IBUPROFEN 800 MG: 800 TABLET, FILM COATED ORAL at 17:13

## 2023-04-18 RX ADMIN — HYDROCODONE BITARTRATE AND ACETAMINOPHEN 1 TABLET: 5; 325 TABLET ORAL at 17:14

## 2023-04-18 ASSESSMENT — PAIN DESCRIPTION - DESCRIPTORS: DESCRIPTORS: ACHING

## 2023-04-18 ASSESSMENT — ENCOUNTER SYMPTOMS
ABDOMINAL PAIN: 0
COUGH: 0
SHORTNESS OF BREATH: 0
BACK PAIN: 0

## 2023-04-18 ASSESSMENT — PAIN - FUNCTIONAL ASSESSMENT: PAIN_FUNCTIONAL_ASSESSMENT: 0-10

## 2023-04-18 ASSESSMENT — PAIN SCALES - GENERAL
PAINLEVEL_OUTOF10: 10
PAINLEVEL_OUTOF10: 10

## 2023-04-18 ASSESSMENT — PAIN DESCRIPTION - ORIENTATION
ORIENTATION: LEFT
ORIENTATION: RIGHT

## 2023-04-18 ASSESSMENT — PAIN DESCRIPTION - ONSET: ONSET: ON-GOING

## 2023-04-18 ASSESSMENT — PAIN DESCRIPTION - FREQUENCY: FREQUENCY: CONTINUOUS

## 2023-04-18 ASSESSMENT — PAIN DESCRIPTION - LOCATION
LOCATION: SHOULDER
LOCATION: SHOULDER

## 2023-04-18 NOTE — ED PROVIDER NOTES
subscore is 5. GCS motor subscore is 6. Deep Tendon Reflexes: Reflexes are normal and symmetric. Psychiatric:         Judgment: Judgment normal.         All other labs were within normal range or not returned as of this dictation. EMERGENCY DEPARTMENT COURSE and DIFFERENTIALDIAGNOSIS/MDM:   Vitals:    Vitals:    04/18/23 1607   BP: 129/77   Pulse: 87   Resp: 18   Temp: 98.2 °F (36.8 °C)   TempSrc: Temporal   SpO2: 100%   Weight: 130 lb (59 kg)   Height: 5' 3\" (1.6 m)       Medical Decision Making  Amount and/or Complexity of Data Reviewed  Labs: ordered. Radiology: ordered. Risk  Prescription drug management. 29 yr old female with L shoulder pain. Xray was negative for shoulder dislocation or fracture. Radiologist, Dr. Radha Malave did have concern of possible free air in abd and suggest an acute abd series. Patient refused xray stating \"she feels fine\". No abd pain, vitals stable, no trauma. F/U with ortho, Rx were sent to pharmacy. Patient verbalizes understanding and to return with any worsening sx as discussed. PROCEDURES:  Unless otherwise noted below, none     Procedures      FINAL IMPRESSION      1.  Acute pain of left shoulder          DISPOSITION/PLAN   DISPOSITION Decision To Discharge 04/18/2023 05:14:15 PM          BRUCE Morrell CNP (electronically signed)  Attending Emergency Physician      BRUCE Morrell CNP  04/18/23 8474

## 2023-04-25 ENCOUNTER — TELEPHONE (OUTPATIENT)
Dept: ORTHOPEDIC SURGERY | Age: 28
End: 2023-04-25

## 2023-04-25 ENCOUNTER — CARE COORDINATION (OUTPATIENT)
Dept: CARE COORDINATION | Age: 28
End: 2023-04-25

## 2023-04-25 DIAGNOSIS — F43.0 PANIC STATE AS ACUTE REACTION TO STRESS: ICD-10-CM

## 2023-04-26 ENCOUNTER — TELEPHONE (OUTPATIENT)
Dept: FAMILY MEDICINE CLINIC | Age: 28
End: 2023-04-26

## 2023-04-26 RX ORDER — HYDROXYZINE HYDROCHLORIDE 25 MG/1
25 TABLET, FILM COATED ORAL EVERY 8 HOURS PRN
Qty: 90 TABLET | Refills: 1 | Status: SHIPPED | OUTPATIENT
Start: 2023-04-26 | End: 2023-06-25

## 2023-04-26 RX ORDER — ARIPIPRAZOLE 20 MG/1
20 TABLET ORAL DAILY
Qty: 30 TABLET | Refills: 3 | OUTPATIENT
Start: 2023-04-26

## 2023-04-26 NOTE — TELEPHONE ENCOUNTER
Rx request   Requested Prescriptions     Pending Prescriptions Disp Refills    hydrOXYzine HCl (ATARAX) 25 MG tablet [Pharmacy Med Name: hydroxyzine HCl 25 mg tablet] 90 tablet 1     Sig: Take 1 tablet by mouth every 8 hours as needed for Itching     LOV 4/3/2023  Next Visit Date:  Future Appointments   Date Time Provider Ermias Johnson   5/4/2023 11:15 AM Karon Danielle PA-C [address]   7/3/2023  9:00 AM Uzma Cao PA-C [address] [place] [address]

## 2023-04-26 NOTE — TELEPHONE ENCOUNTER
Patient states that she is having left shoulder pain. Asking if you will send new Rx for pain until she is seen by Tabitha Mo on 5/4/2023. Send to [facility] on [facility] . Patient states that she does not need the Rx for Hydroxyzine 25 mg. that was requested on 4/25/2023    Please advise. Callback # [phone]; ok to send a my chart message.

## 2023-04-28 ENCOUNTER — HOSPITAL ENCOUNTER (EMERGENCY)
Age: 28
Discharge: HOME | End: 2023-04-28
Attending: EMERGENCY MEDICINE
Payer: MEDICAID

## 2023-04-28 VITALS
WEIGHT: 133 LBS | TEMPERATURE: 98.3 F | RESPIRATION RATE: 18 BRPM | DIASTOLIC BLOOD PRESSURE: 72 MMHG | OXYGEN SATURATION: 99 % | BODY MASS INDEX: 23.57 KG/M2 | SYSTOLIC BLOOD PRESSURE: 136 MMHG | HEIGHT: 63 IN | HEART RATE: 100 BPM

## 2023-04-28 DIAGNOSIS — F41.1 ANXIETY STATE: Primary | ICD-10-CM

## 2023-04-28 PROCEDURE — 96372 THER/PROPH/DIAG INJ SC/IM: CPT | Performed by: EMERGENCY MEDICINE

## 2023-04-28 PROCEDURE — 99284 EMERGENCY DEPT VISIT MOD MDM: CPT | Performed by: EMERGENCY MEDICINE

## 2023-04-28 PROCEDURE — 6360000002 HC RX W HCPCS: Performed by: EMERGENCY MEDICINE

## 2023-04-28 RX ORDER — LORAZEPAM 2 MG/ML
2 INJECTION INTRAMUSCULAR ONCE
Status: COMPLETED | OUTPATIENT
Start: 2023-04-28 | End: 2023-04-28

## 2023-04-28 RX ADMIN — LORAZEPAM 2 MG: 2 INJECTION INTRAMUSCULAR; INTRAVENOUS at 09:57

## 2023-04-28 ASSESSMENT — ENCOUNTER SYMPTOMS
ABDOMINAL PAIN: 0
VOMITING: 0
NAUSEA: 0
DIARRHEA: 0
BACK PAIN: 0
SHORTNESS OF BREATH: 0
COUGH: 0
SORE THROAT: 0

## 2023-04-28 ASSESSMENT — PAIN - FUNCTIONAL ASSESSMENT: PAIN_FUNCTIONAL_ASSESSMENT: 0-10

## 2023-04-28 ASSESSMENT — PAIN SCALES - GENERAL: PAINLEVEL_OUTOF10: 0

## 2023-04-28 NOTE — ED NOTES
Pts ride arrived. Went over discharge paperwork, all questions answered. Belongings sent with pt.       Kulwinder Sam RN  04/28/23 5706

## 2023-04-28 NOTE — ED NOTES
Dr Justin Villanueva updated, patient resting comfortably.  Per Dr Alesha Thorne pending, let patient rest.     Carolina Hanson RN  04/28/23 7588

## 2023-04-28 NOTE — ED NOTES
Chart to zone 1. Dr Ismael Smart updated patient does not wish to be admitted to 3 or any inpatient, so we are holding off calling lab until he sees her and makes a determination.      Bhavya Flood RN  04/28/23 0764

## 2023-04-28 NOTE — DISCHARGE INSTR - COC
Continuity of Care Form    Patient Name: Andrei Preciado   :  1995  MRN:  [id number]    Admit date:  2023  Discharge date:  ***    Code Status Order: Prior   Advance Directives:     Admitting Physician:  No admitting provider for patient encounter. PCP: Jerman Osullivan PA-C    Discharging Nurse: [facility] Unit[facility] Phone Number: ***    Emergency Contact:   Extended Emergency Contact Information  Primary Emergency Contact: [facility] [place] Phone: [phone]  Mobile Phone: [phone]  Relation: Other  Preferred language: English   needed?  No    Past Surgical History:  Past Surgical History:   Procedure Laterality Date    SHOULDER ARTHROSCOPY Left 10/14/2022    LEFT ARTHROSCOPIC SHOULDER  SURGERY ANTERIOR LABRAL REPAIR AND CAPSULAR PLICATION performed by Anthony Foote MD at [facility]       Immunization History:   Immunization History   Administered Date(s) Administered    DTP 1995, 1995, 1996    DTaP 1995, 1995, 1996, 1997, 1999, 2000    DTaP vaccine 1997, 1999, 2000    HPV Quadrivalent (Gardasil) 2008, 2008, 2008    Hep B, ENGERIX-B, RECOMBIVAX-HB, (age Birth - 22y), IM, 0.5mL 1995, 1995, 1998    Hepatitis A 2013, 2013    Hepatitis B 1995, 1995, 1998    Hepatitis B vaccine 1995    Hib PRP-T, ACTHIB (age 2m-5y, Adlt Risk), HIBERIX (age 6w-4y, Adlt Risk), IM, 0.5mL 1995, 1995, 1996, 1997    Hib, unspecified 1995, 1995, 1996, 1997    Influenza 2012, 2013    Influenza Virus Vaccine 10/16/2009, 2012, 2018    Influenza, FLUARIX, FLULAVAL, Matthias Mahoney (age 10 mo+) AND AFLURIA, (age 1 y+), PF, 0.5mL 2021    MMR, Ryder Andrea, M-M-R II, (age 12m+), SC, 0.5mL 1997, 1999, 2000    Meningococcal ACWY, MENACTRA (MenACWY-D), (age

## 2023-04-28 NOTE — ED NOTES
Pt awake. States she is feeling much more level headed. Denies SI/HI. Called for ride and okay to d/c per Dr. Fide Walton.       Nicole Santiago RN  04/28/23 8793
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
## 2023-04-28 NOTE — ED PROVIDER NOTES
day's worth. Pt states that she ran out. Pt denies depression, SI/HI, AVH. Pt reassessed and feels better. Pt has another prescription but can't fill it for 5 days. Pt given anxiety warning signs and will f/u with pcp. FINAL IMPRESSION      1.  Anxiety state          DISPOSITION/PLAN   DISPOSITION Discharge - Pending Orders Complete 04/28/2023 10:46:11 AM        DISCHARGE MEDICATIONS:  [unfilled]         Kenny Alvarado MD(electronically signed)  Attending Emergency Physician            Kenny Alvarado MD  04/28/23 1046

## 2023-04-30 ENCOUNTER — APPOINTMENT (OUTPATIENT)
Dept: GENERAL RADIOLOGY | Age: 28
End: 2023-04-30
Payer: MEDICAID

## 2023-04-30 ENCOUNTER — HOSPITAL ENCOUNTER (EMERGENCY)
Age: 28
Discharge: HOME | End: 2023-04-30
Attending: EMERGENCY MEDICINE
Payer: MEDICAID

## 2023-04-30 VITALS
SYSTOLIC BLOOD PRESSURE: 125 MMHG | RESPIRATION RATE: 18 BRPM | HEART RATE: 91 BPM | TEMPERATURE: 97.6 F | DIASTOLIC BLOOD PRESSURE: 76 MMHG | OXYGEN SATURATION: 97 %

## 2023-04-30 DIAGNOSIS — M25.512 ARTHRALGIA OF SHOULDER REGION, LEFT: Primary | ICD-10-CM

## 2023-04-30 DIAGNOSIS — M24.412 RECURRENT DISLOCATION, LEFT SHOULDER: ICD-10-CM

## 2023-04-30 PROCEDURE — 73030 X-RAY EXAM OF SHOULDER: CPT

## 2023-04-30 PROCEDURE — 99283 EMERGENCY DEPT VISIT LOW MDM: CPT

## 2023-04-30 RX ORDER — OXYCODONE HYDROCHLORIDE AND ACETAMINOPHEN 5; 325 MG/1; MG/1
1 TABLET ORAL EVERY 6 HOURS PRN
Qty: 12 TABLET | Refills: 0 | Status: SHIPPED | OUTPATIENT
Start: 2023-04-30 | End: 2023-05-03 | Stop reason: DRUGHIGH

## 2023-04-30 ASSESSMENT — PAIN - FUNCTIONAL ASSESSMENT: PAIN_FUNCTIONAL_ASSESSMENT: 0-10

## 2023-04-30 ASSESSMENT — PAIN SCALES - GENERAL: PAINLEVEL_OUTOF10: 10

## 2023-04-30 ASSESSMENT — ENCOUNTER SYMPTOMS: RESPIRATORY NEGATIVE: 1

## 2023-05-02 ENCOUNTER — CARE COORDINATION (OUTPATIENT)
Dept: CARE COORDINATION | Age: 28
End: 2023-05-02

## 2023-05-02 NOTE — CARE COORDINATION
Telephone call to patient. Left voicemail of reason for call with request for return phone call. Call back number was provided. Unsuccessful with attempts to reach patient. Will discharge patient from [facility].

## 2023-05-03 ENCOUNTER — OFFICE VISIT (OUTPATIENT)
Dept: FAMILY MEDICINE CLINIC | Age: 28
End: 2023-05-03
Payer: MEDICAID

## 2023-05-03 VITALS
WEIGHT: 133 LBS | TEMPERATURE: 97.8 F | BODY MASS INDEX: 23.57 KG/M2 | OXYGEN SATURATION: 98 % | HEIGHT: 63 IN | DIASTOLIC BLOOD PRESSURE: 70 MMHG | RESPIRATION RATE: 18 BRPM | SYSTOLIC BLOOD PRESSURE: 130 MMHG | HEART RATE: 78 BPM

## 2023-05-03 DIAGNOSIS — S46.012A STRAIN OF MUSC/TEND THE ROTATOR CUFF OF LEFT SHOULDER, INIT: ICD-10-CM

## 2023-05-03 DIAGNOSIS — G47.09 TROUBLE GETTING TO SLEEP: ICD-10-CM

## 2023-05-03 DIAGNOSIS — M25.312 SHOULDER INSTABILITY, LEFT: Primary | ICD-10-CM

## 2023-05-03 DIAGNOSIS — F41.9 ANXIETY: ICD-10-CM

## 2023-05-03 DIAGNOSIS — M24.412 SHOULDER DISLOCATION, RECURRENT, LEFT: ICD-10-CM

## 2023-05-03 PROCEDURE — 99214 OFFICE O/P EST MOD 30 MIN: CPT | Performed by: PHYSICIAN ASSISTANT

## 2023-05-03 PROCEDURE — G8427 DOCREV CUR MEDS BY ELIG CLIN: HCPCS | Performed by: PHYSICIAN ASSISTANT

## 2023-05-03 PROCEDURE — 4004F PT TOBACCO SCREEN RCVD TLK: CPT | Performed by: PHYSICIAN ASSISTANT

## 2023-05-03 PROCEDURE — G8420 CALC BMI NORM PARAMETERS: HCPCS | Performed by: PHYSICIAN ASSISTANT

## 2023-05-03 RX ORDER — OXYCODONE AND ACETAMINOPHEN 7.5; 325 MG/1; MG/1
1 TABLET ORAL 2 TIMES DAILY PRN
Qty: 20 TABLET | Refills: 0 | Status: SHIPPED | OUTPATIENT
Start: 2023-05-03 | End: 2023-05-13

## 2023-05-03 RX ORDER — TEMAZEPAM 15 MG/1
15 CAPSULE ORAL NIGHTLY PRN
Qty: 30 CAPSULE | Refills: 0 | Status: SHIPPED | OUTPATIENT
Start: 2023-05-03 | End: 2023-06-02

## 2023-05-03 ASSESSMENT — ENCOUNTER SYMPTOMS
COLOR CHANGE: 0
CHEST TIGHTNESS: 0
BACK PAIN: 0
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
## 2023-05-04 ENCOUNTER — OFFICE VISIT (OUTPATIENT)
Dept: ORTHOPEDIC SURGERY | Age: 28
End: 2023-05-04
Payer: MEDICAID

## 2023-05-04 VITALS
BODY MASS INDEX: 23.78 KG/M2 | TEMPERATURE: 97.5 F | SYSTOLIC BLOOD PRESSURE: 117 MMHG | OXYGEN SATURATION: 99 % | DIASTOLIC BLOOD PRESSURE: 62 MMHG | HEART RATE: 79 BPM | WEIGHT: 134.2 LBS | HEIGHT: 63 IN

## 2023-05-04 DIAGNOSIS — M24.412 SHOULDER DISLOCATION, RECURRENT, LEFT: Primary | ICD-10-CM

## 2023-05-04 PROCEDURE — 99203 OFFICE O/P NEW LOW 30 MIN: CPT | Performed by: PHYSICIAN ASSISTANT

## 2023-05-04 PROCEDURE — G8427 DOCREV CUR MEDS BY ELIG CLIN: HCPCS | Performed by: PHYSICIAN ASSISTANT

## 2023-05-04 PROCEDURE — 4004F PT TOBACCO SCREEN RCVD TLK: CPT | Performed by: PHYSICIAN ASSISTANT

## 2023-05-04 PROCEDURE — G8420 CALC BMI NORM PARAMETERS: HCPCS | Performed by: PHYSICIAN ASSISTANT

## 2023-05-04 RX ORDER — HYDROCODONE BITARTRATE AND ACETAMINOPHEN 5; 325 MG/1; MG/1
1 TABLET ORAL EVERY 4 HOURS PRN
Qty: 18 TABLET | Refills: 0 | Status: SHIPPED | OUTPATIENT
Start: 2023-05-04 | End: 2023-05-07

## 2023-05-04 NOTE — PROGRESS NOTES
[facility]      Subjective:      Chief Complaint   Patient presents with    Follow-up     Reoccurring dislocation, Lt Rotator Cuff Repair        HPI: Bry Marks is a 29 y.o. female who is here after recurrent dislocations after labral repair by Dr. Lyudmila Benitez about 5 to 6 months ago. She did not follow-up therapy with sling protocols. She has continuous no-shows. She is here today complaining of recurrence of dislocations which happens about 10 times a week. No numbness or tingling down her arm. No significant pain outside of her shoulder.     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     Migraine     Seizure disorder (HCC)     Seizures ([address]     Varicella       Past Surgical History:   Procedure Laterality Date    SHOULDER ARTHROSCOPY Left 10/14/2022    LEFT ARTHROSCOPIC SHOULDER  SURGERY ANTERIOR LABRAL REPAIR AND CAPSULAR PLICATION performed by Tia An MD at [address] [place] [address] History     Socioeconomic History    Marital status: Single     Spouse name: Not on file    Number of children: Not on file    Years of education: Not on file    Highest education level: Not on file   Occupational History    Not on file   Tobacco Use    Smoking status: Every Day     Packs/day: 0.50     Years: 9.00     Pack years: 4.50     Types: Cigarettes     Start date: 2012     Passive exposure: Current    Smokeless tobacco: Never   Vaping Use    Vaping Use: Some days    Substances: Nicotine, THC, CBD, Flavoring    Devices: Disposable, Pre-filled or refillable cartridge, Refillable tank, Pre-filled pod    Passive vaping exposure: Yes   Substance and Sexual Activity    Alcohol use: No    Drug use: Yes     Types: Marijuana Kassiejeariela Mosquera)    Sexual activity: Yes     Partners: Male   Other Topics Concern    Not on file   Social History Narrative    Not on file     Social Determinants of Health     Financial Resource Strain: Low Risk

## 2023-05-11 ENCOUNTER — TELEPHONE (OUTPATIENT)
Dept: FAMILY MEDICINE CLINIC | Age: 28
End: 2023-05-11

## 2023-05-11 DIAGNOSIS — M24.412 RECURRENT DISLOCATION, LEFT SHOULDER: ICD-10-CM

## 2023-05-11 NOTE — TELEPHONE ENCOUNTER
Patient says that Restoril is not helping her sleep and she does not feel comfortable taking it. She is requesting something different to help her sleep.     LOV  5/3/2023  Scheduled  5/19/2023    [phone];ok to leave a VM

## 2023-05-12 RX ORDER — HYDROCODONE BITARTRATE AND ACETAMINOPHEN 5; 325 MG/1; MG/1
TABLET ORAL
Qty: 18 TABLET | Refills: 0 | OUTPATIENT
Start: 2023-05-12

## 2023-05-12 NOTE — TELEPHONE ENCOUNTER
Discuss at her follow up on 5/19 please. I don't feel comfortable sending other controlled medications without an in-person discussion. THank you.

## 2023-05-19 ENCOUNTER — OFFICE VISIT (OUTPATIENT)
Dept: FAMILY MEDICINE CLINIC | Age: 28
End: 2023-05-19
Payer: MEDICAID

## 2023-05-19 VITALS
OXYGEN SATURATION: 98 % | WEIGHT: 134 LBS | BODY MASS INDEX: 23.74 KG/M2 | SYSTOLIC BLOOD PRESSURE: 118 MMHG | HEART RATE: 109 BPM | RESPIRATION RATE: 16 BRPM | DIASTOLIC BLOOD PRESSURE: 60 MMHG | HEIGHT: 63 IN

## 2023-05-19 DIAGNOSIS — G47.09 TROUBLE GETTING TO SLEEP: Primary | ICD-10-CM

## 2023-05-19 DIAGNOSIS — M25.312 SHOULDER INSTABILITY, LEFT: ICD-10-CM

## 2023-05-19 DIAGNOSIS — M25.512 CHRONIC LEFT SHOULDER PAIN: ICD-10-CM

## 2023-05-19 DIAGNOSIS — F31.75 BIPOLAR DISORDER, IN PARTIAL REMISSION, MOST RECENT EPISODE DEPRESSED (HCC): ICD-10-CM

## 2023-05-19 DIAGNOSIS — G89.29 CHRONIC LEFT SHOULDER PAIN: ICD-10-CM

## 2023-05-19 DIAGNOSIS — Z59.819 HOUSING INSTABILITY: ICD-10-CM

## 2023-05-19 PROCEDURE — 4004F PT TOBACCO SCREEN RCVD TLK: CPT | Performed by: PHYSICIAN ASSISTANT

## 2023-05-19 PROCEDURE — G8420 CALC BMI NORM PARAMETERS: HCPCS | Performed by: PHYSICIAN ASSISTANT

## 2023-05-19 PROCEDURE — 99214 OFFICE O/P EST MOD 30 MIN: CPT | Performed by: PHYSICIAN ASSISTANT

## 2023-05-19 PROCEDURE — G8427 DOCREV CUR MEDS BY ELIG CLIN: HCPCS | Performed by: PHYSICIAN ASSISTANT

## 2023-05-19 RX ORDER — ESZOPICLONE 2 MG/1
2 TABLET, FILM COATED ORAL NIGHTLY
Qty: 30 TABLET | Refills: 0 | Status: SHIPPED | OUTPATIENT
Start: 2023-05-19 | End: 2024-05-18

## 2023-05-19 SDOH — ECONOMIC STABILITY - HOUSING INSECURITY: HOUSING INSTABILITY UNSPECIFIED: Z59.819

## 2023-05-19 ASSESSMENT — ENCOUNTER SYMPTOMS
CHEST TIGHTNESS: 0
BACK PAIN: 0
COLOR CHANGE: 0

## 2023-05-19 NOTE — PROGRESS NOTES
Subjective  Tyler Del Rio, 29 y.o. female presents today with:  Chief Complaint   Patient presents with    Follow-up     Discuss medication would like a different medication for sleep, current medication is restoril which she states is not helping her      HPI  Rom Nogueira is in the office today for follow up  Last OV with me: 5/3/23    Missed appointment with Lilibeth Robison, Dr. Halle Barrett, for L shoulder instability/dislocation. Followed up with [address] [facility] ortho. Was given pain medication, nothing else can be done at this time until patient is in a stable home/location. Shoulder is hurting constantly. Dislocates 10+ times/week, per patient. Reports recently getting kicked out of [facility]shelter. Currently living with/staying with an ex-boyfriend. Feels unsure of her time there. Has applications out for housing through [facility]. She has a disability case open for disability. Very much in [facility]. Chronic insomnia. Not sleeping. Feels very on edge. Ativan helps with panic, but does not do well for sleeping. Was on ambien years ago, nervous about the side effects. Feels extremely lost, frustrated. Denies SI/HI. She does have a  at [facility]. Review of Systems   Constitutional:  Positive for activity change. Negative for diaphoresis and fatigue. Respiratory:  Negative for chest tightness. Cardiovascular:  Negative for chest pain and leg swelling. Musculoskeletal:  Positive for arthralgias and myalgias. Negative for back pain, gait problem, joint swelling and neck pain. Skin:  Negative for color change, rash and wound. Neurological:  Positive for weakness (improved) and numbness (intermittent, LUE). Negative for dizziness, tremors, seizures, facial asymmetry, light-headedness and headaches. Psychiatric/Behavioral:  Positive for dysphoric mood and sleep disturbance.  Negative for agitation, behavioral problems, confusion, decreased concentration, hallucinations, self-injury and suicidal

## 2023-05-22 ENCOUNTER — CARE COORDINATION (OUTPATIENT)
Dept: CARE COORDINATION | Age: 28
End: 2023-05-22

## 2023-05-22 NOTE — CARE COORDINATION
Requested by Mary Lawson PA-C to assist patient with housing as she was asked to leave homeless shelter. Telephone call and they provided the following low income housing options :  [facility]revor English [phone], [facility] [phone], [address] [place] [address] [phone], and [address] [place] [address] [phone].

## 2023-05-23 ENCOUNTER — CARE COORDINATION (OUTPATIENT)
Dept: CARE COORDINATION | Age: 28
End: 2023-05-23

## 2023-05-28 ENCOUNTER — HOSPITAL ENCOUNTER (EMERGENCY)
Age: 28
Discharge: HOME | End: 2023-05-29
Payer: MEDICAID

## 2023-05-28 ENCOUNTER — APPOINTMENT (OUTPATIENT)
Dept: CT IMAGING | Age: 28
End: 2023-05-28
Payer: MEDICAID

## 2023-05-28 DIAGNOSIS — G40.919 BREAKTHROUGH SEIZURE (HCC): Primary | ICD-10-CM

## 2023-05-28 DIAGNOSIS — Z91.148 NONCOMPLIANCE WITH MEDICATION REGIMEN: ICD-10-CM

## 2023-05-28 PROCEDURE — 96365 THER/PROPH/DIAG IV INF INIT: CPT

## 2023-05-28 PROCEDURE — 70450 CT HEAD/BRAIN W/O DYE: CPT

## 2023-05-28 PROCEDURE — 82077 ASSAY SPEC XCP UR&BREATH IA: CPT

## 2023-05-28 PROCEDURE — 80185 ASSAY OF PHENYTOIN TOTAL: CPT

## 2023-05-28 PROCEDURE — 80053 COMPREHEN METABOLIC PANEL: CPT

## 2023-05-28 PROCEDURE — 83605 ASSAY OF LACTIC ACID: CPT

## 2023-05-28 PROCEDURE — 36415 COLL VENOUS BLD VENIPUNCTURE: CPT

## 2023-05-28 PROCEDURE — 85025 COMPLETE CBC W/AUTO DIFF WBC: CPT

## 2023-05-28 PROCEDURE — 99284 EMERGENCY DEPT VISIT MOD MDM: CPT

## 2023-05-28 PROCEDURE — 80164 ASSAY DIPROPYLACETIC ACD TOT: CPT

## 2023-05-28 PROCEDURE — 84146 ASSAY OF PROLACTIN: CPT

## 2023-05-28 ASSESSMENT — PAIN DESCRIPTION - LOCATION: LOCATION: HEAD

## 2023-05-28 ASSESSMENT — PAIN SCALES - GENERAL: PAINLEVEL_OUTOF10: 8

## 2023-05-28 ASSESSMENT — PAIN - FUNCTIONAL ASSESSMENT: PAIN_FUNCTIONAL_ASSESSMENT: 0-10

## 2023-05-29 VITALS
HEART RATE: 74 BPM | DIASTOLIC BLOOD PRESSURE: 56 MMHG | OXYGEN SATURATION: 100 % | RESPIRATION RATE: 16 BRPM | SYSTOLIC BLOOD PRESSURE: 108 MMHG | TEMPERATURE: 97.8 F | BODY MASS INDEX: 23.04 KG/M2 | HEIGHT: 63 IN | WEIGHT: 130 LBS

## 2023-05-29 LAB
ALBUMIN SERPL-MCNC: 4.2 G/DL (ref 3.5–4.6)
ALP SERPL-CCNC: 84 U/L (ref 40–130)
ALT SERPL-CCNC: 31 U/L (ref 0–33)
ANION GAP SERPL CALCULATED.3IONS-SCNC: 12 MEQ/L (ref 9–15)
ANISOCYTOSIS BLD QL SMEAR: ABNORMAL
AST SERPL-CCNC: 48 U/L (ref 0–35)
BASOPHILS # BLD: 0 K/UL (ref 0–0.2)
BASOPHILS NFR BLD: 0 %
BILIRUB SERPL-MCNC: 0.3 MG/DL (ref 0.2–0.7)
BUN SERPL-MCNC: 10 MG/DL (ref 6–20)
CALCIUM SERPL-MCNC: 8.8 MG/DL (ref 8.5–9.9)
CHLORIDE SERPL-SCNC: 100 MEQ/L (ref 95–107)
CO2 SERPL-SCNC: 21 MEQ/L (ref 20–31)
CREAT SERPL-MCNC: 0.59 MG/DL (ref 0.5–0.9)
EOSINOPHIL # BLD: 0 K/UL (ref 0–0.7)
EOSINOPHIL NFR BLD: 0 %
ERYTHROCYTE [DISTWIDTH] IN BLOOD BY AUTOMATED COUNT: 19 % (ref 11.5–14.5)
ETHANOL PERCENT: NORMAL G/DL
ETHANOLAMINE SERPL-MCNC: <10 MG/DL (ref 0–0.08)
GLOBULIN SER CALC-MCNC: 2.5 G/DL (ref 2.3–3.5)
GLUCOSE SERPL-MCNC: 87 MG/DL (ref 70–99)
HCT VFR BLD AUTO: 31.9 % (ref 37–47)
HGB BLD-MCNC: 9.9 G/DL (ref 12–16)
LACTATE BLDV-SCNC: 1.5 MMOL/L (ref 0.5–2.2)
LYMPHOCYTES # BLD: 1.6 K/UL (ref 1–4.8)
LYMPHOCYTES NFR BLD: 13 %
MCH RBC QN AUTO: 22 PG (ref 27–31.3)
MCHC RBC AUTO-ENTMCNC: 31 % (ref 33–37)
MCV RBC AUTO: 70.9 FL (ref 79.4–94.8)
MONOCYTES # BLD: 1.1 K/UL (ref 0.2–0.8)
MONOCYTES NFR BLD: 9 %
NEUTROPHILS # BLD: 9.4 K/UL (ref 1.4–6.5)
NEUTS SEG NFR BLD: 78 %
PHENYTOIN SERPL-MCNC: 1.3 UG/ML (ref 10–20)
PLATELET # BLD AUTO: 420 K/UL (ref 130–400)
PLATELET BLD QL SMEAR: ADEQUATE
POIKILOCYTOSIS BLD QL SMEAR: ABNORMAL
POTASSIUM SERPL-SCNC: 4.3 MEQ/L (ref 3.4–4.9)
PROLACTIN SERPL-MCNC: 12.3 NG/ML
PROT SERPL-MCNC: 6.7 G/DL (ref 6.3–8)
RBC # BLD AUTO: 4.5 M/UL (ref 4.2–5.4)
SODIUM SERPL-SCNC: 133 MEQ/L (ref 135–144)
VALPROATE SERPL-MCNC: <2.8 UG/ML (ref 50–100)
WBC # BLD AUTO: 12 K/UL (ref 4.8–10.8)

## 2023-05-29 PROCEDURE — 6360000002 HC RX W HCPCS

## 2023-05-29 PROCEDURE — 96365 THER/PROPH/DIAG IV INF INIT: CPT

## 2023-05-29 PROCEDURE — 2580000003 HC RX 258

## 2023-05-29 PROCEDURE — 6370000000 HC RX 637 (ALT 250 FOR IP)

## 2023-05-29 RX ORDER — DIVALPROEX SODIUM 250 MG/1
500 TABLET, DELAYED RELEASE ORAL ONCE
Status: COMPLETED | OUTPATIENT
Start: 2023-05-29 | End: 2023-05-29

## 2023-05-29 RX ORDER — ACETAMINOPHEN 500 MG
1000 TABLET ORAL ONCE
Status: COMPLETED | OUTPATIENT
Start: 2023-05-29 | End: 2023-05-29

## 2023-05-29 RX ADMIN — DIVALPROEX SODIUM 500 MG: 250 TABLET, DELAYED RELEASE ORAL at 03:16

## 2023-05-29 RX ADMIN — ACETAMINOPHEN 1000 MG: 500 TABLET ORAL at 04:39

## 2023-05-29 RX ADMIN — PHENYTOIN SODIUM 500 MG: 50 INJECTION INTRAMUSCULAR; INTRAVENOUS at 03:29

## 2023-05-29 NOTE — ED PROVIDER NOTES
[address] [facility] ED  eMERGENCY dEPARTMENT eNCOUnter      Pt Name: Liang Peña  MRN: [id number]  Marlingfantionette 1995  Date of evaluation: 5/28/2023  Provider: EMMANUELLE Mike        HISTORY OF PRESENT ILLNESS    Liang Peña is a 29 y.o. female per chart review has a PMHx of migraines, seizures, anxiety presents to ED for evaluation following seizure. Patient reports that she had a witnessed seizure that lasted approximately 7 minutes prior to arrival in the emergency department. Patient states that she is unsure of head injury. Patient does report that she bit her tongue. Patient reports that she takes Depakote 500 mg 3 times daily, Dilantin 200 mg in the a.m. and 300 mg in the p.m. Patient does reports that she missed her medication for the last 2 to 3 days. States that she was unable to access them for a period of time. Patient reports that she does follow with Dr. Bisi Adrian (neurology) and states that her last seizure was approximately 3 to 4 months ago. States that she has been doing well on her current medications. Patient is A/Ox4 on arrival to the emergency department. Patient denies chest pain, shortness of breath, N/V/D, fever, chills. REVIEW OF SYSTEMS       Review of Systems   Constitutional:  Negative for activity change, appetite change, chills, fatigue and fever. HENT:  Negative for congestion. Eyes:  Negative for pain, discharge and itching. Respiratory:  Negative for cough and shortness of breath. Cardiovascular:  Negative for chest pain, palpitations and leg swelling. Gastrointestinal:  Negative for abdominal pain, constipation, diarrhea, nausea and vomiting. Endocrine: Negative for polydipsia, polyphagia and polyuria. Genitourinary:  Negative for difficulty urinating and dysuria. Musculoskeletal:  Negative for arthralgias and myalgias. Skin:  Negative for rash and wound. Allergic/Immunologic: Negative. Neurological:  Positive for seizures.

## 2023-05-29 NOTE — DISCHARGE INSTRUCTIONS
Please ensure that you take your Depakote, Dilantin tomorrow as prescribed by neurology. Follow-up with PCP, neurology. Return to ED if any new or worsening symptoms.

## 2023-05-29 NOTE — ED NOTES
Asked patient if she could provide a urine sample she stated she couldn't at this time     Tennille Gold RN  05/29/23 0121

## 2023-06-01 ENCOUNTER — CARE COORDINATION (OUTPATIENT)
Dept: CARE COORDINATION | Age: 28
End: 2023-06-01

## 2023-06-01 ASSESSMENT — ENCOUNTER SYMPTOMS
ABDOMINAL PAIN: 0
EYE PAIN: 0
NAUSEA: 0
VOMITING: 0
CONSTIPATION: 0
ALLERGIC/IMMUNOLOGIC NEGATIVE: 1
EYE ITCHING: 0
COUGH: 0
DIARRHEA: 0
EYE DISCHARGE: 0
SHORTNESS OF BREATH: 0

## 2023-06-03 DIAGNOSIS — R56.9 SEIZURE (HCC): ICD-10-CM

## 2023-06-03 DIAGNOSIS — G47.09 TROUBLE GETTING TO SLEEP: ICD-10-CM

## 2023-06-03 DIAGNOSIS — F43.0 PANIC STATE AS ACUTE REACTION TO STRESS: ICD-10-CM

## 2023-06-05 RX ORDER — DIVALPROEX SODIUM 500 MG/1
TABLET, DELAYED RELEASE ORAL
Qty: 60 TABLET | Refills: 3 | OUTPATIENT
Start: 2023-06-05

## 2023-06-07 LAB
AMPHET UR QL SCN: ABNORMAL
BARBITURATES UR QL SCN: ABNORMAL
BENZODIAZ UR QL SCN: ABNORMAL
CANNABINOIDS UR QL SCN: POSITIVE
COCAINE UR QL SCN: ABNORMAL
DRUG SCREEN COMMENT UR-IMP: ABNORMAL
FENTANYL SCREEN, URINE: ABNORMAL
METHADONE UR QL SCN: ABNORMAL
OPIATES UR QL SCN: ABNORMAL
OXYCODONE UR QL SCN: ABNORMAL
PCP UR QL SCN: ABNORMAL
PROPOXYPH UR QL SCN: ABNORMAL

## 2023-06-08 ENCOUNTER — CARE COORDINATION (OUTPATIENT)
Dept: CARE COORDINATION | Age: 28
End: 2023-06-08

## 2023-06-08 NOTE — TELEPHONE ENCOUNTER
Comments: This request is coming from the pharmacy. Patient did her urine screen. Last Office Visit (last PCP visit):   5/19/2023    Next Visit Date:  Future Appointments   Date Time Provider [facility] [address]   7/6/2023  1:00 PM RULA Eaton [address]       If hasn't been seen in over a year OR hasn't followed up according to last diabetes/ADHD visit, make appointment for patient before sending refill to provider. Rx requested:  Requested Prescriptions     Pending Prescriptions Disp Refills    hydrOXYzine HCl (ATARAX) 25 MG tablet [Pharmacy Med Name: hydroxyzine HCl 25 mg tablet] 90 tablet 1     Sig: Take 1 tablet by mouth every 8 hours as needed for Itching    eszopiclone (LUNESTA) 2 MG TABS [Pharmacy Med Name: eszopiclone 2 mg tablet] 30 tablet 1     Sig: Take 1 tablet by mouth nightly.  Max Daily Amount: 2 mg

## 2023-06-09 RX ORDER — HYDROXYZINE HYDROCHLORIDE 25 MG/1
25 TABLET, FILM COATED ORAL EVERY 8 HOURS PRN
Qty: 90 TABLET | Refills: 1 | Status: SHIPPED | OUTPATIENT
Start: 2023-06-09 | End: 2023-08-04

## 2023-06-09 RX ORDER — ESZOPICLONE 2 MG/1
2 TABLET, FILM COATED ORAL NIGHTLY
Qty: 30 TABLET | Refills: 2 | Status: SHIPPED | OUTPATIENT
Start: 2023-06-09 | End: 2023-07-06

## 2023-06-22 ENCOUNTER — CARE COORDINATION (OUTPATIENT)
Dept: CARE COORDINATION | Age: 28
End: 2023-06-22

## 2023-06-29 ENCOUNTER — CARE COORDINATION (OUTPATIENT)
Dept: CARE COORDINATION | Age: 28
End: 2023-06-29

## 2023-07-06 ENCOUNTER — OFFICE VISIT (OUTPATIENT)
Dept: FAMILY MEDICINE CLINIC | Age: 28
End: 2023-07-06
Payer: MEDICAID

## 2023-07-06 VITALS
BODY MASS INDEX: 24.45 KG/M2 | WEIGHT: 138 LBS | HEART RATE: 99 BPM | DIASTOLIC BLOOD PRESSURE: 70 MMHG | SYSTOLIC BLOOD PRESSURE: 120 MMHG | OXYGEN SATURATION: 98 % | HEIGHT: 63 IN | RESPIRATION RATE: 16 BRPM

## 2023-07-06 DIAGNOSIS — F43.0 PANIC STATE AS ACUTE REACTION TO STRESS: ICD-10-CM

## 2023-07-06 DIAGNOSIS — R56.9 SEIZURE (HCC): ICD-10-CM

## 2023-07-06 DIAGNOSIS — D50.8 IRON DEFICIENCY ANEMIA SECONDARY TO INADEQUATE DIETARY IRON INTAKE: ICD-10-CM

## 2023-07-06 DIAGNOSIS — Z59.819 HOUSING INSTABILITY: ICD-10-CM

## 2023-07-06 DIAGNOSIS — R10.2 PELVIC PAIN: Primary | ICD-10-CM

## 2023-07-06 PROCEDURE — 99214 OFFICE O/P EST MOD 30 MIN: CPT | Performed by: PHYSICIAN ASSISTANT

## 2023-07-06 PROCEDURE — G8427 DOCREV CUR MEDS BY ELIG CLIN: HCPCS | Performed by: PHYSICIAN ASSISTANT

## 2023-07-06 PROCEDURE — G8420 CALC BMI NORM PARAMETERS: HCPCS | Performed by: PHYSICIAN ASSISTANT

## 2023-07-06 PROCEDURE — 4004F PT TOBACCO SCREEN RCVD TLK: CPT | Performed by: PHYSICIAN ASSISTANT

## 2023-07-06 RX ORDER — QUINIDINE GLUCONATE 324 MG
240 TABLET, EXTENDED RELEASE ORAL
Qty: 90 TABLET | Refills: 2 | Status: SHIPPED | OUTPATIENT
Start: 2023-07-06

## 2023-07-06 RX ORDER — LORAZEPAM 0.5 MG/1
0.5 TABLET ORAL 2 TIMES DAILY PRN
Qty: 60 TABLET | Refills: 2 | Status: SHIPPED | OUTPATIENT
Start: 2023-07-06 | End: 2023-10-04

## 2023-07-06 RX ORDER — IBUPROFEN 800 MG/1
800 TABLET ORAL 2 TIMES DAILY PRN
Qty: 60 TABLET | Refills: 0 | Status: SHIPPED | OUTPATIENT
Start: 2023-07-06

## 2023-07-06 SDOH — ECONOMIC STABILITY - HOUSING INSECURITY: HOUSING INSTABILITY UNSPECIFIED: Z59.819

## 2023-07-06 ASSESSMENT — ENCOUNTER SYMPTOMS
CHEST TIGHTNESS: 0
BACK PAIN: 0
COLOR CHANGE: 0
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
## 2023-07-07 ENCOUNTER — HOSPITAL ENCOUNTER (EMERGENCY)
Age: 28
Discharge: HOME | End: 2023-07-07
Attending: EMERGENCY MEDICINE
Payer: MEDICAID

## 2023-07-07 VITALS
SYSTOLIC BLOOD PRESSURE: 119 MMHG | RESPIRATION RATE: 16 BRPM | HEIGHT: 63 IN | OXYGEN SATURATION: 98 % | BODY MASS INDEX: 24.45 KG/M2 | TEMPERATURE: 98.3 F | HEART RATE: 75 BPM | WEIGHT: 138 LBS | DIASTOLIC BLOOD PRESSURE: 74 MMHG

## 2023-07-07 DIAGNOSIS — G40.919 BREAKTHROUGH SEIZURE (HCC): Primary | ICD-10-CM

## 2023-07-07 LAB
ALBUMIN SERPL-MCNC: 4.5 G/DL (ref 3.5–4.6)
ALP SERPL-CCNC: 78 U/L (ref 40–130)
ALT SERPL-CCNC: 23 U/L (ref 0–33)
ANION GAP SERPL CALCULATED.3IONS-SCNC: 8 MEQ/L (ref 9–15)
ANISOCYTOSIS BLD QL SMEAR: ABNORMAL
AST SERPL-CCNC: 21 U/L (ref 0–35)
BASOPHILS # BLD: 0.1 K/UL (ref 0–0.2)
BASOPHILS NFR BLD: 1 %
BILIRUB SERPL-MCNC: <0.2 MG/DL (ref 0.2–0.7)
BUN SERPL-MCNC: 9 MG/DL (ref 6–20)
CALCIUM SERPL-MCNC: 8.5 MG/DL (ref 8.5–9.9)
CHLORIDE SERPL-SCNC: 107 MEQ/L (ref 95–107)
CO2 SERPL-SCNC: 22 MEQ/L (ref 20–31)
CREAT SERPL-MCNC: 0.5 MG/DL (ref 0.5–0.9)
EOSINOPHIL # BLD: 0 K/UL (ref 0–0.7)
EOSINOPHIL NFR BLD: 0 %
ERYTHROCYTE [DISTWIDTH] IN BLOOD BY AUTOMATED COUNT: 17.4 % (ref 11.5–14.5)
GLOBULIN SER CALC-MCNC: 2.5 G/DL (ref 2.3–3.5)
GLUCOSE SERPL-MCNC: 94 MG/DL (ref 70–99)
HCT VFR BLD AUTO: 34.7 % (ref 37–47)
HGB BLD-MCNC: 10.5 G/DL (ref 12–16)
LYMPHOCYTES # BLD: 2 K/UL (ref 1–4.8)
LYMPHOCYTES NFR BLD: 12 %
MAGNESIUM SERPL-MCNC: 2.4 MG/DL (ref 1.7–2.4)
MCH RBC QN AUTO: 21.1 PG (ref 27–31.3)
MCHC RBC AUTO-ENTMCNC: 30.2 % (ref 33–37)
MCV RBC AUTO: 69.8 FL (ref 79.4–94.8)
METAMYELOCYTES NFR BLD MANUAL: 1 %
MICROCYTES BLD QL SMEAR: ABNORMAL
MONOCYTES # BLD: 0.6 K/UL (ref 0.2–0.8)
MONOCYTES NFR BLD: 4 %
NEUTROPHILS # BLD: 11.4 K/UL (ref 1.4–6.5)
NEUTS SEG NFR BLD: 80 %
PLATELET # BLD AUTO: 357 K/UL (ref 130–400)
PLATELET BLD QL SMEAR: ADEQUATE
POTASSIUM SERPL-SCNC: 4.3 MEQ/L (ref 3.4–4.9)
PROT SERPL-MCNC: 7 G/DL (ref 6.3–8)
RBC # BLD AUTO: 4.98 M/UL (ref 4.2–5.4)
SODIUM SERPL-SCNC: 137 MEQ/L (ref 135–144)
VARIANT LYMPHS NFR BLD: 2 %
WBC # BLD AUTO: 14.1 K/UL (ref 4.8–10.8)

## 2023-07-07 PROCEDURE — 6370000000 HC RX 637 (ALT 250 FOR IP): Performed by: EMERGENCY MEDICINE

## 2023-07-07 PROCEDURE — 85025 COMPLETE CBC W/AUTO DIFF WBC: CPT

## 2023-07-07 PROCEDURE — 36415 COLL VENOUS BLD VENIPUNCTURE: CPT

## 2023-07-07 PROCEDURE — 99284 EMERGENCY DEPT VISIT MOD MDM: CPT

## 2023-07-07 PROCEDURE — 80053 COMPREHEN METABOLIC PANEL: CPT

## 2023-07-07 PROCEDURE — 2580000003 HC RX 258: Performed by: EMERGENCY MEDICINE

## 2023-07-07 PROCEDURE — 96365 THER/PROPH/DIAG IV INF INIT: CPT

## 2023-07-07 PROCEDURE — 6360000002 HC RX W HCPCS: Performed by: EMERGENCY MEDICINE

## 2023-07-07 PROCEDURE — 83735 ASSAY OF MAGNESIUM: CPT

## 2023-07-07 RX ORDER — 0.9 % SODIUM CHLORIDE 0.9 %
1000 INTRAVENOUS SOLUTION INTRAVENOUS ONCE
Status: COMPLETED | OUTPATIENT
Start: 2023-07-07 | End: 2023-07-07

## 2023-07-07 RX ORDER — DIVALPROEX SODIUM 250 MG/1
500 TABLET, DELAYED RELEASE ORAL ONCE
Status: COMPLETED | OUTPATIENT
Start: 2023-07-07 | End: 2023-07-07

## 2023-07-07 RX ADMIN — DIVALPROEX SODIUM 500 MG: 250 TABLET, DELAYED RELEASE ORAL at 11:16

## 2023-07-07 RX ADMIN — PHENYTOIN SODIUM 940 MG: 50 INJECTION INTRAMUSCULAR; INTRAVENOUS at 11:22

## 2023-07-07 RX ADMIN — SODIUM CHLORIDE 1000 ML: 9 INJECTION, SOLUTION INTRAVENOUS at 11:19

## 2023-07-07 ASSESSMENT — PAIN DESCRIPTION - PAIN TYPE: TYPE: ACUTE PAIN

## 2023-07-07 ASSESSMENT — PAIN - FUNCTIONAL ASSESSMENT: PAIN_FUNCTIONAL_ASSESSMENT: 0-10

## 2023-07-07 ASSESSMENT — PAIN DESCRIPTION - ONSET: ONSET: ON-GOING

## 2023-07-07 ASSESSMENT — PAIN DESCRIPTION - ORIENTATION: ORIENTATION: RIGHT

## 2023-07-07 ASSESSMENT — PAIN SCALES - GENERAL: PAINLEVEL_OUTOF10: 8

## 2023-07-07 NOTE — ED NOTES
Labs obtained by this RN, labeled and sent to lab via tube system.        Casi Almazan RN  07/07/23 7176

## 2023-07-07 NOTE — ED NOTES
Dr. Yousif Martinez at the bedside at this time. Assessment completed.        Mell Butt RN  07/07/23 6925

## 2023-07-07 NOTE — ED TRIAGE NOTES
Pt states she thinks she had a seizure during the night. Pt woke up feeling ill, pain in left side of head, tongue, rt knee and rt toe.

## 2023-07-12 ENCOUNTER — HOSPITAL ENCOUNTER (EMERGENCY)
Age: 28
Discharge: HOME | End: 2023-07-12
Payer: MEDICAID

## 2023-07-12 ENCOUNTER — APPOINTMENT (OUTPATIENT)
Dept: CT IMAGING | Age: 28
End: 2023-07-12
Payer: MEDICAID

## 2023-07-12 VITALS
TEMPERATURE: 97.9 F | SYSTOLIC BLOOD PRESSURE: 111 MMHG | BODY MASS INDEX: 24.45 KG/M2 | HEART RATE: 82 BPM | WEIGHT: 138 LBS | DIASTOLIC BLOOD PRESSURE: 63 MMHG | OXYGEN SATURATION: 98 % | HEIGHT: 63 IN | RESPIRATION RATE: 15 BRPM

## 2023-07-12 DIAGNOSIS — N39.0 URINARY TRACT INFECTION WITH HEMATURIA, SITE UNSPECIFIED: ICD-10-CM

## 2023-07-12 DIAGNOSIS — R31.9 URINARY TRACT INFECTION WITH HEMATURIA, SITE UNSPECIFIED: ICD-10-CM

## 2023-07-12 DIAGNOSIS — F19.10 POLYSUBSTANCE ABUSE (HCC): ICD-10-CM

## 2023-07-12 DIAGNOSIS — K52.9 COLITIS: Primary | ICD-10-CM

## 2023-07-12 LAB
ALBUMIN SERPL-MCNC: 3.6 G/DL (ref 3.5–4.6)
ALP SERPL-CCNC: 82 U/L (ref 40–130)
ALT SERPL-CCNC: 15 U/L (ref 0–33)
AMPHET UR QL SCN: ABNORMAL
ANION GAP SERPL CALCULATED.3IONS-SCNC: 13 MEQ/L (ref 9–15)
AST SERPL-CCNC: 17 U/L (ref 0–35)
BACTERIA URNS QL MICRO: ABNORMAL /HPF
BARBITURATES UR QL SCN: ABNORMAL
BASOPHILS # BLD: 0.1 K/UL (ref 0–0.2)
BASOPHILS NFR BLD: 0.8 %
BENZODIAZ UR QL SCN: ABNORMAL
BILIRUB SERPL-MCNC: <0.2 MG/DL (ref 0.2–0.7)
BILIRUB UR QL STRIP: NEGATIVE
BUN SERPL-MCNC: 6 MG/DL (ref 6–20)
CALCIUM SERPL-MCNC: 9 MG/DL (ref 8.5–9.9)
CANNABINOIDS UR QL SCN: POSITIVE
CHLORIDE SERPL-SCNC: 105 MEQ/L (ref 95–107)
CLARITY UR: CLEAR
CO2 SERPL-SCNC: 19 MEQ/L (ref 20–31)
COCAINE UR QL SCN: POSITIVE
COLOR UR: ABNORMAL
CREAT SERPL-MCNC: 0.45 MG/DL (ref 0.5–0.9)
DRUG SCREEN COMMENT UR-IMP: ABNORMAL
EOSINOPHIL # BLD: 0.2 K/UL (ref 0–0.7)
EOSINOPHIL NFR BLD: 2 %
EPI CELLS #/AREA URNS AUTO: ABNORMAL /HPF (ref 0–5)
ERYTHROCYTE [DISTWIDTH] IN BLOOD BY AUTOMATED COUNT: 18 % (ref 11.5–14.5)
FENTANYL SCREEN, URINE: ABNORMAL
GLOBULIN SER CALC-MCNC: 2.9 G/DL (ref 2.3–3.5)
GLUCOSE SERPL-MCNC: 109 MG/DL (ref 70–99)
GLUCOSE UR STRIP-MCNC: NEGATIVE MG/DL
HCG, URINE, POC: NEGATIVE
HCT VFR BLD AUTO: 32 % (ref 37–47)
HGB BLD-MCNC: 9.8 G/DL (ref 12–16)
HGB UR QL STRIP: ABNORMAL
HYALINE CASTS #/AREA URNS AUTO: ABNORMAL /HPF (ref 0–5)
KETONES UR STRIP-MCNC: NEGATIVE MG/DL
LEUKOCYTE ESTERASE UR QL STRIP: ABNORMAL
LYMPHOCYTES # BLD: 2.3 K/UL (ref 1–4.8)
LYMPHOCYTES NFR BLD: 24.1 %
Lab: NORMAL
MCH RBC QN AUTO: 21.6 PG (ref 27–31.3)
MCHC RBC AUTO-ENTMCNC: 30.7 % (ref 33–37)
MCV RBC AUTO: 70.3 FL (ref 79.4–94.8)
METHADONE UR QL SCN: ABNORMAL
MONOCYTES # BLD: 0.5 K/UL (ref 0.2–0.8)
MONOCYTES NFR BLD: 5.5 %
NEGATIVE QC PASS/FAIL: NORMAL
NEUTROPHILS # BLD: 6.5 K/UL (ref 1.4–6.5)
NEUTS SEG NFR BLD: 67.6 %
NITRITE UR QL STRIP: NEGATIVE
OPIATES UR QL SCN: ABNORMAL
OXYCODONE UR QL SCN: ABNORMAL
PCP UR QL SCN: ABNORMAL
PH UR STRIP: 8 [PH] (ref 5–9)
PLATELET # BLD AUTO: 359 K/UL (ref 130–400)
POSITIVE QC PASS/FAIL: NORMAL
POTASSIUM SERPL-SCNC: 4.1 MEQ/L (ref 3.4–4.9)
PROPOXYPH UR QL SCN: ABNORMAL
PROT SERPL-MCNC: 6.5 G/DL (ref 6.3–8)
PROT UR STRIP-MCNC: 30 MG/DL
RBC # BLD AUTO: 4.54 M/UL (ref 4.2–5.4)
RBC #/AREA URNS AUTO: >100 /HPF (ref 0–5)
SODIUM SERPL-SCNC: 137 MEQ/L (ref 135–144)
SP GR UR STRIP: 1.02 (ref 1–1.03)
URINE REFLEX TO CULTURE: YES
UROBILINOGEN UR STRIP-ACNC: 0.2 E.U./DL
WBC # BLD AUTO: 9.5 K/UL (ref 4.8–10.8)
WBC #/AREA URNS AUTO: ABNORMAL /HPF (ref 0–5)

## 2023-07-12 PROCEDURE — 80053 COMPREHEN METABOLIC PANEL: CPT

## 2023-07-12 PROCEDURE — 6360000004 HC RX CONTRAST MEDICATION: Performed by: PHYSICIAN ASSISTANT

## 2023-07-12 PROCEDURE — 6360000002 HC RX W HCPCS: Performed by: PHYSICIAN ASSISTANT

## 2023-07-12 PROCEDURE — 81001 URINALYSIS AUTO W/SCOPE: CPT

## 2023-07-12 PROCEDURE — 36415 COLL VENOUS BLD VENIPUNCTURE: CPT

## 2023-07-12 PROCEDURE — 85025 COMPLETE CBC W/AUTO DIFF WBC: CPT

## 2023-07-12 PROCEDURE — 99285 EMERGENCY DEPT VISIT HI MDM: CPT

## 2023-07-12 PROCEDURE — 80307 DRUG TEST PRSMV CHEM ANLYZR: CPT

## 2023-07-12 PROCEDURE — 74177 CT ABD & PELVIS W/CONTRAST: CPT

## 2023-07-12 PROCEDURE — 87086 URINE CULTURE/COLONY COUNT: CPT

## 2023-07-12 PROCEDURE — 96374 THER/PROPH/DIAG INJ IV PUSH: CPT

## 2023-07-12 RX ORDER — KETOROLAC TROMETHAMINE 15 MG/ML
15 INJECTION, SOLUTION INTRAMUSCULAR; INTRAVENOUS ONCE
Status: COMPLETED | OUTPATIENT
Start: 2023-07-12 | End: 2023-07-12

## 2023-07-12 RX ORDER — CIPROFLOXACIN 500 MG/1
500 TABLET, FILM COATED ORAL 2 TIMES DAILY
Qty: 14 TABLET | Refills: 0 | Status: SHIPPED | OUTPATIENT
Start: 2023-07-12 | End: 2023-07-19

## 2023-07-12 RX ORDER — KETOROLAC TROMETHAMINE 10 MG/1
10 TABLET, FILM COATED ORAL EVERY 6 HOURS PRN
Qty: 12 TABLET | Refills: 0 | Status: SHIPPED | OUTPATIENT
Start: 2023-07-12 | End: 2023-07-24

## 2023-07-12 RX ORDER — METRONIDAZOLE 500 MG/1
500 TABLET ORAL 2 TIMES DAILY
Qty: 14 TABLET | Refills: 0 | Status: SHIPPED | OUTPATIENT
Start: 2023-07-12 | End: 2023-07-19

## 2023-07-12 RX ADMIN — KETOROLAC TROMETHAMINE 15 MG: 15 INJECTION, SOLUTION INTRAMUSCULAR; INTRAVENOUS at 12:45

## 2023-07-12 RX ADMIN — IOPAMIDOL 50 ML: 612 INJECTION, SOLUTION INTRAVENOUS at 12:58

## 2023-07-12 ASSESSMENT — ENCOUNTER SYMPTOMS
RHINORRHEA: 0
CONSTIPATION: 0
SORE THROAT: 0
ABDOMINAL DISTENTION: 0
ABDOMINAL PAIN: 1
VOMITING: 1
EYE DISCHARGE: 0
NAUSEA: 0
COLOR CHANGE: 0
SHORTNESS OF BREATH: 0

## 2023-07-12 ASSESSMENT — PAIN SCALES - GENERAL
PAINLEVEL_OUTOF10: 7
PAINLEVEL_OUTOF10: 7
PAINLEVEL_OUTOF10: 8

## 2023-07-12 ASSESSMENT — PAIN - FUNCTIONAL ASSESSMENT: PAIN_FUNCTIONAL_ASSESSMENT: 0-10

## 2023-07-12 ASSESSMENT — PAIN DESCRIPTION - LOCATION: LOCATION: ABDOMEN

## 2023-07-12 ASSESSMENT — PAIN DESCRIPTION - DESCRIPTORS: DESCRIPTORS: CRAMPING

## 2023-07-12 NOTE — ED PROVIDER NOTES
[facility] ED  eMERGENCY dEPARTMENT eNCOUnter      Pt Name: Meche Mahmood  MRN: [id number]  [address] [place] 1995  Date of evaluation: 7/12/2023  Provider: Verena Baig PA-C    CHIEF COMPLAINT       Chief Complaint   Patient presents with    Vaginal Bleeding     Pain and bleeding increased over the last 9 days. HISTORY OF PRESENT ILLNESS   (Location/Symptom, Timing/Onset,Context/Setting, Quality, Duration, Modifying Factors, Severity)  Note limiting factors. Meche Mahmood is a 29 y.o. female who presents to the emergency department with complaint of left lower quadrant abdominal pain which patient states been ongoing for last 5 days, she states she had started her menstrual period around the seventh of this month, she states that increasing abdominal pain at that time, she was seen at [facility] for the same issue, she was diagnosed with ovarian cyst at that time. She states progressive bleeding, abdominal cramping and left lower quadrant no difficulty with urination, but states increasing urinary frequency, no constipation or diarrhea. She denies any fevers, no nausea or vomiting. She denies current pregnancy. Past medical history significant for migraines, seizures, anxiety. HPI    NursingNotes were reviewed. REVIEW OF SYSTEMS    (2-9 systems for level 4, 10 or more for level 5)     Review of Systems   Constitutional:  Negative for activity change and appetite change. HENT:  Negative for congestion, ear discharge, ear pain, nosebleeds, rhinorrhea and sore throat. Eyes:  Negative for discharge. Respiratory:  Negative for shortness of breath. Cardiovascular:  Negative for chest pain, palpitations and leg swelling. Gastrointestinal:  Positive for abdominal pain and vomiting. Negative for abdominal distention, constipation and nausea. Genitourinary:  Positive for frequency, pelvic pain and vaginal bleeding.  Negative for decreased urine volume,

## 2023-07-12 NOTE — ED NOTES
Pt. Came from home for heavy period bleeding and cramping. She states that it has lasted for 10 days which is not normal for her.    No SOB  Afebrile  VSS  Slightly tachy at [address] [facility],8W, RN  07/12/23 3149

## 2023-07-12 NOTE — ED NOTES
Urine sent to lab     [facility] MENTAL [facility], RN  07/12/23 3535

## 2023-07-13 LAB
BACTERIA UR CULT: NORMAL
PERFORMED ON: ABNORMAL
POC CREATININE: 0.5 MG/DL (ref 0.6–1.2)
POC SAMPLE TYPE: ABNORMAL

## 2023-07-19 ENCOUNTER — HOSPITAL ENCOUNTER (EMERGENCY)
Age: 28
Discharge: HOME | End: 2023-07-19
Attending: EMERGENCY MEDICINE
Payer: MEDICAID

## 2023-07-19 ENCOUNTER — APPOINTMENT (OUTPATIENT)
Dept: CT IMAGING | Age: 28
End: 2023-07-19
Payer: MEDICAID

## 2023-07-19 VITALS
BODY MASS INDEX: 23.04 KG/M2 | DIASTOLIC BLOOD PRESSURE: 74 MMHG | WEIGHT: 130 LBS | TEMPERATURE: 98.4 F | RESPIRATION RATE: 18 BRPM | OXYGEN SATURATION: 100 % | SYSTOLIC BLOOD PRESSURE: 112 MMHG | HEIGHT: 63 IN | HEART RATE: 82 BPM

## 2023-07-19 DIAGNOSIS — K52.9 COLITIS: ICD-10-CM

## 2023-07-19 DIAGNOSIS — R10.84 GENERALIZED ABDOMINAL PAIN: Primary | ICD-10-CM

## 2023-07-19 LAB
ALBUMIN SERPL-MCNC: 4 G/DL (ref 3.5–4.6)
ALP SERPL-CCNC: 93 U/L (ref 40–130)
ALT SERPL-CCNC: 10 U/L (ref 0–33)
ANION GAP SERPL CALCULATED.3IONS-SCNC: 11 MEQ/L (ref 9–15)
AST SERPL-CCNC: 14 U/L (ref 0–35)
BASOPHILS # BLD: 0.1 K/UL (ref 0–0.2)
BASOPHILS NFR BLD: 0.7 %
BILIRUB SERPL-MCNC: <0.2 MG/DL (ref 0.2–0.7)
BUN SERPL-MCNC: 12 MG/DL (ref 6–20)
CALCIUM SERPL-MCNC: 8.9 MG/DL (ref 8.5–9.9)
CHLORIDE SERPL-SCNC: 103 MEQ/L (ref 95–107)
CO2 SERPL-SCNC: 25 MEQ/L (ref 20–31)
CREAT SERPL-MCNC: 0.56 MG/DL (ref 0.5–0.9)
EOSINOPHIL # BLD: 0.3 K/UL (ref 0–0.7)
EOSINOPHIL NFR BLD: 2.5 %
ERYTHROCYTE [DISTWIDTH] IN BLOOD BY AUTOMATED COUNT: 17.7 % (ref 11.5–14.5)
GLOBULIN SER CALC-MCNC: 3 G/DL (ref 2.3–3.5)
GLUCOSE SERPL-MCNC: 71 MG/DL (ref 70–99)
HCT VFR BLD AUTO: 30.9 % (ref 37–47)
HGB BLD-MCNC: 9.7 G/DL (ref 12–16)
LACTATE BLDV-SCNC: 0.9 MMOL/L (ref 0.5–2.2)
LYMPHOCYTES # BLD: 2.4 K/UL (ref 1–4.8)
LYMPHOCYTES NFR BLD: 23 %
MCH RBC QN AUTO: 21.3 PG (ref 27–31.3)
MCHC RBC AUTO-ENTMCNC: 31.3 % (ref 33–37)
MCV RBC AUTO: 67.9 FL (ref 79.4–94.8)
MONOCYTES # BLD: 0.7 K/UL (ref 0.2–0.8)
MONOCYTES NFR BLD: 6.9 %
NEUTROPHILS # BLD: 7 K/UL (ref 1.4–6.5)
NEUTS SEG NFR BLD: 66.9 %
PLATELET # BLD AUTO: 516 K/UL (ref 130–400)
POTASSIUM SERPL-SCNC: 3.9 MEQ/L (ref 3.4–4.9)
PROT SERPL-MCNC: 7 G/DL (ref 6.3–8)
RBC # BLD AUTO: 4.55 M/UL (ref 4.2–5.4)
SODIUM SERPL-SCNC: 139 MEQ/L (ref 135–144)
WBC # BLD AUTO: 10.5 K/UL (ref 4.8–10.8)

## 2023-07-19 PROCEDURE — 96375 TX/PRO/DX INJ NEW DRUG ADDON: CPT

## 2023-07-19 PROCEDURE — 2580000003 HC RX 258: Performed by: EMERGENCY MEDICINE

## 2023-07-19 PROCEDURE — 96372 THER/PROPH/DIAG INJ SC/IM: CPT

## 2023-07-19 PROCEDURE — 6360000002 HC RX W HCPCS: Performed by: EMERGENCY MEDICINE

## 2023-07-19 PROCEDURE — 96361 HYDRATE IV INFUSION ADD-ON: CPT

## 2023-07-19 PROCEDURE — 6360000004 HC RX CONTRAST MEDICATION: Performed by: EMERGENCY MEDICINE

## 2023-07-19 PROCEDURE — 99285 EMERGENCY DEPT VISIT HI MDM: CPT

## 2023-07-19 PROCEDURE — 36415 COLL VENOUS BLD VENIPUNCTURE: CPT

## 2023-07-19 PROCEDURE — 96374 THER/PROPH/DIAG INJ IV PUSH: CPT

## 2023-07-19 PROCEDURE — 74177 CT ABD & PELVIS W/CONTRAST: CPT

## 2023-07-19 PROCEDURE — 80053 COMPREHEN METABOLIC PANEL: CPT

## 2023-07-19 PROCEDURE — 85025 COMPLETE CBC W/AUTO DIFF WBC: CPT

## 2023-07-19 PROCEDURE — 83605 ASSAY OF LACTIC ACID: CPT

## 2023-07-19 RX ORDER — METOCLOPRAMIDE 10 MG/1
10 TABLET ORAL 3 TIMES DAILY PRN
Qty: 20 TABLET | Refills: 0 | Status: SHIPPED | OUTPATIENT
Start: 2023-07-19

## 2023-07-19 RX ORDER — KETOROLAC TROMETHAMINE 30 MG/ML
30 INJECTION, SOLUTION INTRAMUSCULAR; INTRAVENOUS ONCE
Status: COMPLETED | OUTPATIENT
Start: 2023-07-19 | End: 2023-07-19

## 2023-07-19 RX ORDER — SODIUM CHLORIDE, SODIUM LACTATE, POTASSIUM CHLORIDE, AND CALCIUM CHLORIDE .6; .31; .03; .02 G/100ML; G/100ML; G/100ML; G/100ML
1000 INJECTION, SOLUTION INTRAVENOUS ONCE
Status: COMPLETED | OUTPATIENT
Start: 2023-07-19 | End: 2023-07-19

## 2023-07-19 RX ORDER — ONDANSETRON 2 MG/ML
4 INJECTION INTRAMUSCULAR; INTRAVENOUS ONCE
Status: COMPLETED | OUTPATIENT
Start: 2023-07-19 | End: 2023-07-19

## 2023-07-19 RX ADMIN — IOPAMIDOL 50 ML: 612 INJECTION, SOLUTION INTRAVENOUS at 20:56

## 2023-07-19 RX ADMIN — KETOROLAC TROMETHAMINE 30 MG: 30 INJECTION, SOLUTION INTRAMUSCULAR; INTRAVENOUS at 20:27

## 2023-07-19 RX ADMIN — METHYLNALTREXONE BROMIDE 8 MG: 12 INJECTION, SOLUTION SUBCUTANEOUS at 22:29

## 2023-07-19 RX ADMIN — SODIUM CHLORIDE, POTASSIUM CHLORIDE, SODIUM LACTATE AND CALCIUM CHLORIDE 1000 ML: 600; 310; 30; 20 INJECTION, SOLUTION INTRAVENOUS at 20:28

## 2023-07-19 RX ADMIN — ONDANSETRON 4 MG: 2 INJECTION INTRAMUSCULAR; INTRAVENOUS at 20:28

## 2023-07-19 ASSESSMENT — ENCOUNTER SYMPTOMS
ABDOMINAL PAIN: 1
EYE DISCHARGE: 0
ABDOMINAL DISTENTION: 0
PHOTOPHOBIA: 0
WHEEZING: 0
VOMITING: 0
COUGH: 0
SHORTNESS OF BREATH: 0
SORE THROAT: 0
CHEST TIGHTNESS: 0
NAUSEA: 1

## 2023-07-19 ASSESSMENT — PAIN DESCRIPTION - PAIN TYPE: TYPE: ACUTE PAIN

## 2023-07-19 ASSESSMENT — PAIN DESCRIPTION - FREQUENCY: FREQUENCY: CONTINUOUS

## 2023-07-19 ASSESSMENT — PAIN DESCRIPTION - LOCATION
LOCATION: ABDOMEN
LOCATION: ABDOMEN

## 2023-07-19 ASSESSMENT — PAIN DESCRIPTION - DESCRIPTORS
DESCRIPTORS: ACHING
DESCRIPTORS: ACHING

## 2023-07-19 ASSESSMENT — PAIN - FUNCTIONAL ASSESSMENT
PAIN_FUNCTIONAL_ASSESSMENT: NONE - DENIES PAIN
PAIN_FUNCTIONAL_ASSESSMENT: 0-10

## 2023-07-19 ASSESSMENT — LIFESTYLE VARIABLES
HOW OFTEN DO YOU HAVE A DRINK CONTAINING ALCOHOL: NEVER
HOW MANY STANDARD DRINKS CONTAINING ALCOHOL DO YOU HAVE ON A TYPICAL DAY: PATIENT DOES NOT DRINK

## 2023-07-19 ASSESSMENT — PAIN SCALES - GENERAL
PAINLEVEL_OUTOF10: 5
PAINLEVEL_OUTOF10: 8

## 2023-07-19 NOTE — ED PROVIDER NOTES
[facility] ED  eMERGENCY dEPARTMENT eNCOUnter      Pt Name: Roddy Coates  MRN: [id number]  [address] 1995  Date of evaluation: 7/19/2023  Provider: Michelle Crowder MD    CHIEF COMPLAINT       Chief Complaint   Patient presents with    Abdominal Pain     X 3 days          HISTORY OF PRESENT ILLNESS   (Location/Symptom, Timing/Onset,Context/Setting, Quality, Duration, Modifying Factors, Severity)  Note limiting factors. Roddy Coates is a 29 y.o. female who presents to the emergency department for abdominal pain. Patient has had recurring abdominal pain over the past few weeks. She has been seen here 6 days ago and had abdominal CT that showed questionable colitis but no other significant abnormalities. She is also been seen at [facility] [place] in the past 2 weeks had initial ultrasound there and then seen at [facility] emergency department facility yesterday. Yesterday she did have a trans vaginal sound that did not reveal any underlying cause for her symptoms. The reason she presents today for reevaluation is for increased bloating. She states her abdomen is normally flat and feels bloated and more painful to her. Was moderate. She is still eating although decreased eating and she has nausea but no vomiting. She has been having bowel movements today's bowel movement was smaller. No current Vaginal discharge. HPI    NursingNotes were reviewed. REVIEW OF SYSTEMS    (2-9 systems for level 4, 10 or more for level 5)     Review of Systems   Constitutional:  Negative for chills and diaphoresis. HENT:  Negative for congestion, ear pain, mouth sores and sore throat. Eyes:  Negative for photophobia and discharge. Respiratory:  Negative for cough, chest tightness, shortness of breath and wheezing. Cardiovascular:  Negative for chest pain and palpitations. Gastrointestinal:  Positive for abdominal pain and nausea. Negative for abdominal distention and vomiting.

## 2023-07-19 NOTE — ED TRIAGE NOTES
Pt arrived to triage via private vehicle. Pt c/o abdominal pain for the last 3 days. Pt states she was seen twice at [facility] general yesterday but did not get any answers. Pt also c/o nausea but no vomiting. Pt states her abdomen feels bloated. Pt states her the pain is in her lower abdomen.

## 2023-07-20 LAB
PERFORMED ON: NORMAL
POC CREATININE: 0.6 MG/DL (ref 0.6–1.2)
POC SAMPLE TYPE: NORMAL

## 2023-07-20 NOTE — DISCHARGE INSTRUCTIONS
Liquid diet for next 48 hours.   Then increase to [employer], rice, applesauce, and toast or similar

## 2023-08-02 DIAGNOSIS — F31.75 BIPOLAR DISORDER, IN PARTIAL REMISSION, MOST RECENT EPISODE DEPRESSED (HCC): ICD-10-CM

## 2023-08-02 DIAGNOSIS — F43.0 PANIC STATE AS ACUTE REACTION TO STRESS: ICD-10-CM

## 2023-08-03 NOTE — TELEPHONE ENCOUNTER
Comments:     Last Office Visit (last PCP visit):   7/6/2023    Next Visit Date:  Future Appointments   Date Time Provider [address]   8/17/2023  1:30 PM Renae Gallardo  [address]   10/6/2023  8:30 AM RULA Eaton [facility]       **If hasn't been seen in over a year OR hasn't followed up according to last diabetes/ADHD visit, make appointment for patient before sending refill to provider.     Rx requested:  Requested Prescriptions     Pending Prescriptions Disp Refills    VRAYLAR 1.5 MG capsule [Pharmacy Med Name: Vonzell Ip 1.5 mg capsule] 30 capsule 5     Sig: TAKE 1 CAPSULE BY MOUTH DAILY    hydrOXYzine HCl (ATARAX) 25 MG tablet [Pharmacy Med Name: hydroxyzine HCl 25 mg tablet] 90 tablet 5     Sig: Take 1 tablet by mouth every 8 hours as needed for Itching

## 2023-08-04 RX ORDER — HYDROXYZINE HYDROCHLORIDE 25 MG/1
25 TABLET, FILM COATED ORAL EVERY 8 HOURS PRN
Qty: 90 TABLET | Refills: 5 | Status: SHIPPED | OUTPATIENT
Start: 2023-08-04 | End: 2023-10-03

## 2023-08-04 RX ORDER — CARIPRAZINE 1.5 MG/1
CAPSULE, GELATIN COATED ORAL
Qty: 30 CAPSULE | Refills: 5 | Status: SHIPPED | OUTPATIENT
Start: 2023-08-04

## 2023-08-17 ENCOUNTER — PREP FOR PROCEDURE (OUTPATIENT)
Dept: GASTROENTEROLOGY | Age: 28
End: 2023-08-17

## 2023-08-17 ENCOUNTER — OFFICE VISIT (OUTPATIENT)
Dept: GASTROENTEROLOGY | Age: 28
End: 2023-08-17
Payer: MEDICAID

## 2023-08-17 VITALS
WEIGHT: 137 LBS | DIASTOLIC BLOOD PRESSURE: 60 MMHG | OXYGEN SATURATION: 98 % | BODY MASS INDEX: 24.27 KG/M2 | SYSTOLIC BLOOD PRESSURE: 114 MMHG | HEART RATE: 87 BPM

## 2023-08-17 DIAGNOSIS — R10.30 LOWER ABDOMINAL PAIN: Primary | ICD-10-CM

## 2023-08-17 PROCEDURE — G8420 CALC BMI NORM PARAMETERS: HCPCS | Performed by: NURSE PRACTITIONER

## 2023-08-17 PROCEDURE — 4004F PT TOBACCO SCREEN RCVD TLK: CPT | Performed by: NURSE PRACTITIONER

## 2023-08-17 PROCEDURE — 99204 OFFICE O/P NEW MOD 45 MIN: CPT | Performed by: NURSE PRACTITIONER

## 2023-08-17 PROCEDURE — G8427 DOCREV CUR MEDS BY ELIG CLIN: HCPCS | Performed by: NURSE PRACTITIONER

## 2023-08-17 RX ORDER — POLYETHYLENE GLYCOL 3350, SODIUM CHLORIDE, SODIUM BICARBONATE, POTASSIUM CHLORIDE 420; 11.2; 5.72; 1.48 G/4L; G/4L; G/4L; G/4L
4000 POWDER, FOR SOLUTION ORAL ONCE
Qty: 4000 ML | Refills: 0 | Status: SHIPPED | OUTPATIENT
Start: 2023-08-17 | End: 2023-08-17

## 2023-08-17 RX ORDER — DICYCLOMINE HYDROCHLORIDE 10 MG/1
10 CAPSULE ORAL 2 TIMES DAILY PRN
Qty: 60 CAPSULE | Refills: 0 | Status: SHIPPED | OUTPATIENT
Start: 2023-08-17

## 2023-08-17 ASSESSMENT — ENCOUNTER SYMPTOMS
CONSTIPATION: 1
EYE REDNESS: 0
CHEST TIGHTNESS: 0
ANAL BLEEDING: 0
NAUSEA: 0
PHOTOPHOBIA: 0
COLOR CHANGE: 0
BLOOD IN STOOL: 0
VOICE CHANGE: 0
EYE PAIN: 0
SHORTNESS OF BREATH: 0
DIARRHEA: 0
ABDOMINAL DISTENTION: 1
VOMITING: 0
ABDOMINAL PAIN: 1
RECTAL PAIN: 0
TROUBLE SWALLOWING: 0
WHEEZING: 0

## 2023-08-17 NOTE — PROGRESS NOTES
Subjective:      Patient ID: Taran Caceres is a 29 y.o. female who presents today for:  Chief Complaint   Patient presents with    Abdominal Pain    Bloated       HPI  Patient came in to establish GI care with complaints of abdominal pain and bloating, has been seen in multiple different emergency departments over the past month approximately 4-5 times including [facility], [facility] and our emergency department. Has had multiple CT scans of the abdomen initially showing colitis and constipation, in addition had negative GYN work-up. Patient denies constipation per se but does report significant straining to have bowel movement, no blood, no mucus, no family history of colorectal cancer, no fever or chills, no history of colonoscopy. Of note patient had urine drug screen completed yesterday at [facility] which was positive for cannabis, cocaine, and opioids.      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     Migraine     Seizure disorder (HCC)     Seizures ([facility])     Varicella      Past Surgical History:   Procedure Laterality Date    SHOULDER ARTHROSCOPY Left 10/14/2022    LEFT ARTHROSCOPIC SHOULDER  SURGERY ANTERIOR LABRAL REPAIR AND CAPSULAR PLICATION performed by Caro Alberts MD at [address] [place] [address] History     Socioeconomic History    Marital status: Single     Spouse name: Not on file    Number of children: Not on file    Years of education: Not on file    Highest education level: Not on file   Occupational History    Not on file   Tobacco Use    Smoking status: Every Day     Packs/day: 0.50     Years: 9.00     Pack years: 4.50     Types: Cigarettes     Start date: 2012     Passive exposure: Current    Smokeless tobacco: Never   Vaping Use    Vaping Use: Some days    Substances: Nicotine, THC, CBD, Flavoring    Devices: Disposable, Pre-filled or refillable cartridge, Refillable tank, Pre-filled pod    Passive vaping exposure: Yes   Substance and

## 2023-08-18 ENCOUNTER — APPOINTMENT (OUTPATIENT)
Dept: GENERAL RADIOLOGY | Age: 28
End: 2023-08-18
Payer: MEDICAID

## 2023-08-18 ENCOUNTER — HOSPITAL ENCOUNTER (EMERGENCY)
Age: 28
Discharge: HOME | End: 2023-08-18
Payer: MEDICAID

## 2023-08-18 VITALS
HEIGHT: 63 IN | RESPIRATION RATE: 14 BRPM | DIASTOLIC BLOOD PRESSURE: 80 MMHG | WEIGHT: 135 LBS | SYSTOLIC BLOOD PRESSURE: 122 MMHG | TEMPERATURE: 98.4 F | HEART RATE: 95 BPM | BODY MASS INDEX: 23.92 KG/M2 | OXYGEN SATURATION: 99 %

## 2023-08-18 DIAGNOSIS — S43.402A SPRAIN OF LEFT SHOULDER, UNSPECIFIED SHOULDER SPRAIN TYPE, INITIAL ENCOUNTER: Primary | ICD-10-CM

## 2023-08-18 PROCEDURE — 73030 X-RAY EXAM OF SHOULDER: CPT

## 2023-08-18 PROCEDURE — 99283 EMERGENCY DEPT VISIT LOW MDM: CPT

## 2023-08-18 RX ORDER — IBUPROFEN 600 MG/1
600 TABLET ORAL EVERY 8 HOURS PRN
Qty: 90 TABLET | Refills: 0 | Status: SHIPPED | OUTPATIENT
Start: 2023-08-18 | End: 2023-09-17

## 2023-08-18 ASSESSMENT — PAIN DESCRIPTION - ORIENTATION: ORIENTATION: LEFT

## 2023-08-18 ASSESSMENT — PAIN DESCRIPTION - LOCATION: LOCATION: SHOULDER

## 2023-08-18 ASSESSMENT — PAIN SCALES - GENERAL: PAINLEVEL_OUTOF10: 8

## 2023-08-18 ASSESSMENT — PAIN - FUNCTIONAL ASSESSMENT: PAIN_FUNCTIONAL_ASSESSMENT: 0-10

## 2023-08-18 ASSESSMENT — ENCOUNTER SYMPTOMS: RESPIRATORY NEGATIVE: 1

## 2023-08-18 NOTE — ED TRIAGE NOTES
A/o x 3 skin pink w/d resp non labored. Pt reports she had seizure 2 days ago and per witness lt arm was out behind her. Pt reports limited rom.

## 2023-08-18 NOTE — DISCHARGE INSTRUCTIONS
Take the ibuprofen 600 mg with food up to 3 times a day for mild to moderate pain. For severe pain at 2 EXTR strength Tylenol to your ibuprofen dose. Shoulder 2-3 times a day. Follow-up with your orthopedic surgeon as soon as possible for reevaluation of your shoulder pain.

## 2023-08-19 ENCOUNTER — HOSPITAL ENCOUNTER (EMERGENCY)
Age: 28
Discharge: HOME | End: 2023-08-19
Payer: MEDICAID

## 2023-08-19 VITALS
WEIGHT: 135 LBS | HEART RATE: 98 BPM | BODY MASS INDEX: 23.92 KG/M2 | TEMPERATURE: 99 F | SYSTOLIC BLOOD PRESSURE: 115 MMHG | OXYGEN SATURATION: 99 % | DIASTOLIC BLOOD PRESSURE: 77 MMHG | RESPIRATION RATE: 18 BRPM | HEIGHT: 63 IN

## 2023-08-19 DIAGNOSIS — J02.9 ACUTE PHARYNGITIS, UNSPECIFIED ETIOLOGY: ICD-10-CM

## 2023-08-19 DIAGNOSIS — J06.9 VIRAL UPPER RESPIRATORY ILLNESS: Primary | ICD-10-CM

## 2023-08-19 LAB
SARS-COV-2 RDRP RESP QL NAA+PROBE: NOT DETECTED
STREP GRP A PCR: NEGATIVE

## 2023-08-19 PROCEDURE — 99283 EMERGENCY DEPT VISIT LOW MDM: CPT

## 2023-08-19 PROCEDURE — 87651 STREP A DNA AMP PROBE: CPT

## 2023-08-19 PROCEDURE — 87635 SARS-COV-2 COVID-19 AMP PRB: CPT

## 2023-08-19 ASSESSMENT — LIFESTYLE VARIABLES
HOW MANY STANDARD DRINKS CONTAINING ALCOHOL DO YOU HAVE ON A TYPICAL DAY: PATIENT DOES NOT DRINK
HOW OFTEN DO YOU HAVE A DRINK CONTAINING ALCOHOL: NEVER

## 2023-08-19 NOTE — ED TRIAGE NOTES
Pt states that she began feeling ill last night  Pt states took ibuprofen but did not help  Pt has congestion

## 2023-08-19 NOTE — DISCHARGE INSTRUCTIONS
Follow-up with primary care physician. Return to ER if any symptoms worsen or new symptoms develop.   Use Tylenol and ibuprofen as directed on packaging for fever or discomfort

## 2023-08-22 RX ORDER — SODIUM CHLORIDE 9 MG/ML
INJECTION, SOLUTION INTRAVENOUS CONTINUOUS
Status: CANCELLED | OUTPATIENT
Start: 2023-08-22

## 2023-08-22 RX ORDER — SODIUM CHLORIDE 9 MG/ML
INJECTION, SOLUTION INTRAVENOUS PRN
Status: CANCELLED | OUTPATIENT
Start: 2023-08-22

## 2023-08-22 RX ORDER — SODIUM CHLORIDE 0.9 % (FLUSH) 0.9 %
5-40 SYRINGE (ML) INJECTION EVERY 12 HOURS SCHEDULED
Status: CANCELLED | OUTPATIENT
Start: 2023-08-22

## 2023-08-23 ASSESSMENT — ENCOUNTER SYMPTOMS
ABDOMINAL DISTENTION: 0
APNEA: 0
EYE DISCHARGE: 0
ABDOMINAL PAIN: 0
VOMITING: 0
VOICE CHANGE: 0
NAUSEA: 0

## 2023-08-23 NOTE — ED PROVIDER NOTES
(Please note that portions of this note were completed with a voice recognition program.  Efforts were made to edit the dictations but occasionally words are mis-transcribed.)    Pennie Bautista PA-C (electronically signed)  Attending Emergency Physician        Pennie Bautista PA-C  08/23/23 0122

## 2023-08-29 DIAGNOSIS — R56.9 SEIZURE (HCC): ICD-10-CM

## 2023-08-29 RX ORDER — DIVALPROEX SODIUM 500 MG/1
TABLET, DELAYED RELEASE ORAL
Qty: 60 TABLET | Refills: 0 | Status: SHIPPED | OUTPATIENT
Start: 2023-08-29

## 2023-08-29 NOTE — TELEPHONE ENCOUNTER
Pharmacy is requesting medication refill. Please approve or deny this request.    Rx requested:  Requested Prescriptions     Pending Prescriptions Disp Refills    divalproex (DEPAKOTE) 500 MG DR tablet [Pharmacy Med Name: divalproex 500 mg tablet,delayed release] 60 tablet 0     Sig: Take 1 tablet by mouth every 12 hours         Last Office Visit:   9/27/2022      Next Visit Date:  Future Appointments   Date Time Provider [address] [place] [address]   9/19/2023 10:45 AM Bipin Mukherjee, [address] [facility]   10/6/2023  8:30 AM Balaji Perea PA-C [facility]     I called pt unable to [facility]. Put note on script to pharmacy to have pt  call the office to get refills.

## 2023-08-31 DIAGNOSIS — R56.9 SEIZURE (HCC): ICD-10-CM

## 2023-08-31 DIAGNOSIS — G47.09 TROUBLE GETTING TO SLEEP: ICD-10-CM

## 2023-08-31 RX ORDER — DIVALPROEX SODIUM 500 MG/1
TABLET, DELAYED RELEASE ORAL
Qty: 60 TABLET | Refills: 0 | OUTPATIENT
Start: 2023-08-31

## 2023-09-01 RX ORDER — ESZOPICLONE 2 MG/1
2 TABLET, FILM COATED ORAL NIGHTLY
Qty: 30 TABLET | Refills: 2 | OUTPATIENT
Start: 2023-09-01 | End: 2024-08-31

## 2023-09-01 NOTE — CARE COORDINATION
[facility] [place] Case Management Initial Discharge Assessment    Met with Patient to discuss discharge plan. PCP: No primary care provider on file. Date of Last Visit: 2 weeks ago    If no PCP, list provided? A pamphlet for [facility] was given    Discharge Planning    Living Arrangements: independently at home    Who do you live with? With someone    Who helps you with your care:  self    If lives at home:     Do you have any barriers navigating in your home? no    Patient can perform ADL? Yes    Current Services (outpatient and in home) :  None    Dialysis: No    Is transportation available to get to your appointments? Yes    DME Equipment:  no    Respiratory equipment: None    Respiratory provider:  no     [facility]    Consult with Medication Assistance Program?  No        Does Patient Have a High-Risk for Readmission Diagnosis (CHF, PN, MI, COPD)? No        Initial Discharge Plan? (Note: please see concurrent daily documentation for any updates after initial note). CM to assess for any further d/c needs or referrals.       Electronically signed by Francisco Javier Martinez on 1/2/2021 at 9:21 PM
English

## 2023-09-28 DIAGNOSIS — R56.9 SEIZURE (HCC): ICD-10-CM

## 2023-09-28 DIAGNOSIS — G47.09 TROUBLE GETTING TO SLEEP: ICD-10-CM

## 2023-09-29 RX ORDER — DIVALPROEX SODIUM 500 MG/1
TABLET, DELAYED RELEASE ORAL
Qty: 60 TABLET | Refills: 0 | OUTPATIENT
Start: 2023-09-29

## 2023-09-29 RX ORDER — ESZOPICLONE 2 MG/1
2 TABLET, FILM COATED ORAL NIGHTLY
Qty: 30 TABLET | Refills: 2 | Status: SHIPPED | OUTPATIENT
Start: 2023-09-29 | End: 2024-09-28

## 2023-09-29 NOTE — TELEPHONE ENCOUNTER
Comments:     Last Office Visit (last PCP visit):   7/6/2023    Next Visit Date:  Future Appointments   Date Time Provider [address]   10/3/2023  9:00 AM Rayo Garrett MD [facility] OR [facility] EMERGENCY [facility] AT [place]   10/6/2023  8:30 AM RULA Eaton [facility] EMERGENCY [facility] [place]       **If hasn't been seen in over a year OR hasn't followed up according to last diabetes/ADHD visit, make appointment for patient before sending refill to provider. Rx requested:  Requested Prescriptions     Pending Prescriptions Disp Refills    eszopiclone (LUNESTA) 2 MG TABS [Pharmacy Med Name: eszopiclone 2 mg tablet] 30 tablet 2     Sig: Take 1 tablet by mouth nightly.  Max Daily Amount: 2 mg

## 2023-10-03 ENCOUNTER — OFFICE VISIT (OUTPATIENT)
Dept: ORTHOPEDIC SURGERY | Age: 28
End: 2023-10-03

## 2023-10-03 VITALS
OXYGEN SATURATION: 99 % | TEMPERATURE: 99 F | HEART RATE: 98 BPM | SYSTOLIC BLOOD PRESSURE: 115 MMHG | BODY MASS INDEX: 23.92 KG/M2 | DIASTOLIC BLOOD PRESSURE: 77 MMHG | WEIGHT: 135 LBS | HEIGHT: 63 IN

## 2023-10-03 DIAGNOSIS — S43.432S LABRAL TEAR OF SHOULDER, LEFT, SEQUELA: Primary | ICD-10-CM

## 2023-10-03 ASSESSMENT — ENCOUNTER SYMPTOMS
ALLERGIC/IMMUNOLOGIC NEGATIVE: 1
EYES NEGATIVE: 1
GASTROINTESTINAL NEGATIVE: 1
RESPIRATORY NEGATIVE: 1

## 2023-10-03 NOTE — PROGRESS NOTES
supraspinatus. 4/5 infraspinatus. 4/5 teres minor. Negative Hornblower's sign. Negative external rotation lag sign. 5/5 subscapularis. Negative belly press. Special/provocative tests:  Positive Sherin's test - pain. There is not pain with cross body adduction. Positive Speed's test.   Instability: Positive apprehension test. Positive relocation test.   Sensation intact to light touch along the C4-T1 distribution: normal.   Radial pulse is normal and symmetric. Radiographs and Laboratory Studies:     Diagnostic Imaging Studies: Three-view x-ray of the left shoulder dated 8/18/2023 was reviewed by me and demonstrates no acute fracture. The glenohumeral joint is reduced. Laboratory Studies:   Lab Results   Component Value Date    WBC 10.8 08/16/2023    HGB 9.4 (L) 08/16/2023    HCT 32.0 (L) 08/16/2023    MCV 72 (L) 08/16/2023     08/16/2023     Lab Results   Component Value Date    SEDRATE 6 07/25/2013     No results found for: \"CRP\"    Assessment:      Diagnosis Orders   1. Labral tear of shoulder, left, sequela  Ambulatory referral to [address] is a 29 y.o. female with a history and exam consistent with left shoulder pain and instability. Status post left shoulder arthroscopic anterior labral repair and SLAP repair by Dr. Katia Arteaga on 10/14/22. This is an acute exacerbation of a chronic condition . Plan:     Blanche Osborne has chronic left shoulder pain and instability. She has a seizure disorder. She had a left shoulder surgery on 10/14/2022 for an anterior labral repair and SLAP repair by Dr. Katia Arteaga. It sounds like she was not compliant with her sling and physical therapy postoperatively. She has been subluxing/dislocating frequently. I discussed with her that I am going to refer her back to her primary surgeon for continuity of care. They know the specific details of her initial surgery. She states agreement understanding of our discussion.   A referral was

## 2023-10-06 ENCOUNTER — TELEMEDICINE (OUTPATIENT)
Dept: FAMILY MEDICINE CLINIC | Age: 28
End: 2023-10-06
Payer: MEDICAID

## 2023-10-06 DIAGNOSIS — F31.75 BIPOLAR DISORDER, IN PARTIAL REMISSION, MOST RECENT EPISODE DEPRESSED (HCC): ICD-10-CM

## 2023-10-06 DIAGNOSIS — G40.109 TEMPORAL LOBE EPILEPSY, NON-REFRACTORY (HCC): Primary | ICD-10-CM

## 2023-10-06 DIAGNOSIS — R46.89 COMPULSIVE BEHAVIOR: ICD-10-CM

## 2023-10-06 DIAGNOSIS — D50.8 IRON DEFICIENCY ANEMIA SECONDARY TO INADEQUATE DIETARY IRON INTAKE: ICD-10-CM

## 2023-10-06 DIAGNOSIS — M25.312 SHOULDER INSTABILITY, LEFT: ICD-10-CM

## 2023-10-06 DIAGNOSIS — F41.9 ANXIETY: ICD-10-CM

## 2023-10-06 DIAGNOSIS — R56.9 SEIZURE (HCC): ICD-10-CM

## 2023-10-06 DIAGNOSIS — M24.412 SHOULDER DISLOCATION, RECURRENT, LEFT: ICD-10-CM

## 2023-10-06 DIAGNOSIS — F43.0 PANIC STATE AS ACUTE REACTION TO STRESS: ICD-10-CM

## 2023-10-06 PROBLEM — R10.2 PELVIC PAIN: Status: RESOLVED | Noted: 2023-07-06 | Resolved: 2023-10-06

## 2023-10-06 PROBLEM — R10.30 LOWER ABDOMINAL PAIN: Status: RESOLVED | Noted: 2023-08-17 | Resolved: 2023-10-06

## 2023-10-06 PROCEDURE — 99214 OFFICE O/P EST MOD 30 MIN: CPT | Performed by: PHYSICIAN ASSISTANT

## 2023-10-06 PROCEDURE — G8427 DOCREV CUR MEDS BY ELIG CLIN: HCPCS | Performed by: PHYSICIAN ASSISTANT

## 2023-10-06 RX ORDER — LORAZEPAM 0.5 MG/1
0.5 TABLET ORAL 2 TIMES DAILY PRN
Qty: 60 TABLET | Refills: 2 | Status: SHIPPED | OUTPATIENT
Start: 2023-10-06 | End: 2024-01-04

## 2023-10-06 RX ORDER — PHENYTOIN SODIUM 300 MG/1
CAPSULE, EXTENDED RELEASE ORAL
Qty: 90 CAPSULE | Refills: 4 | Status: SHIPPED | OUTPATIENT
Start: 2023-10-06

## 2023-10-06 RX ORDER — PHENYTOIN SODIUM 200 MG/1
CAPSULE, EXTENDED RELEASE ORAL
Qty: 90 CAPSULE | Refills: 4 | Status: SHIPPED | OUTPATIENT
Start: 2023-10-06

## 2023-10-06 RX ORDER — DIVALPROEX SODIUM 500 MG/1
500 TABLET, DELAYED RELEASE ORAL EVERY 12 HOURS
Qty: 180 TABLET | Refills: 4 | Status: SHIPPED | OUTPATIENT
Start: 2023-10-06

## 2023-10-06 ASSESSMENT — ENCOUNTER SYMPTOMS
COLOR CHANGE: 0
CHEST TIGHTNESS: 0
BACK PAIN: 0

## 2023-10-06 NOTE — PROGRESS NOTES
Arminda Escobar, was evaluated through a synchronous (real-time) audio-video encounter. The patient (or guardian if applicable) is aware that this is a billable service, which includes applicable co-pays. This Virtual Visit was conducted with patient's (and/or legal guardian's) consent. Patient identification was verified, and a caregiver was present when appropriate. The patient was located at Home: [address] [place] [address]  Provider was located at [facility] (Appt Dept): [address]  [address] [facility],  [address] [facility]      Arminda Escobar (:  1995) is a Established patient, presenting virtually for evaluation of the following:    Assessment & Plan   Below is the assessment and plan developed based on review of pertinent history, physical exam, labs, studies, and medications. 1. Temporal lobe epilepsy, non-refractory (HCC)  -     phenytoin (PHENYTEK) 300 MG ER capsule; 200 mg po once daily in the am and phenytoin ER 300mg QHS, Disp-90 capsule, R-4Normal  -     phenytoin (PHENYTEK) 200 MG ER capsule; 200 mg po once daily in the am and phenytoin ER 300mg QHS, Disp-90 capsule, R-4Normal  -     divalproex (DEPAKOTE) 500 MG DR tablet; Take 1 tablet by mouth in the morning and 1 tablet in the evening., Disp-180 tablet, R-4Please let pt know to call the office no refills are given she needs an apt. Normal  2. Seizure ([address])  -     phenytoin (PHENYTEK) 300 MG ER capsule; 200 mg po once daily in the am and phenytoin ER 300mg QHS, Disp-90 capsule, R-4Normal  -     phenytoin (PHENYTEK) 200 MG ER capsule; 200 mg po once daily in the am and phenytoin ER 300mg QHS, Disp-90 capsule, R-4Normal  -     divalproex (DEPAKOTE) 500 MG DR tablet; Take 1 tablet by mouth in the morning and 1 tablet in the evening., Disp-180 tablet, R-4Please let pt know to call the office no refills are given she needs an apt. Normal  3. Panic state as acute reaction to stress  -     LORazepam (ATIVAN) 0.5 MG tablet;  Take

## 2023-10-09 RX ORDER — FLUVOXAMINE MALEATE 50 MG/1
TABLET, COATED ORAL
Qty: 135 TABLET | Refills: 1 | OUTPATIENT
Start: 2023-10-09

## 2023-10-11 ENCOUNTER — APPOINTMENT (OUTPATIENT)
Dept: GENERAL RADIOLOGY | Age: 28
End: 2023-10-11
Payer: MEDICAID

## 2023-10-11 ENCOUNTER — HOSPITAL ENCOUNTER (EMERGENCY)
Age: 28
Discharge: HOME | End: 2023-10-11
Payer: MEDICAID

## 2023-10-11 VITALS
HEART RATE: 84 BPM | TEMPERATURE: 98 F | OXYGEN SATURATION: 100 % | BODY MASS INDEX: 23.92 KG/M2 | HEIGHT: 63 IN | WEIGHT: 135 LBS | RESPIRATION RATE: 16 BRPM | DIASTOLIC BLOOD PRESSURE: 73 MMHG | SYSTOLIC BLOOD PRESSURE: 115 MMHG

## 2023-10-11 DIAGNOSIS — Z72.0 TOBACCO USE: ICD-10-CM

## 2023-10-11 DIAGNOSIS — J06.9 VIRAL URI WITH COUGH: Primary | ICD-10-CM

## 2023-10-11 LAB
HCG, URINE, POC: NEGATIVE
INFLUENZA A BY PCR: NEGATIVE
INFLUENZA B BY PCR: NEGATIVE
Lab: NORMAL
NEGATIVE QC PASS/FAIL: NORMAL
POSITIVE QC PASS/FAIL: NORMAL
SARS-COV-2 RDRP RESP QL NAA+PROBE: NOT DETECTED

## 2023-10-11 PROCEDURE — 93005 ELECTROCARDIOGRAM TRACING: CPT | Performed by: STUDENT IN AN ORGANIZED HEALTH CARE EDUCATION/TRAINING PROGRAM

## 2023-10-11 PROCEDURE — 71045 X-RAY EXAM CHEST 1 VIEW: CPT

## 2023-10-11 PROCEDURE — 87502 INFLUENZA DNA AMP PROBE: CPT

## 2023-10-11 PROCEDURE — 87635 SARS-COV-2 COVID-19 AMP PRB: CPT

## 2023-10-11 PROCEDURE — 99285 EMERGENCY DEPT VISIT HI MDM: CPT

## 2023-10-11 RX ORDER — AZITHROMYCIN 250 MG/1
250 TABLET, FILM COATED ORAL SEE ADMIN INSTRUCTIONS
Qty: 6 TABLET | Refills: 0 | Status: SHIPPED | OUTPATIENT
Start: 2023-10-11 | End: 2023-10-16

## 2023-10-11 RX ORDER — ALBUTEROL SULFATE 90 UG/1
2 AEROSOL, METERED RESPIRATORY (INHALATION) EVERY 4 HOURS PRN
Qty: 18 G | Refills: 0 | Status: SHIPPED | OUTPATIENT
Start: 2023-10-11

## 2023-10-11 RX ORDER — GUAIFENESIN 600 MG/1
600 TABLET, EXTENDED RELEASE ORAL 2 TIMES DAILY
Qty: 30 TABLET | Refills: 0 | Status: SHIPPED | OUTPATIENT
Start: 2023-10-11 | End: 2023-10-26

## 2023-10-11 RX ORDER — BENZONATATE 100 MG/1
100-200 CAPSULE ORAL 3 TIMES DAILY PRN
Qty: 60 CAPSULE | Refills: 0 | Status: SHIPPED | OUTPATIENT
Start: 2023-10-11 | End: 2023-10-21

## 2023-10-11 ASSESSMENT — ENCOUNTER SYMPTOMS
ABDOMINAL PAIN: 0
VOMITING: 0
CONSTIPATION: 0
WHEEZING: 0
COUGH: 1
DIARRHEA: 0
NAUSEA: 0
TROUBLE SWALLOWING: 0
SHORTNESS OF BREATH: 1
VOICE CHANGE: 0
SORE THROAT: 0
PHOTOPHOBIA: 0
SINUS PRESSURE: 0
SINUS PAIN: 0

## 2023-10-11 ASSESSMENT — PAIN DESCRIPTION - ONSET: ONSET: ON-GOING

## 2023-10-11 ASSESSMENT — PAIN DESCRIPTION - DESCRIPTORS: DESCRIPTORS: ACHING;SORE

## 2023-10-11 ASSESSMENT — PAIN SCALES - GENERAL: PAINLEVEL_OUTOF10: 7

## 2023-10-11 ASSESSMENT — PAIN DESCRIPTION - FREQUENCY: FREQUENCY: CONTINUOUS

## 2023-10-11 ASSESSMENT — PAIN DESCRIPTION - LOCATION: LOCATION: GENERALIZED

## 2023-10-11 ASSESSMENT — PAIN - FUNCTIONAL ASSESSMENT: PAIN_FUNCTIONAL_ASSESSMENT: 0-10

## 2023-10-11 ASSESSMENT — PAIN DESCRIPTION - PAIN TYPE: TYPE: ACUTE PAIN

## 2023-10-11 NOTE — ED TRIAGE NOTES
Pt c/o SOB, weakness, and general weakness. Pt is A&OX4, skin intact, afebrile,breaths are equal and unlabored.

## 2023-10-11 NOTE — ED PROVIDER NOTES
[facility] ED  EMERGENCY DEPARTMENT ENCOUNTER      Pt Name: Rebecca Paige  MRN: [id number]  [address] [facility] 1995  Date of evaluation: 10/11/2023  Provider: Mary Crowder, [address] [facility]       Chief Complaint   Patient presents with    Shortness of Breath     Fatigue and body aches. HISTORY OF PRESENT ILLNESS   (Location/Symptom, Timing/Onset, Context/Setting, Quality, Duration, Modifying Factors, Severity)  Note limiting factors. Rebecca Paige is a 29 y.o. female who per chart review has a past medical history of compulsive behavior anxiety bipolar disorder epilepsy tobacco use anemia migraines presents to the emergency department for evaluation of generalized illness x2 -3 days. Patient reports productive cough with yellow sputum congestion body aches fatigue and generalized weakness. Does have a history of bronchitis and current symptoms feel similar. She is an every day smoker and reports using marijuana as well. She has not tried anything for her symptoms at home. She states she does feel occasional shortness of breath with coughing episodes. Denies known sick contacts. Denies fever chills chest pain abdominal pain nausea vomiting diarrhea headache dizziness or lightheadedness leg swelling orthopnea dyspnea upon exertion. Denies alcohol use. HPI    Nursing Notes were reviewed. REVIEW OF SYSTEMS    (2-9 systems for level 4, 10 or more for level 5)     Review of Systems   Constitutional:  Positive for fatigue. Negative for activity change, appetite change, chills and fever. HENT:  Positive for congestion. Negative for ear discharge, ear pain, sinus pressure, sinus pain, sore throat, tinnitus, trouble swallowing and voice change. Eyes:  Negative for photophobia. Respiratory:  Positive for cough and shortness of breath (w/ coughing). Negative for wheezing. Cardiovascular:  Negative for chest pain and palpitations.    Gastrointestinal:  Negative for

## 2023-10-12 LAB
EKG ATRIAL RATE: 69 BPM
EKG P AXIS: 42 DEGREES
EKG P-R INTERVAL: 136 MS
EKG Q-T INTERVAL: 388 MS
EKG QRS DURATION: 100 MS
EKG QTC CALCULATION (BAZETT): 415 MS
EKG R AXIS: 39 DEGREES
EKG T AXIS: 23 DEGREES
EKG VENTRICULAR RATE: 69 BPM

## 2023-10-12 PROCEDURE — 93010 ELECTROCARDIOGRAM REPORT: CPT | Performed by: INTERNAL MEDICINE

## 2023-10-23 ENCOUNTER — HOSPITAL ENCOUNTER (EMERGENCY)
Facility: HOSPITAL | Age: 28
Discharge: HOME | End: 2023-10-23
Attending: EMERGENCY MEDICINE
Payer: COMMERCIAL

## 2023-10-23 VITALS
DIASTOLIC BLOOD PRESSURE: 63 MMHG | HEART RATE: 76 BPM | BODY MASS INDEX: 23.92 KG/M2 | SYSTOLIC BLOOD PRESSURE: 106 MMHG | WEIGHT: 135 LBS | RESPIRATION RATE: 18 BRPM | TEMPERATURE: 97.5 F | OXYGEN SATURATION: 98 % | HEIGHT: 63 IN

## 2023-10-23 DIAGNOSIS — R56.9 SEIZURE (MULTI): Primary | ICD-10-CM

## 2023-10-23 LAB
ALBUMIN SERPL BCP-MCNC: 4.4 G/DL (ref 3.4–5)
ALP SERPL-CCNC: 61 U/L (ref 33–110)
ALT SERPL W P-5'-P-CCNC: 34 U/L (ref 7–45)
ANION GAP SERPL CALC-SCNC: 14 MMOL/L (ref 10–20)
AST SERPL W P-5'-P-CCNC: 22 U/L (ref 9–39)
B-HCG SERPL-ACNC: <2 MIU/ML
BASOPHILS # BLD AUTO: 0.08 X10*3/UL (ref 0–0.1)
BASOPHILS NFR BLD AUTO: 1.1 %
BILIRUB SERPL-MCNC: 0.2 MG/DL (ref 0–1.2)
BUN SERPL-MCNC: 9 MG/DL (ref 6–23)
CALCIUM SERPL-MCNC: 9.6 MG/DL (ref 8.6–10.3)
CHLORIDE SERPL-SCNC: 103 MMOL/L (ref 98–107)
CO2 SERPL-SCNC: 24 MMOL/L (ref 21–32)
CREAT SERPL-MCNC: 0.54 MG/DL (ref 0.5–1.05)
EOSINOPHIL # BLD AUTO: 0.18 X10*3/UL (ref 0–0.7)
EOSINOPHIL NFR BLD AUTO: 2.4 %
ERYTHROCYTE [DISTWIDTH] IN BLOOD BY AUTOMATED COUNT: 18.5 % (ref 11.5–14.5)
GFR SERPL CREATININE-BSD FRML MDRD: >90 ML/MIN/1.73M*2
GLUCOSE BLD MANUAL STRIP-MCNC: 116 MG/DL (ref 74–99)
GLUCOSE SERPL-MCNC: 89 MG/DL (ref 74–99)
HCT VFR BLD AUTO: 35.5 % (ref 36–46)
HGB BLD-MCNC: 10.7 G/DL (ref 12–16)
HOLD SPECIMEN: NORMAL
IMM GRANULOCYTES # BLD AUTO: 0.02 X10*3/UL (ref 0–0.7)
IMM GRANULOCYTES NFR BLD AUTO: 0.3 % (ref 0–0.9)
LACTATE SERPL-SCNC: 2 MMOL/L (ref 0.4–2)
LYMPHOCYTES # BLD AUTO: 2.33 X10*3/UL (ref 1.2–4.8)
LYMPHOCYTES NFR BLD AUTO: 30.8 %
MCH RBC QN AUTO: 21.1 PG (ref 26–34)
MCHC RBC AUTO-ENTMCNC: 30.1 G/DL (ref 32–36)
MCV RBC AUTO: 70 FL (ref 80–100)
MONOCYTES # BLD AUTO: 0.52 X10*3/UL (ref 0.1–1)
MONOCYTES NFR BLD AUTO: 6.9 %
NEUTROPHILS # BLD AUTO: 4.43 X10*3/UL (ref 1.2–7.7)
NEUTROPHILS NFR BLD AUTO: 58.5 %
NRBC BLD-RTO: 0 /100 WBCS (ref 0–0)
PHENYTOIN SERPL-MCNC: <2.5 UG/ML (ref 10–20)
PLATELET # BLD AUTO: 432 X10*3/UL (ref 150–450)
PMV BLD AUTO: 8.9 FL (ref 7.5–11.5)
POTASSIUM SERPL-SCNC: 3.5 MMOL/L (ref 3.5–5.3)
PROT SERPL-MCNC: 7.8 G/DL (ref 6.4–8.2)
RBC # BLD AUTO: 5.06 X10*6/UL (ref 4–5.2)
SODIUM SERPL-SCNC: 137 MMOL/L (ref 136–145)
WBC # BLD AUTO: 7.6 X10*3/UL (ref 4.4–11.3)

## 2023-10-23 PROCEDURE — 96361 HYDRATE IV INFUSION ADD-ON: CPT

## 2023-10-23 PROCEDURE — 2500000004 HC RX 250 GENERAL PHARMACY W/ HCPCS (ALT 636 FOR OP/ED): Performed by: EMERGENCY MEDICINE

## 2023-10-23 PROCEDURE — 82947 ASSAY GLUCOSE BLOOD QUANT: CPT | Mod: 59

## 2023-10-23 PROCEDURE — 80165 DIPROPYLACETIC ACID FREE: CPT | Performed by: EMERGENCY MEDICINE

## 2023-10-23 PROCEDURE — 85025 COMPLETE CBC W/AUTO DIFF WBC: CPT | Performed by: EMERGENCY MEDICINE

## 2023-10-23 PROCEDURE — 84702 CHORIONIC GONADOTROPIN TEST: CPT | Performed by: EMERGENCY MEDICINE

## 2023-10-23 PROCEDURE — 96366 THER/PROPH/DIAG IV INF ADDON: CPT

## 2023-10-23 PROCEDURE — 99284 EMERGENCY DEPT VISIT MOD MDM: CPT | Mod: 25 | Performed by: EMERGENCY MEDICINE

## 2023-10-23 PROCEDURE — 80053 COMPREHEN METABOLIC PANEL: CPT | Performed by: EMERGENCY MEDICINE

## 2023-10-23 PROCEDURE — 96365 THER/PROPH/DIAG IV INF INIT: CPT

## 2023-10-23 PROCEDURE — 81025 URINE PREGNANCY TEST: CPT

## 2023-10-23 PROCEDURE — 36415 COLL VENOUS BLD VENIPUNCTURE: CPT | Performed by: EMERGENCY MEDICINE

## 2023-10-23 PROCEDURE — 80185 ASSAY OF PHENYTOIN TOTAL: CPT | Performed by: EMERGENCY MEDICINE

## 2023-10-23 PROCEDURE — 83605 ASSAY OF LACTIC ACID: CPT | Performed by: EMERGENCY MEDICINE

## 2023-10-23 RX ADMIN — SODIUM CHLORIDE 1000 ML: 9 INJECTION, SOLUTION INTRAVENOUS at 16:23

## 2023-10-23 RX ADMIN — SODIUM CHLORIDE 1200 MG PE: 9 INJECTION, SOLUTION INTRAVENOUS at 17:46

## 2023-10-23 ASSESSMENT — COLUMBIA-SUICIDE SEVERITY RATING SCALE - C-SSRS
1. IN THE PAST MONTH, HAVE YOU WISHED YOU WERE DEAD OR WISHED YOU COULD GO TO SLEEP AND NOT WAKE UP?: NO
2. HAVE YOU ACTUALLY HAD ANY THOUGHTS OF KILLING YOURSELF?: NO
6. HAVE YOU EVER DONE ANYTHING, STARTED TO DO ANYTHING, OR PREPARED TO DO ANYTHING TO END YOUR LIFE?: NO

## 2023-10-23 ASSESSMENT — PAIN SCALES - GENERAL: PAINLEVEL_OUTOF10: 5 - MODERATE PAIN

## 2023-10-23 ASSESSMENT — PAIN - FUNCTIONAL ASSESSMENT: PAIN_FUNCTIONAL_ASSESSMENT: 0-10

## 2023-10-23 NOTE — ED PROVIDER NOTES
HPI   Chief Complaint   Patient presents with    Seizures       History of Present Illness:  28-year-old female with known history of seizure disorder presents after having had a seizure just prior to arrival.  It was tonic-clonic in nature.  The patient was postictal.  No tongue biting and no loss of urinary continence.  Patient states she is normally supposed to be on Depakote and Dilantin but she states she has missed some doses.  No trauma or travel.  No sick contacts.  Nothing seems to make her symptoms worse or better.  She denies headache.  No loss of motor or sensory function.  No loss of bladder or bowel function.  No slurred speech or facial droop.  No chest pain or shortness of breath.  No nausea or vomiting.  No diarrhea.  No chance of pregnancy.      PMFSH:   As per HPI, otherwise nurses notes reviewed in EMR.    Past Medical History: Active Ambulatory Problems  No Active Ambulatory Problems  Resolved Ambulatory Problems  No Resolved Ambulatory Problems  Past Medical History:  No date: Epilepsy, unspecified, not intractable, without status   epilepticus (CMS/[facility])  No date: Personal history of other specified conditions  No date: Personal history of other specified conditions   Past Surgical History: No past surgical history on file.   Family History: No family history on file.     Social History:  Social History    Socioeconomic History      Marital status: Single      Spouse name: Not on file      Number of children: Not on file      Years of education: Not on file      Highest education level: Not on file    Occupational History      Not on file    Tobacco Use      Smoking status: Not on file      Smokeless tobacco: Not on file    Substance and Sexual Activity      Alcohol use: Not on file      Drug use: Not on file      Sexual activity: Not on file    Other Topics      Concerns:        Not on file    Social History Narrative      Not on file    Social Determinants of Health  Financial Resource  Strain: Not on file  Food Insecurity: Not on file  Transportation Needs: Not on file  Physical Activity: Not on file  Stress: Not on file  Social Connections: Not on file  Intimate Partner Violence: Not on file  Housing Stability: Not on file         History provided by:  EMS personnel and patient                      No data recorded                Patient History   Past Medical History:   Diagnosis Date    Epilepsy, unspecified, not intractable, without status epilepticus (CMS/[facility])     Epilepsy    Personal history of other specified conditions     History of diarrhea    Personal history of other specified conditions     History of vomiting     No past surgical history on file.  No family history on file.  Social History     Tobacco Use    Smoking status: Not on file    Smokeless tobacco: Not on file   Substance Use Topics    Alcohol use: Not on file    Drug use: Not on file       Physical Exam   ED Triage Vitals   Temp Pulse Resp BP   -- -- -- --      SpO2 Temp src Heart Rate Source Patient Position   -- -- -- --      BP Location FiO2 (%)     -- --       Physical Exam  Physical Exam:    ED Triage Vitals   Temp Pulse Resp BP   -- -- -- --      SpO2 Temp src Heart Rate Source Patient Position   -- -- -- --      BP Location FiO2 (%)     -- --         Constitutional: Vital signs per nursing notes.  Well developed, well nourished.  No acute distress.    Psychiatric: alert and oriented to person, place, and time; no abnormalities of mood or affect; memory intact  Eyes: PERRL; conjunctivae and lids normal; EOMI  ENT: otoscopic exam of external canal and TM´s normal; nasal mucosa, turbinates, and septum normal; mouth, tongue, and pharynx normal; pharynx without edema, exudate, or injection  Respiratory: normal respiratory effort and excursion; no rales, rhonchi, or wheezes; equal air entry  Cardiovascular: regular rate and rhythm; no murmurs, rubs or gallops; symmetric pulses; no edema; normal capillary refill; distal  pulses present  Neurological: normal speech; CN II-XII grossly intact; normal motor and sensory function; no nystagmus; no pronator drift  GI: no masses, tenderness, rebound or guarding; no palpable, pulsatile mass; no organomegaly; no hernia; normal bowel sounds; (-) Quintana´s sign; (-) McBurney´s sign; (-) CVA tenderness  Lymphatic: no adenopathy of neck  Musculoskeletal: normal gait and station; normal digits and nails; no gross tendon or ligament injury; normal to palpation; normal strength/tone; neurovascular status intact; (-) Gilda´s sign  Skin: normal to inspection; normal to palpation; no rash  GCS: 15    ED Course & MDM        Medical Decision Making  Medical Decision Making:    Differential Diagnoses Considered: Seizure, arrhythmia, electrolyte disorder, tox     EKG: My interpretation of EKG is normal sinus rhythm at 69 bpm with no acute ST-T changes    Labs Reviewed   PHENYTOIN - Abnormal       Result Value    Phenytoin <2.5 (*)    CBC WITH AUTO DIFFERENTIAL - Abnormal    WBC 7.6      nRBC 0.0      RBC 5.06      Hemoglobin 10.7 (*)     Hematocrit 35.5 (*)     MCV 70 (*)     MCH 21.1 (*)     MCHC 30.1 (*)     RDW 18.5 (*)     Platelets 432      MPV 8.9      Neutrophils % 58.5      Immature Granulocytes %, Automated 0.3      Lymphocytes % 30.8      Monocytes % 6.9      Eosinophils % 2.4      Basophils % 1.1      Neutrophils Absolute 4.43      Immature Granulocytes Absolute, Automated 0.02      Lymphocytes Absolute 2.33      Monocytes Absolute 0.52      Eosinophils Absolute 0.18      Basophils Absolute 0.08     POCT GLUCOSE - Abnormal    POCT Glucose 116 (*)    COMPREHENSIVE METABOLIC PANEL - Normal    Glucose 89      Sodium 137      Potassium 3.5      Chloride 103      Bicarbonate 24      Anion Gap 14      Urea Nitrogen 9      Creatinine 0.54      eGFR >90      Calcium 9.6      Albumin 4.4      Alkaline Phosphatase 61      Total Protein 7.8      AST 22      Bilirubin, Total 0.2      ALT 34     LACTATE -  Normal    Lactate 2.0      Narrative:     Venipuncture immediately after or during the administration of Metamizole may lead to falsely low results. Testing should be performed immediately  prior to Metamizole dosing.   HUMAN CHORIONIC GONADOTROPIN, SERUM QUANTITATIVE - Normal    HCG, Beta-Quantitative <2      Narrative:      Total HCG measurement is performed using the Maria Griffin Access   Immunoassay which detects intact HCG and free beta HCG subunit.    This test is not indicated for use as a tumor marker.   HCG testing is performed using a different test methodology at [facility] than other [facility] hospitals. Direct result comparison   should only be made within the same method.       VALPROIC ACID, FREE, SERUM       No orders to display       Diagnostic testing considered:         Review of recent and relevant records:    ED Medication Administration:   ED Medication Administration from 10/23/2023 1606 to 10/23/2023 1743         Date/Time Order Dose Route Action Action by     10/23/2023 1623 EDT sodium chloride 0.9 % bolus 1,000 mL 1,000 mL intravenous New Bag BLAS Marie     10/23/2023 1723 EDT sodium chloride 0.9 % bolus 1,000 mL 0 mL intravenous Stopped AB Tenorio            Prescription Medication Consideration/Given:     Social Determinants of Health Significantly Affecting Care:      Summary:    /60 (10/23/23 1719)    Temp      Pulse 80 (10/23/23 1719)   Resp      SpO2 100 % (10/23/23 1719)      Abnormal Labs Reviewed   PHENYTOIN - Abnormal; Notable for the following components:       Result Value    Phenytoin <2.5 (*)     All other components within normal limits   CBC WITH AUTO DIFFERENTIAL - Abnormal; Notable for the following components:    Hemoglobin 10.7 (*)     Hematocrit 35.5 (*)     MCV 70 (*)     MCH 21.1 (*)     MCHC 30.1 (*)     RDW 18.5 (*)     All other components within normal limits   POCT GLUCOSE - Abnormal; Notable for the following components:    POCT Glucose 116  (*)     All other components within normal limits          Diagnostic evaluation was partially completed.  Pregnancy test is negative.  Phenytoin level is subtherapeutic at less than 2.5.  Metabolic panel shows a normal glucose level.  Sodium potassium levels are in the normal range.  Renal and liver functions are in the normal range.  Lactate is in the normal range.  CBC shows a normal white blood cell count with mild anemia with a hemoglobin of 10.7.    The patient was placed in seizure precautions.  She is given a dose of IV fosphenytoin for her subtherapeutic phenytoin level.  She is also been treated with IV fluids.    Patient care signed out to Dr. Castillo      at  1800    .  Transfer of care shared with patient and/or family.    Initial history, physical exam, initiated diagnostic evaluation and treatment plan have been discussed.  Results available at the time of sign out have been reviewed by me and treatment initiated as appropriate.      The following is pending: valproic acid level, reevaluation    Final disposition will be based on accepting physician's interpretation of results and re-evaluation of the patient.      Procedure  Procedures     Christiano TERESA MD  10/23/23 7932

## 2023-10-24 NOTE — ED PROVIDER NOTES
Labs Reviewed   PHENYTOIN - Abnormal       Result Value    Phenytoin <2.5 (*)    CBC WITH AUTO DIFFERENTIAL - Abnormal    WBC 7.6      nRBC 0.0      RBC 5.06      Hemoglobin 10.7 (*)     Hematocrit 35.5 (*)     MCV 70 (*)     MCH 21.1 (*)     MCHC 30.1 (*)     RDW 18.5 (*)     Platelets 432      MPV 8.9      Neutrophils % 58.5      Immature Granulocytes %, Automated 0.3      Lymphocytes % 30.8      Monocytes % 6.9      Eosinophils % 2.4      Basophils % 1.1      Neutrophils Absolute 4.43      Immature Granulocytes Absolute, Automated 0.02      Lymphocytes Absolute 2.33      Monocytes Absolute 0.52      Eosinophils Absolute 0.18      Basophils Absolute 0.08     POCT GLUCOSE - Abnormal    POCT Glucose 116 (*)    COMPREHENSIVE METABOLIC PANEL - Normal    Glucose 89      Sodium 137      Potassium 3.5      Chloride 103      Bicarbonate 24      Anion Gap 14      Urea Nitrogen 9      Creatinine 0.54      eGFR >90      Calcium 9.6      Albumin 4.4      Alkaline Phosphatase 61      Total Protein 7.8      AST 22      Bilirubin, Total 0.2      ALT 34     LACTATE - Normal    Lactate 2.0      Narrative:     Venipuncture immediately after or during the administration of Metamizole may lead to falsely low results. Testing should be performed immediately  prior to Metamizole dosing.   HUMAN CHORIONIC GONADOTROPIN, SERUM QUANTITATIVE - Normal    HCG, Beta-Quantitative <2      Narrative:      Total HCG measurement is performed using the Maria Hitchcock Access   Immunoassay which detects intact HCG and free beta HCG subunit.    This test is not indicated for use as a tumor marker.   HCG testing is performed using a different test methodology at [facility] than other [facility]. Direct result comparison   should only be made within the same method.       VALPROIC ACID, FREE, SERUM        No orders to display    patient was comfortable.  Was given IV phenytoin.  Has been doing well will discharge to home.      Alma Castillo MD  10/23/23 2027       Alma Castillo MD  10/23/23 2031

## 2023-10-25 LAB
SCAN RESULT: ABNORMAL
VALPROATE FREE SERPL-MCNC: <1.3 UG/ML (ref 4–30)

## 2023-10-30 DIAGNOSIS — R46.89 COMPULSIVE BEHAVIOR: ICD-10-CM

## 2023-10-30 DIAGNOSIS — F43.0 PANIC STATE AS ACUTE REACTION TO STRESS: ICD-10-CM

## 2023-10-30 RX ORDER — FLUVOXAMINE MALEATE 50 MG/1
TABLET, COATED ORAL
Qty: 135 TABLET | Refills: 1 | OUTPATIENT
Start: 2023-10-30

## 2023-11-01 DIAGNOSIS — F43.0 PANIC STATE AS ACUTE REACTION TO STRESS: ICD-10-CM

## 2023-11-01 DIAGNOSIS — R46.89 COMPULSIVE BEHAVIOR: ICD-10-CM

## 2023-11-02 RX ORDER — FLUVOXAMINE MALEATE 50 MG/1
TABLET, COATED ORAL
Qty: 135 TABLET | Refills: 1 | Status: SHIPPED | OUTPATIENT
Start: 2023-11-02

## 2023-11-05 ENCOUNTER — APPOINTMENT (OUTPATIENT)
Dept: GENERAL RADIOLOGY | Age: 28
End: 2023-11-05
Payer: MEDICAID

## 2023-11-05 ENCOUNTER — HOSPITAL ENCOUNTER (EMERGENCY)
Age: 28
Discharge: HOME | End: 2023-11-05
Payer: MEDICAID

## 2023-11-05 VITALS
HEIGHT: 63 IN | TEMPERATURE: 98 F | WEIGHT: 135 LBS | HEART RATE: 87 BPM | BODY MASS INDEX: 23.92 KG/M2 | SYSTOLIC BLOOD PRESSURE: 100 MMHG | DIASTOLIC BLOOD PRESSURE: 69 MMHG | RESPIRATION RATE: 18 BRPM | OXYGEN SATURATION: 100 %

## 2023-11-05 DIAGNOSIS — Z20.822 CONTACT WITH AND (SUSPECTED) EXPOSURE TO COVID-19: Primary | ICD-10-CM

## 2023-11-05 LAB — SARS-COV-2 RDRP RESP QL NAA+PROBE: NOT DETECTED

## 2023-11-05 PROCEDURE — 71046 X-RAY EXAM CHEST 2 VIEWS: CPT

## 2023-11-05 PROCEDURE — 99284 EMERGENCY DEPT VISIT MOD MDM: CPT

## 2023-11-05 PROCEDURE — 6370000000 HC RX 637 (ALT 250 FOR IP): Performed by: PHYSICIAN ASSISTANT

## 2023-11-05 PROCEDURE — 87635 SARS-COV-2 COVID-19 AMP PRB: CPT

## 2023-11-05 RX ORDER — BENZONATATE 100 MG/1
100-200 CAPSULE ORAL 3 TIMES DAILY PRN
Qty: 60 CAPSULE | Refills: 0 | Status: SHIPPED | OUTPATIENT
Start: 2023-11-05 | End: 2023-11-12

## 2023-11-05 RX ORDER — CETIRIZINE HYDROCHLORIDE, PSEUDOEPHEDRINE HYDROCHLORIDE 5; 120 MG/1; MG/1
1 TABLET, FILM COATED, EXTENDED RELEASE ORAL 2 TIMES DAILY
Qty: 10 TABLET | Refills: 1 | Status: SHIPPED | OUTPATIENT
Start: 2023-11-05 | End: 2023-12-05

## 2023-11-05 RX ORDER — BENZONATATE 100 MG/1
200 CAPSULE ORAL ONCE
Status: COMPLETED | OUTPATIENT
Start: 2023-11-05 | End: 2023-11-05

## 2023-11-05 RX ORDER — IBUPROFEN 800 MG/1
800 TABLET ORAL ONCE
Status: COMPLETED | OUTPATIENT
Start: 2023-11-05 | End: 2023-11-05

## 2023-11-05 RX ORDER — IBUPROFEN 800 MG/1
800 TABLET ORAL EVERY 8 HOURS PRN
Qty: 20 TABLET | Refills: 0 | Status: SHIPPED | OUTPATIENT
Start: 2023-11-05

## 2023-11-05 RX ADMIN — BENZONATATE 200 MG: 100 CAPSULE ORAL at 10:28

## 2023-11-05 RX ADMIN — IBUPROFEN 800 MG: 800 TABLET, FILM COATED ORAL at 11:04

## 2023-11-05 ASSESSMENT — ENCOUNTER SYMPTOMS
SORE THROAT: 0
BACK PAIN: 0
DIARRHEA: 0
RHINORRHEA: 0
PHOTOPHOBIA: 0
SHORTNESS OF BREATH: 1
EYE PAIN: 0
VOMITING: 0
NAUSEA: 0
COUGH: 1
ABDOMINAL PAIN: 0

## 2023-11-05 ASSESSMENT — PAIN DESCRIPTION - FREQUENCY: FREQUENCY: CONTINUOUS

## 2023-11-05 ASSESSMENT — PAIN SCALES - GENERAL
PAINLEVEL_OUTOF10: 6
PAINLEVEL_OUTOF10: 5

## 2023-11-05 ASSESSMENT — PAIN - FUNCTIONAL ASSESSMENT: PAIN_FUNCTIONAL_ASSESSMENT: 0-10

## 2023-11-05 ASSESSMENT — PAIN DESCRIPTION - PAIN TYPE: TYPE: ACUTE PAIN

## 2023-11-05 NOTE — ED PROVIDER NOTES
[facility] ED  eMERGENCY dEPARTMENTeNCOUnter      Pt Name: Elena Winchester  MRN: [id number]  [address] [facility] 1995  Date ofevaluation: 11/5/2023  Provider: Carri Stinson       Chief Complaint   Patient presents with    Cough     URI 2 weeks ago cough and congestion resumed    Shortness of Breath    Nausea & Vomiting         HISTORY OF PRESENT ILLNESS   (Location/Symptom, Timing/Onset,Context/Setting, Quality, Duration, Modifying Factors, Severity)  Note limiting factors. Elena Winchester is a 29 y.o. female who presents to the emergency department cough and congestion that started yesterday. Pt was exposed to covid. She has chills. She is a smoker. Feels sob. Tried old inahler with no relief. HPI    NursingNotes were reviewed. REVIEW OF SYSTEMS    (2-9 systems for level 4, 10 or more for level 5)     Review of Systems   Constitutional:  Negative for chills, diaphoresis, fatigue and fever. HENT:  Positive for congestion. Negative for rhinorrhea and sore throat. Eyes:  Negative for photophobia and pain. Respiratory:  Positive for cough and shortness of breath. Cardiovascular:  Negative for chest pain and palpitations. Gastrointestinal:  Negative for abdominal pain, diarrhea, nausea and vomiting. Genitourinary:  Negative for dysuria and flank pain. Musculoskeletal:  Negative for back pain. Skin:  Negative for rash. Neurological:  Negative for dizziness, light-headedness and headaches. Psychiatric/Behavioral: Negative. All other systems reviewed and are negative. Except as noted above the remainder of the review of systems was reviewed and negative.        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

## 2023-11-05 NOTE — ED TRIAGE NOTES
Patient states URI 2 weeks ago but this resolved and [facility]. The cough started today, congestion and wheezing yesterday.  Lungs are clear no wheezing

## 2023-11-12 ENCOUNTER — HOSPITAL ENCOUNTER (OUTPATIENT)
Dept: CARDIOLOGY | Facility: HOSPITAL | Age: 28
Discharge: HOME | End: 2023-11-12
Payer: COMMERCIAL

## 2023-11-12 PROCEDURE — 93005 ELECTROCARDIOGRAM TRACING: CPT

## 2023-12-08 ENCOUNTER — OFFICE VISIT (OUTPATIENT)
Dept: FAMILY MEDICINE CLINIC | Age: 28
End: 2023-12-08
Payer: MEDICAID

## 2023-12-08 VITALS
DIASTOLIC BLOOD PRESSURE: 74 MMHG | SYSTOLIC BLOOD PRESSURE: 110 MMHG | WEIGHT: 149.8 LBS | HEIGHT: 63 IN | HEART RATE: 68 BPM | BODY MASS INDEX: 26.54 KG/M2

## 2023-12-08 DIAGNOSIS — M25.312 SHOULDER INSTABILITY, LEFT: ICD-10-CM

## 2023-12-08 DIAGNOSIS — K02.9 DENTAL CARIES: ICD-10-CM

## 2023-12-08 DIAGNOSIS — G40.109 TEMPORAL LOBE EPILEPSY, NON-REFRACTORY (HCC): Primary | ICD-10-CM

## 2023-12-08 DIAGNOSIS — M24.412 SHOULDER DISLOCATION, RECURRENT, LEFT: ICD-10-CM

## 2023-12-08 DIAGNOSIS — F41.9 ANXIETY: ICD-10-CM

## 2023-12-08 DIAGNOSIS — K58.0 IRRITABLE BOWEL SYNDROME WITH DIARRHEA: ICD-10-CM

## 2023-12-08 DIAGNOSIS — F31.75 BIPOLAR DISORDER, IN PARTIAL REMISSION, MOST RECENT EPISODE DEPRESSED (HCC): ICD-10-CM

## 2023-12-08 DIAGNOSIS — D50.8 IRON DEFICIENCY ANEMIA SECONDARY TO INADEQUATE DIETARY IRON INTAKE: ICD-10-CM

## 2023-12-08 PROCEDURE — G8484 FLU IMMUNIZE NO ADMIN: HCPCS | Performed by: PHYSICIAN ASSISTANT

## 2023-12-08 PROCEDURE — 4004F PT TOBACCO SCREEN RCVD TLK: CPT | Performed by: PHYSICIAN ASSISTANT

## 2023-12-08 PROCEDURE — G8419 CALC BMI OUT NRM PARAM NOF/U: HCPCS | Performed by: PHYSICIAN ASSISTANT

## 2023-12-08 PROCEDURE — 99214 OFFICE O/P EST MOD 30 MIN: CPT | Performed by: PHYSICIAN ASSISTANT

## 2023-12-08 PROCEDURE — G8427 DOCREV CUR MEDS BY ELIG CLIN: HCPCS | Performed by: PHYSICIAN ASSISTANT

## 2023-12-08 RX ORDER — IBUPROFEN 800 MG/1
800 TABLET ORAL EVERY 8 HOURS PRN
Qty: 30 TABLET | Refills: 2 | Status: SHIPPED | OUTPATIENT
Start: 2023-12-08

## 2023-12-08 RX ORDER — QUINIDINE GLUCONATE 324 MG
240 TABLET, EXTENDED RELEASE ORAL
Qty: 90 TABLET | Refills: 2 | Status: SHIPPED | OUTPATIENT
Start: 2023-12-08

## 2023-12-08 RX ORDER — DICYCLOMINE HYDROCHLORIDE 10 MG/1
10 CAPSULE ORAL 2 TIMES DAILY PRN
Qty: 60 CAPSULE | Refills: 3 | Status: SHIPPED | OUTPATIENT
Start: 2023-12-08

## 2023-12-08 RX ORDER — LIDOCAINE HYDROCHLORIDE 20 MG/ML
15 SOLUTION OROPHARYNGEAL PRN
Qty: 100 ML | Refills: 2 | Status: SHIPPED | OUTPATIENT
Start: 2023-12-08

## 2023-12-08 RX ORDER — CLINDAMYCIN HYDROCHLORIDE 300 MG/1
300 CAPSULE ORAL 3 TIMES DAILY
Qty: 30 CAPSULE | Refills: 0 | Status: SHIPPED | OUTPATIENT
Start: 2023-12-08 | End: 2023-12-18

## 2023-12-08 NOTE — PROGRESS NOTES
Subjective  Madison Carpenter, 29 y.o. female presents today with:  Chief Complaint   Patient presents with    Follow-up       HPI  Last OV with me: 10/6/23. Recently started working, [employer] as a . Work going well. L shoulder is doing OK, no recent dislocations/subluxation. Unstable, certain movements will elicit dislocation. Higher risk with seizure disorder. Had new patient visit with Dr. Dany Stringer 10/3/23. She was referred back to Dr. Eber Hurtado for further continuation of care. Due to limited transportation, she has not been able to make a follow up. History of seizure disorder. She is on medication. Admits to lapses in her medication. Needs follow up with neurology. Known history of bipolar disorder. Stopped her behavior health medications. Wants to go in a 'different' direction. Has noticed that her moods are more quick to react/irritable with stopping medication. Depakote has helped with some of her moods. Does have ativan on hand for acute panic and irritability. Asking to try an alternate anxiolytic. Chronic dental caries. Most recently noticed that a lower tooth has been very painful and loose. Tenderness and inflammation along the gum line. She has been taking tylenol and otc motrin for pain. Needs to see dentist.   +thermal sensitivity. Plans on scheduling with dentist [facility]). Needing refill of both iron and bentyl. Review of Systems   Constitutional:  Negative for activity change, diaphoresis and fatigue. HENT:  Positive for dental problem. Negative for facial swelling. Respiratory:  Negative for chest tightness. Cardiovascular:  Negative for chest pain and leg swelling. Genitourinary:  Negative for decreased urine volume, difficulty urinating, dyspareunia, dysuria, frequency, genital sores, menstrual problem, pelvic pain, urgency, vaginal bleeding, vaginal discharge and vaginal pain.    Musculoskeletal:  Positive for arthralgias and

## 2023-12-09 ENCOUNTER — HOSPITAL ENCOUNTER (EMERGENCY)
Facility: HOSPITAL | Age: 28
Discharge: HOME | End: 2023-12-09
Payer: COMMERCIAL

## 2023-12-09 VITALS
HEIGHT: 63 IN | SYSTOLIC BLOOD PRESSURE: 125 MMHG | WEIGHT: 150 LBS | DIASTOLIC BLOOD PRESSURE: 75 MMHG | TEMPERATURE: 97.7 F | OXYGEN SATURATION: 100 % | HEART RATE: 94 BPM | BODY MASS INDEX: 26.58 KG/M2 | RESPIRATION RATE: 18 BRPM

## 2023-12-09 DIAGNOSIS — G40.919 BREAKTHROUGH SEIZURE (MULTI): Primary | ICD-10-CM

## 2023-12-09 PROBLEM — K58.0 IRRITABLE BOWEL SYNDROME WITH DIARRHEA: Status: ACTIVE | Noted: 2023-12-09

## 2023-12-09 PROBLEM — K02.9 DENTAL CARIES: Status: ACTIVE | Noted: 2023-12-09

## 2023-12-09 PROBLEM — R56.9 SEIZURE (HCC): Status: RESOLVED | Noted: 2021-11-01 | Resolved: 2023-12-09

## 2023-12-09 PROBLEM — S46.012A STRAIN OF MUSC/TEND THE ROTATOR CUFF OF LEFT SHOULDER, INIT: Status: RESOLVED | Noted: 2022-08-25 | Resolved: 2023-12-09

## 2023-12-09 PROBLEM — S43.402A SPRAIN OF SHOULDER, LEFT: Status: RESOLVED | Noted: 2023-08-18 | Resolved: 2023-12-09

## 2023-12-09 LAB
ALBUMIN SERPL BCP-MCNC: 4.4 G/DL (ref 3.4–5)
ALP SERPL-CCNC: 60 U/L (ref 33–110)
ALT SERPL W P-5'-P-CCNC: 25 U/L (ref 7–45)
ANION GAP SERPL CALC-SCNC: 11 MMOL/L (ref 10–20)
AST SERPL W P-5'-P-CCNC: 19 U/L (ref 9–39)
BASOPHILS # BLD AUTO: 0.09 X10*3/UL (ref 0–0.1)
BASOPHILS NFR BLD AUTO: 0.8 %
BILIRUB SERPL-MCNC: 0.2 MG/DL (ref 0–1.2)
BUN SERPL-MCNC: 13 MG/DL (ref 6–23)
CALCIUM SERPL-MCNC: 9.2 MG/DL (ref 8.6–10.3)
CHLORIDE SERPL-SCNC: 103 MMOL/L (ref 98–107)
CO2 SERPL-SCNC: 25 MMOL/L (ref 21–32)
CREAT SERPL-MCNC: 0.55 MG/DL (ref 0.5–1.05)
EOSINOPHIL # BLD AUTO: 0.22 X10*3/UL (ref 0–0.7)
EOSINOPHIL NFR BLD AUTO: 2 %
ERYTHROCYTE [DISTWIDTH] IN BLOOD BY AUTOMATED COUNT: 18.2 % (ref 11.5–14.5)
GFR SERPL CREATININE-BSD FRML MDRD: >90 ML/MIN/1.73M*2
GLUCOSE SERPL-MCNC: 75 MG/DL (ref 74–99)
HCT VFR BLD AUTO: 34.8 % (ref 36–46)
HGB BLD-MCNC: 10.8 G/DL (ref 12–16)
IMM GRANULOCYTES # BLD AUTO: 0.03 X10*3/UL (ref 0–0.7)
IMM GRANULOCYTES NFR BLD AUTO: 0.3 % (ref 0–0.9)
LYMPHOCYTES # BLD AUTO: 3.03 X10*3/UL (ref 1.2–4.8)
LYMPHOCYTES NFR BLD AUTO: 27.5 %
MAGNESIUM SERPL-MCNC: 1.96 MG/DL (ref 1.6–2.4)
MCH RBC QN AUTO: 21.7 PG (ref 26–34)
MCHC RBC AUTO-ENTMCNC: 31 G/DL (ref 32–36)
MCV RBC AUTO: 70 FL (ref 80–100)
MONOCYTES # BLD AUTO: 0.48 X10*3/UL (ref 0.1–1)
MONOCYTES NFR BLD AUTO: 4.4 %
NEUTROPHILS # BLD AUTO: 7.15 X10*3/UL (ref 1.2–7.7)
NEUTROPHILS NFR BLD AUTO: 65 %
NRBC BLD-RTO: 0 /100 WBCS (ref 0–0)
PLATELET # BLD AUTO: 425 X10*3/UL (ref 150–450)
POTASSIUM SERPL-SCNC: 3.6 MMOL/L (ref 3.5–5.3)
PROT SERPL-MCNC: 7.6 G/DL (ref 6.4–8.2)
RBC # BLD AUTO: 4.97 X10*6/UL (ref 4–5.2)
SODIUM SERPL-SCNC: 135 MMOL/L (ref 136–145)
WBC # BLD AUTO: 11 X10*3/UL (ref 4.4–11.3)

## 2023-12-09 PROCEDURE — 83735 ASSAY OF MAGNESIUM: CPT | Performed by: PHYSICIAN ASSISTANT

## 2023-12-09 PROCEDURE — 36415 COLL VENOUS BLD VENIPUNCTURE: CPT | Performed by: PHYSICIAN ASSISTANT

## 2023-12-09 PROCEDURE — 80165 DIPROPYLACETIC ACID FREE: CPT | Performed by: PHYSICIAN ASSISTANT

## 2023-12-09 PROCEDURE — 80053 COMPREHEN METABOLIC PANEL: CPT | Performed by: PHYSICIAN ASSISTANT

## 2023-12-09 PROCEDURE — 99284 EMERGENCY DEPT VISIT MOD MDM: CPT

## 2023-12-09 PROCEDURE — 80186 ASSAY OF PHENYTOIN FREE: CPT | Performed by: PHYSICIAN ASSISTANT

## 2023-12-09 PROCEDURE — 99283 EMERGENCY DEPT VISIT LOW MDM: CPT

## 2023-12-09 PROCEDURE — 85025 COMPLETE CBC W/AUTO DIFF WBC: CPT | Performed by: PHYSICIAN ASSISTANT

## 2023-12-09 RX ORDER — CLONAZEPAM 0.5 MG/1
0.5 TABLET ORAL 2 TIMES DAILY PRN
Qty: 30 TABLET | Refills: 0 | Status: SHIPPED | OUTPATIENT
Start: 2023-12-09 | End: 2023-12-24

## 2023-12-09 ASSESSMENT — LIFESTYLE VARIABLES
HAVE YOU EVER FELT YOU SHOULD CUT DOWN ON YOUR DRINKING: NO
HAVE PEOPLE ANNOYED YOU BY CRITICIZING YOUR DRINKING: NO
REASON UNABLE TO ASSESS: NO
EVER FELT BAD OR GUILTY ABOUT YOUR DRINKING: NO
EVER HAD A DRINK FIRST THING IN THE MORNING TO STEADY YOUR NERVES TO GET RID OF A HANGOVER: NO

## 2023-12-09 ASSESSMENT — COLUMBIA-SUICIDE SEVERITY RATING SCALE - C-SSRS
6. HAVE YOU EVER DONE ANYTHING, STARTED TO DO ANYTHING, OR PREPARED TO DO ANYTHING TO END YOUR LIFE?: NO
1. IN THE PAST MONTH, HAVE YOU WISHED YOU WERE DEAD OR WISHED YOU COULD GO TO SLEEP AND NOT WAKE UP?: NO
2. HAVE YOU ACTUALLY HAD ANY THOUGHTS OF KILLING YOURSELF?: NO

## 2023-12-09 ASSESSMENT — PAIN - FUNCTIONAL ASSESSMENT: PAIN_FUNCTIONAL_ASSESSMENT: 0-10

## 2023-12-09 ASSESSMENT — ENCOUNTER SYMPTOMS
BACK PAIN: 0
COLOR CHANGE: 0
CHEST TIGHTNESS: 0
FACIAL SWELLING: 0

## 2023-12-09 NOTE — ED PROVIDER NOTES
HPI   Chief Complaint   Patient presents with    Seizures     Pt states has epilepsy and had 2 seizures last night also c/o tooth pain         History provided by:  Patient   used: No      28-year-old female past medical history epilepsy presents for 2 breakthrough seizures yesterday.  She went into work today and she came here to get a work note.  She is on Dilantin and Depakote and denies any missed doses.  She states that 2 weeks ago she was sick and this lasted for 9 days.  She has a lower tooth infection that she just went to her family doctor yesterday and picked up antibiotics, but has not taken any yet.  She has been very stressed and not sleeping well.  She has not had a breakthrough seizure in 2 months.  She is currently at her baseline.  Denies fever, cough, dizziness, headache, vision changes, weakness, n/v/d, abd or back pain, dysuria, SOB or CP    ROS negative unless otherwise stated in HPI                      [place] Coma Scale Score: 15                  Patient History   Past Medical History:   Diagnosis Date    Epilepsy, unspecified, not intractable, without status epilepticus (CMS/[facility])     Epilepsy    Personal history of other specified conditions     History of diarrhea    Personal history of other specified conditions     History of vomiting     History reviewed. No pertinent surgical history.  No family history on file.  Social History     Tobacco Use    Smoking status: Not on file    Smokeless tobacco: Not on file   Substance Use Topics    Alcohol use: Not on file    Drug use: Not on file       Physical Exam   ED Triage Vitals [12/09/23 1114]   Temp Heart Rate Resp BP   36.5 °C (97.7 °F) 94 18 125/75      SpO2 Temp src Heart Rate Source Patient Position   100 % -- -- --      BP Location FiO2 (%)     -- --       Physical Exam    General: alert, no distress, well nourished, talking in full and complete sentences  Skin: dry, intact, no rashes  Head: atraumatic,  normocephalic  Eyes: EOMI, PERRLA, normal conjunctiva, no nystagmus  Throat: no stridor, no hot potato voice  Nose: nares patent  Mouth: mucous membranes moist  Respiratory: non labored breathing  Extremities: FROM X4, strength +5/5, capillary refill intact  Neuro: A&Ox3, cranial nerves II through XII intact  Psych: cooperative, appropriate mood    ED Course & MDM   Diagnoses as of 12/09/23 1341   Breakthrough seizure (CMS/HCC)     Labs Reviewed   CBC WITH AUTO DIFFERENTIAL - Abnormal       Result Value    WBC 11.0      nRBC 0.0      RBC 4.97      Hemoglobin 10.8 (*)     Hematocrit 34.8 (*)     MCV 70 (*)     MCH 21.7 (*)     MCHC 31.0 (*)     RDW 18.2 (*)     Platelets 425      Neutrophils % 65.0      Immature Granulocytes %, Automated 0.3      Lymphocytes % 27.5      Monocytes % 4.4      Eosinophils % 2.0      Basophils % 0.8      Neutrophils Absolute 7.15      Immature Granulocytes Absolute, Automated 0.03      Lymphocytes Absolute 3.03      Monocytes Absolute 0.48      Eosinophils Absolute 0.22      Basophils Absolute 0.09     COMPREHENSIVE METABOLIC PANEL - Abnormal    Glucose 75      Sodium 135 (*)     Potassium 3.6      Chloride 103      Bicarbonate 25      Anion Gap 11      Urea Nitrogen 13      Creatinine 0.55      eGFR >90      Calcium 9.2      Albumin 4.4      Alkaline Phosphatase 60      Total Protein 7.6      AST 19      Bilirubin, Total 0.2      ALT 25     MAGNESIUM - Normal    Magnesium 1.96     URINALYSIS WITH REFLEX MICROSCOPIC AND CULTURE   HCG, URINE, QUALITATIVE   DRUG SCREEN,URINE   VALPROIC ACID, FREE, SERUM   PHENYTOIN, FREE      No orders to display         Medical Decision Making  She is placed on seizure precautions and to follow-up with her neurologist.  Dilantin and Depakote levels pending.  Sodium 135.  No other significant abnormalities.  She is already on an antibiotic for her tooth.  Possibly infectious along with stress and insomnia cause for her breakthrough seizure.  She is to  follow-up with her neurologist.  Afebrile, not tachycardic, not tachypneic, not hypoxic, tolerating PO, non toxic appearing and ambulating at baseline at discharge. Hemodynamically intact. All questions answered. Patient in agreement with treatment.      Procedure  Procedures     Slime Ferreira PA-C  12/09/23 1493

## 2023-12-09 NOTE — Clinical Note
Jackie Medellin was seen and treated in our emergency department on 12/9/2023.  She may return to work on 12/09/2023.       If you have any questions or concerns, please don't hesitate to call.      Slime Ferreira PA-C

## 2023-12-13 LAB
PHENYTOIN FREE SERPL-MCNC: 0.3 UG/ML (ref 1–2)
SCAN RESULT: ABNORMAL
SCAN RESULT: ABNORMAL
VALPROATE FREE SERPL-MCNC: <1.3 UG/ML (ref 4–30)

## 2023-12-20 NOTE — RESULT ENCOUNTER NOTE
Dr Mina reviewed results, instructed to contact patient to notify of low levels and to encourage follow up with neurology.  Patient was contacted and states she has not seen neuro yet as she has been busy with the holidays.  I notified her that her levels were low, she states she has been compliant with her medications. I instructed her to call Dr Patel office in the morning to make appointment and to notify them of her nontherapetic medication levels, patient in agreement.

## 2024-01-19 ENCOUNTER — OFFICE VISIT (OUTPATIENT)
Dept: FAMILY MEDICINE CLINIC | Age: 29
End: 2024-01-19
Payer: MEDICAID

## 2024-01-19 DIAGNOSIS — F31.75 BIPOLAR DISORDER, IN PARTIAL REMISSION, MOST RECENT EPISODE DEPRESSED (HCC): ICD-10-CM

## 2024-01-19 DIAGNOSIS — G40.109 TEMPORAL LOBE EPILEPSY, NON-REFRACTORY (HCC): ICD-10-CM

## 2024-01-19 DIAGNOSIS — R56.9 SEIZURE (HCC): ICD-10-CM

## 2024-01-19 DIAGNOSIS — D50.8 IRON DEFICIENCY ANEMIA SECONDARY TO INADEQUATE DIETARY IRON INTAKE: ICD-10-CM

## 2024-01-19 DIAGNOSIS — G47.09 TROUBLE GETTING TO SLEEP: Primary | ICD-10-CM

## 2024-01-19 PROCEDURE — G8427 DOCREV CUR MEDS BY ELIG CLIN: HCPCS | Performed by: PHYSICIAN ASSISTANT

## 2024-01-19 PROCEDURE — 99214 OFFICE O/P EST MOD 30 MIN: CPT | Performed by: PHYSICIAN ASSISTANT

## 2024-01-19 RX ORDER — DIVALPROEX SODIUM 500 MG/1
500 TABLET, DELAYED RELEASE ORAL EVERY 12 HOURS
Qty: 180 TABLET | Refills: 4 | Status: SHIPPED | OUTPATIENT
Start: 2024-01-19

## 2024-01-19 RX ORDER — PHENYTOIN SODIUM 300 MG/1
CAPSULE, EXTENDED RELEASE ORAL
Qty: 90 CAPSULE | Refills: 4 | Status: SHIPPED | OUTPATIENT
Start: 2024-01-19

## 2024-01-19 RX ORDER — TRAZODONE HYDROCHLORIDE 50 MG/1
50 TABLET ORAL NIGHTLY PRN
Qty: 30 TABLET | Refills: 0 | Status: SHIPPED | OUTPATIENT
Start: 2024-01-19

## 2024-01-19 RX ORDER — PHENYTOIN SODIUM 200 MG/1
CAPSULE, EXTENDED RELEASE ORAL
Qty: 90 CAPSULE | Refills: 4 | Status: SHIPPED | OUTPATIENT
Start: 2024-01-19

## 2024-01-19 RX ORDER — DIVALPROEX SODIUM 250 MG/1
250 TABLET, EXTENDED RELEASE ORAL 2 TIMES DAILY
Qty: 60 TABLET | Refills: 3 | Status: SHIPPED | OUTPATIENT
Start: 2024-01-19

## 2024-01-19 ASSESSMENT — PATIENT HEALTH QUESTIONNAIRE - PHQ9
2. FEELING DOWN, DEPRESSED OR HOPELESS: 0
10. IF YOU CHECKED OFF ANY PROBLEMS, HOW DIFFICULT HAVE THESE PROBLEMS MADE IT FOR YOU TO DO YOUR WORK, TAKE CARE OF THINGS AT HOME, OR GET ALONG WITH OTHER PEOPLE: 0
3. TROUBLE FALLING OR STAYING ASLEEP: 0
SUM OF ALL RESPONSES TO PHQ QUESTIONS 1-9: 0
SUM OF ALL RESPONSES TO PHQ QUESTIONS 1-9: 0
9. THOUGHTS THAT YOU WOULD BE BETTER OFF DEAD, OR OF HURTING YOURSELF: 0
5. POOR APPETITE OR OVEREATING: 0
1. LITTLE INTEREST OR PLEASURE IN DOING THINGS: 0
7. TROUBLE CONCENTRATING ON THINGS, SUCH AS READING THE NEWSPAPER OR WATCHING TELEVISION: 0
SUM OF ALL RESPONSES TO PHQ QUESTIONS 1-9: 0
4. FEELING TIRED OR HAVING LITTLE ENERGY: 0
6. FEELING BAD ABOUT YOURSELF - OR THAT YOU ARE A FAILURE OR HAVE LET YOURSELF OR YOUR FAMILY DOWN: 0
SUM OF ALL RESPONSES TO PHQ QUESTIONS 1-9: 0
SUM OF ALL RESPONSES TO PHQ9 QUESTIONS 1 & 2: 0
8. MOVING OR SPEAKING SO SLOWLY THAT OTHER PEOPLE COULD HAVE NOTICED. OR THE OPPOSITE, BEING SO FIGETY OR RESTLESS THAT YOU HAVE BEEN MOVING AROUND A LOT MORE THAN USUAL: 0

## 2024-01-19 ASSESSMENT — ENCOUNTER SYMPTOMS
BACK PAIN: 0
FACIAL SWELLING: 0
CHEST TIGHTNESS: 0
COLOR CHANGE: 0

## 2024-01-19 NOTE — PROGRESS NOTES
Bevterry Park, was evaluated through a synchronous (real-time) audio-video encounter. The patient (or guardian if applicable) is aware that this is a billable service, which includes applicable co-pays. This Virtual Visit was conducted with patient's (and/or legal guardian's) consent. Patient identification was verified, and a caregiver was present when appropriate.   The patient was located at Home: [address] [place] [id number]  Provider was located at Facility (Appt Dept): [address] [place] [address]  [address] [place] [id number]      Bev Park (:  1995) is a Established patient, presenting virtually for evaluation of the following:    Assessment & Plan   Below is the assessment and plan developed based on review of pertinent history, physical exam, labs, studies, and medications.  1. Trouble getting to sleep  2. Seizure (HCC)  -     divalproex (DEPAKOTE) 500 MG DR tablet; Take 1 tablet by mouth in the morning and 1 tablet in the evening., Disp-180 tablet, R-4Please let pt know to call the office no refills are given she needs an apt.Normal  -     phenytoin (PHENYTEK) 200 MG ER capsule; 200 mg po once daily in the am and phenytoin ER 300mg QHS, Disp-90 capsule, R-4Normal  -     phenytoin (PHENYTEK) 300 MG ER capsule; 200 mg po once daily in the am and phenytoin ER 300mg QHS, Disp-90 capsule, R-4Normal  -     divalproex (DEPAKOTE ER) 250 MG extended release tablet; Take 1 tablet by mouth in the morning and at bedtime, Disp-60 tablet, R-3Normal  3. Temporal lobe epilepsy, non-refractory (HCC)  -     divalproex (DEPAKOTE) 500 MG DR tablet; Take 1 tablet by mouth in the morning and 1 tablet in the evening., Disp-180 tablet, R-4Please let pt know to call the office no refills are given she needs an apt.Normal  -     phenytoin (PHENYTEK) 200 MG ER capsule; 200 mg po once daily in the am and phenytoin ER 300mg QHS, Disp-90 capsule, R-4Normal  -     phenytoin (PHENYTEK) 300 MG ER capsule; 200

## 2024-02-05 ENCOUNTER — TELEPHONE (OUTPATIENT)
Dept: FAMILY MEDICINE CLINIC | Age: 29
End: 2024-02-05

## 2024-02-05 NOTE — TELEPHONE ENCOUNTER
Patient called crying and stating that she is not doing well mentally. She is requesting Tamar give her a call. She has appointment tomorrow at 8 a.m.    Please advise    Callback [phone]

## 2024-02-06 ENCOUNTER — OFFICE VISIT (OUTPATIENT)
Dept: FAMILY MEDICINE CLINIC | Age: 29
End: 2024-02-06
Payer: MEDICAID

## 2024-02-06 VITALS
HEART RATE: 111 BPM | DIASTOLIC BLOOD PRESSURE: 78 MMHG | OXYGEN SATURATION: 99 % | SYSTOLIC BLOOD PRESSURE: 136 MMHG | RESPIRATION RATE: 16 BRPM | HEIGHT: 63 IN | BODY MASS INDEX: 26.58 KG/M2 | WEIGHT: 150 LBS

## 2024-02-06 DIAGNOSIS — R56.9 SEIZURE (HCC): ICD-10-CM

## 2024-02-06 DIAGNOSIS — F41.9 ANXIETY: ICD-10-CM

## 2024-02-06 DIAGNOSIS — G40.109 TEMPORAL LOBE EPILEPSY, NON-REFRACTORY (HCC): ICD-10-CM

## 2024-02-06 DIAGNOSIS — F31.75 BIPOLAR DISORDER, IN PARTIAL REMISSION, MOST RECENT EPISODE DEPRESSED (HCC): Primary | ICD-10-CM

## 2024-02-06 PROCEDURE — G8484 FLU IMMUNIZE NO ADMIN: HCPCS | Performed by: PHYSICIAN ASSISTANT

## 2024-02-06 PROCEDURE — G8419 CALC BMI OUT NRM PARAM NOF/U: HCPCS | Performed by: PHYSICIAN ASSISTANT

## 2024-02-06 PROCEDURE — 4004F PT TOBACCO SCREEN RCVD TLK: CPT | Performed by: PHYSICIAN ASSISTANT

## 2024-02-06 PROCEDURE — G8427 DOCREV CUR MEDS BY ELIG CLIN: HCPCS | Performed by: PHYSICIAN ASSISTANT

## 2024-02-06 PROCEDURE — 99214 OFFICE O/P EST MOD 30 MIN: CPT | Performed by: PHYSICIAN ASSISTANT

## 2024-02-06 RX ORDER — HYDROXYZINE HYDROCHLORIDE 25 MG/1
25 TABLET, FILM COATED ORAL EVERY 8 HOURS PRN
COMMUNITY
Start: 2024-01-19

## 2024-02-06 RX ORDER — VILAZODONE HYDROCHLORIDE 10 MG/1
10 TABLET ORAL DAILY
Qty: 30 TABLET | Refills: 2 | Status: SHIPPED | OUTPATIENT
Start: 2024-02-06

## 2024-02-06 ASSESSMENT — ENCOUNTER SYMPTOMS
CHEST TIGHTNESS: 0
BACK PAIN: 0
COLOR CHANGE: 0
FACIAL SWELLING: 0

## 2024-02-06 NOTE — PROGRESS NOTES
Seizure disorder (HCC)     Seizures (HCC)     Varicella      Past Surgical History:   Procedure Laterality Date    SHOULDER ARTHROSCOPY Left 10/14/2022    LEFT ARTHROSCOPIC SHOULDER  SURGERY ANTERIOR LABRAL REPAIR AND CAPSULAR PLICATION performed by Thai Cai MD at [facility] OR     Social History     Socioeconomic History    Marital status: Single     Spouse name: Not on file    Number of children: Not on file    Years of education: Not on file    Highest education level: Not on file   Occupational History    Not on file   Tobacco Use    Smoking status: Every Day     Current packs/day: 0.50     Average packs/day: 0.5 packs/day for 12.1 years (6.0 ttl pk-yrs)     Types: Cigarettes     Start date: 2012     Passive exposure: Current    Smokeless tobacco: Never   Vaping Use    Vaping Use: Some days    Substances: Nicotine, THC, CBD, Flavoring    Devices: Disposable, Pre-filled or refillable cartridge, Refillable tank, Pre-filled pod    Passive vaping exposure: Yes   Substance and Sexual Activity    Alcohol use: No    Drug use: Yes     Types: Marijuana (Weed)     Comment: 1 gram    Sexual activity: Yes     Partners: Male   Other Topics Concern    Not on file   Social History Narrative    Not on file     Social Determinants of Health     Financial Resource Strain: Low Risk  (2/27/2023)    Overall Financial Resource Strain (CARDIA)     Difficulty of Paying Living Expenses: Not hard at all   Food Insecurity: Not on file (2/27/2023)   Transportation Needs: Unmet Transportation Needs (2/27/2023)    PRAPARE - Transportation     Lack of Transportation (Medical): Not on file     Lack of Transportation (Non-Medical): Yes   Physical Activity: Not on file   Stress: Not on file   Social Connections: Not on file   Intimate Partner Violence: Not on file   Housing Stability: High Risk (2/27/2023)    Housing Stability Vital Sign     Unable to Pay for Housing in the Last Year: Not on file     Number of Places Lived in the Last Year:

## 2024-02-12 DIAGNOSIS — G47.09 TROUBLE GETTING TO SLEEP: ICD-10-CM

## 2024-02-17 DIAGNOSIS — F41.9 ANXIETY: ICD-10-CM

## 2024-02-19 RX ORDER — ESZOPICLONE 2 MG/1
2 TABLET, FILM COATED ORAL NIGHTLY
Qty: 30 TABLET | Refills: 2 | OUTPATIENT
Start: 2024-02-19

## 2024-02-19 RX ORDER — CLONAZEPAM 0.5 MG/1
TABLET ORAL
Qty: 60 TABLET | Refills: 2 | Status: SHIPPED | OUTPATIENT
Start: 2024-02-19 | End: 2024-05-19

## 2024-02-19 NOTE — TELEPHONE ENCOUNTER
Comments:     Last Office Visit (last PCP visit):   2/6/2024    Next Visit Date:  Future Appointments   Date Time Provider Department Center   2/20/2024 11:30 AM Tamar Meza PA-C [place]  [facility]       **If hasn't been seen in over a year OR hasn't followed up according to last diabetes/ADHD visit, make appointment for patient before sending refill to provider.    Rx requested:  Requested Prescriptions     Pending Prescriptions Disp Refills    clonazePAM (KLONOPIN) 0.5 MG tablet [Pharmacy Med Name: clonazepam 0.5 mg tablet] 60 tablet 2     Sig: Take 1 tablet by mouth 2 times daily as needed for Anxiety (panic.) for up to 90 days. Max Daily Amount: 1 mg (2 tablets)

## 2024-02-20 ENCOUNTER — OFFICE VISIT (OUTPATIENT)
Dept: FAMILY MEDICINE CLINIC | Age: 29
End: 2024-02-20
Payer: MEDICAID

## 2024-02-20 ENCOUNTER — TELEPHONE (OUTPATIENT)
Dept: NEUROLOGY | Age: 29
End: 2024-02-20

## 2024-02-20 VITALS
WEIGHT: 151 LBS | DIASTOLIC BLOOD PRESSURE: 72 MMHG | RESPIRATION RATE: 16 BRPM | HEIGHT: 63 IN | BODY MASS INDEX: 26.75 KG/M2 | OXYGEN SATURATION: 99 % | HEART RATE: 78 BPM | SYSTOLIC BLOOD PRESSURE: 110 MMHG

## 2024-02-20 DIAGNOSIS — G40.109 TEMPORAL LOBE EPILEPSY, NON-REFRACTORY (HCC): ICD-10-CM

## 2024-02-20 DIAGNOSIS — M25.312 SHOULDER INSTABILITY, LEFT: ICD-10-CM

## 2024-02-20 DIAGNOSIS — M24.412 SHOULDER DISLOCATION, RECURRENT, LEFT: ICD-10-CM

## 2024-02-20 DIAGNOSIS — R56.9 SEIZURE (HCC): ICD-10-CM

## 2024-02-20 DIAGNOSIS — M25.512 CHRONIC LEFT SHOULDER PAIN: ICD-10-CM

## 2024-02-20 DIAGNOSIS — N92.6 IRREGULAR PERIODS: ICD-10-CM

## 2024-02-20 DIAGNOSIS — G89.29 CHRONIC LEFT SHOULDER PAIN: ICD-10-CM

## 2024-02-20 DIAGNOSIS — N64.3 GALACTORRHEA: ICD-10-CM

## 2024-02-20 DIAGNOSIS — R63.5 WEIGHT GAIN: ICD-10-CM

## 2024-02-20 DIAGNOSIS — F31.75 BIPOLAR DISORDER, IN PARTIAL REMISSION, MOST RECENT EPISODE DEPRESSED (HCC): ICD-10-CM

## 2024-02-20 DIAGNOSIS — R56.9 SEIZURE (HCC): Primary | ICD-10-CM

## 2024-02-20 PROBLEM — K02.9 DENTAL CARIES: Status: RESOLVED | Noted: 2023-12-09 | Resolved: 2024-02-20

## 2024-02-20 LAB
GONADOTROPIN, CHORIONIC (HCG) QUANT: <0.1 MIU/ML
PROLACTIN SERPL-MCNC: 9.2 NG/ML
T4 FREE SERPL-MCNC: 0.92 NG/DL (ref 0.84–1.68)
TSH SERPL-MCNC: 0.36 UIU/ML (ref 0.44–3.86)

## 2024-02-20 PROCEDURE — 99214 OFFICE O/P EST MOD 30 MIN: CPT | Performed by: PHYSICIAN ASSISTANT

## 2024-02-20 PROCEDURE — G8427 DOCREV CUR MEDS BY ELIG CLIN: HCPCS | Performed by: PHYSICIAN ASSISTANT

## 2024-02-20 PROCEDURE — G8419 CALC BMI OUT NRM PARAM NOF/U: HCPCS | Performed by: PHYSICIAN ASSISTANT

## 2024-02-20 PROCEDURE — G8484 FLU IMMUNIZE NO ADMIN: HCPCS | Performed by: PHYSICIAN ASSISTANT

## 2024-02-20 PROCEDURE — 4004F PT TOBACCO SCREEN RCVD TLK: CPT | Performed by: PHYSICIAN ASSISTANT

## 2024-02-20 RX ORDER — VILAZODONE HYDROCHLORIDE 20 MG/1
20 TABLET ORAL DAILY
Qty: 30 TABLET | Refills: 11 | Status: SHIPPED | OUTPATIENT
Start: 2024-02-20

## 2024-02-20 RX ORDER — TRAZODONE HYDROCHLORIDE 50 MG/1
50 TABLET ORAL NIGHTLY PRN
Qty: 30 TABLET | Refills: 11 | Status: SHIPPED | OUTPATIENT
Start: 2024-02-20

## 2024-02-20 ASSESSMENT — ENCOUNTER SYMPTOMS
FACIAL SWELLING: 0
BACK PAIN: 0
SHORTNESS OF BREATH: 0
COLOR CHANGE: 0
CHEST TIGHTNESS: 0

## 2024-02-20 NOTE — PROGRESS NOTES
needed for Itching      divalproex (DEPAKOTE) 500 MG DR tablet Take 1 tablet by mouth in the morning and 1 tablet in the evening. 180 tablet 4    phenytoin (PHENYTEK) 200 MG ER capsule 200 mg po once daily in the am and phenytoin ER 300mg QHS 90 capsule 4    phenytoin (PHENYTEK) 300 MG ER capsule 200 mg po once daily in the am and phenytoin ER 300mg QHS 90 capsule 4    divalproex (DEPAKOTE ER) 250 MG extended release tablet Take 1 tablet by mouth in the morning and at bedtime 60 tablet 3    dicyclomine (BENTYL) 10 MG capsule Take 1 capsule by mouth 2 times daily as needed (abdominal pain) 60 capsule 3    ferrous gluconate (FERATE) 240 (27 Fe) MG tablet Take 1 tablet by mouth daily (with breakfast) 90 tablet 2    ibuprofen (ADVIL;MOTRIN) 800 MG tablet Take 1 tablet by mouth every 8 hours as needed for Pain 30 tablet 2     No current facility-administered medications for this visit.     PMH, Surgical Hx, Family Hx, and Social Hx reviewed and updated.  Health Maintenance reviewed.    Objective  Vitals:    02/20/24 1143   BP: 110/72   Site: Left Upper Arm   Position: Sitting   Cuff Size: Medium Adult   Pulse: 78   Resp: 16   SpO2: 99%   Weight: 68.5 kg (151 lb)   Height: 1.6 m (5' 3\")     BP Readings from Last 3 Encounters:   02/20/24 110/72   02/06/24 136/78   12/08/23 110/74     Wt Readings from Last 3 Encounters:   02/20/24 68.5 kg (151 lb)   02/06/24 68 kg (150 lb)   12/08/23 67.9 kg (149 lb 12.8 oz)     Physical Exam  Vitals reviewed.   Constitutional:       General: She is not in acute distress.     Appearance: Normal appearance. She is not ill-appearing.   HENT:      Head: Normocephalic and atraumatic.      Right Ear: External ear normal.      Left Ear: External ear normal.      Nose: Nose normal.      Mouth/Throat:      Mouth: Mucous membranes are moist.   Eyes:      Conjunctiva/sclera: Conjunctivae normal.   Cardiovascular:      Rate and Rhythm: Normal rate and regular rhythm.   Musculoskeletal:

## 2024-02-20 NOTE — TELEPHONE ENCOUNTER
Tamar Meza sent an [employer] message requesting an asap appt for breakthrough seizure for patient. She was last seen as a VV in 2022.   Labs were placed by Tamar.    Please advise.

## 2024-02-21 LAB — FOLLICLE STIMULATING HORMONE: 5.3 MIU/ML

## 2024-02-23 ENCOUNTER — TELEPHONE (OUTPATIENT)
Dept: NEUROLOGY | Age: 29
End: 2024-02-23

## 2024-02-23 LAB
PHENYTOIN FREE MFR SERPL: ABNORMAL % (ref 8–14)
PHENYTOIN FREE SERPL-MCNC: <0.5 UG/ML (ref 1–2.5)
PHENYTOIN SERPL-MCNC: 5 UG/ML (ref 10–20)

## 2024-02-23 NOTE — TELEPHONE ENCOUNTER
Called and LMOVM for patient, Tamar Meza requesting appt per Dr. Reinier richardson to add 4-6 weeks if labs aren't normal, phenytoin low, pt added to schedule 3/12/24@ 3 pm to [place] office. LMOVM to call back .will send [employer] message as well.

## 2024-02-26 PROBLEM — R52 PAIN, UNSPECIFIED: Status: ACTIVE | Noted: 2023-03-18

## 2024-02-26 PROBLEM — R11.2 NAUSEA AND VOMITING: Status: ACTIVE | Noted: 2023-03-18

## 2024-02-26 PROBLEM — Z20.822 CONTACT WITH AND (SUSPECTED) EXPOSURE TO COVID-19: Status: ACTIVE | Noted: 2023-03-18

## 2024-03-18 DIAGNOSIS — K58.0 IRRITABLE BOWEL SYNDROME WITH DIARRHEA: ICD-10-CM

## 2024-03-18 DIAGNOSIS — F31.75 BIPOLAR DISORDER, IN PARTIAL REMISSION, MOST RECENT EPISODE DEPRESSED (HCC): ICD-10-CM

## 2024-03-21 RX ORDER — CARIPRAZINE 1.5 MG/1
CAPSULE, GELATIN COATED ORAL
Qty: 30 CAPSULE | Refills: 5 | Status: SHIPPED | OUTPATIENT
Start: 2024-03-21

## 2024-03-21 RX ORDER — DICYCLOMINE HYDROCHLORIDE 10 MG/1
CAPSULE ORAL
Qty: 60 CAPSULE | Refills: 5 | Status: SHIPPED | OUTPATIENT
Start: 2024-03-21

## 2024-03-21 RX ORDER — HYDROXYZINE HYDROCHLORIDE 25 MG/1
25 TABLET, FILM COATED ORAL EVERY 8 HOURS PRN
Qty: 90 TABLET | Refills: 5 | Status: SHIPPED | OUTPATIENT
Start: 2024-03-21

## 2024-03-21 NOTE — TELEPHONE ENCOUNTER
Comments:     Last Office Visit (last PCP visit):   2/20/2024    Next Visit Date:  Future Appointments   Date Time Provider Department Center   4/22/2024  1:00 PM Eddie Avery, APRN - CNP [place] [facility]  [facility]       **If hasn't been seen in over a year OR hasn't followed up according to last diabetes/ADHD visit, make appointment for patient before sending refill to provider.    Rx requested:  Requested Prescriptions     Pending Prescriptions Disp Refills    VRAYLAR 1.5 MG capsule [Pharmacy Med Name: Vraylar 1.5 mg capsule] 30 capsule 5     Sig: TAKE 1 CAPSULE BY MOUTH DAILY.    dicyclomine (BENTYL) 10 MG capsule [Pharmacy Med Name: dicyclomine 10 mg capsule] 60 capsule 5     Sig: TAKE 1 CAPSULE BY MOUTH TWICE DAILY AS NEEDED abdominal pain    hydrOXYzine HCl (ATARAX) 25 MG tablet [Pharmacy Med Name: hydroxyzine HCl 25 mg tablet] 90 tablet 5     Sig: TAKE 1 TABLET BY MOUTH EVERY 8 HOURS AS NEEDED FOR ITCHING.

## 2024-04-22 ENCOUNTER — OFFICE VISIT (OUTPATIENT)
Dept: BEHAVIORAL/MENTAL HEALTH CLINIC | Age: 29
End: 2024-04-22
Payer: MEDICAID

## 2024-04-22 VITALS
HEIGHT: 63 IN | SYSTOLIC BLOOD PRESSURE: 116 MMHG | WEIGHT: 146 LBS | BODY MASS INDEX: 25.87 KG/M2 | DIASTOLIC BLOOD PRESSURE: 65 MMHG | HEART RATE: 86 BPM

## 2024-04-22 DIAGNOSIS — R46.89 COMPULSIVE BEHAVIOR: ICD-10-CM

## 2024-04-22 DIAGNOSIS — F31.75 BIPOLAR DISORDER, IN PARTIAL REMISSION, MOST RECENT EPISODE DEPRESSED (HCC): Primary | ICD-10-CM

## 2024-04-22 DIAGNOSIS — F41.9 ANXIETY: ICD-10-CM

## 2024-04-22 DIAGNOSIS — R56.9 SEIZURE (HCC): ICD-10-CM

## 2024-04-22 PROCEDURE — 90833 PSYTX W PT W E/M 30 MIN: CPT | Performed by: REGISTERED NURSE

## 2024-04-22 PROCEDURE — G8419 CALC BMI OUT NRM PARAM NOF/U: HCPCS | Performed by: REGISTERED NURSE

## 2024-04-22 PROCEDURE — G8427 DOCREV CUR MEDS BY ELIG CLIN: HCPCS | Performed by: REGISTERED NURSE

## 2024-04-22 PROCEDURE — 4004F PT TOBACCO SCREEN RCVD TLK: CPT | Performed by: REGISTERED NURSE

## 2024-04-22 PROCEDURE — 99215 OFFICE O/P EST HI 40 MIN: CPT | Performed by: REGISTERED NURSE

## 2024-04-22 RX ORDER — TRAZODONE HYDROCHLORIDE 50 MG/1
100 TABLET ORAL NIGHTLY PRN
Qty: 30 TABLET | Refills: 11
Start: 2024-04-22

## 2024-04-22 NOTE — PROGRESS NOTES
Up:  Return in 1.5 weeks, or call in the interim for any side-effects, decompensation, questions, or problems between now and the next visit.       CASTRO Kerns  Psychiatric Mental Health Nurse Practitioner   [facility]      Psychiatry     1 hour spent with patient in face to face encounter

## 2024-04-30 DIAGNOSIS — R56.9 SEIZURE (HCC): ICD-10-CM

## 2024-04-30 DIAGNOSIS — G40.109 TEMPORAL LOBE EPILEPSY, NON-REFRACTORY (HCC): ICD-10-CM

## 2024-05-01 NOTE — TELEPHONE ENCOUNTER
Comments: sending mychart appt reminder    Last Office Visit (last PCP visit):   2/20/2024    Next Visit Date:  Future Appointments   Date Time Provider Department Center   5/7/2024 11:00 AM Eddie Avery APRN - CNP [facility] AM  [facility]       **If hasn't been seen in over a year OR hasn't followed up according to last diabetes/ADHD visit, make appointment for patient before sending refill to provider.    Rx requested:  Requested Prescriptions     Pending Prescriptions Disp Refills    divalproex (DEPAKOTE ER) 250 MG extended release tablet [Pharmacy Med Name: divalproex  mg tablet,extended release 24 hr] 60 tablet 3     Sig: Take 1 tablet by mouth in the morning and at bedtime           ;

## 2024-05-02 RX ORDER — DIVALPROEX SODIUM 250 MG/1
250 TABLET, EXTENDED RELEASE ORAL 2 TIMES DAILY
Qty: 60 TABLET | Refills: 3 | OUTPATIENT
Start: 2024-05-02

## 2024-05-07 ENCOUNTER — OFFICE VISIT (OUTPATIENT)
Dept: BEHAVIORAL/MENTAL HEALTH CLINIC | Age: 29
End: 2024-05-07
Payer: MEDICAID

## 2024-05-07 VITALS
WEIGHT: 140 LBS | BODY MASS INDEX: 24.8 KG/M2 | HEART RATE: 90 BPM | SYSTOLIC BLOOD PRESSURE: 130 MMHG | DIASTOLIC BLOOD PRESSURE: 78 MMHG

## 2024-05-07 DIAGNOSIS — R56.9 SEIZURE (HCC): ICD-10-CM

## 2024-05-07 DIAGNOSIS — F43.10 PTSD (POST-TRAUMATIC STRESS DISORDER): ICD-10-CM

## 2024-05-07 DIAGNOSIS — F31.75 BIPOLAR DISORDER, IN PARTIAL REMISSION, MOST RECENT EPISODE DEPRESSED (HCC): Primary | ICD-10-CM

## 2024-05-07 DIAGNOSIS — F31.75 BIPOLAR DISORDER, IN PARTIAL REMISSION, MOST RECENT EPISODE DEPRESSED (HCC): ICD-10-CM

## 2024-05-07 DIAGNOSIS — F41.9 ANXIETY: ICD-10-CM

## 2024-05-07 LAB
AMPHET UR QL SCN: ABNORMAL
BARBITURATES UR QL SCN: ABNORMAL
BENZODIAZ UR QL SCN: ABNORMAL
CANNABINOIDS UR QL SCN: POSITIVE
COCAINE UR QL SCN: ABNORMAL
DRUG SCREEN COMMENT UR-IMP: ABNORMAL
FENTANYL SCREEN, URINE: ABNORMAL
HCG SERPL QL: NEGATIVE
METHADONE UR QL SCN: ABNORMAL
OPIATES UR QL SCN: ABNORMAL
OXYCODONE UR QL SCN: ABNORMAL
PCP UR QL SCN: ABNORMAL
PROPOXYPH UR QL SCN: ABNORMAL
VALPROATE SERPL-MCNC: 41.1 UG/ML (ref 50–100)

## 2024-05-07 PROCEDURE — G8427 DOCREV CUR MEDS BY ELIG CLIN: HCPCS | Performed by: REGISTERED NURSE

## 2024-05-07 PROCEDURE — G8420 CALC BMI NORM PARAMETERS: HCPCS | Performed by: REGISTERED NURSE

## 2024-05-07 PROCEDURE — 4004F PT TOBACCO SCREEN RCVD TLK: CPT | Performed by: REGISTERED NURSE

## 2024-05-07 PROCEDURE — 90833 PSYTX W PT W E/M 30 MIN: CPT | Performed by: REGISTERED NURSE

## 2024-05-07 PROCEDURE — 99215 OFFICE O/P EST HI 40 MIN: CPT | Performed by: REGISTERED NURSE

## 2024-05-07 RX ORDER — OLANZAPINE 5 MG/1
5 TABLET ORAL NIGHTLY
Qty: 30 TABLET | Refills: 3 | Status: SHIPPED | OUTPATIENT
Start: 2024-05-07

## 2024-05-07 RX ORDER — CLONAZEPAM 0.5 MG/1
0.5 TABLET ORAL 2 TIMES DAILY PRN
Qty: 60 TABLET | Refills: 2 | Status: SHIPPED | OUTPATIENT
Start: 2024-05-07 | End: 2024-08-05

## 2024-05-07 RX ORDER — TRAZODONE HYDROCHLORIDE 100 MG/1
100 TABLET ORAL NIGHTLY PRN
Qty: 30 TABLET | Refills: 3 | Status: SHIPPED | OUTPATIENT
Start: 2024-05-07

## 2024-05-07 RX ORDER — CLONAZEPAM 0.5 MG/1
0.5 TABLET ORAL 2 TIMES DAILY PRN
Qty: 60 TABLET | Refills: 2 | Status: SHIPPED | OUTPATIENT
Start: 2024-05-07 | End: 2024-05-07

## 2024-05-07 ASSESSMENT — PATIENT HEALTH QUESTIONNAIRE - PHQ9
7. TROUBLE CONCENTRATING ON THINGS, SUCH AS READING THE NEWSPAPER OR WATCHING TELEVISION: NEARLY EVERY DAY
SUM OF ALL RESPONSES TO PHQ QUESTIONS 1-9: 24
10. IF YOU CHECKED OFF ANY PROBLEMS, HOW DIFFICULT HAVE THESE PROBLEMS MADE IT FOR YOU TO DO YOUR WORK, TAKE CARE OF THINGS AT HOME, OR GET ALONG WITH OTHER PEOPLE: EXTREMELY DIFFICULT
7. TROUBLE CONCENTRATING ON THINGS, SUCH AS READING THE NEWSPAPER OR WATCHING TELEVISION: NEARLY EVERY DAY
SUM OF ALL RESPONSES TO PHQ9 QUESTIONS 1 & 2: 6
5. POOR APPETITE OR OVEREATING: NEARLY EVERY DAY
6. FEELING BAD ABOUT YOURSELF - OR THAT YOU ARE A FAILURE OR HAVE LET YOURSELF OR YOUR FAMILY DOWN: NEARLY EVERY DAY
1. LITTLE INTEREST OR PLEASURE IN DOING THINGS: NEARLY EVERY DAY
4. FEELING TIRED OR HAVING LITTLE ENERGY: NEARLY EVERY DAY
1. LITTLE INTEREST OR PLEASURE IN DOING THINGS: NEARLY EVERY DAY
3. TROUBLE FALLING OR STAYING ASLEEP: NEARLY EVERY DAY
5. POOR APPETITE OR OVEREATING: NEARLY EVERY DAY
SUM OF ALL RESPONSES TO PHQ QUESTIONS 1-9: 24
6. FEELING BAD ABOUT YOURSELF - OR THAT YOU ARE A FAILURE OR HAVE LET YOURSELF OR YOUR FAMILY DOWN: NEARLY EVERY DAY
SUM OF ALL RESPONSES TO PHQ QUESTIONS 1-9: 24
2. FEELING DOWN, DEPRESSED OR HOPELESS: NEARLY EVERY DAY
2. FEELING DOWN, DEPRESSED OR HOPELESS: NEARLY EVERY DAY
8. MOVING OR SPEAKING SO SLOWLY THAT OTHER PEOPLE COULD HAVE NOTICED. OR THE OPPOSITE - BEING SO FIDGETY OR RESTLESS THAT YOU HAVE BEEN MOVING AROUND A LOT MORE THAN USUAL: NEARLY EVERY DAY
9. THOUGHTS THAT YOU WOULD BE BETTER OFF DEAD, OR OF HURTING YOURSELF: NOT AT ALL
9. THOUGHTS THAT YOU WOULD BE BETTER OFF DEAD, OR OF HURTING YOURSELF: NOT AT ALL
3. TROUBLE FALLING OR STAYING ASLEEP: NEARLY EVERY DAY
4. FEELING TIRED OR HAVING LITTLE ENERGY: NEARLY EVERY DAY
8. MOVING OR SPEAKING SO SLOWLY THAT OTHER PEOPLE COULD HAVE NOTICED. OR THE OPPOSITE, BEING SO FIGETY OR RESTLESS THAT YOU HAVE BEEN MOVING AROUND A LOT MORE THAN USUAL: NEARLY EVERY DAY
10. IF YOU CHECKED OFF ANY PROBLEMS, HOW DIFFICULT HAVE THESE PROBLEMS MADE IT FOR YOU TO DO YOUR WORK, TAKE CARE OF THINGS AT HOME, OR GET ALONG WITH OTHER PEOPLE: EXTREMELY DIFFICULT

## 2024-05-07 ASSESSMENT — ANXIETY QUESTIONNAIRES
GAD7 TOTAL SCORE: 21
7. FEELING AFRAID AS IF SOMETHING AWFUL MIGHT HAPPEN: NEARLY EVERY DAY
2. NOT BEING ABLE TO STOP OR CONTROL WORRYING: NEARLY EVERY DAY
4. TROUBLE RELAXING: NEARLY EVERY DAY
IF YOU CHECKED OFF ANY PROBLEMS ON THIS QUESTIONNAIRE, HOW DIFFICULT HAVE THESE PROBLEMS MADE IT FOR YOU TO DO YOUR WORK, TAKE CARE OF THINGS AT HOME, OR GET ALONG WITH OTHER PEOPLE: EXTREMELY DIFFICULT
6. BECOMING EASILY ANNOYED OR IRRITABLE: NEARLY EVERY DAY
IF YOU CHECKED OFF ANY PROBLEMS ON THIS QUESTIONNAIRE, HOW DIFFICULT HAVE THESE PROBLEMS MADE IT FOR YOU TO DO YOUR WORK, TAKE CARE OF THINGS AT HOME, OR GET ALONG WITH OTHER PEOPLE: EXTREMELY DIFFICULT
3. WORRYING TOO MUCH ABOUT DIFFERENT THINGS: NEARLY EVERY DAY
4. TROUBLE RELAXING: NEARLY EVERY DAY
5. BEING SO RESTLESS THAT IT IS HARD TO SIT STILL: NEARLY EVERY DAY
7. FEELING AFRAID AS IF SOMETHING AWFUL MIGHT HAPPEN: NEARLY EVERY DAY
2. NOT BEING ABLE TO STOP OR CONTROL WORRYING: NEARLY EVERY DAY
1. FEELING NERVOUS, ANXIOUS, OR ON EDGE: NEARLY EVERY DAY
3. WORRYING TOO MUCH ABOUT DIFFERENT THINGS: NEARLY EVERY DAY
6. BECOMING EASILY ANNOYED OR IRRITABLE: NEARLY EVERY DAY
1. FEELING NERVOUS, ANXIOUS, OR ON EDGE: NEARLY EVERY DAY
5. BEING SO RESTLESS THAT IT IS HARD TO SIT STILL: NEARLY EVERY DAY

## 2024-05-07 ASSESSMENT — COLUMBIA-SUICIDE SEVERITY RATING SCALE - C-SSRS
3. IN THE PAST MONTH, HAVE YOU BEEN THINKING ABOUT HOW YOU MIGHT KILL YOURSELF?: YES
6. IN YOUR LIFETIME, HAVE YOU EVER DONE ANYTHING, STARTED TO DO ANYTHING, OR PREPARED TO DO ANYTHING TO END YOUR LIFE?: YES
5. IN THE PAST MONTH, HAVE YOU STARTED TO WORK OUT OR WORKED OUT THE DETAILS OF HOW TO KILL YOURSELF? DO YOU INTEND TO CARRY OUT THIS PLAN?: NO
1. IN THE PAST MONTH, HAVE YOU WISHED YOU WERE DEAD OR WISHED YOU COULD GO TO SLEEP AND NOT WAKE UP?: YES
4. IN THE PAST MONTH, HAVE YOU HAD THESE THOUGHTS AND HAD SOME INTENTION OF ACTING ON THEM?: NO
7. DID THIS OCCUR IN THE LAST THREE MONTHS: NO
2. IN THE PAST MONTH, HAVE YOU ACTUALLY HAD ANY THOUGHTS OF KILLING YOURSELF?: YES

## 2024-05-07 NOTE — PROGRESS NOTES
PSYCHIATRIC MEDICATION MANAGEMENT PROGRESS NOTE  Eddielaura Avery, PMHNP    5/7/24  11:16 AM EDT   Patient was seen and examined in person, Chart reviewed   Patient's case discussed with other treatment providers       Time spent with Patient: 30 minutes    Chief Complaint: 29 y.o. female seen for follow-up visit for medication management of The primary encounter diagnosis was Bipolar disorder, in partial remission, most recent episode depressed (HCC). Diagnoses of Anxiety, Seizure (HCC), and PTSD (post-traumatic stress disorder) were also pertinent to this visit.. Visit attended by patient     Subjective:      Pt reports mental health decompensation, states yesterday her flash backs had increased significantly.     Endorses flashback of finding sons father, states the flashback are becoming so vivid.  States she fell to the pavement at work as she did at the time she found him.    Feeling suicidal yesterday, states suicide is not something the would ever act upon due to the love and need to protect her sone.  States I can't have my son have two dead parents.     States time given off work due to current mental health status.    States ran out of trazodone as well as klonopin, reports taking all Depakote in the evening.    States she has not been able to make it to lab yet for Depakote level.    Endorses increased depression and anxiety, however states she is caring for self and her son. Adequate appetite    Endorses THC use, denies ETOH      Patient reports they have been compliant with current medication regimen and have not missed a dose.    Patient denies medication side effects.     At today's visit, patient denies thoughts of harm to self or others since last appointment, and denies auditory or visual hallucinations.       Appetite (since last visit):   [x] Normal/Unchanged  [] Increased  [] Decreased      Sleep (since last visit):   [x] Normal/Unchanged  [] Increased  [] Decreased      Energy:    [x]

## 2024-05-10 DIAGNOSIS — G40.109 TEMPORAL LOBE EPILEPSY, NON-REFRACTORY (HCC): ICD-10-CM

## 2024-05-10 DIAGNOSIS — R56.9 SEIZURE (HCC): ICD-10-CM

## 2024-05-13 ENCOUNTER — OFFICE VISIT (OUTPATIENT)
Dept: BEHAVIORAL/MENTAL HEALTH CLINIC | Age: 29
End: 2024-05-13
Payer: MEDICAID

## 2024-05-13 VITALS
WEIGHT: 149 LBS | HEART RATE: 89 BPM | DIASTOLIC BLOOD PRESSURE: 72 MMHG | SYSTOLIC BLOOD PRESSURE: 122 MMHG | BODY MASS INDEX: 26.39 KG/M2

## 2024-05-13 DIAGNOSIS — R56.9 SEIZURE (HCC): ICD-10-CM

## 2024-05-13 DIAGNOSIS — G40.109 TEMPORAL LOBE EPILEPSY, NON-REFRACTORY (HCC): ICD-10-CM

## 2024-05-13 PROCEDURE — 4004F PT TOBACCO SCREEN RCVD TLK: CPT | Performed by: REGISTERED NURSE

## 2024-05-13 PROCEDURE — G8419 CALC BMI OUT NRM PARAM NOF/U: HCPCS | Performed by: REGISTERED NURSE

## 2024-05-13 PROCEDURE — 99215 OFFICE O/P EST HI 40 MIN: CPT | Performed by: REGISTERED NURSE

## 2024-05-13 PROCEDURE — 90833 PSYTX W PT W E/M 30 MIN: CPT | Performed by: REGISTERED NURSE

## 2024-05-13 PROCEDURE — G8427 DOCREV CUR MEDS BY ELIG CLIN: HCPCS | Performed by: REGISTERED NURSE

## 2024-05-13 RX ORDER — OLANZAPINE 7.5 MG/1
7.5 TABLET, FILM COATED ORAL NIGHTLY
Qty: 14 TABLET | Refills: 3 | Status: SHIPPED | OUTPATIENT
Start: 2024-05-13

## 2024-05-13 RX ORDER — DIVALPROEX SODIUM 250 MG/1
250 TABLET, EXTENDED RELEASE ORAL 2 TIMES DAILY
Qty: 60 TABLET | Refills: 3 | OUTPATIENT
Start: 2024-05-13

## 2024-05-13 ASSESSMENT — PATIENT HEALTH QUESTIONNAIRE - PHQ9
SUM OF ALL RESPONSES TO PHQ9 QUESTIONS 1 & 2: 2
SUM OF ALL RESPONSES TO PHQ QUESTIONS 1-9: 2
SUM OF ALL RESPONSES TO PHQ QUESTIONS 1-9: 2
1. LITTLE INTEREST OR PLEASURE IN DOING THINGS: SEVERAL DAYS
SUM OF ALL RESPONSES TO PHQ QUESTIONS 1-9: 2
SUM OF ALL RESPONSES TO PHQ QUESTIONS 1-9: 2
2. FEELING DOWN, DEPRESSED OR HOPELESS: SEVERAL DAYS

## 2024-05-13 NOTE — PROGRESS NOTES
below:       [] Constitutional  [] Eyes  [] Ear/Nose/Mouth/Throat  [] Respiratory  [] CV  [] GI  []   [] Musculoskeletal  [] Skin/Breast  [] Neurological  [] Endocrine  [] Heme/Lymph  [] Allergic/Immunologic      MEDICATIONS:    Current Outpatient Medications:     OLANZapine (ZYPREXA) 7.5 MG tablet, Take 1 tablet by mouth nightly, Disp: 14 tablet, Rfl: 3    traZODone (DESYREL) 100 MG tablet, Take 1 tablet by mouth nightly as needed for Sleep, Disp: 30 tablet, Rfl: 3    clonazePAM (KLONOPIN) 0.5 MG tablet, Take 1 tablet by mouth 2 times daily as needed for Anxiety for up to 90 days. Max Daily Amount: 1 mg, Disp: 60 tablet, Rfl: 2    dicyclomine (BENTYL) 10 MG capsule, TAKE 1 CAPSULE BY MOUTH TWICE DAILY AS NEEDED abdominal pain, Disp: 60 capsule, Rfl: 5    hydrOXYzine HCl (ATARAX) 25 MG tablet, TAKE 1 TABLET BY MOUTH EVERY 8 HOURS AS NEEDED FOR ITCHING., Disp: 90 tablet, Rfl: 5    divalproex (DEPAKOTE) 500 MG DR tablet, Take 1 tablet by mouth in the morning and 1 tablet in the evening., Disp: 180 tablet, Rfl: 4    phenytoin (PHENYTEK) 200 MG ER capsule, 200 mg po once daily in the am and phenytoin ER 300mg QHS, Disp: 90 capsule, Rfl: 4    phenytoin (PHENYTEK) 300 MG ER capsule, 200 mg po once daily in the am and phenytoin ER 300mg QHS, Disp: 90 capsule, Rfl: 4    divalproex (DEPAKOTE ER) 250 MG extended release tablet, Take 1 tablet by mouth in the morning and at bedtime, Disp: 60 tablet, Rfl: 3    ferrous gluconate (FERATE) 240 (27 Fe) MG tablet, Take 1 tablet by mouth daily (with breakfast), Disp: 90 tablet, Rfl: 2    ibuprofen (ADVIL;MOTRIN) 800 MG tablet, Take 1 tablet by mouth every 8 hours as needed for Pain, Disp: 30 tablet, Rfl: 2    VRAYLAR 1.5 MG capsule, TAKE 1 CAPSULE BY MOUTH DAILY. (Patient not taking: Reported on 4/22/2024), Disp: 30 capsule, Rfl: 5    vilazodone HCl (VIIBRYD) 20 MG TABS, Take 1 tablet by mouth daily (Patient not taking: Reported on 4/22/2024), Disp: 30 tablet, Rfl:

## 2024-05-21 ENCOUNTER — OFFICE VISIT (OUTPATIENT)
Dept: BEHAVIORAL/MENTAL HEALTH CLINIC | Age: 29
End: 2024-05-21
Payer: MEDICAID

## 2024-05-21 VITALS
SYSTOLIC BLOOD PRESSURE: 120 MMHG | DIASTOLIC BLOOD PRESSURE: 70 MMHG | BODY MASS INDEX: 26.39 KG/M2 | HEART RATE: 88 BPM | WEIGHT: 149 LBS

## 2024-05-21 DIAGNOSIS — F43.10 PTSD (POST-TRAUMATIC STRESS DISORDER): ICD-10-CM

## 2024-05-21 DIAGNOSIS — F17.200 NICOTINE USE DISORDER: ICD-10-CM

## 2024-05-21 DIAGNOSIS — R56.9 SEIZURE (HCC): ICD-10-CM

## 2024-05-21 DIAGNOSIS — F60.3 BORDERLINE PERSONALITY DISORDER (HCC): ICD-10-CM

## 2024-05-21 DIAGNOSIS — F41.9 ANXIETY: ICD-10-CM

## 2024-05-21 DIAGNOSIS — F31.75 BIPOLAR DISORDER, IN PARTIAL REMISSION, MOST RECENT EPISODE DEPRESSED (HCC): Primary | ICD-10-CM

## 2024-05-21 PROCEDURE — 99215 OFFICE O/P EST HI 40 MIN: CPT | Performed by: REGISTERED NURSE

## 2024-05-21 PROCEDURE — G8419 CALC BMI OUT NRM PARAM NOF/U: HCPCS | Performed by: REGISTERED NURSE

## 2024-05-21 PROCEDURE — 4004F PT TOBACCO SCREEN RCVD TLK: CPT | Performed by: REGISTERED NURSE

## 2024-05-21 PROCEDURE — 90834 PSYTX W PT 45 MINUTES: CPT | Performed by: REGISTERED NURSE

## 2024-05-21 PROCEDURE — G8427 DOCREV CUR MEDS BY ELIG CLIN: HCPCS | Performed by: REGISTERED NURSE

## 2024-05-21 RX ORDER — TRAZODONE HYDROCHLORIDE 100 MG/1
100 TABLET ORAL NIGHTLY PRN
Qty: 14 TABLET | Refills: 2 | Status: SHIPPED | OUTPATIENT
Start: 2024-05-21

## 2024-05-21 RX ORDER — OLANZAPINE 7.5 MG/1
7.5 TABLET, FILM COATED ORAL NIGHTLY
Qty: 14 TABLET | Refills: 1 | Status: SHIPPED | OUTPATIENT
Start: 2024-05-21

## 2024-05-21 NOTE — PROGRESS NOTES
PSYCHIATRIC MEDICATION MANAGEMENT PROGRESS NOTE  Eddie Avery, PMHNP    05/13/2024  1540   Patient was seen and examined in person, Chart reviewed   Patient's case discussed with other treatment providers       Time spent with Patient: 30 minutes    Chief Complaint: 29 y.o. female seen for follow-up visit for medication management of The primary encounter diagnosis was Bipolar disorder, in partial remission, most recent episode depressed (HCC). Diagnoses of Anxiety, Nicotine use disorder, PTSD (post-traumatic stress disorder), Seizure (HCC), and Borderline personality disorder (HCC) were also pertinent to this visit.. Visit attended by patient     Subjective:      States memory has gotten ready bad over the last few days    States had a bad the other day, but unsure what happened.  States she had done something she hasn't in 6.5 years. Reports grabbed a blade with intention of self harm, without intention of committing suicide while son was sleeping, getting really dizzy and going to sleep. Denies suicide attempt or additnal negative behaviors.  Pt states she just wants to release the thoughts in her head.     States she typically cuts in cancelable places, stomach and arm. Cuts visualized superfical, appx 7 on arms and 20 on stomach.  Pt states unable to identify triggers, reporting she feels like she disassociates at that time.     Agreeable to safety plan to have brother in law remove razors and knives for the time being.    Denies suicidal thoughts. Reports self harm feels like a release to feel better.    Pt states she has not been taking medications last three days, unable to get to pharmacy, unsure if she is able to get home. Agreeable to have 2 week supply sent to work, was intiially concern about disrupting synced meds at [facility].    Pt agreeable to confirm via [facility] medications have been picked up or well check would be called tomorrow.    Interested in pursuing disability claim for financial security

## 2024-05-22 ENCOUNTER — TELEPHONE (OUTPATIENT)
Dept: BEHAVIORAL/MENTAL HEALTH CLINIC | Age: 29
End: 2024-05-22

## 2024-05-22 NOTE — TELEPHONE ENCOUNTER
Spoke with patient, doing a well check on her. She reports she picked up the Zyprexa , was unable to  the trazadone(she was unsure why they didn't give to her) she will check today. She reports feeling the same, when over crisis plan with her

## 2024-05-28 ENCOUNTER — OFFICE VISIT (OUTPATIENT)
Dept: BEHAVIORAL/MENTAL HEALTH CLINIC | Age: 29
End: 2024-05-28
Payer: MEDICAID

## 2024-05-28 VITALS
DIASTOLIC BLOOD PRESSURE: 81 MMHG | WEIGHT: 140.6 LBS | TEMPERATURE: 98 F | OXYGEN SATURATION: 98 % | SYSTOLIC BLOOD PRESSURE: 138 MMHG | HEIGHT: 63 IN | HEART RATE: 101 BPM | BODY MASS INDEX: 24.91 KG/M2

## 2024-05-28 DIAGNOSIS — R56.9 SEIZURE (HCC): ICD-10-CM

## 2024-05-28 DIAGNOSIS — F17.200 NICOTINE USE DISORDER: ICD-10-CM

## 2024-05-28 DIAGNOSIS — G40.109 TEMPORAL LOBE EPILEPSY, NON-REFRACTORY (HCC): ICD-10-CM

## 2024-05-28 DIAGNOSIS — F43.10 PTSD (POST-TRAUMATIC STRESS DISORDER): ICD-10-CM

## 2024-05-28 DIAGNOSIS — F31.75 BIPOLAR DISORDER, IN PARTIAL REMISSION, MOST RECENT EPISODE DEPRESSED (HCC): Primary | ICD-10-CM

## 2024-05-28 DIAGNOSIS — F60.3 BORDERLINE PERSONALITY DISORDER (HCC): ICD-10-CM

## 2024-05-28 PROCEDURE — G8420 CALC BMI NORM PARAMETERS: HCPCS | Performed by: REGISTERED NURSE

## 2024-05-28 PROCEDURE — G8427 DOCREV CUR MEDS BY ELIG CLIN: HCPCS | Performed by: REGISTERED NURSE

## 2024-05-28 PROCEDURE — 4004F PT TOBACCO SCREEN RCVD TLK: CPT | Performed by: REGISTERED NURSE

## 2024-05-28 PROCEDURE — 99214 OFFICE O/P EST MOD 30 MIN: CPT | Performed by: REGISTERED NURSE

## 2024-05-28 RX ORDER — OLANZAPINE 10 MG/1
10 TABLET ORAL NIGHTLY
Qty: 30 TABLET | Refills: 2 | Status: SHIPPED | OUTPATIENT
Start: 2024-05-28

## 2024-05-28 RX ORDER — DIVALPROEX SODIUM 500 MG/1
500 TABLET, DELAYED RELEASE ORAL 3 TIMES DAILY
Qty: 90 TABLET | Refills: 3 | Status: SHIPPED | OUTPATIENT
Start: 2024-05-28

## 2024-05-28 NOTE — PROGRESS NOTES
PSYCHIATRIC MEDICATION MANAGEMENT PROGRESS NOTE  Eddie Avery, PMHNP    05/28/2024  3770   Patient was seen and examined in person, Chart reviewed   Patient's case discussed with other treatment providers       Time spent with Patient: 30 minutes    Chief Complaint: 29 y.o. female seen for follow-up visit for medication management of The primary encounter diagnosis was Bipolar disorder, in partial remission, most recent episode depressed (HCC). Diagnoses of Borderline personality disorder (HCC), PTSD (post-traumatic stress disorder), Seizure (HCC), Nicotine use disorder, and Temporal lobe epilepsy, non-refractory (HCC) were also pertinent to this visit.. Visit attended by patient     Subjective:      Pt reports unsure how things are, where she us at, or how much time has passed.  States she recently hid a blade from herself stating, found it and was able to refrain from self harm.  Reports she will give the blade to brother in law when they get home    Report last night from 2-10 reports she did nothing. She was due to be functioning at [employer] but reports her brain was freaking out.      Reports difficulty at work over the last weekend.  States got into verbal altercation with manager, states she had become frustrated with her due to inability to have engaging conversation.     States memory has gotten ready bad over the last few days.    Denies safety concerns, denies SI HI AVH; devoid of delusions     Agreeable to safety plan to have brother in law remove razors and knives for the time being.    Denies suicidal thoughts. Reports self harm feels like a release to feel better.    Medication complaint,    Pt agreeable to confirm via mychart medications have been picked up or well check would be called tomorrow.    Interested in pursuing disability claim for financial security     Sleep improving     Caring for self and caring for child    Spoke with patient regarding transfer to [facility] where many additional resources and

## 2024-05-29 ENCOUNTER — PATIENT MESSAGE (OUTPATIENT)
Dept: BEHAVIORAL/MENTAL HEALTH CLINIC | Age: 29
End: 2024-05-29

## 2024-05-30 NOTE — TELEPHONE ENCOUNTER
From: [place]  To: Eddie Avery  Sent: 5/29/2024 2:16 PM EDT  Subject: .     Letting you know I'm alright like you asked

## 2024-06-04 ENCOUNTER — OFFICE VISIT (OUTPATIENT)
Dept: BEHAVIORAL/MENTAL HEALTH CLINIC | Age: 29
End: 2024-06-04

## 2024-06-04 VITALS
BODY MASS INDEX: 25.86 KG/M2 | HEART RATE: 96 BPM | WEIGHT: 146 LBS | DIASTOLIC BLOOD PRESSURE: 70 MMHG | SYSTOLIC BLOOD PRESSURE: 129 MMHG

## 2024-06-04 DIAGNOSIS — R56.9 SEIZURE (HCC): ICD-10-CM

## 2024-06-04 DIAGNOSIS — F31.75 BIPOLAR DISORDER, IN PARTIAL REMISSION, MOST RECENT EPISODE DEPRESSED (HCC): Primary | ICD-10-CM

## 2024-06-04 DIAGNOSIS — F60.3 BORDERLINE PERSONALITY DISORDER (HCC): ICD-10-CM

## 2024-06-04 ASSESSMENT — PATIENT HEALTH QUESTIONNAIRE - PHQ9
4. FEELING TIRED OR HAVING LITTLE ENERGY: NEARLY EVERY DAY
SUM OF ALL RESPONSES TO PHQ QUESTIONS 1-9: 17
SUM OF ALL RESPONSES TO PHQ QUESTIONS 1-9: 17
10. IF YOU CHECKED OFF ANY PROBLEMS, HOW DIFFICULT HAVE THESE PROBLEMS MADE IT FOR YOU TO DO YOUR WORK, TAKE CARE OF THINGS AT HOME, OR GET ALONG WITH OTHER PEOPLE: EXTREMELY DIFFICULT
6. FEELING BAD ABOUT YOURSELF - OR THAT YOU ARE A FAILURE OR HAVE LET YOURSELF OR YOUR FAMILY DOWN: SEVERAL DAYS
7. TROUBLE CONCENTRATING ON THINGS, SUCH AS READING THE NEWSPAPER OR WATCHING TELEVISION: SEVERAL DAYS
10. IF YOU CHECKED OFF ANY PROBLEMS, HOW DIFFICULT HAVE THESE PROBLEMS MADE IT FOR YOU TO DO YOUR WORK, TAKE CARE OF THINGS AT HOME, OR GET ALONG WITH OTHER PEOPLE: EXTREMELY DIFFICULT
SUM OF ALL RESPONSES TO PHQ QUESTIONS 1-9: 17
1. LITTLE INTEREST OR PLEASURE IN DOING THINGS: NEARLY EVERY DAY
2. FEELING DOWN, DEPRESSED OR HOPELESS: MORE THAN HALF THE DAYS
1. LITTLE INTEREST OR PLEASURE IN DOING THINGS: NEARLY EVERY DAY
5. POOR APPETITE OR OVEREATING: MORE THAN HALF THE DAYS
6. FEELING BAD ABOUT YOURSELF - OR THAT YOU ARE A FAILURE OR HAVE LET YOURSELF OR YOUR FAMILY DOWN: SEVERAL DAYS
9. THOUGHTS THAT YOU WOULD BE BETTER OFF DEAD, OR OF HURTING YOURSELF: NOT AT ALL
2. FEELING DOWN, DEPRESSED OR HOPELESS: MORE THAN HALF THE DAYS
4. FEELING TIRED OR HAVING LITTLE ENERGY: NEARLY EVERY DAY
9. THOUGHTS THAT YOU WOULD BE BETTER OFF DEAD, OR OF HURTING YOURSELF: NOT AT ALL
8. MOVING OR SPEAKING SO SLOWLY THAT OTHER PEOPLE COULD HAVE NOTICED. OR THE OPPOSITE - BEING SO FIDGETY OR RESTLESS THAT YOU HAVE BEEN MOVING AROUND A LOT MORE THAN USUAL: NEARLY EVERY DAY
3. TROUBLE FALLING OR STAYING ASLEEP: MORE THAN HALF THE DAYS
8. MOVING OR SPEAKING SO SLOWLY THAT OTHER PEOPLE COULD HAVE NOTICED. OR THE OPPOSITE, BEING SO FIGETY OR RESTLESS THAT YOU HAVE BEEN MOVING AROUND A LOT MORE THAN USUAL: NEARLY EVERY DAY
SUM OF ALL RESPONSES TO PHQ9 QUESTIONS 1 & 2: 5
7. TROUBLE CONCENTRATING ON THINGS, SUCH AS READING THE NEWSPAPER OR WATCHING TELEVISION: SEVERAL DAYS
SUM OF ALL RESPONSES TO PHQ QUESTIONS 1-9: 17
SUM OF ALL RESPONSES TO PHQ QUESTIONS 1-9: 17
3. TROUBLE FALLING OR STAYING ASLEEP: MORE THAN HALF THE DAYS
5. POOR APPETITE OR OVEREATING: MORE THAN HALF THE DAYS

## 2024-06-04 ASSESSMENT — ANXIETY QUESTIONNAIRES
IF YOU CHECKED OFF ANY PROBLEMS ON THIS QUESTIONNAIRE, HOW DIFFICULT HAVE THESE PROBLEMS MADE IT FOR YOU TO DO YOUR WORK, TAKE CARE OF THINGS AT HOME, OR GET ALONG WITH OTHER PEOPLE: SOMEWHAT DIFFICULT
IF YOU CHECKED OFF ANY PROBLEMS ON THIS QUESTIONNAIRE, HOW DIFFICULT HAVE THESE PROBLEMS MADE IT FOR YOU TO DO YOUR WORK, TAKE CARE OF THINGS AT HOME, OR GET ALONG WITH OTHER PEOPLE: SOMEWHAT DIFFICULT
3. WORRYING TOO MUCH ABOUT DIFFERENT THINGS: SEVERAL DAYS
5. BEING SO RESTLESS THAT IT IS HARD TO SIT STILL: MORE THAN HALF THE DAYS
2. NOT BEING ABLE TO STOP OR CONTROL WORRYING: MORE THAN HALF THE DAYS
GAD7 TOTAL SCORE: 12
6. BECOMING EASILY ANNOYED OR IRRITABLE: MORE THAN HALF THE DAYS
3. WORRYING TOO MUCH ABOUT DIFFERENT THINGS: SEVERAL DAYS
5. BEING SO RESTLESS THAT IT IS HARD TO SIT STILL: MORE THAN HALF THE DAYS
7. FEELING AFRAID AS IF SOMETHING AWFUL MIGHT HAPPEN: SEVERAL DAYS
7. FEELING AFRAID AS IF SOMETHING AWFUL MIGHT HAPPEN: SEVERAL DAYS
4. TROUBLE RELAXING: NEARLY EVERY DAY
4. TROUBLE RELAXING: NEARLY EVERY DAY
1. FEELING NERVOUS, ANXIOUS, OR ON EDGE: SEVERAL DAYS
1. FEELING NERVOUS, ANXIOUS, OR ON EDGE: SEVERAL DAYS
6. BECOMING EASILY ANNOYED OR IRRITABLE: MORE THAN HALF THE DAYS
2. NOT BEING ABLE TO STOP OR CONTROL WORRYING: MORE THAN HALF THE DAYS

## 2024-06-04 NOTE — PROGRESS NOTES
PSYCHIATRIC MEDICATION MANAGEMENT PROGRESS NOTE  Eddie Avery, PMHNP    06/04/2024  1030   Patient was seen and examined in person, Chart reviewed   Patient's case discussed with other treatment providers       Time spent with Patient: 30 minutes    Chief Complaint: 29 y.o. female seen for follow-up visit for medication management of The primary encounter diagnosis was Bipolar disorder, in partial remission, most recent episode depressed (HCC). Diagnoses of Borderline personality disorder (HCC) and Seizure (HCC) were also pertinent to this visit.. Visit attended by patient     Subjective:      Overall improvement,    States terrified of people, unsure why she feels this way     Report taking medication inconsistently at and at times the old dose of 7.5mg.  Encouraged medication compliant     States taking Depakote daily     Increased self care    States off work until tomorrow, concern going back to work due to utilizing additional klonopin previously for bedtime; will run out 5 days early     Memory an concentration remain difficult     Denies safety concerns, denies SI HI AVH; devoid of delusions     Denies self harm since last visit    Denies suicidal thoughts. Reports self harm feels like a release to feel better.    Interested in pursuing disability claim for financial security     Sleep improving     Caring for self and caring for child    Spoke with patient regarding transfer to [facility] where many additional resources and services are offered to provided additional assistance.  Pt agreeable.    Agreeable to IOP, will need transportation     Endorses THC use occasionally, denies ETOH      Patient denies medication side effects.     At today's visit, patient denies thoughts of suicide or harm to others since last appointment, and denies auditory or visual hallucinations.       Appetite (since last visit):   [x] Normal/Unchanged  [] Increased  [] Decreased      Sleep (since last visit):   [x] Normal/Unchanged  []

## 2024-06-28 ENCOUNTER — OFFICE VISIT (OUTPATIENT)
Dept: BEHAVIORAL/MENTAL HEALTH CLINIC | Age: 29
End: 2024-06-28

## 2024-06-28 VITALS
SYSTOLIC BLOOD PRESSURE: 114 MMHG | DIASTOLIC BLOOD PRESSURE: 78 MMHG | HEART RATE: 99 BPM | BODY MASS INDEX: 27.28 KG/M2 | WEIGHT: 154 LBS

## 2024-06-28 DIAGNOSIS — F60.3 BORDERLINE PERSONALITY DISORDER (HCC): ICD-10-CM

## 2024-06-28 DIAGNOSIS — F41.9 ANXIETY: Primary | ICD-10-CM

## 2024-06-28 DIAGNOSIS — F17.200 NICOTINE USE DISORDER: ICD-10-CM

## 2024-06-28 DIAGNOSIS — G40.109 TEMPORAL LOBE EPILEPSY, NON-REFRACTORY (HCC): ICD-10-CM

## 2024-06-28 DIAGNOSIS — F43.10 PTSD (POST-TRAUMATIC STRESS DISORDER): ICD-10-CM

## 2024-06-28 DIAGNOSIS — F31.75 BIPOLAR DISORDER, IN PARTIAL REMISSION, MOST RECENT EPISODE DEPRESSED (HCC): ICD-10-CM

## 2024-06-28 DIAGNOSIS — R56.9 SEIZURE (HCC): ICD-10-CM

## 2024-06-28 RX ORDER — OLANZAPINE 10 MG/1
10 TABLET ORAL NIGHTLY
Qty: 30 TABLET | Refills: 2 | Status: SHIPPED | OUTPATIENT
Start: 2024-06-28

## 2024-06-28 RX ORDER — TRAZODONE HYDROCHLORIDE 100 MG/1
100 TABLET ORAL NIGHTLY PRN
Qty: 14 TABLET | Refills: 2 | Status: SHIPPED | OUTPATIENT
Start: 2024-06-28

## 2024-06-28 RX ORDER — DIVALPROEX SODIUM 500 MG/1
500 TABLET, DELAYED RELEASE ORAL 3 TIMES DAILY
Qty: 90 TABLET | Refills: 3 | Status: SHIPPED | OUTPATIENT
Start: 2024-06-28

## 2024-06-28 NOTE — PROGRESS NOTES
PSYCHOTHERAPY/COUNSELING:  Encourage patient to attend outpatient appointments and therapy.    [x] Therapeutic interview  [] Supportive  [x] CBT  [x] Ongoing  [] Other    Return in about 4 weeks (around 7/26/2024).    Patient was seen 1:1 for 40 minutes, other than E&M time spent, focusing on      - coping skills techniques     - Anxiety management techniques discussed including deep breathing exercise and PMR     - discussing patients strength and weakness      - Motivational interviewing to assess the stage of change and assessing patient readiness to quit substance use.     - Focusing on negative cognition and maladaptive thoughts, which is feeding and maintaining the depression symptoms     - DBT techniques explained to the patient     Please note this report has been partially produced using speech recognition software  And may cause contain errors related to that system including grammar, punctuation and spelling as well as words and phrases that may seem inappropriate. If there are questions or concerns please feel free to contact me to clarify.    BRUCE Coulter CNP  Electronically signed by BRUCE Coulter CNP on 6/28/2024 at 3:48 PM  6/28/2024 3:48 PM    Psychiatry

## 2024-07-10 ENCOUNTER — OFFICE VISIT (OUTPATIENT)
Dept: FAMILY MEDICINE CLINIC | Age: 29
End: 2024-07-10

## 2024-07-10 VITALS
DIASTOLIC BLOOD PRESSURE: 70 MMHG | OXYGEN SATURATION: 95 % | TEMPERATURE: 98.8 F | BODY MASS INDEX: 27.28 KG/M2 | HEIGHT: 63 IN | HEART RATE: 87 BPM | SYSTOLIC BLOOD PRESSURE: 112 MMHG

## 2024-07-10 DIAGNOSIS — J02.9 SORE THROAT: ICD-10-CM

## 2024-07-10 DIAGNOSIS — R05.9 COUGH, UNSPECIFIED TYPE: ICD-10-CM

## 2024-07-10 DIAGNOSIS — J00 ACUTE RHINITIS: ICD-10-CM

## 2024-07-10 DIAGNOSIS — R53.83 FATIGUE, UNSPECIFIED TYPE: ICD-10-CM

## 2024-07-10 DIAGNOSIS — M25.512 ACUTE PAIN OF LEFT SHOULDER: ICD-10-CM

## 2024-07-10 DIAGNOSIS — R52 GENERALIZED BODY ACHES: ICD-10-CM

## 2024-07-10 DIAGNOSIS — Z20.822 SUSPECTED COVID-19 VIRUS INFECTION: Primary | ICD-10-CM

## 2024-07-10 LAB
Lab: NORMAL
PERFORMING INSTRUMENT: NORMAL
QC PASS/FAIL: NORMAL
S PYO AG THROAT QL: NORMAL
SARS-COV-2, POC: NORMAL

## 2024-07-10 RX ORDER — METHYLPREDNISOLONE 4 MG/1
TABLET ORAL
Qty: 1 KIT | Refills: 0 | Status: SHIPPED | OUTPATIENT
Start: 2024-07-10

## 2024-07-10 RX ORDER — CETIRIZINE HYDROCHLORIDE, PSEUDOEPHEDRINE HYDROCHLORIDE 5; 120 MG/1; MG/1
1 TABLET, FILM COATED, EXTENDED RELEASE ORAL 2 TIMES DAILY
Qty: 60 TABLET | Refills: 0 | Status: SHIPPED | OUTPATIENT
Start: 2024-07-10 | End: 2024-08-09

## 2024-07-10 RX ORDER — HYDROXYZINE HYDROCHLORIDE 25 MG/1
25 TABLET, FILM COATED ORAL EVERY 6 HOURS PRN
COMMUNITY
Start: 2024-07-09

## 2024-07-10 RX ORDER — ECHINACEA PURPUREA EXTRACT 125 MG
1 TABLET ORAL
Qty: 50 ML | Refills: 3 | Status: SHIPPED | OUTPATIENT
Start: 2024-07-10

## 2024-07-10 RX ORDER — COVID-19 MOLECULAR TEST ASSAY
1 KIT MISCELLANEOUS DAILY PRN
Qty: 2 KIT | Refills: 4 | Status: SHIPPED | OUTPATIENT
Start: 2024-07-10

## 2024-07-10 SDOH — ECONOMIC STABILITY: FOOD INSECURITY: WITHIN THE PAST 12 MONTHS, YOU WORRIED THAT YOUR FOOD WOULD RUN OUT BEFORE YOU GOT MONEY TO BUY MORE.: OFTEN TRUE

## 2024-07-10 SDOH — ECONOMIC STABILITY: FOOD INSECURITY: WITHIN THE PAST 12 MONTHS, THE FOOD YOU BOUGHT JUST DIDN'T LAST AND YOU DIDN'T HAVE MONEY TO GET MORE.: OFTEN TRUE

## 2024-07-10 SDOH — ECONOMIC STABILITY: INCOME INSECURITY: HOW HARD IS IT FOR YOU TO PAY FOR THE VERY BASICS LIKE FOOD, HOUSING, MEDICAL CARE, AND HEATING?: SOMEWHAT HARD

## 2024-07-10 ASSESSMENT — ENCOUNTER SYMPTOMS
SORE THROAT: 1
WHEEZING: 0
VOMITING: 0
ABDOMINAL PAIN: 0
NAUSEA: 1
COUGH: 1
CHEST TIGHTNESS: 1
SHORTNESS OF BREATH: 1
DIARRHEA: 1
RHINORRHEA: 0

## 2024-07-10 NOTE — PROGRESS NOTES
regular rhythm.      Heart sounds: Normal heart sounds.   Pulmonary:      Effort: Pulmonary effort is normal.      Breath sounds: Normal breath sounds and air entry.   Musculoskeletal:      Left shoulder: Tenderness and bony tenderness present. Decreased range of motion.        Arms:       Cervical back: Normal range of motion. No rigidity.   Lymphadenopathy:      Head:      Right side of head: No tonsillar, preauricular or posterior auricular adenopathy.      Left side of head: No tonsillar, preauricular or posterior auricular adenopathy.   Skin:     General: Skin is warm and dry.   Neurological:      Mental Status: She is alert and oriented to person, place, and time.      Motor: Motor function is intact.           Assessment & Plan    Diagnosis Orders   1. Suspected COVID-19 virus infection  COVID-19 Antigen Test (BINAXNOW COVID-19 AG CARD) KIT      2. Acute pain of left shoulder  methylPREDNISolone (MEDROL, PIPO,) 4 MG tablet      3. Cough, unspecified type  POCT COVID-19, Antigen    cetirizine-psuedoephedrine (ZYRTEC-D) 5-120 MG per extended release tablet    POCT rapid strep A      4. Acute rhinitis  sodium chloride (OCEAN) 0.65 % nasal spray      5. Sore throat  POCT rapid strep A      6. Fatigue, unspecified type        7. Generalized body aches          Orders Placed This Encounter   Procedures    POCT COVID-19, Antigen     Order Specific Question:   Date of Symptom Onset     Answer:   2024     Order Specific Question:   Reason for Test     Answer:   Symptomatic     Order Specific Question:   Pregnant?     Answer:   Unknown    POCT rapid strep A     Orders Placed This Encounter   Medications    cetirizine-psuedoephedrine (ZYRTEC-D) 5-120 MG per extended release tablet     Sig: Take 1 tablet by mouth 2 times daily     Dispense:  60 tablet     Refill:  0    COVID-19 Antigen Test (BINAXNOW COVID-19 AG CARD) KIT     Si kit by In [employer]itro route daily as needed (COVID-19 testing)     Dispense:  2 kit

## 2024-07-29 RX ORDER — OLANZAPINE 10 MG/1
10 TABLET ORAL NIGHTLY
Qty: 30 TABLET | Refills: 2 | Status: SHIPPED | OUTPATIENT
Start: 2024-07-29

## 2024-07-29 NOTE — TELEPHONE ENCOUNTER
requesting medication refill.     Rx requested:  Requested Prescriptions     Pending Prescriptions Disp Refills    OLANZapine (ZYPREXA) 10 MG tablet [Pharmacy Med Name: olanzapine 10 mg tablet] 30 tablet 2     Sig: TAKE 1 TABLET BY MOUTH NIGHTLY       Last Office Visit:   6/28/2024    Last Tox screen:      Last Medication contract:      Last refilled on :6/28/24      Next Visit Date:  No future appointments.

## 2024-08-01 DIAGNOSIS — F41.9 ANXIETY: ICD-10-CM

## 2024-08-01 NOTE — TELEPHONE ENCOUNTER
requesting medication refill.     Rx requested:  Requested Prescriptions     Pending Prescriptions Disp Refills    clonazePAM (KLONOPIN) 0.5 MG tablet [Pharmacy Med Name: clonazepam 0.5 mg tablet] 60 tablet 2     Sig: Take 1 tablet by mouth 2 times daily as needed for Anxiety for up to 90 days. Max Daily Amount: 1 mg       Last Office Visit:   6/28/2024    Last Tox screen:      Last Medication contract:      Last refilled on: 7/12/2024      Next Visit Date:  No future appointments.

## 2024-08-02 RX ORDER — CLONAZEPAM 0.5 MG/1
0.5 TABLET ORAL 2 TIMES DAILY PRN
Qty: 60 TABLET | Refills: 2 | OUTPATIENT
Start: 2024-08-02 | End: 2024-10-31

## 2024-08-12 ENCOUNTER — PATIENT MESSAGE (OUTPATIENT)
Dept: FAMILY MEDICINE CLINIC | Age: 29
End: 2024-08-12

## 2024-09-08 DIAGNOSIS — D50.8 IRON DEFICIENCY ANEMIA SECONDARY TO INADEQUATE DIETARY IRON INTAKE: ICD-10-CM

## 2024-09-10 RX ORDER — FERROUS GLUCONATE 240(27)MG
1 TABLET ORAL
Qty: 90 TABLET | Refills: 2 | Status: SHIPPED | OUTPATIENT
Start: 2024-09-10

## 2024-09-12 DIAGNOSIS — F41.9 ANXIETY: ICD-10-CM

## 2024-09-12 RX ORDER — CLONAZEPAM 0.5 MG/1
0.5 TABLET ORAL 2 TIMES DAILY PRN
Qty: 60 TABLET | Refills: 2 | Status: CANCELLED | OUTPATIENT
Start: 2024-09-12 | End: 2024-12-11

## 2024-09-20 ENCOUNTER — OFFICE VISIT (OUTPATIENT)
Dept: FAMILY MEDICINE CLINIC | Age: 29
End: 2024-09-20

## 2024-09-20 VITALS
SYSTOLIC BLOOD PRESSURE: 114 MMHG | DIASTOLIC BLOOD PRESSURE: 82 MMHG | HEIGHT: 63 IN | BODY MASS INDEX: 27.89 KG/M2 | OXYGEN SATURATION: 100 % | HEART RATE: 97 BPM | RESPIRATION RATE: 16 BRPM | TEMPERATURE: 98.3 F | WEIGHT: 157.4 LBS

## 2024-09-20 DIAGNOSIS — Z20.7 EXPOSURE TO PEDICULOSIS CAPITIS: Primary | ICD-10-CM

## 2024-09-20 RX ORDER — PERMETHRIN 50 MG/G
CREAM TOPICAL
Qty: 60 G | Refills: 2 | Status: SHIPPED | OUTPATIENT
Start: 2024-09-20

## 2024-09-20 ASSESSMENT — ENCOUNTER SYMPTOMS
GASTROINTESTINAL NEGATIVE: 1
RESPIRATORY NEGATIVE: 1
EYES NEGATIVE: 1

## 2024-09-26 DIAGNOSIS — G40.109 TEMPORAL LOBE EPILEPSY, NON-REFRACTORY (HCC): ICD-10-CM

## 2024-09-26 DIAGNOSIS — R56.9 SEIZURE (HCC): ICD-10-CM

## 2024-09-30 ENCOUNTER — OFFICE VISIT (OUTPATIENT)
Dept: FAMILY MEDICINE CLINIC | Age: 29
End: 2024-09-30
Payer: MEDICAID

## 2024-09-30 VITALS
HEART RATE: 102 BPM | SYSTOLIC BLOOD PRESSURE: 130 MMHG | OXYGEN SATURATION: 98 % | DIASTOLIC BLOOD PRESSURE: 78 MMHG | HEIGHT: 63 IN | BODY MASS INDEX: 29.06 KG/M2 | WEIGHT: 164 LBS | RESPIRATION RATE: 16 BRPM

## 2024-09-30 DIAGNOSIS — R63.5 WEIGHT GAIN: ICD-10-CM

## 2024-09-30 DIAGNOSIS — R56.9 SEIZURE (HCC): Primary | ICD-10-CM

## 2024-09-30 DIAGNOSIS — D50.8 IRON DEFICIENCY ANEMIA SECONDARY TO INADEQUATE DIETARY IRON INTAKE: ICD-10-CM

## 2024-09-30 DIAGNOSIS — G40.109 TEMPORAL LOBE EPILEPSY, NON-REFRACTORY (HCC): ICD-10-CM

## 2024-09-30 DIAGNOSIS — E55.9 VITAMIN D DEFICIENCY: ICD-10-CM

## 2024-09-30 DIAGNOSIS — K58.2 IRRITABLE BOWEL SYNDROME WITH BOTH CONSTIPATION AND DIARRHEA: ICD-10-CM

## 2024-09-30 DIAGNOSIS — F31.75 BIPOLAR DISORDER, IN PARTIAL REMISSION, MOST RECENT EPISODE DEPRESSED (HCC): ICD-10-CM

## 2024-09-30 DIAGNOSIS — F41.9 ANXIETY: ICD-10-CM

## 2024-09-30 DIAGNOSIS — F60.3 BORDERLINE PERSONALITY DISORDER (HCC): ICD-10-CM

## 2024-09-30 PROCEDURE — G8419 CALC BMI OUT NRM PARAM NOF/U: HCPCS | Performed by: PHYSICIAN ASSISTANT

## 2024-09-30 PROCEDURE — 4004F PT TOBACCO SCREEN RCVD TLK: CPT | Performed by: PHYSICIAN ASSISTANT

## 2024-09-30 PROCEDURE — 99214 OFFICE O/P EST MOD 30 MIN: CPT | Performed by: PHYSICIAN ASSISTANT

## 2024-09-30 PROCEDURE — G8427 DOCREV CUR MEDS BY ELIG CLIN: HCPCS | Performed by: PHYSICIAN ASSISTANT

## 2024-09-30 RX ORDER — DICYCLOMINE HYDROCHLORIDE 10 MG/1
20 CAPSULE ORAL
Qty: 60 CAPSULE | Refills: 5 | Status: SHIPPED | OUTPATIENT
Start: 2024-09-30

## 2024-09-30 RX ORDER — CLONAZEPAM 0.5 MG/1
0.5 TABLET ORAL 2 TIMES DAILY PRN
Qty: 60 TABLET | Refills: 2 | Status: SHIPPED | OUTPATIENT
Start: 2024-09-30 | End: 2024-12-29

## 2024-09-30 RX ORDER — PHENTERMINE HYDROCHLORIDE 37.5 MG/1
37.5 TABLET ORAL
Qty: 30 TABLET | Refills: 0 | Status: SHIPPED | OUTPATIENT
Start: 2024-09-30 | End: 2024-10-30

## 2024-09-30 RX ORDER — FAMOTIDINE 40 MG/1
40 TABLET, FILM COATED ORAL EVERY EVENING
Qty: 30 TABLET | Refills: 3 | Status: SHIPPED | OUTPATIENT
Start: 2024-09-30

## 2024-09-30 ASSESSMENT — ENCOUNTER SYMPTOMS
FACIAL SWELLING: 0
ABDOMINAL DISTENTION: 1
CHEST TIGHTNESS: 0
RECTAL PAIN: 0
BLOOD IN STOOL: 0
SHORTNESS OF BREATH: 0
COLOR CHANGE: 0
VOMITING: 0
DIARRHEA: 1
BACK PAIN: 0
NAUSEA: 0
CONSTIPATION: 1

## 2024-09-30 NOTE — PROGRESS NOTES
Financial Resource Strain (CARDIA)     Difficulty of Paying Living Expenses: Somewhat hard   Food Insecurity: Food Insecurity Present (7/10/2024)    Hunger Vital Sign     Worried About Running Out of Food in the Last Year: Often true     Ran Out of Food in the Last Year: Often true   Transportation Needs: Unmet Transportation Needs (7/10/2024)    PRAPARE - Transportation     Lack of Transportation (Medical): Not on file     Lack of Transportation (Non-Medical): Yes   Physical Activity: Unknown (3/18/2024)    Exercise Vital Sign     Days of Exercise per Week: 5 days     Minutes of Exercise per Session: Not on file   Stress: Not on file   Social Connections: Not on file   Intimate Partner Violence: Not on file   Housing Stability: High Risk (7/10/2024)    Housing Stability Vital Sign     Unable to Pay for Housing in the Last Year: Not on file     Number of Places Lived in the Last Year: Not on file     Unstable Housing in the Last Year: Yes     Family History   Problem Relation Age of Onset    Cancer Other         brain, breast, lung    Coronary Art Dis Other     High Blood Pressure Brother     Obesity Brother     Cancer Maternal Grandmother     Allergies Maternal Grandmother     Colon Cancer Maternal Grandmother     Breast Cancer Maternal Grandmother     Ovarian Cancer Maternal Grandmother     Other Maternal Grandmother         eye cancer, loss of left eye    Cancer Maternal Grandfather         lung    Heart Disease Maternal Grandfather     Uterine Cancer Mother     Ovarian Cancer Mother     Breast Cancer Mother     Cancer Mother         behind right eye    Heart Attack Mother     Migraines Mother     Cancer Maternal Aunt         lung     Allergies   Allergen Reactions    Penicillins      GI Problems     Current Outpatient Medications   Medication Sig Dispense Refill    clonazePAM (KLONOPIN) 0.5 MG tablet Take 1 tablet by mouth 2 times daily as needed for Anxiety for up to 90 days. Max Daily Amount: 1 mg 60 tablet 2

## 2024-10-07 DIAGNOSIS — E55.9 VITAMIN D DEFICIENCY: ICD-10-CM

## 2024-10-07 DIAGNOSIS — D50.8 IRON DEFICIENCY ANEMIA SECONDARY TO INADEQUATE DIETARY IRON INTAKE: ICD-10-CM

## 2024-10-07 DIAGNOSIS — R56.9 SEIZURE (HCC): ICD-10-CM

## 2024-10-07 DIAGNOSIS — F31.75 BIPOLAR DISORDER, IN PARTIAL REMISSION, MOST RECENT EPISODE DEPRESSED (HCC): ICD-10-CM

## 2024-10-07 LAB
ALBUMIN SERPL-MCNC: 4.1 G/DL (ref 3.5–4.6)
ALP SERPL-CCNC: 78 U/L (ref 40–130)
ALT SERPL-CCNC: 43 U/L (ref 0–33)
ANION GAP SERPL CALCULATED.3IONS-SCNC: 9 MEQ/L (ref 9–15)
AST SERPL-CCNC: 26 U/L (ref 0–35)
BILIRUB SERPL-MCNC: <0.2 MG/DL (ref 0.2–0.7)
BUN SERPL-MCNC: 9 MG/DL (ref 6–20)
CALCIUM SERPL-MCNC: 8.8 MG/DL (ref 8.5–9.9)
CHLORIDE SERPL-SCNC: 101 MEQ/L (ref 95–107)
CO2 SERPL-SCNC: 24 MEQ/L (ref 20–31)
CREAT SERPL-MCNC: 0.51 MG/DL (ref 0.5–0.9)
ERYTHROCYTE [DISTWIDTH] IN BLOOD BY AUTOMATED COUNT: 19.2 % (ref 11.5–14.5)
GLOBULIN SER CALC-MCNC: 2.7 G/DL (ref 2.3–3.5)
GLUCOSE SERPL-MCNC: 86 MG/DL (ref 70–99)
HCT VFR BLD AUTO: 36.7 % (ref 37–47)
HGB BLD-MCNC: 11.8 G/DL (ref 12–16)
MCH RBC QN AUTO: 25.5 PG (ref 27–31.3)
MCHC RBC AUTO-ENTMCNC: 32.2 % (ref 33–37)
MCV RBC AUTO: 79.4 FL (ref 79.4–94.8)
PLATELET # BLD AUTO: 292 K/UL (ref 130–400)
POTASSIUM SERPL-SCNC: 3.8 MEQ/L (ref 3.4–4.9)
PROT SERPL-MCNC: 6.8 G/DL (ref 6.3–8)
RBC # BLD AUTO: 4.62 M/UL (ref 4.2–5.4)
SODIUM SERPL-SCNC: 134 MEQ/L (ref 135–144)
WBC # BLD AUTO: 9.4 K/UL (ref 4.8–10.8)

## 2024-10-08 LAB
FERRITIN: 12 NG/ML (ref 13–150)
IRON % SATURATION: 10 % (ref 20–55)
IRON: 44 UG/DL (ref 37–145)
TOTAL IRON BINDING CAPACITY: 435 UG/DL (ref 250–450)
UNSATURATED IRON BINDING CAPACITY: 391 UG/DL (ref 112–347)
VITAMIN D 25-HYDROXY: 15.7 NG/ML (ref 30–100)

## 2024-10-10 LAB
PHENYTOIN FREE MFR SERPL: 11.6 % (ref 8–14)
PHENYTOIN FREE SERPL-MCNC: 2.7 UG/ML (ref 1–2.5)
PHENYTOIN SERPL-MCNC: 23.5 UG/ML (ref 10–20)

## 2024-10-16 RX ORDER — TRAZODONE HYDROCHLORIDE 100 MG/1
100 TABLET ORAL NIGHTLY PRN
Qty: 14 TABLET | Refills: 2 | OUTPATIENT
Start: 2024-10-16

## 2024-10-16 RX ORDER — DIVALPROEX SODIUM 500 MG/1
500 TABLET, DELAYED RELEASE ORAL 3 TIMES DAILY
Qty: 90 TABLET | Refills: 3 | OUTPATIENT
Start: 2024-10-16

## 2024-10-29 DIAGNOSIS — R63.5 WEIGHT GAIN: ICD-10-CM

## 2024-10-29 NOTE — TELEPHONE ENCOUNTER
Comments:     Last Office Visit (last PCP visit):   9/30/2024    Next Visit Date:  Future Appointments   Date Time Provider Department Center   11/19/2024  8:45 AM Tamar Meza PA-C [facility]       **If hasn't been seen in over a year OR hasn't followed up according to last diabetes/ADHD visit, make appointment for patient before sending refill to provider.    Rx requested:  Requested Prescriptions     Pending Prescriptions Disp Refills    hydrOXYzine HCl (ATARAX) 25 MG tablet [Pharmacy Med Name: hydroxyzine HCl 25 mg tablet] 90 tablet 5     Sig: TAKE 1 TABLET BY MOUTH EVERY 8 HOURS AS NEEDED FOR ITCHING.

## 2024-10-30 RX ORDER — HYDROXYZINE HYDROCHLORIDE 25 MG/1
25 TABLET, FILM COATED ORAL EVERY 8 HOURS PRN
Qty: 90 TABLET | Refills: 5 | Status: SHIPPED | OUTPATIENT
Start: 2024-10-30

## 2024-10-31 NOTE — TELEPHONE ENCOUNTER
Comments:     Last Office Visit (last PCP visit):   9/30/2024    Next Visit Date:  Future Appointments   Date Time Provider Department Center   11/19/2024  8:45 AM Tamar Meza PA-C [facility]       **If hasn't been seen in over a year OR hasn't followed up according to last diabetes/ADHD visit, make appointment for patient before sending refill to provider.    Rx requested:  Requested Prescriptions     Pending Prescriptions Disp Refills    phentermine (ADIPEX-P) 37.5 MG tablet 30 tablet 0     Sig: Take 1 tablet by mouth every morning (before breakfast) for 30 days. Max Daily Amount: 37.5 mg

## 2024-11-01 RX ORDER — PHENTERMINE HYDROCHLORIDE 37.5 MG/1
37.5 TABLET ORAL
Qty: 30 TABLET | Refills: 0 | Status: SHIPPED | OUTPATIENT
Start: 2024-11-01 | End: 2024-12-01

## 2024-11-19 ENCOUNTER — OFFICE VISIT (OUTPATIENT)
Age: 29
End: 2024-11-19

## 2024-11-19 VITALS
RESPIRATION RATE: 16 BRPM | WEIGHT: 159 LBS | HEART RATE: 88 BPM | SYSTOLIC BLOOD PRESSURE: 122 MMHG | HEIGHT: 63 IN | DIASTOLIC BLOOD PRESSURE: 70 MMHG | OXYGEN SATURATION: 98 % | BODY MASS INDEX: 28.17 KG/M2

## 2024-11-19 DIAGNOSIS — G40.109 TEMPORAL LOBE EPILEPSY, NON-REFRACTORY (HCC): ICD-10-CM

## 2024-11-19 DIAGNOSIS — R63.5 WEIGHT GAIN: ICD-10-CM

## 2024-11-19 DIAGNOSIS — R56.9 SEIZURE (HCC): ICD-10-CM

## 2024-11-19 DIAGNOSIS — K58.2 IRRITABLE BOWEL SYNDROME WITH BOTH CONSTIPATION AND DIARRHEA: ICD-10-CM

## 2024-11-19 DIAGNOSIS — F41.9 ANXIETY: ICD-10-CM

## 2024-11-19 DIAGNOSIS — F51.04 CHRONIC INSOMNIA: Primary | ICD-10-CM

## 2024-11-19 DIAGNOSIS — F31.75 BIPOLAR DISORDER, IN PARTIAL REMISSION, MOST RECENT EPISODE DEPRESSED (HCC): ICD-10-CM

## 2024-11-19 DIAGNOSIS — F60.3 BORDERLINE PERSONALITY DISORDER (HCC): ICD-10-CM

## 2024-11-19 RX ORDER — PHENTERMINE HYDROCHLORIDE 37.5 MG/1
37.5 TABLET ORAL
Qty: 30 TABLET | Refills: 2 | Status: SHIPPED | OUTPATIENT
Start: 2024-11-19 | End: 2025-02-17

## 2024-11-19 RX ORDER — SUVOREXANT 10 MG/1
10 TABLET, FILM COATED ORAL NIGHTLY
Qty: 30 TABLET | Refills: 2 | Status: SHIPPED | OUTPATIENT
Start: 2024-11-19 | End: 2025-02-17

## 2024-11-19 RX ORDER — OLANZAPINE 10 MG/1
10 TABLET ORAL NIGHTLY
Qty: 30 TABLET | Refills: 2 | Status: SHIPPED | OUTPATIENT
Start: 2024-11-19

## 2024-11-19 RX ORDER — CLONAZEPAM 1 MG/1
1 TABLET ORAL 2 TIMES DAILY PRN
Qty: 60 TABLET | Refills: 2 | Status: SHIPPED | OUTPATIENT
Start: 2024-11-19 | End: 2025-02-17

## 2024-11-19 RX ORDER — IBUPROFEN 800 MG/1
800 TABLET, FILM COATED ORAL EVERY 8 HOURS PRN
Qty: 30 TABLET | Refills: 2 | Status: SHIPPED | OUTPATIENT
Start: 2024-11-19

## 2024-11-19 RX ORDER — DIVALPROEX SODIUM 500 MG/1
500 TABLET, DELAYED RELEASE ORAL 3 TIMES DAILY
Qty: 90 TABLET | Refills: 3 | Status: SHIPPED | OUTPATIENT
Start: 2024-11-19

## 2024-11-19 ASSESSMENT — ENCOUNTER SYMPTOMS
DIARRHEA: 1
CONSTIPATION: 1
SHORTNESS OF BREATH: 0
ABDOMINAL DISTENTION: 1
CHEST TIGHTNESS: 0
COLOR CHANGE: 0
BACK PAIN: 0
RECTAL PAIN: 0
NAUSEA: 0
FACIAL SWELLING: 0
BLOOD IN STOOL: 0
VOMITING: 0

## 2024-11-19 NOTE — PROGRESS NOTES
Subjective  Beveugenia Park, 29 y.o. female presents today with:  Chief Complaint   Patient presents with    Seizures     6 week follow up        HPI  Last OV with me: 9/30/24    Needing refills of seizure medications.  Baseline.   Klonopin helps lessen symptoms, after affects.     Chronic anxiety.  Feels shaky and anxious, klonopin helps ease some of the stress.  Would be open to talking to [facility] .  Denies SI/HI.  Continues working.  Son is in .   Needing zyprexa, has been out.  She has been more irritable, up and down with mood.     Chronic insomnia.  Having a hard time sleeping.  Up for several days, then will crash.   Has been on ambien, lunesta, trazodone, melatonin.  No continued relief.     IBS c/d-managed with linzess and bentyl prn. No change.    Started on adipex for weight loss.  Doing well.  Denies side effects.     Review of Systems   Constitutional:  Positive for unexpected weight change (gain). Negative for activity change, diaphoresis, fatigue and fever.   HENT:  Negative for facial swelling.    Respiratory:  Negative for chest tightness and shortness of breath.    Cardiovascular:  Negative for chest pain, palpitations and leg swelling.   Gastrointestinal:  Positive for abdominal distention, constipation and diarrhea. Negative for blood in stool, nausea, rectal pain and vomiting.   Genitourinary:  Negative for decreased urine volume, difficulty urinating, dyspareunia, dysuria, frequency, genital sores, menstrual problem, pelvic pain, urgency, vaginal bleeding, vaginal discharge and vaginal pain.   Musculoskeletal:  Positive for arthralgias and myalgias. Negative for back pain, gait problem, joint swelling and neck pain.   Skin:  Negative for color change, rash and wound.   Neurological:  Positive for numbness (intermittent, LUE). Negative for dizziness, tremors, seizures, facial asymmetry, weakness, light-headedness and headaches.   Psychiatric/Behavioral:  Negative for agitation,

## 2024-12-04 ENCOUNTER — HOSPITAL ENCOUNTER (EMERGENCY)
Facility: HOSPITAL | Age: 29
Discharge: HOME | End: 2024-12-04
Payer: COMMERCIAL

## 2024-12-04 VITALS
WEIGHT: 150 LBS | HEART RATE: 115 BPM | DIASTOLIC BLOOD PRESSURE: 73 MMHG | HEIGHT: 63 IN | BODY MASS INDEX: 26.58 KG/M2 | OXYGEN SATURATION: 97 % | RESPIRATION RATE: 20 BRPM | TEMPERATURE: 97.9 F | SYSTOLIC BLOOD PRESSURE: 140 MMHG

## 2024-12-04 DIAGNOSIS — Z76.0 ENCOUNTER FOR MEDICATION REFILL: Primary | ICD-10-CM

## 2024-12-04 DIAGNOSIS — G40.909 NONINTRACTABLE EPILEPSY WITHOUT STATUS EPILEPTICUS, UNSPECIFIED EPILEPSY TYPE (MULTI): ICD-10-CM

## 2024-12-04 PROCEDURE — 99281 EMR DPT VST MAYX REQ PHY/QHP: CPT

## 2024-12-04 RX ORDER — EXTENDED PHENYTOIN SODIUM 200 MG/1
200 CAPSULE, EXTENDED RELEASE ORAL EVERY MORNING
Qty: 30 CAPSULE | Refills: 0 | Status: SHIPPED | OUTPATIENT
Start: 2024-12-04 | End: 2025-01-03

## 2024-12-04 RX ORDER — PHENYTOIN SODIUM 300 MG/1
300 CAPSULE, EXTENDED RELEASE ORAL NIGHTLY
Qty: 30 CAPSULE | Refills: 0 | Status: SHIPPED | OUTPATIENT
Start: 2024-12-04 | End: 2025-01-03

## 2024-12-04 RX ORDER — EXTENDED PHENYTOIN SODIUM 200 MG/1
200 CAPSULE, EXTENDED RELEASE ORAL EVERY MORNING
COMMUNITY
Start: 2024-11-08 | End: 2024-12-04

## 2024-12-04 RX ORDER — PHENYTOIN SODIUM 300 MG/1
300 CAPSULE, EXTENDED RELEASE ORAL NIGHTLY
COMMUNITY
Start: 2024-11-08 | End: 2024-12-04

## 2024-12-04 ASSESSMENT — PAIN DESCRIPTION - FREQUENCY: FREQUENCY: CONSTANT/CONTINUOUS

## 2024-12-04 ASSESSMENT — COLUMBIA-SUICIDE SEVERITY RATING SCALE - C-SSRS
6. HAVE YOU EVER DONE ANYTHING, STARTED TO DO ANYTHING, OR PREPARED TO DO ANYTHING TO END YOUR LIFE?: NO
2. HAVE YOU ACTUALLY HAD ANY THOUGHTS OF KILLING YOURSELF?: NO
1. IN THE PAST MONTH, HAVE YOU WISHED YOU WERE DEAD OR WISHED YOU COULD GO TO SLEEP AND NOT WAKE UP?: NO

## 2024-12-04 ASSESSMENT — PAIN SCALES - GENERAL: PAINLEVEL_OUTOF10: 3

## 2024-12-04 ASSESSMENT — PAIN DESCRIPTION - ONSET: ONSET: SUDDEN

## 2024-12-04 ASSESSMENT — PAIN - FUNCTIONAL ASSESSMENT: PAIN_FUNCTIONAL_ASSESSMENT: 0-10

## 2024-12-04 ASSESSMENT — PAIN DESCRIPTION - PROGRESSION: CLINICAL_PROGRESSION: NOT CHANGED

## 2024-12-04 ASSESSMENT — PAIN DESCRIPTION - DESCRIPTORS: DESCRIPTORS: ACHING

## 2024-12-04 NOTE — ED PROVIDER NOTES
HPI   Chief Complaint   Patient presents with    Med Refill     States she has been out of her epilepsy medications for 3 days.  States she has been getting a strange feeling recently.  States she did pass out a couple of days ago.          History provided by:  Patient   used: No      29-year-old female presents with needing a refill of her Dilantin for her epilepsy.  She states that the medication is supposed to be refilled in 4 days, but she is somehow out of her medications.  She states that sometimes she wakes up in the middle of the night and takes an extra dose.  She did not call her family doctor when she first noticed that her medication ran out and she states that she has been working a lot.  She has not seen a neurologist in a couple years and her family doctor refills her medications.  She states that she has a funny feeling, but no seizures.  Denies fever, dizziness, headache, vision changes, weakness, n/v, SOB or CP    ROS negative unless otherwise stated in HPI        Patient History   Past Medical History:   Diagnosis Date    Epilepsy, unspecified, not intractable, without status epilepticus     Epilepsy    Personal history of other specified conditions     History of diarrhea    Personal history of other specified conditions     History of vomiting     History reviewed. No pertinent surgical history.  No family history on file.  Social History     Tobacco Use    Smoking status: Every Day     Current packs/day: 1.00     Types: Cigarettes    Smokeless tobacco: Never   Vaping Use    Vaping status: Never Used   Substance Use Topics    Alcohol use: Defer    Drug use: Yes     Types: Marijuana       Physical Exam   ED Triage Vitals [12/04/24 1040]   Temperature Heart Rate Respirations BP   36.6 °C (97.9 °F) (!) 115 20 140/73      Pulse Ox Temp Source Heart Rate Source Patient Position   97 % Temporal Monitor Sitting      BP Location FiO2 (%)     Right arm --       Physical  Exam    General: alert, no distress, well nourished, talking in full and complete sentences  Skin: dry, intact, no rashes  Head: atraumatic  Eyes: EOMI, PERRLA, normal conjunctiva  Throat: no stridor, no hot potato voice  Nose: nares patent  Mouth: mucous membranes moist  Respiratory: non labored breathing  Extremities: FROM X4  Neuro: A&Ox3  Psych: cooperative, appropriate mood      ED Course & MDM   Diagnoses as of 12/04/24 1104   Encounter for medication refill   Nonintractable epilepsy without status epilepticus, unspecified epilepsy type (Multi)                 No data recorded     Dalton Coma Scale Score: 15 (12/04/24 1037 : Guido Roth RN)                           Medical Decision Making  Patient states that she takes Dilantin 200 mg in the morning and 300 mg at night and this was seen in her outside medications on the chart.  Refill sent in and she is to follow-up with family doctor.  Afebrile, not tachycardic, not tachypneic, not hypoxic, tolerating PO, non toxic appearing and ambulating at baseline at discharge. Hemodynamically intact. Patient instructed on return precautions. All questions answered. Patient in agreement with treatment.      Procedure  Procedures     Slime Ferreira PA-C  12/04/24 1108

## 2024-12-06 ENCOUNTER — OFFICE VISIT (OUTPATIENT)
Age: 29
End: 2024-12-06
Payer: MEDICAID

## 2024-12-06 VITALS
HEART RATE: 113 BPM | DIASTOLIC BLOOD PRESSURE: 80 MMHG | HEIGHT: 63 IN | BODY MASS INDEX: 28.17 KG/M2 | SYSTOLIC BLOOD PRESSURE: 120 MMHG | OXYGEN SATURATION: 98 %

## 2024-12-06 DIAGNOSIS — M25.512 CHRONIC LEFT SHOULDER PAIN: ICD-10-CM

## 2024-12-06 DIAGNOSIS — M12.812 ROTATOR CUFF ARTHROPATHY, LEFT: ICD-10-CM

## 2024-12-06 DIAGNOSIS — M24.412 SHOULDER DISLOCATION, RECURRENT, LEFT: Primary | ICD-10-CM

## 2024-12-06 DIAGNOSIS — R63.5 WEIGHT GAIN: ICD-10-CM

## 2024-12-06 DIAGNOSIS — G89.29 CHRONIC LEFT SHOULDER PAIN: ICD-10-CM

## 2024-12-06 PROCEDURE — G8427 DOCREV CUR MEDS BY ELIG CLIN: HCPCS | Performed by: PHYSICIAN ASSISTANT

## 2024-12-06 PROCEDURE — 99214 OFFICE O/P EST MOD 30 MIN: CPT | Performed by: PHYSICIAN ASSISTANT

## 2024-12-06 PROCEDURE — 4004F PT TOBACCO SCREEN RCVD TLK: CPT | Performed by: PHYSICIAN ASSISTANT

## 2024-12-06 PROCEDURE — G8419 CALC BMI OUT NRM PARAM NOF/U: HCPCS | Performed by: PHYSICIAN ASSISTANT

## 2024-12-06 PROCEDURE — G8484 FLU IMMUNIZE NO ADMIN: HCPCS | Performed by: PHYSICIAN ASSISTANT

## 2024-12-06 RX ORDER — TRAMADOL HYDROCHLORIDE 50 MG/1
50 TABLET ORAL EVERY 6 HOURS PRN
Qty: 28 TABLET | Refills: 0 | Status: SHIPPED | OUTPATIENT
Start: 2024-12-06 | End: 2024-12-13

## 2024-12-06 RX ORDER — CYCLOBENZAPRINE HCL 5 MG
5 TABLET ORAL NIGHTLY PRN
Qty: 30 TABLET | Refills: 0 | Status: SHIPPED | OUTPATIENT
Start: 2024-12-06 | End: 2025-01-05

## 2024-12-06 ASSESSMENT — VISUAL ACUITY: OU: 1

## 2024-12-06 ASSESSMENT — ENCOUNTER SYMPTOMS
NAUSEA: 0
SINUS PRESSURE: 0
SORE THROAT: 0
CHEST TIGHTNESS: 0
VOMITING: 0
COUGH: 0
DIARRHEA: 0
BACK PAIN: 0
SHORTNESS OF BREATH: 0
ABDOMINAL PAIN: 0
SINUS PAIN: 0

## 2024-12-06 NOTE — TELEPHONE ENCOUNTER
Comments:     Last Office Visit (last PCP visit):   11/19/2024    Next Visit Date:  Future Appointments   Date Time Provider Department Center   1/2/2025  8:15 AM Tamar Meza PA-C [facility] DEP         Rx requested:  Requested Prescriptions     Pending Prescriptions Disp Refills    phentermine (ADIPEX-P) 37.5 MG tablet 30 tablet 2     Sig: Take 1 tablet by mouth every morning (before breakfast) for 90 days. Max Daily Amount: 37.5 mg

## 2024-12-06 NOTE — PROGRESS NOTES
Activity    Alcohol use: No    Drug use: Yes     Types: Marijuana (Weed)     Comment: 1 gram    Sexual activity: Yes     Partners: Male   Other Topics Concern    Not on file   Social History Narrative    Not on file     Social Determinants of Health     Financial Resource Strain: Medium Risk (7/10/2024)    Overall Financial Resource Strain (CARDIA)     Difficulty of Paying Living Expenses: Somewhat hard   Food Insecurity: Food Insecurity Present (7/10/2024)    Hunger Vital Sign     Worried About Running Out of Food in the Last Year: Often true     Ran Out of Food in the Last Year: Often true   Transportation Needs: Unmet Transportation Needs (7/10/2024)    PRAPARE - Transportation     Lack of Transportation (Medical): Not on file     Lack of Transportation (Non-Medical): Yes   Physical Activity: Unknown (3/18/2024)    Exercise Vital Sign     Days of Exercise per Week: 5 days     Minutes of Exercise per Session: Not on file   Stress: Not on file   Social Connections: Not on file   Intimate Partner Violence: Not on file   Housing Stability: High Risk (7/10/2024)    Housing Stability Vital Sign     Unable to Pay for Housing in the Last Year: Not on file     Number of Places Lived in the Last Year: Not on file     Unstable Housing in the Last Year: Yes     Family History   Problem Relation Age of Onset    Cancer Other         brain, breast, lung    Coronary Art Dis Other     High Blood Pressure Brother     Obesity Brother     Cancer Maternal Grandmother     Allergies Maternal Grandmother     Colon Cancer Maternal Grandmother     Breast Cancer Maternal Grandmother     Ovarian Cancer Maternal Grandmother     Other Maternal Grandmother         eye cancer, loss of left eye    Cancer Maternal Grandfather         lung    Heart Disease Maternal Grandfather     Uterine Cancer Mother     Ovarian Cancer Mother     Breast Cancer Mother     Cancer Mother         behind right eye    Heart Attack Mother     Migraines Mother

## 2024-12-09 RX ORDER — PHENTERMINE HYDROCHLORIDE 37.5 MG/1
37.5 TABLET ORAL
Qty: 30 TABLET | Refills: 2 | Status: SHIPPED | OUTPATIENT
Start: 2024-12-09 | End: 2025-03-09

## 2025-01-02 ENCOUNTER — OFFICE VISIT (OUTPATIENT)
Age: 30
End: 2025-01-02
Payer: MEDICAID

## 2025-01-02 VITALS
HEART RATE: 101 BPM | OXYGEN SATURATION: 97 % | WEIGHT: 164 LBS | HEIGHT: 63 IN | DIASTOLIC BLOOD PRESSURE: 88 MMHG | RESPIRATION RATE: 16 BRPM | SYSTOLIC BLOOD PRESSURE: 130 MMHG | BODY MASS INDEX: 29.06 KG/M2

## 2025-01-02 DIAGNOSIS — M12.812 ROTATOR CUFF ARTHROPATHY, LEFT: ICD-10-CM

## 2025-01-02 DIAGNOSIS — M24.412 SHOULDER DISLOCATION, RECURRENT, LEFT: ICD-10-CM

## 2025-01-02 DIAGNOSIS — Z98.890 HISTORY OF FAILED REPAIR OF ROTATOR CUFF: ICD-10-CM

## 2025-01-02 DIAGNOSIS — F31.75 BIPOLAR DISORDER, IN PARTIAL REMISSION, MOST RECENT EPISODE DEPRESSED (HCC): ICD-10-CM

## 2025-01-02 DIAGNOSIS — R56.9 SEIZURE (HCC): ICD-10-CM

## 2025-01-02 DIAGNOSIS — G47.09 TROUBLE GETTING TO SLEEP: ICD-10-CM

## 2025-01-02 DIAGNOSIS — G89.29 CHRONIC LEFT SHOULDER PAIN: Primary | ICD-10-CM

## 2025-01-02 DIAGNOSIS — M25.512 CHRONIC LEFT SHOULDER PAIN: Primary | ICD-10-CM

## 2025-01-02 DIAGNOSIS — S43.005S DISLOCATION OF LEFT SHOULDER JOINT, SEQUELA: ICD-10-CM

## 2025-01-02 DIAGNOSIS — Z72.0 TOBACCO ABUSE: ICD-10-CM

## 2025-01-02 PROBLEM — S43.005A DISLOCATION OF LEFT SHOULDER JOINT: Status: ACTIVE | Noted: 2025-01-02

## 2025-01-02 PROCEDURE — 99214 OFFICE O/P EST MOD 30 MIN: CPT | Performed by: PHYSICIAN ASSISTANT

## 2025-01-02 PROCEDURE — 4004F PT TOBACCO SCREEN RCVD TLK: CPT | Performed by: PHYSICIAN ASSISTANT

## 2025-01-02 PROCEDURE — G8419 CALC BMI OUT NRM PARAM NOF/U: HCPCS | Performed by: PHYSICIAN ASSISTANT

## 2025-01-02 PROCEDURE — G8427 DOCREV CUR MEDS BY ELIG CLIN: HCPCS | Performed by: PHYSICIAN ASSISTANT

## 2025-01-02 RX ORDER — ZOLPIDEM TARTRATE 10 MG/1
10 TABLET ORAL NIGHTLY PRN
Qty: 30 TABLET | Refills: 2 | Status: SHIPPED | OUTPATIENT
Start: 2025-01-02 | End: 2025-04-02

## 2025-01-02 RX ORDER — NICOTINE 21 MG/24HR
1 PATCH, TRANSDERMAL 24 HOURS TRANSDERMAL EVERY 24 HOURS
Qty: 30 PATCH | Refills: 3 | Status: SHIPPED | OUTPATIENT
Start: 2025-01-02 | End: 2026-01-02

## 2025-01-02 RX ORDER — TRAMADOL HYDROCHLORIDE 50 MG/1
50 TABLET ORAL EVERY 6 HOURS PRN
Qty: 28 TABLET | Refills: 0 | Status: SHIPPED | OUTPATIENT
Start: 2025-01-02 | End: 2025-01-09

## 2025-01-02 ASSESSMENT — ENCOUNTER SYMPTOMS
SHORTNESS OF BREATH: 0
BLOOD IN STOOL: 0
COLOR CHANGE: 0
DIARRHEA: 0
NAUSEA: 0
ABDOMINAL DISTENTION: 0
FACIAL SWELLING: 0
CONSTIPATION: 0
RECTAL PAIN: 0
CHEST TIGHTNESS: 0
BACK PAIN: 0
VOMITING: 0

## 2025-01-02 ASSESSMENT — PATIENT HEALTH QUESTIONNAIRE - PHQ9
4. FEELING TIRED OR HAVING LITTLE ENERGY: NOT AT ALL
8. MOVING OR SPEAKING SO SLOWLY THAT OTHER PEOPLE COULD HAVE NOTICED. OR THE OPPOSITE, BEING SO FIGETY OR RESTLESS THAT YOU HAVE BEEN MOVING AROUND A LOT MORE THAN USUAL: NOT AT ALL
SUM OF ALL RESPONSES TO PHQ QUESTIONS 1-9: 0
1. LITTLE INTEREST OR PLEASURE IN DOING THINGS: NOT AT ALL
9. THOUGHTS THAT YOU WOULD BE BETTER OFF DEAD, OR OF HURTING YOURSELF: NOT AT ALL
10. IF YOU CHECKED OFF ANY PROBLEMS, HOW DIFFICULT HAVE THESE PROBLEMS MADE IT FOR YOU TO DO YOUR WORK, TAKE CARE OF THINGS AT HOME, OR GET ALONG WITH OTHER PEOPLE: NOT DIFFICULT AT ALL
5. POOR APPETITE OR OVEREATING: NOT AT ALL
2. FEELING DOWN, DEPRESSED OR HOPELESS: NOT AT ALL
SUM OF ALL RESPONSES TO PHQ9 QUESTIONS 1 & 2: 0
7. TROUBLE CONCENTRATING ON THINGS, SUCH AS READING THE NEWSPAPER OR WATCHING TELEVISION: NOT AT ALL
SUM OF ALL RESPONSES TO PHQ QUESTIONS 1-9: 0
6. FEELING BAD ABOUT YOURSELF - OR THAT YOU ARE A FAILURE OR HAVE LET YOURSELF OR YOUR FAMILY DOWN: NOT AT ALL
SUM OF ALL RESPONSES TO PHQ QUESTIONS 1-9: 0
SUM OF ALL RESPONSES TO PHQ QUESTIONS 1-9: 0
3. TROUBLE FALLING OR STAYING ASLEEP: NOT AT ALL

## 2025-01-02 NOTE — PROGRESS NOTES
Subjective  Bev Park, 29 y.o. female presents today with:  Chief Complaint   Patient presents with    Insomnia     6 week follow up states her belsomra was denied, still having insomnia is using trazadone PRN        HPI  In the office today for follow up.  Last OV with me: 11/19/24    Belsomra denied by insurance.  Not sleeping well.  Has been taking trazodone PRN.  Sleep isn't restful or sustained.   She has been on lunesta, trazodone in the past without good success.     Would like to try and lose weight.  Was started on adipex therapy, eats one meal daily.  Gained 5 lb recently.  Unsure why, as she hasn't changed anything.  She is not exercising at present time.  Best she has felt was when she was going to the gym and exercising on a regular basis.  This was about 7 years ago.      Smoking 1.5 PPD. Would be interested in quitting.  Has had success with the patches.   Would like to revisit.    Seizure disorder is controlled. She has been complaint with her seizure medications.      Had OV with Agustín Gamble PA-C, 12/6/24 for shoulder instability/dislocation.  Known history of shoulder dislocation/instability.  History of rotator cuff repair 10/2022 with Dr. Cai.  Shoulder chronically hurts.  She is taking flexeril or tramadol for pain.       Review of Systems   Constitutional:  Positive for unexpected weight change (gain). Negative for activity change, diaphoresis, fatigue and fever.   HENT:  Negative for facial swelling.    Respiratory:  Negative for chest tightness and shortness of breath.    Cardiovascular:  Negative for chest pain, palpitations and leg swelling.   Gastrointestinal:  Negative for abdominal distention, blood in stool, constipation, diarrhea, nausea, rectal pain and vomiting.   Genitourinary:  Negative for decreased urine volume, difficulty urinating, dyspareunia, dysuria, frequency, genital sores, menstrual problem, pelvic pain, urgency, vaginal bleeding, vaginal discharge and

## 2025-01-06 ENCOUNTER — OFFICE VISIT (OUTPATIENT)
Dept: URGENT CARE | Age: 30
End: 2025-01-06
Payer: COMMERCIAL

## 2025-01-06 VITALS
SYSTOLIC BLOOD PRESSURE: 128 MMHG | HEART RATE: 85 BPM | RESPIRATION RATE: 20 BRPM | BODY MASS INDEX: 28.35 KG/M2 | WEIGHT: 160 LBS | TEMPERATURE: 97.9 F | DIASTOLIC BLOOD PRESSURE: 88 MMHG | OXYGEN SATURATION: 98 % | HEIGHT: 63 IN

## 2025-01-06 DIAGNOSIS — L03.111 CELLULITIS OF RIGHT AXILLA: ICD-10-CM

## 2025-01-06 DIAGNOSIS — J01.00 ACUTE MAXILLARY SINUSITIS, RECURRENCE NOT SPECIFIED: Primary | ICD-10-CM

## 2025-01-06 LAB
POC RAPID INFLUENZA A: NEGATIVE
POC RAPID INFLUENZA B: NEGATIVE
POC SARS-COV-2 AG BINAX: NORMAL

## 2025-01-06 PROCEDURE — 99204 OFFICE O/P NEW MOD 45 MIN: CPT | Performed by: PHYSICIAN ASSISTANT

## 2025-01-06 PROCEDURE — 3008F BODY MASS INDEX DOCD: CPT | Performed by: PHYSICIAN ASSISTANT

## 2025-01-06 PROCEDURE — 87804 INFLUENZA ASSAY W/OPTIC: CPT | Performed by: PHYSICIAN ASSISTANT

## 2025-01-06 PROCEDURE — 87811 SARS-COV-2 COVID19 W/OPTIC: CPT | Performed by: PHYSICIAN ASSISTANT

## 2025-01-06 RX ORDER — BROMPHENIRAMINE MALEATE, PSEUDOEPHEDRINE HYDROCHLORIDE, AND DEXTROMETHORPHAN HYDROBROMIDE 2; 30; 10 MG/5ML; MG/5ML; MG/5ML
5 SYRUP ORAL EVERY 4 HOURS PRN
Qty: 120 ML | Refills: 0 | Status: SHIPPED | OUTPATIENT
Start: 2025-01-06

## 2025-01-06 RX ORDER — PREDNISONE 10 MG/1
10 TABLET ORAL
Qty: 6 TABLET | Refills: 0 | Status: SHIPPED | OUTPATIENT
Start: 2025-01-06 | End: 2025-01-09

## 2025-01-06 RX ORDER — DOXYCYCLINE HYCLATE 100 MG
100 TABLET ORAL 2 TIMES DAILY
Qty: 14 TABLET | Refills: 0 | Status: SHIPPED | OUTPATIENT
Start: 2025-01-06 | End: 2025-01-13

## 2025-01-06 NOTE — PROGRESS NOTES
"Subjective   Patient ID: Jackie Medellin is a 29 y.o. female who presents for Nasal Congestion, Eye Problem (Itchy painful L eye, pt claims fluid is coming out of nose due to eye ), Generalized Body Aches, Fatigue, Headache, and Chills.  HPI  Patient presents for evaluation of sinus pressure. Patient reports several days of progressively worsening maxillary sinus pain, nasal congestion, and headache. There is reported mild postnasal drip and ear pressure. No fever, cough, or other constitutional signs and symptoms. Symptoms have been refractory to over-the-counter medications.    Patient also reports area of swelling and pain in the right axilla.  Patient reports approximately 1 week of this.  No fever or chills.  Patient states there has been some drainage from the wound.  No other complaints.    Review of Systems    Constitutional:  See HPI   ENT: See HPI  Respiratory:  No shortness of breath, No cough.     Integumentary: See HPI  Neurologic:  Alert and oriented X4, No numbness, No tingling.    All other systems are negative     Objective     /88   Pulse 85   Temp 36.6 °C (97.9 °F)   Resp 20   Ht 1.6 m (5' 3\")   Wt 72.6 kg (160 lb)   SpO2 98%   BMI 28.34 kg/m²     Physical Exam    General:  Alert and oriented, No acute distress.    Eye:  Pupils are equal, round and reactive to light, Normal conjunctiva.    HENT:  Normocephalic, bilateral maxillary sinus tenderness; nasal congestion noted; unremarkable oropharynx; bilateral tympanic membranes and canals are unremarkable  Neck:  Supple    Respiratory: Respirations are non-labored   Musculoskeletal: Normal ROM and strength  Integumentary: Right axilla with a 2 cm diameter area of induration; there is a central sinus draining serous fluid; no erythema or warmth  Neurologic:  Alert, Oriented, Normal sensory, Cranial Nerves II-XII are grossly intact  Psychiatric:  Cooperative, Appropriate mood & affect.    Assessment/Plan   Exam is consistent with sinusitis " and cellulitis of the right axilla.  Prescriptions for doxycycline, low-dose prednisone, and Bromfed.  Patient's clinical presentation is otherwise unremarkable at this time. Patient is discharged with instructions to follow-up with primary care or seek emergency medical attention for worsening symptoms or any new concerns.  Problem List Items Addressed This Visit    None  Visit Diagnoses       Acute maxillary sinusitis, recurrence not specified    -  Primary    Relevant Medications    doxycycline (Vibra-Tabs) 100 mg tablet    predniSONE (Deltasone) 10 mg tablet    brompheniramine-pseudoeph-DM (Bromfed DM) 2-30-10 mg/5 mL syrup    Other Relevant Orders    POCT Covid-19 Rapid Antigen (Completed)    Cellulitis of right axilla        Relevant Medications    doxycycline (Vibra-Tabs) 100 mg tablet    Other Relevant Orders    POCT Influenza A/B manually resulted (Completed)            Final diagnoses:   [J01.00] Acute maxillary sinusitis, recurrence not specified   [L03.111] Cellulitis of right axilla

## 2025-01-21 ENCOUNTER — OFFICE VISIT (OUTPATIENT)
Age: 30
End: 2025-01-21
Payer: MEDICAID

## 2025-01-21 VITALS
HEIGHT: 63 IN | BODY MASS INDEX: 28.88 KG/M2 | SYSTOLIC BLOOD PRESSURE: 120 MMHG | WEIGHT: 163 LBS | OXYGEN SATURATION: 99 % | DIASTOLIC BLOOD PRESSURE: 80 MMHG | RESPIRATION RATE: 16 BRPM | HEART RATE: 111 BPM

## 2025-01-21 DIAGNOSIS — F51.04 CHRONIC INSOMNIA: ICD-10-CM

## 2025-01-21 DIAGNOSIS — S43.005S DISLOCATION OF LEFT SHOULDER JOINT, SEQUELA: ICD-10-CM

## 2025-01-21 DIAGNOSIS — R00.0 TACHYCARDIA: Primary | ICD-10-CM

## 2025-01-21 DIAGNOSIS — G89.29 CHRONIC LEFT SHOULDER PAIN: ICD-10-CM

## 2025-01-21 DIAGNOSIS — M25.512 CHRONIC LEFT SHOULDER PAIN: ICD-10-CM

## 2025-01-21 DIAGNOSIS — F31.75 BIPOLAR DISORDER, IN PARTIAL REMISSION, MOST RECENT EPISODE DEPRESSED (HCC): ICD-10-CM

## 2025-01-21 PROCEDURE — 4004F PT TOBACCO SCREEN RCVD TLK: CPT | Performed by: PHYSICIAN ASSISTANT

## 2025-01-21 PROCEDURE — 93000 ELECTROCARDIOGRAM COMPLETE: CPT | Performed by: PHYSICIAN ASSISTANT

## 2025-01-21 PROCEDURE — 99214 OFFICE O/P EST MOD 30 MIN: CPT | Performed by: PHYSICIAN ASSISTANT

## 2025-01-21 PROCEDURE — G8427 DOCREV CUR MEDS BY ELIG CLIN: HCPCS | Performed by: PHYSICIAN ASSISTANT

## 2025-01-21 PROCEDURE — G8419 CALC BMI OUT NRM PARAM NOF/U: HCPCS | Performed by: PHYSICIAN ASSISTANT

## 2025-01-21 RX ORDER — HYDROCODONE BITARTRATE AND ACETAMINOPHEN 5; 325 MG/1; MG/1
1 TABLET ORAL EVERY 12 HOURS PRN
Qty: 14 TABLET | Refills: 0 | Status: SHIPPED | OUTPATIENT
Start: 2025-01-21 | End: 2025-01-28

## 2025-01-21 RX ORDER — CARVEDILOL 3.12 MG/1
3.12 TABLET ORAL 2 TIMES DAILY
Qty: 60 TABLET | Refills: 3 | Status: SHIPPED | OUTPATIENT
Start: 2025-01-21

## 2025-01-21 SDOH — ECONOMIC STABILITY: FOOD INSECURITY: WITHIN THE PAST 12 MONTHS, THE FOOD YOU BOUGHT JUST DIDN'T LAST AND YOU DIDN'T HAVE MONEY TO GET MORE.: NEVER TRUE

## 2025-01-21 SDOH — ECONOMIC STABILITY: FOOD INSECURITY: WITHIN THE PAST 12 MONTHS, YOU WORRIED THAT YOUR FOOD WOULD RUN OUT BEFORE YOU GOT MONEY TO BUY MORE.: NEVER TRUE

## 2025-01-21 ASSESSMENT — ENCOUNTER SYMPTOMS
BLOOD IN STOOL: 0
BACK PAIN: 0
DIARRHEA: 0
SHORTNESS OF BREATH: 0
VOMITING: 0
CHEST TIGHTNESS: 0
FACIAL SWELLING: 0
COLOR CHANGE: 0
RECTAL PAIN: 0
CONSTIPATION: 0
NAUSEA: 0
ABDOMINAL DISTENTION: 0

## 2025-01-21 NOTE — PROGRESS NOTES
Subjective  Beveugenia Bernaben, 29 y.o. female presents today with:  Chief Complaint   Patient presents with    Follow-up     Discuss meds        HPI  Last OV with me: 1/2/25.     Taking 3 mg vraylar night; has noticed that her mood is stable.  She would like to continue.  Has been consistent and complaint with taking medications.   She is feeling well.   Helping her sleep, she has not needed the ambien.   Has been taking klonopin PRN but also has needed it in the morning as her heart rate is often elevated.    Some intermittent dizziness.  Known cardiac disease with her mother.     Working on smoking cessation.   Wearing nicotine patch.      Chronic L shoulder pain, known rotator cuff arthropathy and shoulder dislocation.  Finished tramadol, asking if she can continue on a pain medication, takes PRN.  Tramadol is not covered under her insurance.  Would like to discuss more today.   Referral to ortho; this has not been scheduled yet.     Seizure disorder.  Taking medication.  Denies any recent breakthrough seizure.      Review of Systems   Constitutional:  Negative for activity change, diaphoresis, fatigue, fever and unexpected weight change.   HENT:  Negative for facial swelling.    Respiratory:  Negative for chest tightness and shortness of breath.    Cardiovascular:  Negative for chest pain, palpitations and leg swelling.   Gastrointestinal:  Negative for abdominal distention, blood in stool, constipation, diarrhea, nausea, rectal pain and vomiting.   Genitourinary:  Negative for decreased urine volume, difficulty urinating, dyspareunia, dysuria, frequency, genital sores, menstrual problem, pelvic pain, urgency, vaginal bleeding, vaginal discharge and vaginal pain.   Musculoskeletal:  Positive for arthralgias and myalgias. Negative for back pain, gait problem, joint swelling and neck pain.   Skin:  Negative for color change, rash and wound.   Neurological:  Positive for numbness (intermittent, LUE). Negative

## 2025-01-27 DIAGNOSIS — G89.29 CHRONIC LEFT SHOULDER PAIN: ICD-10-CM

## 2025-01-27 DIAGNOSIS — M25.512 CHRONIC LEFT SHOULDER PAIN: ICD-10-CM

## 2025-01-27 DIAGNOSIS — S43.005S DISLOCATION OF LEFT SHOULDER JOINT, SEQUELA: ICD-10-CM

## 2025-01-28 NOTE — TELEPHONE ENCOUNTER
Comments:     Last Office Visit (last PCP visit):   1/21/2025    Next Visit Date:  Future Appointments   Date Time Provider Department Center   3/13/2025  9:00 AM Tamar Meza PA-C [facility] DEP         Rx requested:  Requested Prescriptions     Pending Prescriptions Disp Refills    HYDROcodone-acetaminophen (NORCO) 5-325 MG per tablet 14 tablet 0     Sig: Take 1 tablet by mouth every 12 hours as needed for Pain for up to 7 days. Max Daily Amount: 2 tablets

## 2025-01-28 NOTE — TELEPHONE ENCOUNTER
Patient called to check status of her refill request states she popped her should out of place, and she is in a lot of pain.

## 2025-01-30 ENCOUNTER — OFFICE VISIT (OUTPATIENT)
Age: 30
End: 2025-01-30
Payer: MEDICAID

## 2025-01-30 VITALS
HEIGHT: 63 IN | DIASTOLIC BLOOD PRESSURE: 80 MMHG | OXYGEN SATURATION: 98 % | HEART RATE: 84 BPM | WEIGHT: 163 LBS | SYSTOLIC BLOOD PRESSURE: 120 MMHG | BODY MASS INDEX: 28.88 KG/M2

## 2025-01-30 DIAGNOSIS — K52.9 ACUTE GASTROENTERITIS: Primary | ICD-10-CM

## 2025-01-30 DIAGNOSIS — G40.109 TEMPORAL LOBE EPILEPSY, NON-REFRACTORY (HCC): ICD-10-CM

## 2025-01-30 DIAGNOSIS — R56.9 SEIZURE (HCC): ICD-10-CM

## 2025-01-30 PROCEDURE — 4004F PT TOBACCO SCREEN RCVD TLK: CPT | Performed by: PHYSICIAN ASSISTANT

## 2025-01-30 PROCEDURE — 99213 OFFICE O/P EST LOW 20 MIN: CPT | Performed by: PHYSICIAN ASSISTANT

## 2025-01-30 PROCEDURE — G8419 CALC BMI OUT NRM PARAM NOF/U: HCPCS | Performed by: PHYSICIAN ASSISTANT

## 2025-01-30 PROCEDURE — G8427 DOCREV CUR MEDS BY ELIG CLIN: HCPCS | Performed by: PHYSICIAN ASSISTANT

## 2025-01-30 RX ORDER — PHENYTOIN SODIUM 200 MG/1
CAPSULE, EXTENDED RELEASE ORAL
Qty: 90 CAPSULE | Refills: 4 | Status: SHIPPED | OUTPATIENT
Start: 2025-01-30

## 2025-01-30 RX ORDER — PHENYTOIN SODIUM 300 MG/1
CAPSULE, EXTENDED RELEASE ORAL
Qty: 90 CAPSULE | Refills: 4 | Status: SHIPPED | OUTPATIENT
Start: 2025-01-30

## 2025-01-30 RX ORDER — HYDROCODONE BITARTRATE AND ACETAMINOPHEN 5; 325 MG/1; MG/1
1 TABLET ORAL EVERY 12 HOURS PRN
Qty: 14 TABLET | Refills: 0 | Status: SHIPPED | OUTPATIENT
Start: 2025-01-30 | End: 2025-02-06

## 2025-01-30 RX ORDER — ONDANSETRON 4 MG/1
4 TABLET, ORALLY DISINTEGRATING ORAL 3 TIMES DAILY PRN
Qty: 21 TABLET | Refills: 0 | Status: SHIPPED | OUTPATIENT
Start: 2025-01-30

## 2025-01-30 ASSESSMENT — ENCOUNTER SYMPTOMS
FACIAL SWELLING: 0
CONSTIPATION: 0
SHORTNESS OF BREATH: 0
ABDOMINAL DISTENTION: 0
BLOOD IN STOOL: 0
CHEST TIGHTNESS: 0
RECTAL PAIN: 0
NAUSEA: 0
COLOR CHANGE: 0
VOMITING: 0
DIARRHEA: 0
BACK PAIN: 0

## 2025-01-30 ASSESSMENT — VISUAL ACUITY: OU: 1

## 2025-01-30 NOTE — TELEPHONE ENCOUNTER
Comments:     Last Office Visit (last PCP visit):   1/21/2025    Next Visit Date:  Future Appointments   Date Time Provider Department Center   3/13/2025  9:00 AM Tamar Meza PA-C [facility] DE[facility]         Rx requested:  Requested Prescriptions     Pending Prescriptions Disp Refills    phenytoin (PHENYTEK) 300 MG ER capsule [Pharmacy Med Name: Phenytek 300 mg capsule] 90 capsule 4     Sig: take 200mg orally once daily in the morning and 1 capsule (300mg) AT BEDTIME    phenytoin (PHENYTEK) 200 MG ER capsule [Pharmacy Med Name: Phenytek 200 mg capsule] 90 capsule 4     Sig: take 1 capsule (200mg) once daily in the IN THE MORNING and 300mg AT BEDTIME

## 2025-01-30 NOTE — PROGRESS NOTES
Subjective  Bev Park 1995 is a 29 y.o. female who presents today with:  Chief Complaint   Patient presents with    Other     Pt is here today presenting with shoulder pain and nausea that lasts all day        HPI  History of Present Illness  The patient presents for evaluation of shoulder pain and gastrointestinal symptoms.    She is aware that the definitive solution to her shoulder pain is surgical intervention. 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 She has been referred to Dr. Suresh for further evaluation and potential rescheduling of the surgery. She dislocated her shoulder yesterday while running to the bathroom to vomit. She acknowledges the need to wear her sling more consistently and has been advised to exercise caution with her elbow while using the sling. She has been managing her pain with medication but has run out of her supply within a week. She is seeking a more effective pain management solution that does not require frequent dosing.    She was sent home from work yesterday and the day before due to persistent nausea, reminiscent of morning sickness, even though she is not pregnant. She reports episodes of vomiting interspersed with diarrhea. Her symptoms have persisted for 3 days. She has attempted to alleviate her symptoms with ginger ale, Pepto-Bismol, and Tums, but the nausea remains unrelenting. She has been able to consume crackers and a chicken wrap, but these attempts at nourishment have been met with immediate regurgitation. She spent the previous day sleeping but woke up feeling as unwell as the day before.    Supplemental Information  She has been on phenytoin for several years.    MEDICATIONS  Current: phenytoin      Review of Systems   Constitutional:  Negative for activity change, diaphoresis, fatigue, fever and unexpected weight change.   HENT:

## 2025-02-05 DIAGNOSIS — S43.005S DISLOCATION OF LEFT SHOULDER JOINT, SEQUELA: ICD-10-CM

## 2025-02-05 DIAGNOSIS — M25.512 CHRONIC LEFT SHOULDER PAIN: ICD-10-CM

## 2025-02-05 DIAGNOSIS — G89.29 CHRONIC LEFT SHOULDER PAIN: ICD-10-CM

## 2025-02-05 NOTE — TELEPHONE ENCOUNTER
Patient comment: I have a consult with surgeon Baljit Suresh on 2/17/25 and already requested the day off work for it. The pain is like internal rawness and constant burning with cramping. Looking to schedule surgery ASAP.

## 2025-02-06 RX ORDER — HYDROCODONE BITARTRATE AND ACETAMINOPHEN 5; 325 MG/1; MG/1
1 TABLET ORAL EVERY 12 HOURS PRN
Qty: 14 TABLET | Refills: 0 | Status: SHIPPED | OUTPATIENT
Start: 2025-02-06 | End: 2025-02-13

## 2025-02-12 DIAGNOSIS — S43.005S DISLOCATION OF LEFT SHOULDER JOINT, SEQUELA: ICD-10-CM

## 2025-02-12 DIAGNOSIS — M25.512 CHRONIC LEFT SHOULDER PAIN: ICD-10-CM

## 2025-02-12 DIAGNOSIS — G89.29 CHRONIC LEFT SHOULDER PAIN: ICD-10-CM

## 2025-02-13 RX ORDER — HYDROCODONE BITARTRATE AND ACETAMINOPHEN 5; 325 MG/1; MG/1
1 TABLET ORAL EVERY 12 HOURS PRN
Qty: 14 TABLET | Refills: 0 | Status: SHIPPED | OUTPATIENT
Start: 2025-02-13 | End: 2025-02-20

## 2025-02-13 NOTE — TELEPHONE ENCOUNTER
Comments:     Last Office Visit (last PCP visit):   1/21/2025    Next Visit Date:  Future Appointments   Date Time Provider Department Center   2/17/2025 11:30 AM Baljit Suresh DO [facility]   3/13/2025  9:00 AM Tamar Meza PA-C [facility] DEP         Rx requested:  Requested Prescriptions     Pending Prescriptions Disp Refills    HYDROcodone-acetaminophen (NORCO) 5-325 MG per tablet 14 tablet 0     Sig: Take 1 tablet by mouth every 12 hours as needed for Pain for up to 7 days. Max Daily Amount: 2 tablets

## 2025-02-17 ENCOUNTER — OFFICE VISIT (OUTPATIENT)
Dept: ORTHOPEDIC SURGERY | Age: 30
End: 2025-02-17
Payer: MEDICAID

## 2025-02-17 ENCOUNTER — HOSPITAL ENCOUNTER (OUTPATIENT)
Dept: ORTHOPEDIC SURGERY | Age: 30
Discharge: HOME | End: 2025-02-19
Payer: MEDICAID

## 2025-02-17 VITALS
BODY MASS INDEX: 28.88 KG/M2 | HEIGHT: 63 IN | OXYGEN SATURATION: 98 % | TEMPERATURE: 98.3 F | WEIGHT: 163 LBS | HEART RATE: 84 BPM

## 2025-02-17 DIAGNOSIS — M25.512 LEFT SHOULDER PAIN, UNSPECIFIED CHRONICITY: ICD-10-CM

## 2025-02-17 DIAGNOSIS — M25.512 LEFT SHOULDER PAIN, UNSPECIFIED CHRONICITY: Primary | ICD-10-CM

## 2025-02-17 PROCEDURE — 4004F PT TOBACCO SCREEN RCVD TLK: CPT | Performed by: ORTHOPAEDIC SURGERY

## 2025-02-17 PROCEDURE — 73030 X-RAY EXAM OF SHOULDER: CPT | Performed by: ORTHOPAEDIC SURGERY

## 2025-02-17 PROCEDURE — G8419 CALC BMI OUT NRM PARAM NOF/U: HCPCS | Performed by: ORTHOPAEDIC SURGERY

## 2025-02-17 PROCEDURE — 99213 OFFICE O/P EST LOW 20 MIN: CPT | Performed by: ORTHOPAEDIC SURGERY

## 2025-02-17 PROCEDURE — G8427 DOCREV CUR MEDS BY ELIG CLIN: HCPCS | Performed by: ORTHOPAEDIC SURGERY

## 2025-02-17 PROCEDURE — 73030 X-RAY EXAM OF SHOULDER: CPT

## 2025-02-17 NOTE — PROGRESS NOTES
Subjective:      Patient ID: Bev Park is a 30 y.o. female who presents today for:  Chief Complaint   Patient presents with    New Patient     Pt present today for left shoulder pain. Pt had Left Shoulder Rotator Cuff 2022. There is  a lot of pain in shoulder dislocates every few day. Pt is currently taking hydrocodone for pain.        Subjective/Objective/Assessment/Plan:     SUBJECTIVE -the patient is here with complaints of left shoulder stiffness and instability.  She had shoulder surgery on 10/14/2022 by Dr. Cai where he did a left shoulder arthroscopy with anterior labral repair and capsular plication along with a superior labral repair.  She has a history of seizures.    OBJECTIVE -forward flexion to 80 degrees.  I can take her past of this to roughly 120 degrees but she feels like it is going to dislocate.  Swelling left upper extremity    ASSESSMENT    Diagnosis Orders   1. Left shoulder pain, unspecified chronicity  XR SHOULDER LEFT (MIN 2 VIEWS)          PLAN -I am getting a repeat MRI of her left shoulder.  I have gone over this with the radiologist and they say to do a left shoulder MRI arthrogram so we have put those orders in.  I explained that she may need to see a shoulder specialist.  I may need to send her to Bennie Foster MD due to the complex nature of her shoulder and due to the fact that this is revision shoulder arthroscopy for instability.      XR CHEST (2 VW)  Narrative: EXAMINATION:  TWO XRAY VIEWS OF THE CHEST    11/5/2023 11:48 am    COMPARISON:  10/11/2023    HISTORY:  ORDERING SYSTEM PROVIDED HISTORY: cough  TECHNOLOGIST PROVIDED HISTORY:  Reason for exam:->cough  Is the patient pregnant?->No  What reading provider will be dictating this exam?->CRC    FINDINGS:  The lungs are without acute focal process.  There is no effusion or  pneumothorax. The cardiomediastinal silhouette is without acute process. The  osseous structures are without acute process.  Impression: No acute

## 2025-02-18 DIAGNOSIS — G89.29 CHRONIC LEFT SHOULDER PAIN: Primary | ICD-10-CM

## 2025-02-18 DIAGNOSIS — M25.512 CHRONIC LEFT SHOULDER PAIN: Primary | ICD-10-CM

## 2025-02-19 ENCOUNTER — TELEPHONE (OUTPATIENT)
Dept: INTERVENTIONAL RADIOLOGY/VASCULAR | Age: 30
End: 2025-02-19

## 2025-02-19 DIAGNOSIS — G89.29 CHRONIC LEFT SHOULDER PAIN: ICD-10-CM

## 2025-02-19 DIAGNOSIS — S43.005S DISLOCATION OF LEFT SHOULDER JOINT, SEQUELA: ICD-10-CM

## 2025-02-19 DIAGNOSIS — M25.512 CHRONIC LEFT SHOULDER PAIN: ICD-10-CM

## 2025-02-19 NOTE — TELEPHONE ENCOUNTER
Patient was given this information via phone conversation - voiced understanding  2.  Spoke to Dyan in [facility] to schedule procedure    >  Left should arthrogram with MRI  is scheduled on 2/21/2025 @ 11:00 am - MRI @ 12:00 pm   >  You will need to arrive at 10:30 am from home and check in at the Diagnostic Imaging Check In desk.   >  Patient to have PT/INR and CBC drawn anytime between now and 1 day prior

## 2025-02-20 ENCOUNTER — TELEPHONE (OUTPATIENT)
Dept: INTERVENTIONAL RADIOLOGY/VASCULAR | Age: 30
End: 2025-02-20

## 2025-02-20 NOTE — TELEPHONE ENCOUNTER
Pre procedure telephone call, pt verbalized understanding of the following:   Left should arthrogram with MRI  is scheduled on 2/21/2025 @ 11:00 am - MRI @ 12:00 pm   >  You will need to arrive at 9:30 am from home and check in at the Diagnostic Imaging Check In desk and Radiology RN will draw your required blood work.Electronically signed by Rin Martinez RN on 2/20/2025 at 2:45 PM

## 2025-02-20 NOTE — TELEPHONE ENCOUNTER
Patient is aware - voiced understanding     She will be coming in around 9:30 am to get labs drawn

## 2025-02-21 ENCOUNTER — HOSPITAL ENCOUNTER (OUTPATIENT)
Dept: MRI IMAGING | Age: 30
Discharge: HOME | End: 2025-02-23
Payer: MEDICAID

## 2025-02-21 ENCOUNTER — HOSPITAL ENCOUNTER (OUTPATIENT)
Dept: INTERVENTIONAL RADIOLOGY/VASCULAR | Age: 30
Discharge: HOME | End: 2025-02-23
Payer: MEDICAID

## 2025-02-21 VITALS
DIASTOLIC BLOOD PRESSURE: 55 MMHG | HEART RATE: 99 BPM | OXYGEN SATURATION: 76 % | SYSTOLIC BLOOD PRESSURE: 112 MMHG | RESPIRATION RATE: 12 BRPM

## 2025-02-21 DIAGNOSIS — M25.512 LEFT SHOULDER PAIN, UNSPECIFIED CHRONICITY: ICD-10-CM

## 2025-02-21 DIAGNOSIS — K52.9 ACUTE GASTROENTERITIS: ICD-10-CM

## 2025-02-21 DIAGNOSIS — M25.512 CHRONIC LEFT SHOULDER PAIN: ICD-10-CM

## 2025-02-21 DIAGNOSIS — G89.29 CHRONIC LEFT SHOULDER PAIN: ICD-10-CM

## 2025-02-21 LAB
ALBUMIN SERPL-MCNC: 4.3 G/DL (ref 3.5–4.6)
ALBUMIN SERPL-MCNC: 4.4 G/DL (ref 3.5–4.6)
ALP SERPL-CCNC: 96 U/L (ref 40–130)
ALP SERPL-CCNC: 97 U/L (ref 40–130)
ALT SERPL-CCNC: 70 U/L (ref 0–33)
ALT SERPL-CCNC: 71 U/L (ref 0–33)
ANION GAP SERPL CALCULATED.3IONS-SCNC: 13 MEQ/L (ref 9–15)
ANION GAP SERPL CALCULATED.3IONS-SCNC: 15 MEQ/L (ref 9–15)
AST SERPL-CCNC: 56 U/L (ref 0–35)
AST SERPL-CCNC: 57 U/L (ref 0–35)
BILIRUB SERPL-MCNC: <0.2 MG/DL (ref 0.2–0.7)
BILIRUB SERPL-MCNC: <0.2 MG/DL (ref 0.2–0.7)
BUN SERPL-MCNC: 10 MG/DL (ref 6–20)
BUN SERPL-MCNC: 10 MG/DL (ref 6–20)
CALCIUM SERPL-MCNC: 8.9 MG/DL (ref 8.5–9.9)
CALCIUM SERPL-MCNC: 9 MG/DL (ref 8.5–9.9)
CHLORIDE SERPL-SCNC: 101 MEQ/L (ref 95–107)
CHLORIDE SERPL-SCNC: 102 MEQ/L (ref 95–107)
CO2 SERPL-SCNC: 21 MEQ/L (ref 20–31)
CO2 SERPL-SCNC: 22 MEQ/L (ref 20–31)
CREAT SERPL-MCNC: 0.55 MG/DL (ref 0.5–0.9)
CREAT SERPL-MCNC: 0.55 MG/DL (ref 0.5–0.9)
ERYTHROCYTE [DISTWIDTH] IN BLOOD BY AUTOMATED COUNT: 15.4 % (ref 11.5–14.5)
GLOBULIN SER CALC-MCNC: 3.1 G/DL (ref 2.3–3.5)
GLOBULIN SER CALC-MCNC: 3.1 G/DL (ref 2.3–3.5)
GLUCOSE SERPL-MCNC: 93 MG/DL (ref 70–99)
GLUCOSE SERPL-MCNC: 95 MG/DL (ref 70–99)
HCT VFR BLD AUTO: 40.8 % (ref 37–47)
HGB BLD-MCNC: 13.7 G/DL (ref 12–16)
INR PPP: 1
MCH RBC QN AUTO: 27 PG (ref 27–31.3)
MCHC RBC AUTO-ENTMCNC: 33.6 % (ref 33–37)
MCV RBC AUTO: 80.5 FL (ref 79.4–94.8)
PLATELET # BLD AUTO: 404 K/UL (ref 130–400)
POTASSIUM SERPL-SCNC: 4.6 MEQ/L (ref 3.4–4.9)
POTASSIUM SERPL-SCNC: 4.7 MEQ/L (ref 3.4–4.9)
PROT SERPL-MCNC: 7.4 G/DL (ref 6.3–8)
PROT SERPL-MCNC: 7.5 G/DL (ref 6.3–8)
PROTHROMBIN TIME: 13.7 SEC (ref 12.3–14.9)
RBC # BLD AUTO: 5.07 M/UL (ref 4.2–5.4)
SODIUM SERPL-SCNC: 137 MEQ/L (ref 135–144)
SODIUM SERPL-SCNC: 137 MEQ/L (ref 135–144)
WBC # BLD AUTO: 9.2 K/UL (ref 4.8–10.8)

## 2025-02-21 PROCEDURE — 85027 COMPLETE CBC AUTOMATED: CPT

## 2025-02-21 PROCEDURE — 20610 DRAIN/INJ JOINT/BURSA W/O US: CPT | Performed by: RADIOLOGY

## 2025-02-21 PROCEDURE — 85610 PROTHROMBIN TIME: CPT

## 2025-02-21 PROCEDURE — 73222 MRI JOINT UPR EXTREM W/DYE: CPT | Performed by: RADIOLOGY

## 2025-02-21 PROCEDURE — 23350 INJECTION FOR SHOULDER X-RAY: CPT

## 2025-02-21 PROCEDURE — 77002 NEEDLE LOCALIZATION BY XRAY: CPT

## 2025-02-21 PROCEDURE — A9577 INJ MULTIHANCE: HCPCS | Performed by: RADIOLOGY

## 2025-02-21 PROCEDURE — 6360000004 HC RX CONTRAST MEDICATION: Performed by: RADIOLOGY

## 2025-02-21 PROCEDURE — 73222 MRI JOINT UPR EXTREM W/DYE: CPT

## 2025-02-21 PROCEDURE — 36415 COLL VENOUS BLD VENIPUNCTURE: CPT

## 2025-02-21 PROCEDURE — 80053 COMPREHEN METABOLIC PANEL: CPT

## 2025-02-21 PROCEDURE — 77002 NEEDLE LOCALIZATION BY XRAY: CPT | Performed by: RADIOLOGY

## 2025-02-21 PROCEDURE — 6360000002 HC RX W HCPCS: Performed by: RADIOLOGY

## 2025-02-21 PROCEDURE — 2500000003 HC RX 250 WO HCPCS: Performed by: RADIOLOGY

## 2025-02-21 RX ORDER — LIDOCAINE HYDROCHLORIDE 20 MG/ML
INJECTION, SOLUTION INFILTRATION; PERINEURAL PRN
Status: COMPLETED | OUTPATIENT
Start: 2025-02-21 | End: 2025-02-21

## 2025-02-21 RX ORDER — IOPAMIDOL 408 MG/ML
INJECTION, SOLUTION INTRATHECAL PRN
Status: COMPLETED | OUTPATIENT
Start: 2025-02-21 | End: 2025-02-21

## 2025-02-21 RX ORDER — SODIUM CHLORIDE 0.9 % (FLUSH) 0.9 %
SYRINGE (ML) INJECTION PRN
Status: COMPLETED | OUTPATIENT
Start: 2025-02-21 | End: 2025-02-21

## 2025-02-21 RX ADMIN — Medication 10 ML: at 11:33

## 2025-02-21 RX ADMIN — IOPAMIDOL 3 ML: 408 INJECTION, SOLUTION INTRATHECAL at 11:32

## 2025-02-21 RX ADMIN — GADOBENATE DIMEGLUMINE 0.1 ML: 529 INJECTION, SOLUTION INTRAVENOUS at 11:33

## 2025-02-21 RX ADMIN — LIDOCAINE HYDROCHLORIDE 13 ML: 20 INJECTION, SOLUTION INFILTRATION; PERINEURAL at 11:30

## 2025-02-21 NOTE — OR NURSING
LEFT SHOULDER ARTHROGRAM - NO SEDATION    1118 - Pt brought to specials. Pt A&Ox4, calm and cooperative. Procedure explained and consent signed. Minimally assisted pt onto table in supine position. History, allergies and medications verified. VSS, on RA. Confirms no chance of pregnancy. Denies taking a blood thinner.     1125 - Dr Hernandez marked left shoulder site using fluoro imaging.     Site cleansed with chloraprep by FLORY RN. After 3 minute dry time, prepped and draped in a sterile fashion by Dr. Hernandez.    1128 - Time out performed.     1130 - 2% lidocaine used to numb procedure site, see eMar.     1132 - Spinal needle 20G x 3.5\" inserted and contrast given to confirm placement using fluoro guidance, see eMar.     1133 - Medication cocktail instilled through spinal needle under fluoro guidance, see eMar.     Pt tolerating procedure well and verbal support given throughout.     Needle removed, band aid applied, no bleeding noted.     Pt assisted off table. Discharge instructions provided and pt verbalized understanding.    To MRI via  for exam.

## 2025-02-21 NOTE — FLOWSHEET NOTE
0950 Pt to ct holding, labwork drawn for arthrogram procedure. Pt notified could take up to 1 hr. Pt requested call when resulted.     1050 Pt called and notified 1/2 labs resulted and that it should not be too much longer if she is able to make her way back to the radiology waiting are. Pt on her way.

## 2025-02-21 NOTE — DISCHARGE INSTRUCTIONS
ARTHROGRAM DISCHARGE INSTRUCTION    Arthrogram: About This Test  What is an arthrogram?     An arthrogram is a test to take pictures of the tissues inside your joint. These tissues include tendons, ligaments, muscles, and cartilage. First your doctor injects a contrast material, such as a dye or air, into your joint. Thenimaging is done with X-rays, an MRI, or a CT scan.  Why is it done?  An arthrogram is used to find the cause of symptoms in your joint. Symptoms may include pain, swelling, or abnormal movement of your joint. It may also be doneto see if you can be helped with surgery, such as arthroscopy.  An arthrogram can:  Find problems in your joint capsule, ligaments, or cartilage. Problems could include tears, arthritis, or disease. For example, this test may be used to help find problems such as rotator cuff tears. It may find problems with the bones of the joint.  Find fluid-filled cysts.    How is it done?  The skin and tissues over the joint will be numbed with medicine.  The doctor will put a small needle into your joint. He or she may remove a sample of the joint fluid for testing.  Your doctor may use a fluoroscope to guide the needle and take a series of X-ray pictures of the joint.  The doctor will inject a contrast material into your joint. This is usually dye, but it can also be saline, air, or a combination. It helps the doctor see the soft tissues of the joint. Then the doctor removes the needle.  You may be asked to be still. Or you may be asked to move your joint.  You may also have an MRI or a CT scan to get images of the joint.    What happens after the test?    Activity:  Rest, avoid strenuous activity such as bending or lifting heavy objects for at least 24 hours.    Be aware that your leg or arm (depending on injection site) may feel heavy for up to 4 hours.    May shower, bathe, or swim after 24 hours.      Diet:  Resume usual diet    Medication:  * Resume home medication unless otherwise

## 2025-02-22 RX ORDER — HYDROCODONE BITARTRATE AND ACETAMINOPHEN 5; 325 MG/1; MG/1
1 TABLET ORAL EVERY 12 HOURS PRN
Qty: 60 TABLET | Refills: 0 | Status: SHIPPED | OUTPATIENT
Start: 2025-02-22 | End: 2025-03-24

## 2025-02-25 DIAGNOSIS — F41.9 ANXIETY: ICD-10-CM

## 2025-02-25 RX ORDER — FAMOTIDINE 40 MG/1
40 TABLET, FILM COATED ORAL EVERY EVENING
Qty: 30 TABLET | Refills: 3 | Status: SHIPPED | OUTPATIENT
Start: 2025-02-25

## 2025-02-25 NOTE — TELEPHONE ENCOUNTER
Comments:     Last Office Visit (last PCP visit):   1/21/2025    Next Visit Date:  Future Appointments   Date Time Provider Department Center   3/10/2025 11:15 AM Baljit Suresh DO [facility]   3/13/2025  9:00 AM Tamar Meza PA-C [facility]       **If hasn't been seen in over a year OR hasn't followed up according to last diabetes/ADHD visit, make appointment for patient before sending refill to provider.    Rx requested:  Requested Prescriptions     Pending Prescriptions Disp Refills    clonazePAM (KLONOPIN) 1 MG tablet [Pharmacy Med Name: clonazepam 1 mg tablet] 60 tablet 2     Sig: Take 1 tablet by mouth 2 times daily as needed for Anxiety for up to 90 days. Max Daily Amount: 2 mg

## 2025-02-25 NOTE — TELEPHONE ENCOUNTER
Comments:     Last Office Visit (last PCP visit):   1/21/2025    Next Visit Date:  Future Appointments   Date Time Provider Department Center   3/10/2025 11:15 AM Baljit Suresh DO [facility]   3/13/2025  9:00 AM Tamar Meza PA-C [facility]         Rx requested:  Requested Prescriptions     Pending Prescriptions Disp Refills    famotidine (PEPCID) 40 MG tablet [Pharmacy Med Name: famotidine 40 mg tablet] 30 tablet 3     Sig: Take 1 tablet by mouth every evening

## 2025-02-26 DIAGNOSIS — S43.432S LABRAL TEAR OF SHOULDER, LEFT, SEQUELA: Primary | ICD-10-CM

## 2025-02-26 RX ORDER — CLONAZEPAM 1 MG/1
1 TABLET ORAL 2 TIMES DAILY PRN
Qty: 60 TABLET | Refills: 2 | Status: SHIPPED | OUTPATIENT
Start: 2025-03-04 | End: 2025-06-02

## 2025-02-26 NOTE — RESULT ENCOUNTER NOTE
The following referral has been placed in the patient's chart. Pt will be called and notified of the phone number to call to schedule appointment.

## 2025-03-13 ENCOUNTER — OFFICE VISIT (OUTPATIENT)
Age: 30
End: 2025-03-13
Payer: MEDICAID

## 2025-03-13 VITALS
SYSTOLIC BLOOD PRESSURE: 112 MMHG | DIASTOLIC BLOOD PRESSURE: 82 MMHG | HEIGHT: 63 IN | HEART RATE: 100 BPM | WEIGHT: 156 LBS | OXYGEN SATURATION: 98 % | BODY MASS INDEX: 27.64 KG/M2 | RESPIRATION RATE: 16 BRPM

## 2025-03-13 DIAGNOSIS — M12.812 ROTATOR CUFF ARTHROPATHY, LEFT: ICD-10-CM

## 2025-03-13 DIAGNOSIS — G40.109 TEMPORAL LOBE EPILEPSY, NON-REFRACTORY (HCC): ICD-10-CM

## 2025-03-13 DIAGNOSIS — Z98.890 HISTORY OF FAILED REPAIR OF ROTATOR CUFF: ICD-10-CM

## 2025-03-13 DIAGNOSIS — K52.9 ACUTE GASTROENTERITIS: ICD-10-CM

## 2025-03-13 DIAGNOSIS — S43.005S DISLOCATION OF LEFT SHOULDER JOINT, SEQUELA: ICD-10-CM

## 2025-03-13 DIAGNOSIS — R56.9 SEIZURE (HCC): ICD-10-CM

## 2025-03-13 DIAGNOSIS — F60.3 BORDERLINE PERSONALITY DISORDER (HCC): ICD-10-CM

## 2025-03-13 DIAGNOSIS — F43.0 ACUTE STRESS REACTION: ICD-10-CM

## 2025-03-13 DIAGNOSIS — F31.75 BIPOLAR DISORDER, IN PARTIAL REMISSION, MOST RECENT EPISODE DEPRESSED (HCC): Primary | ICD-10-CM

## 2025-03-13 DIAGNOSIS — R11.2 NAUSEA AND VOMITING, UNSPECIFIED VOMITING TYPE: ICD-10-CM

## 2025-03-13 PROBLEM — R52 PAIN, UNSPECIFIED: Status: RESOLVED | Noted: 2023-03-18 | Resolved: 2025-03-13

## 2025-03-13 PROBLEM — Z59.819 HOUSING INSTABILITY: Status: RESOLVED | Noted: 2023-05-19 | Resolved: 2025-03-13

## 2025-03-13 PROBLEM — R63.5 WEIGHT GAIN: Status: RESOLVED | Noted: 2024-02-20 | Resolved: 2025-03-13

## 2025-03-13 PROBLEM — D50.8 IRON DEFICIENCY ANEMIA SECONDARY TO INADEQUATE DIETARY IRON INTAKE: Status: RESOLVED | Noted: 2023-07-06 | Resolved: 2025-03-13

## 2025-03-13 PROBLEM — Z20.822 CONTACT WITH AND (SUSPECTED) EXPOSURE TO COVID-19: Status: RESOLVED | Noted: 2023-03-18 | Resolved: 2025-03-13

## 2025-03-13 PROBLEM — N64.3 GALACTORRHEA: Status: RESOLVED | Noted: 2024-02-20 | Resolved: 2025-03-13

## 2025-03-13 PROBLEM — F43.21 GRIEF REACTION: Status: RESOLVED | Noted: 2021-06-29 | Resolved: 2025-03-13

## 2025-03-13 PROBLEM — F43.20 GRIEF REACTION: Status: RESOLVED | Noted: 2021-06-29 | Resolved: 2025-03-13

## 2025-03-13 PROCEDURE — 99214 OFFICE O/P EST MOD 30 MIN: CPT | Performed by: PHYSICIAN ASSISTANT

## 2025-03-13 PROCEDURE — G2211 COMPLEX E/M VISIT ADD ON: HCPCS | Performed by: PHYSICIAN ASSISTANT

## 2025-03-13 PROCEDURE — G8419 CALC BMI OUT NRM PARAM NOF/U: HCPCS | Performed by: PHYSICIAN ASSISTANT

## 2025-03-13 PROCEDURE — 4004F PT TOBACCO SCREEN RCVD TLK: CPT | Performed by: PHYSICIAN ASSISTANT

## 2025-03-13 PROCEDURE — G8427 DOCREV CUR MEDS BY ELIG CLIN: HCPCS | Performed by: PHYSICIAN ASSISTANT

## 2025-03-13 RX ORDER — ONDANSETRON 4 MG/1
4 TABLET, ORALLY DISINTEGRATING ORAL 3 TIMES DAILY PRN
Qty: 21 TABLET | Refills: 2 | Status: SHIPPED | OUTPATIENT
Start: 2025-03-13

## 2025-03-13 ASSESSMENT — ENCOUNTER SYMPTOMS
FACIAL SWELLING: 0
RECTAL PAIN: 0
COLOR CHANGE: 0
ABDOMINAL DISTENTION: 0
CONSTIPATION: 0
BACK PAIN: 0
NAUSEA: 0
SHORTNESS OF BREATH: 0
CHEST TIGHTNESS: 0
DIARRHEA: 0
BLOOD IN STOOL: 0
VOMITING: 0

## 2025-03-13 NOTE — PROGRESS NOTES
Subjective  Bev Park, 30 y.o. female presents today with:  Chief Complaint   Patient presents with    Follow-up     6 week follow up states she has concerns, but she would like to discuss them with provider        HPI  Last OV with me: 1/21/25.       C/o 1 days history of thick PND, N/V.   Son was recently sick with something similar.   Asking for possible work note for today.  Refill of zofran.   No fever, no cough/sob/sinus pain/pressure.  Mild body aches.  Keeping fluids down.    Taking 3 mg vraylar night; has noticed that her mood is stable.  She would like to continue.  Has been consistent and complaint with taking medications.   She is feeling well.   Helping her sleep, she has not needed the ambien.   Has been taking klonopin PRN.  Known cardiac disease with her mother.   Would like to see a therapist, asking for a referral to telemedicine/therapy.    Started beta blocker due to elevated, sustained tachycardia.   Has noticed significant relief of her anxiety with introducing BB.   Would like to continue.  Doing well on coreg, BID.   Pulse is 100 or less.      Working on smoking cessation.   Wearing nicotine patch.      Chronic L shoulder pain, known rotator cuff arthropathy and shoulder dislocation.  She was seen by Dr. Suresh on 2/17/25.   Chronic pain, shoulder instability.   Will take norco, for pain. Up to twice daily.   Asking if I can adjust strength of medication, currently 5/325.     Patient had MRI of L shoulder on 2/21/25 as ordered by ortho.  IMPRESSION:      1. Thinning of the subscapularis tendon close the attachment point consistent  with intrasubstance tearing.  2. Subtle thinning and increased signal within the supraspinatus tendon  consistent with mild tendinopathy.  3. There is likely disruption of the bilateral lower glenohumeral ligaments as  the attachments points are not seen and both appear thickened and retracted with  the posterior inferior ligament not seen at either

## 2025-03-21 DIAGNOSIS — S43.005S DISLOCATION OF LEFT SHOULDER JOINT, SEQUELA: ICD-10-CM

## 2025-03-21 DIAGNOSIS — G89.29 CHRONIC LEFT SHOULDER PAIN: ICD-10-CM

## 2025-03-21 DIAGNOSIS — M25.512 CHRONIC LEFT SHOULDER PAIN: ICD-10-CM

## 2025-03-21 RX ORDER — HYDROCODONE BITARTRATE AND ACETAMINOPHEN 5; 325 MG/1; MG/1
1 TABLET ORAL EVERY 12 HOURS PRN
Qty: 60 TABLET | Refills: 0 | Status: CANCELLED | OUTPATIENT
Start: 2025-03-21 | End: 2025-04-20

## 2025-03-21 NOTE — TELEPHONE ENCOUNTER
Comments:     Last Office Visit (last PCP visit):   3/13/2025    Next Visit Date:  Future Appointments   Date Time Provider Department Center   4/28/2025  8:00 AM Tamar Meza PA-C [facility]       **If hasn't been seen in over a year OR hasn't followed up according to last diabetes/ADHD visit, make appointment for patient before sending refill to provider.    Rx requested:  Requested Prescriptions     Pending Prescriptions Disp Refills    HYDROcodone-acetaminophen (NORCO) 5-325 MG per tablet 60 tablet 0     Sig: Take 1 tablet by mouth every 12 hours as needed for Pain for up to 30 days. Max Daily Amount: 2 tablets

## 2025-03-23 RX ORDER — HYDROCODONE BITARTRATE AND ACETAMINOPHEN 7.5; 325 MG/1; MG/1
1 TABLET ORAL 2 TIMES DAILY
Qty: 60 TABLET | Refills: 0 | Status: SHIPPED | OUTPATIENT
Start: 2025-03-23 | End: 2025-04-22

## 2025-04-07 DIAGNOSIS — G47.09 TROUBLE GETTING TO SLEEP: ICD-10-CM

## 2025-04-07 DIAGNOSIS — G40.109 TEMPORAL LOBE EPILEPSY, NON-REFRACTORY (HCC): ICD-10-CM

## 2025-04-07 DIAGNOSIS — R56.9 SEIZURE (HCC): ICD-10-CM

## 2025-04-07 RX ORDER — DIVALPROEX SODIUM 500 MG/1
500 TABLET, DELAYED RELEASE ORAL 3 TIMES DAILY
Qty: 90 TABLET | Refills: 3 | Status: SHIPPED | OUTPATIENT
Start: 2025-04-07

## 2025-04-07 RX ORDER — ZOLPIDEM TARTRATE 10 MG/1
10 TABLET ORAL NIGHTLY PRN
Qty: 30 TABLET | Refills: 2 | Status: SHIPPED | OUTPATIENT
Start: 2025-04-07 | End: 2025-07-06

## 2025-04-07 NOTE — TELEPHONE ENCOUNTER
Comments:     Last Office Visit (last PCP visit):   3/13/2025    Next Visit Date:  Future Appointments   Date Time Provider Department Center   4/28/2025  8:00 AM Tamar Meza PA-C [facility]         Rx requested:  Requested Prescriptions     Pending Prescriptions Disp Refills    divalproex (DEPAKOTE) 500 MG DR tablet [Pharmacy Med Name: divalproex 500 mg tablet,delayed release] 90 tablet 3     Sig: Take 1 tablet by mouth 3 times daily    zolpidem (AMBIEN) 10 MG tablet [Pharmacy Med Name: zolpidem 10 mg tablet] 30 tablet 2     Sig: Take 1 tablet by mouth nightly as needed for Sleep for up to 90 days. Max Daily Amount: 10 mg

## 2025-05-03 ENCOUNTER — PATIENT MESSAGE (OUTPATIENT)
Age: 30
End: 2025-05-03

## 2025-05-03 DIAGNOSIS — S43.005S DISLOCATION OF LEFT SHOULDER JOINT, SEQUELA: Primary | ICD-10-CM

## 2025-05-03 DIAGNOSIS — G89.29 CHRONIC LEFT SHOULDER PAIN: ICD-10-CM

## 2025-05-03 DIAGNOSIS — M12.812 ROTATOR CUFF ARTHROPATHY, LEFT: ICD-10-CM

## 2025-05-03 DIAGNOSIS — M24.412 SHOULDER DISLOCATION, RECURRENT, LEFT: ICD-10-CM

## 2025-05-03 DIAGNOSIS — M25.512 CHRONIC LEFT SHOULDER PAIN: ICD-10-CM

## 2025-05-03 DIAGNOSIS — M25.312 SHOULDER INSTABILITY, LEFT: ICD-10-CM

## 2025-05-11 RX ORDER — HYDROCODONE BITARTRATE AND ACETAMINOPHEN 5; 325 MG/1; MG/1
1 TABLET ORAL EVERY 12 HOURS PRN
Qty: 60 TABLET | Refills: 0 | Status: SHIPPED | OUTPATIENT
Start: 2025-05-11 | End: 2025-06-10

## 2025-05-19 ENCOUNTER — TELEMEDICINE (OUTPATIENT)
Age: 30
End: 2025-05-19
Payer: MEDICAID

## 2025-05-19 DIAGNOSIS — M12.812 ROTATOR CUFF ARTHROPATHY, LEFT: ICD-10-CM

## 2025-05-19 DIAGNOSIS — F60.3 BORDERLINE PERSONALITY DISORDER (HCC): ICD-10-CM

## 2025-05-19 DIAGNOSIS — G40.109 TEMPORAL LOBE EPILEPSY, NON-REFRACTORY (HCC): ICD-10-CM

## 2025-05-19 DIAGNOSIS — F31.75 BIPOLAR DISORDER, IN PARTIAL REMISSION, MOST RECENT EPISODE DEPRESSED (HCC): Primary | ICD-10-CM

## 2025-05-19 DIAGNOSIS — S43.005S DISLOCATION OF LEFT SHOULDER JOINT, SEQUELA: ICD-10-CM

## 2025-05-19 DIAGNOSIS — R56.9 SEIZURE (HCC): ICD-10-CM

## 2025-05-19 DIAGNOSIS — M24.412 SHOULDER DISLOCATION, RECURRENT, LEFT: ICD-10-CM

## 2025-05-19 DIAGNOSIS — K59.01 SLOW TRANSIT CONSTIPATION: ICD-10-CM

## 2025-05-19 PROBLEM — R11.2 NAUSEA AND VOMITING: Status: RESOLVED | Noted: 2023-03-18 | Resolved: 2025-05-19

## 2025-05-19 PROBLEM — K52.9 ACUTE GASTROENTERITIS: Status: RESOLVED | Noted: 2025-03-13 | Resolved: 2025-05-19

## 2025-05-19 PROCEDURE — G8427 DOCREV CUR MEDS BY ELIG CLIN: HCPCS | Performed by: PHYSICIAN ASSISTANT

## 2025-05-19 PROCEDURE — G2211 COMPLEX E/M VISIT ADD ON: HCPCS | Performed by: PHYSICIAN ASSISTANT

## 2025-05-19 PROCEDURE — 99214 OFFICE O/P EST MOD 30 MIN: CPT | Performed by: PHYSICIAN ASSISTANT

## 2025-05-19 ASSESSMENT — ENCOUNTER SYMPTOMS
FACIAL SWELLING: 0
COLOR CHANGE: 0
SHORTNESS OF BREATH: 0
BLOOD IN STOOL: 0
CONSTIPATION: 0
DIARRHEA: 0
VOMITING: 0
RECTAL PAIN: 0
ABDOMINAL DISTENTION: 0
NAUSEA: 0
CHEST TIGHTNESS: 0
BACK PAIN: 0

## 2025-05-19 NOTE — PROGRESS NOTES
Bev Park, was evaluated through a synchronous (real-time) audio-video encounter. The patient (or guardian if applicable) is aware that this is a billable service, which includes applicable co-pays. This Virtual Visit was conducted with patient's (and/or legal guardian's) consent. Patient identification was verified, and a caregiver was present when appropriate.   The patient was located at Home: [id number] [id number]  Provider was located at Facility (Appt Dept): [id number] [id number]  Confirm you are appropriately licensed, registered, or certified to deliver care in the state where the patient is located as indicated above. If you are not or unsure, please re-schedule the visit: Yes, I confirm.     Bev Park (:  1995) is a Established patient, presenting virtually for evaluation of the following:      Below is the assessment and plan developed based on review of pertinent history, physical exam, labs, studies, and medications.     Assessment & Plan  Bipolar disorder, in partial remission, most recent episode depressed (HCC)    Restart vraylar, monitor.          Borderline personality disorder (HCC)    Stable.          Seizure (HCC)   Encouraged patient to be consistent with medications.          Temporal lobe epilepsy, non-refractory (HCC)            Dislocation of left shoulder joint, sequela   Stable on current medications.          Rotator cuff arthropathy, left    Stable on current medications.          Shoulder dislocation, recurrent, left   Stable on current medications.          Slow transit constipation    Has tried miralax, enema and stool softener.  Trial linzess daily.  Adjust dose as tolerated.    Orders:    linaCLOtide (LINZESS) 72 MCG CAPS capsule; Take 1 capsule by mouth every morning (before breakfast)      Return in about 6 weeks (around 2025) for follow up virtual visit.       Subjective   HPI  Last OV with

## 2025-05-21 ENCOUNTER — TELEPHONE (OUTPATIENT)
Age: 30
End: 2025-05-21

## 2025-05-21 NOTE — TELEPHONE ENCOUNTER
----- Message from TODD LEVINE CMA sent at 5/21/2025  8:05 AM EDT -----        ----- Message -----  From: Gomez Strange CMA  Sent: 5/21/2025   8:04 AM EDT  To: [facility]       ----- Message -----  From: Tmaar Meza PA-C  Sent: 5/19/2025   8:41 AM EDT  To: #    6 week vv please.

## 2025-05-22 ENCOUNTER — TELEPHONE (OUTPATIENT)
Age: 30
End: 2025-05-22

## 2025-05-22 NOTE — TELEPHONE ENCOUNTER
----- Message from TODD LEVINE CMA sent at 5/21/2025  8:05 AM EDT -----        ----- Message -----  From: Gomez Strange CMA  Sent: 5/21/2025   8:04 AM EDT  To: [facility]       ----- Message -----  From: Tamar Meza PA-C  Sent: 5/19/2025   8:41 AM EDT  To: #    6 week vv please.

## 2025-06-13 ENCOUNTER — HOSPITAL ENCOUNTER (EMERGENCY)
Age: 30
Discharge: HOME | End: 2025-06-13
Attending: EMERGENCY MEDICINE
Payer: MEDICAID

## 2025-06-13 VITALS
HEIGHT: 64 IN | RESPIRATION RATE: 17 BRPM | HEART RATE: 96 BPM | TEMPERATURE: 97.2 F | SYSTOLIC BLOOD PRESSURE: 138 MMHG | WEIGHT: 156 LBS | OXYGEN SATURATION: 100 % | BODY MASS INDEX: 26.63 KG/M2 | DIASTOLIC BLOOD PRESSURE: 92 MMHG

## 2025-06-13 DIAGNOSIS — K04.7 DENTAL INFECTION: Primary | ICD-10-CM

## 2025-06-13 PROCEDURE — 6370000000 HC RX 637 (ALT 250 FOR IP): Performed by: EMERGENCY MEDICINE

## 2025-06-13 PROCEDURE — 99283 EMERGENCY DEPT VISIT LOW MDM: CPT

## 2025-06-13 RX ORDER — CLINDAMYCIN HYDROCHLORIDE 300 MG/1
300 CAPSULE ORAL 3 TIMES DAILY
Qty: 30 CAPSULE | Refills: 0 | Status: SHIPPED | OUTPATIENT
Start: 2025-06-13 | End: 2025-06-23

## 2025-06-13 RX ORDER — IBUPROFEN 600 MG/1
600 TABLET, FILM COATED ORAL EVERY 6 HOURS PRN
Qty: 30 TABLET | Refills: 0 | Status: SHIPPED | OUTPATIENT
Start: 2025-06-13

## 2025-06-13 RX ORDER — IBUPROFEN 400 MG/1
800 TABLET, FILM COATED ORAL ONCE
Status: COMPLETED | OUTPATIENT
Start: 2025-06-13 | End: 2025-06-13

## 2025-06-13 RX ORDER — CLINDAMYCIN HYDROCHLORIDE 150 MG/1
300 CAPSULE ORAL ONCE
Status: COMPLETED | OUTPATIENT
Start: 2025-06-13 | End: 2025-06-13

## 2025-06-13 RX ADMIN — CLINDAMYCIN HYDROCHLORIDE 300 MG: 150 CAPSULE ORAL at 09:51

## 2025-06-13 RX ADMIN — IBUPROFEN 800 MG: 400 TABLET, FILM COATED ORAL at 09:51

## 2025-06-13 ASSESSMENT — PAIN - FUNCTIONAL ASSESSMENT
PAIN_FUNCTIONAL_ASSESSMENT: 0-10
PAIN_FUNCTIONAL_ASSESSMENT: ACTIVITIES ARE NOT PREVENTED

## 2025-06-13 ASSESSMENT — ENCOUNTER SYMPTOMS
SHORTNESS OF BREATH: 0
WHEEZING: 0
ABDOMINAL PAIN: 0
COUGH: 0
EYE DISCHARGE: 0
VOMITING: 0
CHEST TIGHTNESS: 0
SORE THROAT: 0
ABDOMINAL DISTENTION: 0
PHOTOPHOBIA: 0

## 2025-06-13 ASSESSMENT — PAIN DESCRIPTION - LOCATION
LOCATION: TEETH
LOCATION: TEETH

## 2025-06-13 ASSESSMENT — PAIN SCALES - GENERAL
PAINLEVEL_OUTOF10: 10
PAINLEVEL_OUTOF10: 10

## 2025-06-13 ASSESSMENT — PAIN DESCRIPTION - ORIENTATION
ORIENTATION: RIGHT;LOWER
ORIENTATION: RIGHT;LOWER

## 2025-06-13 ASSESSMENT — PAIN DESCRIPTION - DESCRIPTORS
DESCRIPTORS: THROBBING
DESCRIPTORS: THROBBING

## 2025-06-13 ASSESSMENT — PAIN DESCRIPTION - PAIN TYPE: TYPE: ACUTE PAIN

## 2025-06-13 ASSESSMENT — PAIN DESCRIPTION - FREQUENCY: FREQUENCY: CONTINUOUS

## 2025-06-13 NOTE — ED PROVIDER NOTES
[facility] EMERGENCY DEPARTMENT  eMERGENCY dEPARTMENT eNCOUnter      Pt Name: Bev Park  MRN: [id number]  Birthdate 1995  Date of evaluation: 6/13/2025  Provider: Gopal Lovett MD    CHIEF COMPLAINT       Chief Complaint   Patient presents with    Dental Pain     Right lower dental pain-onset: 8 days. Has been an ongoing problem for 1.5 years.  Pt admits to not having it extracted when needed. Pt also states she is on oxycodone per pain management for a \"frozen shoulder.\"          HISTORY OF PRESENT ILLNESS   (Location/Symptom, Timing/Onset,Context/Setting, Quality, Duration, Modifying Factors, Severity)  Note limiting factors.   Bev Park is a 30 y.o. female who presents to the emergency department with complaints of dental pain.  Patient states that she has a wisdom tooth that was recommended or removed 3 years ago and she did not get it extracted at that time.  She has had intermittent problems with it since.  She feels like it got infected starting 8 days ago.  Increased pain and there is a small hole in the tooth and at times she does taste some drainage.  No related facial swelling.  No fever or chills.  No difficulty swallowing.    HPI    NursingNotes were reviewed.    REVIEW OF SYSTEMS    (2-9 systems for level 4, 10 or more for level 5)     Review of Systems   Constitutional:  Negative for chills and diaphoresis.   HENT:  Positive for dental problem. Negative for congestion, ear pain, mouth sores and sore throat.    Eyes:  Negative for photophobia and discharge.   Respiratory:  Negative for cough, chest tightness, shortness of breath and wheezing.    Cardiovascular:  Negative for chest pain and palpitations.   Gastrointestinal:  Negative for abdominal distention, abdominal pain and vomiting.   Endocrine: Negative for cold intolerance.   Genitourinary:  Negative for difficulty urinating.   Musculoskeletal:  Negative for arthralgias.   Skin:  Negative for pallor and rash.

## 2025-06-13 NOTE — ED NOTES
Pt is given dc instructions, work excuse, referral packet with local dentists and informed to  two e scripts from [facility].  Pt voiced understanding of dc instructions without further questions.

## 2025-06-17 ENCOUNTER — PATIENT MESSAGE (OUTPATIENT)
Age: 30
End: 2025-06-17

## 2025-06-17 DIAGNOSIS — M25.512 CHRONIC LEFT SHOULDER PAIN: ICD-10-CM

## 2025-06-17 DIAGNOSIS — M24.412 SHOULDER DISLOCATION, RECURRENT, LEFT: ICD-10-CM

## 2025-06-17 DIAGNOSIS — S43.005S DISLOCATION OF LEFT SHOULDER JOINT, SEQUELA: ICD-10-CM

## 2025-06-17 DIAGNOSIS — M12.812 ROTATOR CUFF ARTHROPATHY, LEFT: ICD-10-CM

## 2025-06-17 DIAGNOSIS — G89.29 CHRONIC LEFT SHOULDER PAIN: ICD-10-CM

## 2025-06-17 DIAGNOSIS — M25.312 SHOULDER INSTABILITY, LEFT: ICD-10-CM

## 2025-06-18 NOTE — TELEPHONE ENCOUNTER
Comments:     Last Office Visit (last PCP visit):   5/19/2025    Next Visit Date:  Future Appointments   Date Time Provider Department Center   8/14/2025  8:30 AM Tamar Meza PA-C [facility] DEP         Rx requested:  Requested Prescriptions     Pending Prescriptions Disp Refills    HYDROcodone-acetaminophen (NORCO) 5-325 MG per tablet 60 tablet 0     Sig: Take 1 tablet by mouth every 12 hours as needed for Pain (shoulder) for up to 30 days. Max Daily Amount: 2 tablets

## 2025-06-19 RX ORDER — HYDROCODONE BITARTRATE AND ACETAMINOPHEN 5; 325 MG/1; MG/1
1 TABLET ORAL EVERY 12 HOURS PRN
Qty: 60 TABLET | Refills: 0 | Status: SHIPPED | OUTPATIENT
Start: 2025-06-19 | End: 2025-07-19

## 2025-06-24 ENCOUNTER — TELEPHONE (OUTPATIENT)
Age: 30
End: 2025-06-24

## 2025-06-24 DIAGNOSIS — G40.109 TEMPORAL LOBE EPILEPSY, NON-REFRACTORY (HCC): ICD-10-CM

## 2025-06-24 DIAGNOSIS — R56.9 SEIZURE (HCC): ICD-10-CM

## 2025-06-24 NOTE — TELEPHONE ENCOUNTER
Patient states she has had a seizure on 6/22/25. She is no longer established with a Neurologist due to missed appointments. I have her scheduled for tomorrow with [facility]. She also may visit Ed or walk in today.

## 2025-06-25 ENCOUNTER — TELEPHONE (OUTPATIENT)
Age: 30
End: 2025-06-25

## 2025-06-25 ENCOUNTER — HOSPITAL ENCOUNTER (EMERGENCY)
Age: 30
Discharge: HOME | End: 2025-06-25
Payer: MEDICAID

## 2025-06-25 VITALS
HEART RATE: 92 BPM | BODY MASS INDEX: 24.8 KG/M2 | OXYGEN SATURATION: 99 % | TEMPERATURE: 98 F | SYSTOLIC BLOOD PRESSURE: 135 MMHG | WEIGHT: 140 LBS | HEIGHT: 63 IN | RESPIRATION RATE: 18 BRPM | DIASTOLIC BLOOD PRESSURE: 78 MMHG

## 2025-06-25 DIAGNOSIS — G40.919 BREAKTHROUGH SEIZURE (HCC): ICD-10-CM

## 2025-06-25 DIAGNOSIS — N30.01 ACUTE CYSTITIS WITH HEMATURIA: ICD-10-CM

## 2025-06-25 DIAGNOSIS — K04.7 DENTAL INFECTION: Primary | ICD-10-CM

## 2025-06-25 LAB
ANION GAP SERPL CALCULATED.3IONS-SCNC: 14 MEQ/L (ref 9–15)
BACTERIA URNS QL MICRO: ABNORMAL /HPF
BASOPHILS # BLD: 0.1 K/UL (ref 0–0.1)
BASOPHILS NFR BLD: 0.8 % (ref 0.1–1.2)
BILIRUB UR QL STRIP: ABNORMAL
BUN SERPL-MCNC: 11 MG/DL (ref 6–20)
CALCIUM SERPL-MCNC: 9.3 MG/DL (ref 8.5–9.9)
CHLORIDE SERPL-SCNC: 103 MEQ/L (ref 95–107)
CLARITY UR: ABNORMAL
CO2 SERPL-SCNC: 21 MEQ/L (ref 20–31)
COLOR UR: ABNORMAL
CREAT SERPL-MCNC: 0.6 MG/DL (ref 0.5–0.9)
EOSINOPHIL # BLD: 0.3 K/UL (ref 0–0.4)
EOSINOPHIL NFR BLD: 3.1 % (ref 0.7–5.8)
EPI CELLS #/AREA URNS HPF: ABNORMAL /HPF
ERYTHROCYTE [DISTWIDTH] IN BLOOD BY AUTOMATED COUNT: 16.8 % (ref 11.7–14.4)
GLUCOSE SERPL-MCNC: 98 MG/DL (ref 70–99)
GLUCOSE UR STRIP-MCNC: NEGATIVE MG/DL
HCT VFR BLD AUTO: 41 % (ref 37–47)
HGB BLD-MCNC: 13.1 G/DL (ref 11.2–15.7)
HGB UR QL STRIP: ABNORMAL
HYPOCHROMIA BLD QL SMEAR: ABNORMAL
IMM GRANULOCYTES # BLD: 0 K/UL
IMM GRANULOCYTES NFR BLD: 0.5 %
KETONES UR STRIP-MCNC: 40 MG/DL
LEUKOCYTE ESTERASE UR QL STRIP: ABNORMAL
LYMPHOCYTES # BLD: 2.2 K/UL (ref 1.2–3.7)
LYMPHOCYTES NFR BLD: 25.3 %
MCH RBC QN AUTO: 25.2 PG (ref 25.6–32.2)
MCHC RBC AUTO-ENTMCNC: 32 % (ref 32.2–35.5)
MCV RBC AUTO: 79 FL (ref 79.4–94.8)
MONOCYTES # BLD: 0.5 K/UL (ref 0.2–0.9)
MONOCYTES NFR BLD: 6.1 % (ref 4.7–12.5)
NEUTROPHILS # BLD: 5.5 K/UL (ref 1.6–6.1)
NEUTS SEG NFR BLD: 64.2 % (ref 34–71.1)
NITRITE UR QL STRIP: NEGATIVE
OVALOCYTES BLD QL SMEAR: ABNORMAL
PH UR STRIP: 6 [PH] (ref 5–9)
PHENYTOIN SERPL-MCNC: 15 UG/ML (ref 10–20)
PLATELET # BLD AUTO: 351 K/UL (ref 182–369)
PLATELET BLD QL SMEAR: ADEQUATE
POTASSIUM SERPL-SCNC: 4.1 MEQ/L (ref 3.4–4.9)
PROT UR STRIP-MCNC: 100 MG/DL
RBC # BLD AUTO: 5.19 M/UL (ref 3.93–5.22)
RBC #/AREA URNS HPF: ABNORMAL /HPF (ref 0–2)
SODIUM SERPL-SCNC: 138 MEQ/L (ref 135–144)
SP GR UR STRIP: >=1.03 (ref 1–1.03)
URINE REFLEX TO CULTURE: YES
UROBILINOGEN UR STRIP-ACNC: 1 E.U./DL
WBC # BLD AUTO: 8.6 K/UL (ref 4–10)
WBC #/AREA URNS HPF: ABNORMAL /HPF (ref 0–5)

## 2025-06-25 PROCEDURE — 85025 COMPLETE CBC W/AUTO DIFF WBC: CPT

## 2025-06-25 PROCEDURE — 99284 EMERGENCY DEPT VISIT MOD MDM: CPT

## 2025-06-25 PROCEDURE — 80185 ASSAY OF PHENYTOIN TOTAL: CPT

## 2025-06-25 PROCEDURE — 80048 BASIC METABOLIC PNL TOTAL CA: CPT

## 2025-06-25 PROCEDURE — 87086 URINE CULTURE/COLONY COUNT: CPT

## 2025-06-25 PROCEDURE — 6370000000 HC RX 637 (ALT 250 FOR IP)

## 2025-06-25 PROCEDURE — 81001 URINALYSIS AUTO W/SCOPE: CPT

## 2025-06-25 PROCEDURE — 96372 THER/PROPH/DIAG INJ SC/IM: CPT

## 2025-06-25 PROCEDURE — 6360000002 HC RX W HCPCS

## 2025-06-25 PROCEDURE — 36415 COLL VENOUS BLD VENIPUNCTURE: CPT

## 2025-06-25 PROCEDURE — 96374 THER/PROPH/DIAG INJ IV PUSH: CPT

## 2025-06-25 RX ORDER — LIDOCAINE HYDROCHLORIDE 20 MG/ML
1 SOLUTION OROPHARYNGEAL PRN
Qty: 100 ML | Refills: 0 | Status: SHIPPED | OUTPATIENT
Start: 2025-06-25

## 2025-06-25 RX ORDER — PHENYTOIN SODIUM 200 MG/1
CAPSULE, EXTENDED RELEASE ORAL
Qty: 90 CAPSULE | Refills: 4 | Status: SHIPPED | OUTPATIENT
Start: 2025-06-25

## 2025-06-25 RX ORDER — MORPHINE SULFATE 4 MG/ML
4 INJECTION, SOLUTION INTRAMUSCULAR; INTRAVENOUS ONCE
Refills: 0 | Status: COMPLETED | OUTPATIENT
Start: 2025-06-25 | End: 2025-06-25

## 2025-06-25 RX ORDER — KETOROLAC TROMETHAMINE 30 MG/ML
30 INJECTION, SOLUTION INTRAMUSCULAR; INTRAVENOUS ONCE
Status: COMPLETED | OUTPATIENT
Start: 2025-06-25 | End: 2025-06-25

## 2025-06-25 RX ORDER — PHENYTOIN SODIUM 300 MG/1
CAPSULE, EXTENDED RELEASE ORAL
Qty: 90 CAPSULE | Refills: 4 | Status: SHIPPED | OUTPATIENT
Start: 2025-06-25

## 2025-06-25 RX ORDER — CLINDAMYCIN HYDROCHLORIDE 300 MG/1
300 CAPSULE ORAL 3 TIMES DAILY
Qty: 30 CAPSULE | Refills: 0 | Status: SHIPPED | OUTPATIENT
Start: 2025-06-25 | End: 2025-07-05

## 2025-06-25 RX ORDER — CLINDAMYCIN HYDROCHLORIDE 150 MG/1
300 CAPSULE ORAL ONCE
Status: COMPLETED | OUTPATIENT
Start: 2025-06-25 | End: 2025-06-25

## 2025-06-25 RX ORDER — LIDOCAINE HYDROCHLORIDE 20 MG/ML
5 SOLUTION OROPHARYNGEAL ONCE
Status: COMPLETED | OUTPATIENT
Start: 2025-06-25 | End: 2025-06-25

## 2025-06-25 RX ADMIN — KETOROLAC TROMETHAMINE 30 MG: 30 INJECTION, SOLUTION INTRAMUSCULAR at 13:26

## 2025-06-25 RX ADMIN — LIDOCAINE HYDROCHLORIDE 5 ML: 20 SOLUTION ORAL at 14:25

## 2025-06-25 RX ADMIN — MORPHINE SULFATE 4 MG: 4 INJECTION INTRAVENOUS at 14:24

## 2025-06-25 RX ADMIN — CLINDAMYCIN HYDROCHLORIDE 300 MG: 150 CAPSULE ORAL at 13:26

## 2025-06-25 ASSESSMENT — PAIN DESCRIPTION - LOCATION: LOCATION: TEETH;MOUTH

## 2025-06-25 ASSESSMENT — PAIN SCALES - GENERAL: PAINLEVEL_OUTOF10: 10

## 2025-06-25 ASSESSMENT — PAIN DESCRIPTION - DESCRIPTORS: DESCRIPTORS: ACHING

## 2025-06-25 ASSESSMENT — PAIN DESCRIPTION - PAIN TYPE: TYPE: ACUTE PAIN

## 2025-06-25 ASSESSMENT — ENCOUNTER SYMPTOMS
RESPIRATORY NEGATIVE: 1
FACIAL SWELLING: 0

## 2025-06-25 ASSESSMENT — PAIN DESCRIPTION - ORIENTATION: ORIENTATION: RIGHT;LOWER;UPPER

## 2025-06-25 ASSESSMENT — PAIN DESCRIPTION - ONSET: ONSET: GRADUAL

## 2025-06-25 ASSESSMENT — PAIN DESCRIPTION - FREQUENCY: FREQUENCY: CONTINUOUS

## 2025-06-25 ASSESSMENT — LIFESTYLE VARIABLES
HOW MANY STANDARD DRINKS CONTAINING ALCOHOL DO YOU HAVE ON A TYPICAL DAY: 1 OR 2
HOW OFTEN DO YOU HAVE A DRINK CONTAINING ALCOHOL: MONTHLY OR LESS

## 2025-06-25 ASSESSMENT — PAIN - FUNCTIONAL ASSESSMENT
PAIN_FUNCTIONAL_ASSESSMENT: 0-10
PAIN_FUNCTIONAL_ASSESSMENT: PREVENTS OR INTERFERES SOME ACTIVE ACTIVITIES AND ADLS

## 2025-06-25 NOTE — ED TRIAGE NOTES
Patient in with dental pain, she states  her seizures have increased.   Verbal consent was given by patient to conduct this telemed visit with Dr Meehan.  The visit was performed via telephone.    Clinic/Physician Location : [facility]  Patient Location: Home    This phone call occurred on   Date and Time: 04/12/23 8:22 AM        In addition to the telemedicine visit:    [] Patients physician was contacted for coordination of care.  [] Consultation(s) ordered (MD,  etc.)  [x] Precertification/referral for approval of treatment plan initiated.  [x] Medication Compliance Audit was completed, and prescription monitoring report reviewed.  [x] Routine discharge instructions given.  [] Complex discharge instructions given.

## 2025-06-25 NOTE — ED PROVIDER NOTES
superficial or posterior cervical adenopathy.   Skin:     General: Skin is warm and dry.      Capillary Refill: Capillary refill takes less than 2 seconds.   Neurological:      Mental Status: She is alert and oriented to person, place, and time. Mental status is at baseline.      Cranial Nerves: No facial asymmetry.      Motor: Motor function is intact.      Coordination: Coordination is intact.      Gait: Gait is intact.   Psychiatric:         Attention and Perception: Attention normal.         Mood and Affect: Mood normal.         Speech: Speech normal.         Behavior: Behavior normal.         Thought Content: Thought content normal.         Cognition and Memory: Cognition normal.         Judgment: Judgment normal.           All other labs were within normal range or not returned as of this dictation.    EMERGENCY DEPARTMENT COURSE and DIFFERENTIALDIAGNOSIS/MDM:   Vitals:    Vitals:    06/25/25 1300   BP: 135/78   Pulse: 92   Resp: 18   Temp: 98 °F (36.7 °C)   TempSrc: Oral   SpO2: 99%   Weight: 63.5 kg (140 lb)   Height: 1.6 m (5' 3\")            Bev Park is a 30 y.o. female who presents to the Emergency Department with dental pain ongoing for greater than 1 week swelling in the gums and throat right lower posterior molar \"wisdom tooth\".  Reports 2 seizures in the past 3 days concerned for breakthrough seizures due to infection.  She reports regular use of her Depakote and phenytoin for epilepsy with the exception of last night reports she is had \"brain fog\" since seizure.  Patient is currently answering questions appropriately does not appear in distress no slurred speech normal facial symmetry.  Denies headache pain oral cavity is examined there is gingival swelling in the right posterior side broken tooth is noted the second molar from the back also appears to have some black discoloration.  Neck is supple there are no masses palpated.  CBC BMP within normal limits urinalysis leukocyte Estrace

## 2025-06-26 LAB — BACTERIA UR CULT: NORMAL

## 2025-06-29 DIAGNOSIS — R00.0 TACHYCARDIA: ICD-10-CM

## 2025-06-30 RX ORDER — CARVEDILOL 3.12 MG/1
3.12 TABLET ORAL 2 TIMES DAILY
Qty: 60 TABLET | Refills: 3 | Status: SHIPPED | OUTPATIENT
Start: 2025-06-30

## 2025-06-30 RX ORDER — HYDROXYZINE HYDROCHLORIDE 25 MG/1
25 TABLET, FILM COATED ORAL EVERY 8 HOURS PRN
Qty: 90 TABLET | Refills: 5 | Status: SHIPPED | OUTPATIENT
Start: 2025-06-30

## 2025-06-30 NOTE — TELEPHONE ENCOUNTER
Comments:     Last Office Visit (last PCP visit):   5/19/2025    Next Visit Date:  No future appointments.      Rx requested:  Requested Prescriptions     Pending Prescriptions Disp Refills    carvedilol (COREG) 3.125 MG tablet [Pharmacy Med Name: carvedilol 3.125 mg tablet] 60 tablet 3     Sig: Take 1 tablet by mouth 2 times daily    hydrOXYzine HCl (ATARAX) 25 MG tablet [Pharmacy Med Name: hydroxyzine HCl 25 mg tablet] 90 tablet 5     Sig: Take 1 tablet by mouth every 8 hours as needed for Itching

## 2025-08-25 ENCOUNTER — PATIENT MESSAGE (OUTPATIENT)
Age: 30
End: 2025-08-25

## 2025-08-25 DIAGNOSIS — M25.512 CHRONIC LEFT SHOULDER PAIN: ICD-10-CM

## 2025-08-25 DIAGNOSIS — M24.412 SHOULDER DISLOCATION, RECURRENT, LEFT: ICD-10-CM

## 2025-08-25 DIAGNOSIS — G40.109 TEMPORAL LOBE EPILEPSY, NON-REFRACTORY (HCC): ICD-10-CM

## 2025-08-25 DIAGNOSIS — M25.312 SHOULDER INSTABILITY, LEFT: ICD-10-CM

## 2025-08-25 DIAGNOSIS — G89.29 CHRONIC LEFT SHOULDER PAIN: ICD-10-CM

## 2025-08-25 DIAGNOSIS — R56.9 SEIZURE (HCC): ICD-10-CM

## 2025-08-25 DIAGNOSIS — S43.005S DISLOCATION OF LEFT SHOULDER JOINT, SEQUELA: ICD-10-CM

## 2025-08-25 DIAGNOSIS — F41.9 ANXIETY: ICD-10-CM

## 2025-08-25 DIAGNOSIS — M12.812 ROTATOR CUFF ARTHROPATHY, LEFT: ICD-10-CM

## 2025-08-26 RX ORDER — CLONAZEPAM 1 MG/1
1 TABLET ORAL 2 TIMES DAILY PRN
Qty: 60 TABLET | Refills: 2 | Status: SHIPPED | OUTPATIENT
Start: 2025-08-26 | End: 2025-11-24

## 2025-08-26 RX ORDER — DIVALPROEX SODIUM 500 MG/1
500 TABLET, DELAYED RELEASE ORAL 3 TIMES DAILY
Qty: 90 TABLET | Refills: 3 | Status: SHIPPED | OUTPATIENT
Start: 2025-08-26

## 2025-08-26 RX ORDER — HYDROCODONE BITARTRATE AND ACETAMINOPHEN 5; 325 MG/1; MG/1
1 TABLET ORAL EVERY 12 HOURS PRN
Qty: 60 TABLET | Refills: 0 | Status: SHIPPED | OUTPATIENT
Start: 2025-08-26 | End: 2025-09-25

## 2025-09-02 ENCOUNTER — APPOINTMENT (OUTPATIENT)
Dept: GENERAL RADIOLOGY | Age: 30
End: 2025-09-02
Payer: MEDICAID

## 2025-09-02 ENCOUNTER — HOSPITAL ENCOUNTER (EMERGENCY)
Age: 30
Discharge: HOME | End: 2025-09-02
Attending: EMERGENCY MEDICINE
Payer: MEDICAID

## 2025-09-02 VITALS
TEMPERATURE: 97.8 F | HEART RATE: 70 BPM | WEIGHT: 140 LBS | RESPIRATION RATE: 20 BRPM | SYSTOLIC BLOOD PRESSURE: 118 MMHG | OXYGEN SATURATION: 100 % | BODY MASS INDEX: 24.8 KG/M2 | HEIGHT: 63 IN | DIASTOLIC BLOOD PRESSURE: 72 MMHG

## 2025-09-02 DIAGNOSIS — B34.9 VIRAL ILLNESS: Primary | ICD-10-CM

## 2025-09-02 DIAGNOSIS — R11.2 NAUSEA AND VOMITING, UNSPECIFIED VOMITING TYPE: ICD-10-CM

## 2025-09-02 DIAGNOSIS — R10.13 ABDOMINAL PAIN, EPIGASTRIC: ICD-10-CM

## 2025-09-02 LAB
ALBUMIN SERPL-MCNC: 4.2 G/DL (ref 3.5–4.6)
ALP SERPL-CCNC: 76 U/L (ref 40–130)
ALT SERPL-CCNC: 25 U/L (ref 0–33)
ANION GAP SERPL CALCULATED.3IONS-SCNC: 11 MEQ/L (ref 9–15)
AST SERPL-CCNC: 24 U/L (ref 0–35)
BACTERIA URNS QL MICRO: ABNORMAL /HPF
BASOPHILS # BLD: 0.1 K/UL (ref 0–0.1)
BASOPHILS NFR BLD: 0.8 % (ref 0.1–1.2)
BILIRUB SERPL-MCNC: <0.2 MG/DL (ref 0.2–0.7)
BILIRUB UR QL STRIP: NEGATIVE
BUN SERPL-MCNC: 8 MG/DL (ref 6–20)
CALCIUM SERPL-MCNC: 8.9 MG/DL (ref 8.5–9.9)
CHLORIDE SERPL-SCNC: 104 MEQ/L (ref 95–107)
CLARITY UR: CLEAR
CO2 SERPL-SCNC: 22 MEQ/L (ref 20–31)
COLOR UR: YELLOW
CREAT SERPL-MCNC: 0.5 MG/DL (ref 0.5–0.9)
EOSINOPHIL # BLD: 0.2 K/UL (ref 0–0.4)
EOSINOPHIL NFR BLD: 1.8 % (ref 0.7–5.8)
EPI CELLS #/AREA URNS HPF: ABNORMAL /HPF
ERYTHROCYTE [DISTWIDTH] IN BLOOD BY AUTOMATED COUNT: 16.5 % (ref 11.7–14.4)
GLOBULIN SER CALC-MCNC: 3.1 G/DL (ref 2.3–3.5)
GLUCOSE SERPL-MCNC: 93 MG/DL (ref 70–99)
GLUCOSE UR STRIP-MCNC: NEGATIVE MG/DL
HCG UR QL: NEGATIVE
HCT VFR BLD AUTO: 38.2 % (ref 37–47)
HGB BLD-MCNC: 11.9 G/DL (ref 11.2–15.7)
HGB UR QL STRIP: NEGATIVE
IMM GRANULOCYTES # BLD: 0 K/UL
IMM GRANULOCYTES NFR BLD: 0.3 %
INFLUENZA A BY PCR: NEGATIVE
INFLUENZA B BY PCR: NEGATIVE
KETONES UR STRIP-MCNC: NEGATIVE MG/DL
LEUKOCYTE ESTERASE UR QL STRIP: NORMAL
LIPASE SERPL-CCNC: 27 U/L (ref 12–95)
LYMPHOCYTES # BLD: 1.8 K/UL (ref 1.2–3.7)
LYMPHOCYTES NFR BLD: 19.4 %
MCH RBC QN AUTO: 24.2 PG (ref 25.6–32.2)
MCHC RBC AUTO-ENTMCNC: 31.2 % (ref 32.2–35.5)
MCV RBC AUTO: 77.6 FL (ref 79.4–94.8)
MONOCYTES # BLD: 0.4 K/UL (ref 0.2–0.9)
MONOCYTES NFR BLD: 4.4 % (ref 4.7–12.5)
NEUTROPHILS # BLD: 6.8 K/UL (ref 1.6–6.1)
NEUTS SEG NFR BLD: 73.3 % (ref 34–71.1)
NITRITE UR QL STRIP: NEGATIVE
PH UR STRIP: 6 [PH] (ref 5–9)
PLATELET # BLD AUTO: 337 K/UL (ref 182–369)
POTASSIUM SERPL-SCNC: 4.2 MEQ/L (ref 3.4–4.9)
PROT SERPL-MCNC: 7.3 G/DL (ref 6.3–8)
PROT UR STRIP-MCNC: NEGATIVE MG/DL
RBC # BLD AUTO: 4.92 M/UL (ref 3.93–5.22)
RBC #/AREA URNS HPF: ABNORMAL /HPF (ref 0–2)
SARS-COV-2 RDRP RESP QL NAA+PROBE: NOT DETECTED
SODIUM SERPL-SCNC: 137 MEQ/L (ref 135–144)
SP GR UR STRIP: 1.02 (ref 1–1.03)
STREP GRP A PCR: NEGATIVE
URINE REFLEX TO CULTURE: NORMAL
UROBILINOGEN UR STRIP-ACNC: 0.2 E.U./DL
WBC # BLD AUTO: 9.3 K/UL (ref 4–10)
WBC #/AREA URNS HPF: ABNORMAL /HPF (ref 0–5)

## 2025-09-02 PROCEDURE — 6370000000 HC RX 637 (ALT 250 FOR IP): Performed by: EMERGENCY MEDICINE

## 2025-09-02 PROCEDURE — 87635 SARS-COV-2 COVID-19 AMP PRB: CPT

## 2025-09-02 PROCEDURE — 36415 COLL VENOUS BLD VENIPUNCTURE: CPT

## 2025-09-02 PROCEDURE — 84703 CHORIONIC GONADOTROPIN ASSAY: CPT

## 2025-09-02 PROCEDURE — 80053 COMPREHEN METABOLIC PANEL: CPT

## 2025-09-02 PROCEDURE — 96376 TX/PRO/DX INJ SAME DRUG ADON: CPT

## 2025-09-02 PROCEDURE — 74022 RADEX COMPL AQT ABD SERIES: CPT

## 2025-09-02 PROCEDURE — 81001 URINALYSIS AUTO W/SCOPE: CPT

## 2025-09-02 PROCEDURE — 87651 STREP A DNA AMP PROBE: CPT

## 2025-09-02 PROCEDURE — 2580000003 HC RX 258: Performed by: EMERGENCY MEDICINE

## 2025-09-02 PROCEDURE — 85025 COMPLETE CBC W/AUTO DIFF WBC: CPT

## 2025-09-02 PROCEDURE — 99284 EMERGENCY DEPT VISIT MOD MDM: CPT

## 2025-09-02 PROCEDURE — 6360000002 HC RX W HCPCS: Performed by: EMERGENCY MEDICINE

## 2025-09-02 PROCEDURE — 83690 ASSAY OF LIPASE: CPT

## 2025-09-02 PROCEDURE — 87502 INFLUENZA DNA AMP PROBE: CPT

## 2025-09-02 PROCEDURE — 96374 THER/PROPH/DIAG INJ IV PUSH: CPT

## 2025-09-02 RX ORDER — DICYCLOMINE HYDROCHLORIDE 10 MG/1
10 CAPSULE ORAL
Qty: 10 CAPSULE | Refills: 0 | Status: SHIPPED | OUTPATIENT
Start: 2025-09-02

## 2025-09-02 RX ORDER — ONDANSETRON 2 MG/ML
4 INJECTION INTRAMUSCULAR; INTRAVENOUS ONCE
Status: COMPLETED | OUTPATIENT
Start: 2025-09-02 | End: 2025-09-02

## 2025-09-02 RX ORDER — DICYCLOMINE HYDROCHLORIDE 10 MG/1
10 CAPSULE ORAL ONCE
Status: COMPLETED | OUTPATIENT
Start: 2025-09-02 | End: 2025-09-02

## 2025-09-02 RX ORDER — 0.9 % SODIUM CHLORIDE 0.9 %
1000 INTRAVENOUS SOLUTION INTRAVENOUS ONCE
Status: COMPLETED | OUTPATIENT
Start: 2025-09-02 | End: 2025-09-02

## 2025-09-02 RX ORDER — ONDANSETRON 4 MG/1
4 TABLET, ORALLY DISINTEGRATING ORAL 3 TIMES DAILY PRN
Qty: 8 TABLET | Refills: 0 | Status: SHIPPED | OUTPATIENT
Start: 2025-09-02

## 2025-09-02 RX ADMIN — SODIUM CHLORIDE 1000 ML: 0.9 INJECTION, SOLUTION INTRAVENOUS at 10:17

## 2025-09-02 RX ADMIN — ONDANSETRON 4 MG: 2 INJECTION, SOLUTION INTRAMUSCULAR; INTRAVENOUS at 13:27

## 2025-09-02 RX ADMIN — ONDANSETRON 4 MG: 2 INJECTION, SOLUTION INTRAMUSCULAR; INTRAVENOUS at 10:17

## 2025-09-02 RX ADMIN — DICYCLOMINE HYDROCHLORIDE 10 MG: 10 CAPSULE ORAL at 10:17

## 2025-09-02 ASSESSMENT — ENCOUNTER SYMPTOMS
COLOR CHANGE: 0
VOMITING: 1
EYE DISCHARGE: 0
DIARRHEA: 1
COUGH: 0
NAUSEA: 1
EYE REDNESS: 0
ABDOMINAL PAIN: 1
SINUS PAIN: 0
SORE THROAT: 1
BACK PAIN: 0
SHORTNESS OF BREATH: 0

## 2025-09-02 ASSESSMENT — PAIN - FUNCTIONAL ASSESSMENT: PAIN_FUNCTIONAL_ASSESSMENT: 0-10

## 2025-09-02 ASSESSMENT — PAIN SCALES - GENERAL
PAINLEVEL_OUTOF10: 5
PAINLEVEL_OUTOF10: 0

## 2025-09-02 ASSESSMENT — LIFESTYLE VARIABLES
HOW OFTEN DO YOU HAVE A DRINK CONTAINING ALCOHOL: NEVER
HOW MANY STANDARD DRINKS CONTAINING ALCOHOL DO YOU HAVE ON A TYPICAL DAY: PATIENT DOES NOT DRINK

## (undated) DEVICE — SYRINGE MED 50ML LUERLOCK TIP

## (undated) DEVICE — NEEDLE SPNL 18GA L3.5IN W/ QNCKE SHARPER BVL DURA CLICK

## (undated) DEVICE — SUTURE VCRL SZ 0 L27IN ABSRB UD L26MM CP-2 1/2 CIR SGL J870H

## (undated) DEVICE — STOCKINETTE,IMPERVIOUS,12X48,STERILE: Brand: MEDLINE

## (undated) DEVICE — GLOVE ORANGE PI 8   MSG9080

## (undated) DEVICE — PAD,ABDOMINAL,8"X10",ST,LF: Brand: MEDLINE

## (undated) DEVICE — [AGGRESSIVE PLUS CUTTER, ARTHROSCOPIC SHAVER BLADE,  DO NOT RESTERILIZE,  DO NOT USE IF PACKAGE IS DAMAGED,  KEEP DRY,  KEEP AWAY FROM SUNLIGHT]: Brand: FORMULA

## (undated) DEVICE — GOWN,SIRUS,POLYRNF,BRTHSLV,XLN/XL,20/CS: Brand: MEDLINE

## (undated) DEVICE — COVER LT HNDL BLU PLAS

## (undated) DEVICE — SUTURE VCRL SZ 2-0 L27IN ABSRB UD L26MM CT-2 1/2 CIR J269H

## (undated) DEVICE — SHUTTLE SUT 90DEG UP CHIA IDEAL

## (undated) DEVICE — Device

## (undated) DEVICE — BANDAGE COBAN 4 IN COMPR W4INXL5YD FOAM COHESIVE QUIK STK SELF ADH SFT

## (undated) DEVICE — DRESSING THER CLD UNIV STRL ICEMAN

## (undated) DEVICE — PACK,BASIC: Brand: MEDLINE

## (undated) DEVICE — SUTURE MCRYL SZ 3-0 L27IN ABSRB UD L19MM PS-2 3/8 CIR PRIM Y427H

## (undated) DEVICE — CLASSIC CLD THER SYS

## (undated) DEVICE — POSITIONER PT UNIV HD RESTRN DISP

## (undated) DEVICE — ELECTRODE PT RET AD L9FT HI MOIST COND ADH HYDRGEL CORDED

## (undated) DEVICE — ABSORBENT ROLL ECODRI-SAFE

## (undated) DEVICE — DRAPE,U/ SHT,SPLIT,PLAS,STERIL: Brand: MEDLINE

## (undated) DEVICE — GOWN,AURORA,NONREINFORCED,LARGE: Brand: MEDLINE

## (undated) DEVICE — ELECTRODE RF SUCT HND CTRL DISPOSABLE VAPR COOLPULSE 90

## (undated) DEVICE — APPLICATOR MEDICATED 26 CC SOLUTION HI LT ORNG CHLORAPREP

## (undated) DEVICE — COUNTER NDL 40 COUNT HLD 70 FOAM BLK ADH W/ MAG

## (undated) DEVICE — DRAPE,SHOULDER,BEACH ULTRAGARD: Brand: MEDLINE

## (undated) DEVICE — TOWEL,OR,DSP,ST,BLUE,STD,4/PK,20PK/CS: Brand: MEDLINE

## (undated) DEVICE — UNIVERSAL CANNULA SET WITH FENESTRATIONS 5.5 MM (I.D.) X 70 MM: Brand: CONMED

## (undated) DEVICE — SHEET,DRAPE,53X77,STERILE: Brand: MEDLINE

## (undated) DEVICE — DRAPE EQUIP TRNSPRT CONTAINMENT FOR BK TAB

## (undated) DEVICE — INTENDED FOR TISSUE SEPARATION, AND OTHER PROCEDURES THAT REQUIRE A SHARP SURGICAL BLADE TO PUNCTURE OR CUT.: Brand: BARD-PARKER ® CARBON RIB-BACK BLADES

## (undated) DEVICE — 3M™ STERI-STRIP™ REINFORCED ADHESIVE SKIN CLOSURES, R1547, 1/2 IN X 4 IN (12 MM X 100 MM), 6 STRIPS/ENVELOPE: Brand: 3M™ STERI-STRIP™

## (undated) DEVICE — LABEL MED MINI W/ MARKER

## (undated) DEVICE — 4-PORT MANIFOLD: Brand: NEPTUNE 2

## (undated) DEVICE — PAD COOL W10.9XL11.3IN UNIV REG HOSE WRP ON THER CLD

## (undated) DEVICE — TAPE,ELASTIC,FOAM,CURAD,3"X5.5YD,LF: Brand: CURAD

## (undated) DEVICE — [AGGRESSIVE 6-FLUTE BARREL BUR, ARTHROSCOPIC SHAVER BLADE,  DO NOT RESTERILIZE,  DO NOT USE IF PACKAGE IS DAMAGED,  KEEP DRY,  KEEP AWAY FROM SUNLIGHT]: Brand: FORMULA

## (undated) DEVICE — DRESSING PETRO W3XL8IN OIL EMUL N ADH GZ KNIT IMPREG CELOS

## (undated) DEVICE — TUBING, SUCTION, 3/16" X 12', STRAIGHT: Brand: MEDLINE

## (undated) DEVICE — SPONGE,LAP,18"X18",DLX,XR,ST,5/PK,40/PK: Brand: MEDLINE

## (undated) DEVICE — SPONGE GZ W4XL4IN COT 12 PLY TYP VII WVN C FLD DSGN

## (undated) DEVICE — TUBING PMP L8FT LNG W/ CONN FOR AR-6400 REDEUCE

## (undated) DEVICE — MARKER SURG SKIN GENTIAN VLT REG TIP W/ 6IN RUL

## (undated) DEVICE — KIT CHAIR TRIMANO FOAM W/ SUPP ARM DRP ERGONOMICALLY DESIGNED

## (undated) DEVICE — 3M™ IOBAN™ 2 ANTIMICROBIAL INCISE DRAPE 6650EZ: Brand: IOBAN™ 2

## (undated) DEVICE — 3M™ STERI-DRAPE™ U-DRAPE 1015: Brand: STERI-DRAPE™